# Patient Record
Sex: FEMALE | Race: WHITE | NOT HISPANIC OR LATINO | Employment: OTHER | URBAN - METROPOLITAN AREA
[De-identification: names, ages, dates, MRNs, and addresses within clinical notes are randomized per-mention and may not be internally consistent; named-entity substitution may affect disease eponyms.]

---

## 2019-04-24 NOTE — TELEPHONE ENCOUNTER
RECORDS RECEIVED FROM: Compression fracture T-12 vertebrae, referral Dr. Melissa Leon, records available at [facility] in [facility], pt was seen by Dr. Dangelo- fax number is [phone], appt per pt   DATE RECEIVED: May 7, 2019    NOTES STATUS DETAILS   OFFICE NOTE from referring provider Not found Dr. Melissa Leon   OFFICE NOTE from other specialist Care Everywhere Dr Horn 10/27/16  Dr. Ferreira 9/28/16   DISCHARGE SUMMARY from hospital N/A    DISCHARGE REPORT from the ER N/A    OPERATIVE REPORT N/A    MEDICATION LIST Care Everywhere    IMPLANT RECORD/STICKER N/A    LABS     CBC/DIFF N/A    CULTURES N/A    INJECTIONS DONE IN RADIOLOGY Received 8/29/18   MRI Received 10/7/16   CT SCAN N/A    XRAYS (IMAGES & REPORTS) Received 8/6/18   TUMOR     PATHOLOGY  Slides & report N/A      04/24/19   2:22 PM  Faxed request to above number. Note, this is a colorectal specialist. Likely unrelated.    05/03/19   10:20 AM  Spoke with Jannet at [facility], she is pushing images

## 2019-05-03 DIAGNOSIS — M54.9 BACK PAIN: Primary | ICD-10-CM

## 2019-05-07 ENCOUNTER — ANCILLARY PROCEDURE (OUTPATIENT)
Dept: GENERAL RADIOLOGY | Facility: CLINIC | Age: 84
End: 2019-05-07
Attending: ORTHOPAEDIC SURGERY
Payer: MEDICARE

## 2019-05-07 ENCOUNTER — OFFICE VISIT (OUTPATIENT)
Dept: ORTHOPEDICS | Facility: CLINIC | Age: 84
End: 2019-05-07
Payer: MEDICARE

## 2019-05-07 ENCOUNTER — PRE VISIT (OUTPATIENT)
Dept: ORTHOPEDICS | Facility: CLINIC | Age: 84
End: 2019-05-07

## 2019-05-07 VITALS — BODY MASS INDEX: 21.29 KG/M2 | WEIGHT: 127.8 LBS | HEIGHT: 65 IN

## 2019-05-07 DIAGNOSIS — S22.080A WEDGE COMPRESSION FRACTURE OF T11-T12 VERTEBRA, INITIAL ENCOUNTER FOR CLOSED FRACTURE (H): ICD-10-CM

## 2019-05-07 DIAGNOSIS — S22.080A CLOSED WEDGE COMPRESSION FRACTURE OF ELEVENTH THORACIC VERTEBRA, INITIAL ENCOUNTER: Primary | ICD-10-CM

## 2019-05-07 DIAGNOSIS — M54.9 BACK PAIN: ICD-10-CM

## 2019-05-07 RX ORDER — IBUPROFEN 600 MG/1
600 TABLET, FILM COATED ORAL
Status: ON HOLD | COMMUNITY
Start: 2016-09-28 | End: 2020-09-10

## 2019-05-07 RX ORDER — FLUTICASONE PROPIONATE 50 MCG
2 SPRAY, SUSPENSION (ML) NASAL EVERY MORNING
COMMUNITY
Start: 2014-12-15

## 2019-05-07 RX ORDER — TRIAMTERENE/HYDROCHLOROTHIAZID 37.5-25 MG
37.5 TABLET ORAL DAILY
Status: ON HOLD | COMMUNITY
Start: 2019-02-15 | End: 2020-09-10

## 2019-05-07 RX ORDER — METOPROLOL SUCCINATE 25 MG/1
25 TABLET, EXTENDED RELEASE ORAL EVERY MORNING
COMMUNITY
Start: 2018-08-22 | End: 2022-03-15

## 2019-05-07 ASSESSMENT — ENCOUNTER SYMPTOMS
STIFFNESS: 0
SNORES LOUDLY: 0
NECK PAIN: 0
MUSCLE WEAKNESS: 0
MYALGIAS: 0
BACK PAIN: 1
SHORTNESS OF BREATH: 0
MUSCLE CRAMPS: 1
POSTURAL DYSPNEA: 0
ARTHRALGIAS: 0
JOINT SWELLING: 0
COUGH DISTURBING SLEEP: 0
DYSPNEA ON EXERTION: 0
HEMOPTYSIS: 0
COUGH: 1
WHEEZING: 0

## 2019-05-07 ASSESSMENT — MIFFLIN-ST. JEOR: SCORE: 1045.58

## 2019-05-07 NOTE — PROGRESS NOTES
REASON FOR CONSULTATION: Consult (Low back pain, compression fracture T-12 Vertebra referral by Dr. Melissa Leon )     REFERRING PHYSICIAN: Referred Self   PCP:Cameron Chandler    History of Present Illness:  83/f, for evaluation of a T11 body compression fracture.  Patient does not report any specific event that caused her back pain.  Notes pain in her back since early 2000's.  Has been getting worse for the last several years.  She has tried physical therapy and water aerobics, which have not helped very much.  Ibuprofen helps occasionally.  She has had 3 steroid injections into her back, the most recent one on 8/29/2018, which did not work (appears to be a facet joint injection).   Reports the pain is gone to limit her quality of life and ability to daily activities.  She has pain when sitting or walking for long durations.  Pain is better when lying down on a wedge.  She would like testing done at this point as it significantly bothering her.      Back 100%, Leg 0%,  Left > Right  Worse: Sitting, standing or walking  Better: Lying down    Previous treatment:   Epidural steroid injection, 8/29/2018 most recent one, did not work  PT and water aerobics, which has not worked.      Oswestry (DACIA) Questionnaire    OSWESTRY DISABILITY INDEX 5/7/2019   Count 8   Sum 14   Oswestry Score (%) 35   Some recent data might be hidden          ROS:  A 12-point review of systems was completed and is negative except for otherwise noted above in the history of present illness.    Med Hx:  No past medical history on file.    Surg Hx:  No past surgical history on file.    Allergies:  Allergies   Allergen Reactions     Rosuvastatin Muscle Pain (Myalgia)     Seasonal Allergies      Atorvastatin      PN: Reaction: BLURRED VISION     Fenofibrate Muscle Pain (Myalgia)     Fluconazole Nausea     Losartan      PN: Reaction: Back pain and cramping     Pravastatin      PN:  Reaction: JOINT AND MUSCLE PAIN     Simvastatin Nausea     Niacin  "Rash       Meds:  Current Outpatient Medications   Medication     aspirin (ASA) 81 MG EC tablet     cholecalciferol (VITAMIN D-1000 MAX ST) 1000 units TABS     fluticasone (FLONASE) 50 MCG/ACT nasal spray     ibuprofen (ADVIL/MOTRIN) 600 MG tablet     metoprolol succinate ER (TOPROL-XL) 25 MG 24 hr tablet     triamterene-HCTZ (MAXZIDE-25) 37.5-25 MG tablet     No current facility-administered medications for this visit.        Fam Hx:  No family history on file.    P/S Hx:  Social History     Tobacco Use     Smoking status: Not on file   Substance Use Topics     Alcohol use: Not on file         Physical Exam:  Very pleasant, healthy appearing, alert, oriented x 3, cooperative.  Normal mood and affect.  Not in cardiorespiratory distress.  Ht 1.651 m (5' 5\")   Wt 58 kg (127 lb 12.8 oz)   BMI 21.27 kg/m    Normal upright posture.    Normal gait without assistive device.  No antalgia / imbalance.  Able to walk on toes and on heels with ease.  Back: no deformity, no skin lesions or surgical scars.  Localizes pain at low back and PSIS  Tenderness: (+) lumbar midline, (+) L paraspinal, (-) R and (+) L PSIS.  ROM:   Pain with extension.   Mild pain with flexion    Neuro Exam:  Motor: 5/5 strength for all muscle groups in both LE's.  Sensory:  Intact to light touch in both LE's.   Reflexes:  Knee 2+ bilat.  Ankle 2+ bilat.  (-) Babinski, (-) clonus.    Lower Extremity:  Equal leg lengths, full pulses, (-) atrophy / asymmetry.  Full painless passive knee and ankle motion.  Straight leg raise: (-) right, (-) left.  Hip impingement: (-) right, (-) left.  THERESA/Chinmay's: (-) right, (+) left.      Sacroiliac Joint Tests:      TEST RIGHT LEFT   PSIS tenderness - +   Dioni finger sign - -   THERESA/Chinmay - +   Thigh thrust - -           Gapping -   Compression -   Sacral thrust -   Gaenslen's -           Imaging:  Lumbar AP lateral x-ray from 8/6/2018 reviewed.  This shows chronic appearing T11 compression deformity.  " Multilevel lumbar spondylosis.  Thoracic hyperkyphosis; thoracolumbar junction kyphosis, with compensatory lumbar hyperlordosis and pelvic retroversion.  Sagittal measurements:  LL 43  L4-S1 30, ideal 28  PI 42  PI-LL mismatch -1  PT 26  SVA +4.0cm  TPA 22  GT 30  T10-L2 kyph 32  TK T2-L1 87    Impression:   - 83/f with a T11 compression fracture of unknown chronicity and etiology, kyphosis with T-spine round back, and lumbar paraspinal pain and weakness    Plan:   Discussed physical therapy with the patient to strengthen her paraspinal muscles.  Patient states that she is tried physical therapy in the past and did not work very well.  Discussed a interlaminar epidural steroid injection for L4-5.  Patient is in agreement of this plan and will undergo the injection.  Discussed possible surgery in the future for posterior spinal fusion with osteotomies.  We discussed this to be a large procedure and will undergo for someone of her age.  Patient agrees and like to hold off on surgery as long as possible.  Patient will call the office with results of the injection.  Follow-up PRN    All questions and concerns were answered to the patient's apparent satisfaction before leaving the clinic.     Total visit time > 30 mins, > 50% counseling and coordination of care.    Luis E Otoole MD    Attestation:  I (Dr. Acosta Whipple - Spine Surgeon) have personally evaluated patient with PGY-4 Xiang, and agree with findings and plan outlined in the note, which I also edited.  I discussed at length with the patient/family, explained the nature of spinal condition, and formulated workup and/or treatment plan together.  All questions were answered to the best of my ability and to patient's apparent satisfaction.    A>  - Old burst fracture L3; old compression fracture T9; unknown date/mechanism of fractures.  - Probable osteoporosis.  - Thoracic hyperkyphosis (TK T2-L1 = 87 degrees) and thoracolumbar junction kyphosis (T10-L2  = 32 degrees kyphosis), with compensatory lumbar hyperlordosis and pelvic retroversion (PT = 26).    P> as above.      Acosta Whipple MD    Orthopaedic Spine Surgery  Dept Orthopaedic Surgery, [facility]  [phone] office, [phone] pager  [email or web address]    Answers for HPI/ROS submitted by the patient on 5/7/2019   General Symptoms: No  Skin Symptoms: No  HENT Symptoms: No  EYE SYMPTOMS: No  HEART SYMPTOMS: No  LUNG SYMPTOMS: Yes  INTESTINAL SYMPTOMS: No  URINARY SYMPTOMS: No  GYNECOLOGIC SYMPTOMS: No  BREAST SYMPTOMS: No  SKELETAL SYMPTOMS: Yes  BLOOD SYMPTOMS: No  NERVOUS SYSTEM SYMPTOMS: No  MENTAL HEALTH SYMPTOMS: No  Cough: Yes  Coughing up blood: No  Difficulty breating or shortness of breath: No  Snoring: No  Wheezing: No  Difficulty breathing on exertion: No  Nighttime Cough: No  Difficulty breathing when lying flat: No  Back pain: Yes  Muscle aches: No  Neck pain: No  Swollen joints: No  Joint pain: No  Bone pain: Yes  Muscle cramps: Yes  Muscle weakness: No  Joint stiffness: No

## 2019-05-07 NOTE — LETTER
5/7/2019       RE: Lydia Dietz  [address] [facility] [address]     Dear Colleague,    Thank you for referring your patient, Lydia Dietz, to the [facility]. Please see a copy of my visit note below.    REASON FOR CONSULTATION: Consult (Low back pain, compression fracture T-12 Vertebra referral by Dr. Melissa Leon )     REFERRING PHYSICIAN: Referred Self   PCP:Cameron Chandler    History of Present Illness:  83/f, for evaluation of a T11 body compression fracture.  Patient does not report any specific event that caused her back pain.  Notes pain in her back since early 2000's.  Has been getting worse for the last several years.  She has tried physical therapy and water aerobics, which have not helped very much.  Ibuprofen helps occasionally.  She has had 3 steroid injections into her back, the most recent one on 8/29/2018, which did not work (appears to be a facet joint injection).   Reports the pain is gone to limit her quality of life and ability to daily activities.  She has pain when sitting or walking for long durations.  Pain is better when lying down on a wedge.  She would like testing done at this point as it significantly bothering her.      Back 100%, Leg 0%,  Left > Right  Worse: Sitting, standing or walking  Better: Lying down    Previous treatment:   Epidural steroid injection, 8/29/2018 most recent one, did not work  PT and water aerobics, which has not worked.      Oswestry (DACIA) Questionnaire    OSWESTRY DISABILITY INDEX 5/7/2019   Count 8   Sum 14   Oswestry Score (%) 35   Some recent data might be hidden          ROS:  A 12-point review of systems was completed and is negative except for otherwise noted above in the history of present illness.    Med Hx:  No past medical history on file.    Surg Hx:  No past surgical history on file.    Allergies:  Allergies   Allergen Reactions     Rosuvastatin Muscle Pain (Myalgia)  "    Seasonal Allergies      Atorvastatin      PN: Reaction: BLURRED VISION     Fenofibrate Muscle Pain (Myalgia)     Fluconazole Nausea     Losartan      PN: Reaction: Back pain and cramping     Pravastatin      PN:  Reaction: JOINT AND MUSCLE PAIN     Simvastatin Nausea     Niacin Rash       Meds:  Current Outpatient Medications   Medication     aspirin (ASA) 81 MG EC tablet     cholecalciferol (VITAMIN D-1000 MAX ST) 1000 units TABS     fluticasone (FLONASE) 50 MCG/ACT nasal spray     ibuprofen (ADVIL/MOTRIN) 600 MG tablet     metoprolol succinate ER (TOPROL-XL) 25 MG 24 hr tablet     triamterene-HCTZ (MAXZIDE-25) 37.5-25 MG tablet     No current facility-administered medications for this visit.        Fam Hx:  No family history on file.    P/S Hx:  Social History     Tobacco Use     Smoking status: Not on file   Substance Use Topics     Alcohol use: Not on file         Physical Exam:  Very pleasant, healthy appearing, alert, oriented x 3, cooperative.  Normal mood and affect.  Not in cardiorespiratory distress.  Ht 1.651 m (5' 5\")   Wt 58 kg (127 lb 12.8 oz)   BMI 21.27 kg/m     Normal upright posture.    Normal gait without assistive device.  No antalgia / imbalance.  Able to walk on toes and on heels with ease.  Back: no deformity, no skin lesions or surgical scars.  Localizes pain at low back and PSIS  Tenderness: (+) lumbar midline, (+) L paraspinal, (-) R and (+) L PSIS.  ROM:   Pain with extension.   Mild pain with flexion    Neuro Exam:  Motor: 5/5 strength for all muscle groups in both LE's.  Sensory:  Intact to light touch in both LE's.   Reflexes:  Knee 2+ bilat.  Ankle 2+ bilat.  (-) Babinski, (-) clonus.    Lower Extremity:  Equal leg lengths, full pulses, (-) atrophy / asymmetry.  Full painless passive knee and ankle motion.  Straight leg raise: (-) right, (-) left.  Hip impingement: (-) right, (-) left.  THERESA/Chinmay's: (-) right, (+) left.      Sacroiliac Joint Tests:      TEST RIGHT LEFT   PSIS " tenderness - +   Dioni finger sign - -   THERESA/Chinmay - +   Thigh thrust - -           Gapping -   Compression -   Sacral thrust -   Gaenslen's -           Imaging:  Lumbar AP lateral x-ray from 8/6/2018 reviewed.  This shows chronic appearing T11 compression deformity.  Multilevel lumbar spondylosis.  Thoracic hyperkyphosis; thoracolumbar junction kyphosis, with compensatory lumbar hyperlordosis and pelvic retroversion.  Sagittal measurements:  LL 43  L4-S1 30, ideal 28  PI 42  PI-LL mismatch -1  PT 26  SVA +4.0cm  TPA 22  GT 30  T10-L2 kyph 32  TK T2-L1 87    Impression:   - 83/f with a T11 compression fracture of unknown chronicity and etiology, kyphosis with T-spine round back, and lumbar paraspinal pain and weakness    Plan:   Discussed physical therapy with the patient to strengthen her paraspinal muscles.  Patient states that she is tried physical therapy in the past and did not work very well.  Discussed a interlaminar epidural steroid injection for L4-5.  Patient is in agreement of this plan and will undergo the injection.  Discussed possible surgery in the future for posterior spinal fusion with osteotomies.  We discussed this to be a large procedure and will undergo for someone of her age.  Patient agrees and like to hold off on surgery as long as possible.  Patient will call the office with results of the injection.  Follow-up PRN    All questions and concerns were answered to the patient's apparent satisfaction before leaving the clinic.     Total visit time > 30 mins, > 50% counseling and coordination of care.    Luis E Otoole MD    Attestation:  I (Dr. Acosta Whipple - Spine Surgeon) have personally evaluated patient with PGY-4 Xiang, and agree with findings and plan outlined in the note, which I also edited.  I discussed at length with the patient/family, explained the nature of spinal condition, and formulated workup and/or treatment plan together.  All questions were answered to the  best of my ability and to patient's apparent satisfaction.    A>  - Old burst fracture L3; old compression fracture T9; unknown date/mechanism of fractures.  - Probable osteoporosis.  - Thoracic hyperkyphosis (TK T2-L1 = 87 degrees) and thoracolumbar junction kyphosis (T10-L2 = 32 degrees kyphosis), with compensatory lumbar hyperlordosis and pelvic retroversion (PT = 26).    P> as above.      Acosta Whipple MD    Orthopaedic Spine Surgery  Dept Orthopaedic Surgery, [facility]  [phone] office, [phone] pager  [email or web address]    Answers for HPI/ROS submitted by the patient on 5/7/2019   General Symptoms: No  Skin Symptoms: No  HENT Symptoms: No  EYE SYMPTOMS: No  HEART SYMPTOMS: No  LUNG SYMPTOMS: Yes  INTESTINAL SYMPTOMS: No  URINARY SYMPTOMS: No  GYNECOLOGIC SYMPTOMS: No  BREAST SYMPTOMS: No  SKELETAL SYMPTOMS: Yes  BLOOD SYMPTOMS: No  NERVOUS SYSTEM SYMPTOMS: No  MENTAL HEALTH SYMPTOMS: No  Cough: Yes  Coughing up blood: No  Difficulty breating or shortness of breath: No  Snoring: No  Wheezing: No  Difficulty breathing on exertion: No  Nighttime Cough: No  Difficulty breathing when lying flat: No  Back pain: Yes  Muscle aches: No  Neck pain: No  Swollen joints: No  Joint pain: No  Bone pain: Yes  Muscle cramps: Yes  Muscle weakness: No  Joint stiffness: No      Again, thank you for allowing me to participate in the care of your patient.      Sincerely,    Acosta Whipple MD

## 2019-05-07 NOTE — NURSING NOTE
"Reason For Visit:   Chief Complaint   Patient presents with     Consult     Low back pain, compression fracture T-12 Vertebra referral by Dr. Melissa Leon        Primary MD: Cameron Chandler  Ref. MD: Argelia Leon     ?  No  Currently working? No.  Work status?  Retired.  Date of surgery: N/A  Type of surgery: n/a.  Smoker: No  Request smoking cessation information: No    Ht 1.651 m (5' 5\")   Wt 58 kg (127 lb 12.8 oz)   BMI 21.27 kg/m      Pain Assessment  Patient Currently in Pain: Yes  0-10 Pain Scale: 9  Primary Pain Location: Back  Pain Descriptors: Constant, Sore, Aching    Oswestry (DACIA) Questionnaire    OSWESTRY DISABILITY INDEX 5/7/2019   Count 8   Sum 14   Oswestry Score (%) 35   Some recent data might be hidden            Neck Disability Index (NDI) Questionnaire    No flowsheet data found.           Visual Analog Pain Scale  Back Pain Scale 0-10: 10  Right leg pain: 0  Left leg pain: 0    Promis 10 Assessment    PROMIS 10 5/7/2019   In general, would you say your health is: Very good   In general, would you say your quality of life is: Excellent   In general, how would you rate your physical health? Fair   In general, how would you rate your mental health, including your mood and your ability to think? Very good   In general, how would you rate your satisfaction with your social activities and relationships? Very good   In general, please rate how well you carry out your usual social activities and roles Very good   To what extent are you able to carry out your everyday physical activities such as walking, climbing stairs, carrying groceries, or moving a chair? Completely   How often have you been bothered by emotional problems such as feeling anxious, depressed or irritable? Sometimes   How would you rate your fatigue on average? Moderate   How would you rate your pain on average?   0 = No Pain  to  10 = Worst Imaginable Pain 10   In general, would you say your health is: 4   In general, " would you say your quality of life is: 5   In general, how would you rate your physical health? 2   In general, how would you rate your mental health, including your mood and your ability to think? 4   In general, how would you rate your satisfaction with your social activities and relationships? 4   In general, please rate how well you carry out your usual social activities and roles. (This includes activities at home, at work and in your community, and responsibilities as a parent, child, spouse, employee, friend, etc.) 4   To what extent are you able to carry out your everyday physical activities such as walking, climbing stairs, carrying groceries, or moving a chair? 5   In the past 7 days, how often have you been bothered by emotional problems such as feeling anxious, depressed, or irritable? 3   In the past 7 days, how would you rate your fatigue on average? 3   In the past 7 days, how would you rate your pain on average, where 0 means no pain, and 10 means worst imaginable pain? 10   Global Mental Health Score 16   Global Physical Health Score 11   PROMIS TOTAL - SUBSCORES 27   Some recent data might be hidden                Marleni De Luna CMA

## 2019-06-12 ENCOUNTER — ANCILLARY PROCEDURE (OUTPATIENT)
Dept: GENERAL RADIOLOGY | Facility: CLINIC | Age: 84
End: 2019-06-12
Payer: MEDICARE

## 2019-06-12 VITALS
HEART RATE: 72 BPM | TEMPERATURE: 97.8 F | SYSTOLIC BLOOD PRESSURE: 142 MMHG | RESPIRATION RATE: 16 BRPM | DIASTOLIC BLOOD PRESSURE: 88 MMHG | OXYGEN SATURATION: 96 %

## 2019-06-12 DIAGNOSIS — S22.080A CLOSED WEDGE COMPRESSION FRACTURE OF ELEVENTH THORACIC VERTEBRA, INITIAL ENCOUNTER: ICD-10-CM

## 2019-06-12 RX ORDER — LIDOCAINE HYDROCHLORIDE 10 MG/ML
30 INJECTION, SOLUTION EPIDURAL; INFILTRATION; INTRACAUDAL; PERINEURAL ONCE
Status: COMPLETED | OUTPATIENT
Start: 2019-06-12 | End: 2019-06-12

## 2019-06-12 RX ORDER — METHYLPREDNISOLONE ACETATE 80 MG/ML
80 INJECTION, SUSPENSION INTRA-ARTICULAR; INTRALESIONAL; INTRAMUSCULAR; SOFT TISSUE ONCE
Status: COMPLETED | OUTPATIENT
Start: 2019-06-12 | End: 2019-06-12

## 2019-06-12 RX ORDER — BUPIVACAINE HYDROCHLORIDE 5 MG/ML
10 INJECTION, SOLUTION EPIDURAL; INTRACAUDAL ONCE
Status: COMPLETED | OUTPATIENT
Start: 2019-06-12 | End: 2019-06-12

## 2019-06-12 RX ORDER — IOPAMIDOL 408 MG/ML
20 INJECTION, SOLUTION INTRATHECAL ONCE
Status: COMPLETED | OUTPATIENT
Start: 2019-06-12 | End: 2019-06-12

## 2019-06-12 RX ADMIN — IOPAMIDOL 2 ML: 408 INJECTION, SOLUTION INTRATHECAL at 09:52

## 2019-06-12 RX ADMIN — LIDOCAINE HYDROCHLORIDE 5 ML: 10 INJECTION, SOLUTION EPIDURAL; INFILTRATION; INTRACAUDAL; PERINEURAL at 09:52

## 2019-06-12 RX ADMIN — BUPIVACAINE HYDROCHLORIDE 2 ML: 5 INJECTION, SOLUTION EPIDURAL; INTRACAUDAL at 09:52

## 2019-06-12 RX ADMIN — METHYLPREDNISOLONE ACETATE 80 MG: 80 INJECTION, SUSPENSION INTRA-ARTICULAR; INTRALESIONAL; INTRAMUSCULAR; SOFT TISSUE at 09:52

## 2019-06-12 NOTE — PROGRESS NOTES
Pt back from injection and complains of no back pain at this time. VSS and no complications noted. Pt given oral and written instructions. Pt escorted to the lobby. Darcy HORNER.

## 2020-08-20 ENCOUNTER — TELEPHONE (OUTPATIENT)
Dept: CARDIOLOGY | Facility: CLINIC | Age: 85
End: 2020-08-20

## 2020-08-20 NOTE — TELEPHONE ENCOUNTER
Called to make appointment for a second opinion with cardiovascular surgeon, Dr. De Los Santos. Unfortunately she was transferred to Dr. Gamble's nurse team.     Was seen and worked up at [facility] in [place] and was told she needed a valve and open heart surgery done. She wishes to have a second opinion.    Will have scheduling call and set up an appointment. Verbalized understanding.    Message sent to .

## 2020-08-27 ENCOUNTER — OFFICE VISIT (OUTPATIENT)
Dept: CARDIOLOGY | Facility: CLINIC | Age: 85
End: 2020-08-27
Payer: MEDICARE

## 2020-08-27 VITALS
WEIGHT: 126 LBS | HEIGHT: 65 IN | HEART RATE: 73 BPM | BODY MASS INDEX: 20.99 KG/M2 | DIASTOLIC BLOOD PRESSURE: 54 MMHG | SYSTOLIC BLOOD PRESSURE: 144 MMHG

## 2020-08-27 DIAGNOSIS — I35.0 NONRHEUMATIC AORTIC VALVE STENOSIS: Primary | ICD-10-CM

## 2020-08-27 ASSESSMENT — MIFFLIN-ST. JEOR: SCORE: 1027.41

## 2020-08-27 NOTE — LETTER
8/27/2020      RE: Lydia Dietz  [address] [facility] [address]       Dear Colleague,    Thank you for the opportunity to participate in the care of your patient, Lydia Dietz, at the [facility] CARDIOTHORACIC at [facility]. Please see a copy of my visit note below.     HISTORY:         Patient is a pleasant 85 year old female with fatigue and shortness of breath.  Her severe aortic stenosis is characterized by echocardiography on 6/22/20 at [facility] with an area of 0.86 cm2, mean gradient 42 mmHg and peak velocity of 4.3 m/sec with LVEF 60%.  She underwent coronary angiography as part of her preoperative evaluation, which demonstrated moderate proximal LAD stenosis, severe distal LAD stenosis, and severe proximal RCA stenosis. The lesions appear calcified. Patient's systemic pressure reportedly dropped with engagement of the RCA with a diagnostic catheter at the time. Hypotension was transient, and she recovered quickly with the administration of vasoactive agents.      Patient says that she has been feeling progressively fatigued over the last two years, and finds that she needs to rest for up to a half-hour at a time while tending garden or doing other work around the house. Her  Geovany feels that she has slowed down a bit when they go out for walks. She has no angina, syncope, orthopnea, lower extremity edema, or PND.      PAST MEDICAL HISTORY:   Coronary artery disease  Aortic stenosis  Hypertension  Osteoarthritis  Mild renal insuffiency  Renal cysts      PAST SURGICAL HISTORY:   None      CURRENT MEDICATIONS:   Aspirin 81mg  Metoprolol succinate 25mg   Triamterene-hydrochlorothiazide 37.5-25mg      ALLERGIES:            Allergies   Allergen Reactions     Rosuvastatin Muscle Pain (Myalgia)     Seasonal Allergies       Amlodipine         Other reaction(s): Edema,generalized     Atorvastatin         PN: Reaction: BLURRED VISION     Fenofibrate Muscle  Pain (Myalgia)     Fluconazole Nausea     Losartan         PN: Reaction: Back pain and cramping     Pravastatin         PN:  Reaction: JOINT AND MUSCLE PAIN     Simvastatin Nausea     Niacin Rash          FAMILY HISTORY:   None relevant      SOCIAL HISTORY:   Denies alcohol drug or tobacco abuse.      REVIEW OF SYSTEMS:      10 point review of systems norrnal other than mentioned in history and physical.       PHYSICAL EXAMINATION:      GENERAL: Well-appearing and comfortable  HEENT: no conjunctival pallor  Neck: no JVD  Heart: Regular rate and rhythm. Harsh 3/6 late-peaking systolic murmur with radiation to carotids. Delayed carotid pulses.   Lungs: Clear to auscultation. No ronchi, wheezes, rales.   Abdomen: Soft, nontender, nondistended. Bowel sounds present.  Extremities: No clubbing, cyanosis, or edema.   Neurologic: Alert and oriented to person/place/time, normal speech and affect. No focal deficits.  Skin: No petechiae, purpura or rash.      PROCEDURES & FURTHER ASSESSMENTS:      ECG: ([employer] report) sinus rhythm, normal tracing     CT TAVR (8/3/2020 - [employer]):              Aortic annulus:                          Area: 377 mm2                          Circumference: 70 mm                          Diameter: 20.1 mm x 22.7 mm mm                Left ventricular Outflow Tract (LVOT)                          Moderate calcifications are noted in the LVOT.                          Area: 338 mm2                          Circumference : 67 mm                          Diameter: 20.2 x 23.1 mm                Sinus of Valsalva                           Diameter: 33.4 x 33.5 mm                          Height: 22.3 mm                Sinotubular Junction                           Area: 613 mm2                          Circumference: 91 mm                          Diameter: 29.3 x 32.7 mm                Coronary Heights                          Annulus to LM Height: 18.2 mm                          Annulus  to RCA Height: 15.6 mm                Max Ascending Aorta Diameter: 34.5 mm                          Max Descending Thoracic Aorta Diameter: 35.2 mm                          Min Abdominal Aorta Diameter: 12.8 mm                CTA Abdomen and Pelvis: Moderate calcifications of the abdominal aorta.  Minimal iliac tortuosity.               Measurements:                          Right Common Iliac Artery: 7.2 x 8.5 mm                          Right External Iliac Artery: 7. 2 x 8.3 mm                          Right Common Femoral Artery: 7.1 x 7.5 mm                            Left Common Iliac Artery: 8.4 x 9.9 mm                          Left External Iliac Artery: 6.8 x 8.2 mm                          Left Common Femoral artery: 6.8 x 8.0 mm        STS RISK SCORE: 3.4%  FRAILTY SCORE: 1/5  NYHA CLASS: II      CLINICAL IMPRESSION:      Patient is a 85-year-old female with symptomatic severe aortic stenosis who is a high risk for adverse outcomes after surgical aortic valve replacement based on age, frailty, co-morbidities and overall surgical mortality risk estimation of 3.4% based on the STS score.  I will request a TAVR evaluation to assess her candidacy.         Please do not hesitate to contact me if you have any questions/concerns.     Sincerely,     Meño De Los Santos MD

## 2020-09-02 ENCOUNTER — OFFICE VISIT (OUTPATIENT)
Dept: CARDIOLOGY | Facility: CLINIC | Age: 85
End: 2020-09-02
Attending: INTERNAL MEDICINE
Payer: MEDICARE

## 2020-09-02 DIAGNOSIS — I35.0 NONRHEUMATIC AORTIC VALVE STENOSIS: Primary | ICD-10-CM

## 2020-09-02 DIAGNOSIS — I25.10 CORONARY ARTERY DISEASE INVOLVING NATIVE CORONARY ARTERY OF NATIVE HEART WITHOUT ANGINA PECTORIS: ICD-10-CM

## 2020-09-02 PROCEDURE — 99205 OFFICE O/P NEW HI 60 MIN: CPT | Mod: GC | Performed by: INTERNAL MEDICINE

## 2020-09-02 NOTE — PROGRESS NOTES
[facility] HEART CARE  CARDIOVASCULAR DIVISION    VALVE CLINIC INITIAL CONSULTATION    PRIMARY CARDIOLOGIST: Dr. Rudolph Giron      PERTINENT CLINICAL HISTORY:     Lydia Dietz is a very pleasant 85-year-old female with severe aortic valvular stenosis referred to our clinic by Dr Meño De Los Santos for evaluation and consideration for potential transcatheter aortic valve replacement (TAVR). She was originally seen by Dr Ras Colbert, who recommended a surgical AVR with concomitant two-vessel bypass for severe coronary artery disease. She sought a second opinion from Dr De Los Santos, who felt that if her CAD could be addressed percutaneously, she would be a good candidate for TAVR. She is here today with her  Geovany, and her daughter Argelia called in to join the conversation     Her severe aortic stenosis is characterized by echocardiography on 6/22/20 at [facility] with an area of 0.86 cm2, mean gradient 42 mmHg and peak velocity of 4.3 m/sec with LVEF 60%.  She underwent coronary angiography as part of her preoperative evaluation, which demonstrated moderate proximal LAD stenosis, severe distal LAD stenosis, and severe proximal RCA stenosis. The lesions appear calcified. Patient's systemic pressure reportedly dropped with engagement of the RCA with a diagnostic catheter at the time. Hypotension was transient, and she recovered quickly with the administration of vasoactive agents.     On my evaluation, Ms Dietz relates that she has been feeling progressively fatigued over the last two years, and finds that she needs to rest for up to a half-hour at a time while tending garden or doing other work around the house. Her  Geovany feels that she has slowed down a bit when they go out for walks. She has no angina, syncope, orthopnea, lower extremity edema, or PND.     PAST MEDICAL HISTORY:   Coronary artery disease  Aortic stenosis  Hypertension  Osteoarthritis  Mild renal insuffiency  Renal cysts     PAST  SURGICAL HISTORY:   None     CURRENT MEDICATIONS:   Aspirin 81mg  Metoprolol succinate 25mg   Triamterene-hydrochlorothiazide 37.5-25mg     ALLERGIES:     Allergies   Allergen Reactions     Rosuvastatin Muscle Pain (Myalgia)     Seasonal Allergies      Amlodipine      Other reaction(s): Edema,generalized     Atorvastatin      PN: Reaction: BLURRED VISION     Fenofibrate Muscle Pain (Myalgia)     Fluconazole Nausea     Losartan      PN: Reaction: Back pain and cramping     Pravastatin      PN:  Reaction: JOINT AND MUSCLE PAIN     Simvastatin Nausea     Niacin Rash        FAMILY HISTORY:   None relevant     SOCIAL HISTORY:     Retired  Former smoker; quit in 1980     REVIEW OF SYSTEMS:     Constitutional: No fevers or chills  Skin: No new rash or itching  Eyes: No acute change in vision  Ears/Nose/Throat: No purulent rhinorrhea, new hearing loss, or new vertigo  Respiratory: No cough or hemoptysis  Cardiovascular: See HPI  Gastrointestinal: No change in appetite, vomiting, hematemesis or diarrhea  Genitourinary: No dysuria or hematuria  Musculoskeletal: No new back pain, neck pain or muscle pain  Neurologic: No new headaches, focal weakness or behavior changes  Psychiatric: No hallucinations, excessive alcohol consumption or illegal drug usage  Hematologic/Lymphatic/Immunologic: No bleeding, chills, fever, night sweats or weight loss  Endocrine: No new cold intolerance, heat intolerance, polyphagia, polydipsia or polyuria      PHYSICAL EXAMINATION:     GENERAL: Well-appearing and comfortable  HEENT: no conjunctival pallor  Neck: no JVD  Heart: Regular rate and rhythm. Harsh 3/6 late-peaking systolic murmur with radiation to carotids. Delayed carotid pulses.   Lungs: Clear to auscultation. No ronchi, wheezes, rales.   Abdomen: Soft, nontender, nondistended. Bowel sounds present.  Extremities: No clubbing, cyanosis, or edema.   Neurologic: Alert and oriented to person/place/time, normal speech and affect. No  focal deficits.  Skin: No petechiae, purpura or rash.     PROCEDURES & FURTHER ASSESSMENTS:     ECG: ([employer] report) sinus rhythm, normal tracing    CT TAVR (8/3/2020 - [employer]):   Aortic annulus:    Area: 377 mm2    Circumference: 70 mm    Diameter: 20.1 mm x 22.7 mm mm     Left ventricular Outflow Tract (LVOT)    Moderate calcifications are noted in the LVOT.    Area: 338 mm2    Circumference : 67 mm    Diameter: 20.2 x 23.1 mm     Sinus of Valsalva     Diameter: 33.4 x 33.5 mm    Height: 22.3 mm     Sinotubular Junction     Area: 613 mm2    Circumference: 91 mm    Diameter: 29.3 x 32.7 mm     Coronary Heights    Annulus to LM Height: 18.2 mm    Annulus to RCA Height: 15.6 mm     Max Ascending Aorta Diameter: 34.5 mm    Max Descending Thoracic Aorta Diameter: 35.2 mm    Min Abdominal Aorta Diameter: 12.8 mm     CTA Abdomen and Pelvis: Moderate calcifications of the abdominal aorta.  Minimal iliac tortuosity.    Measurements:    Right Common Iliac Artery: 7.2 x 8.5 mm    Right External Iliac Artery: 7. 2 x 8.3 mm    Right Common Femoral Artery: 7.1 x 7.5 mm      Left Common Iliac Artery: 8.4 x 9.9 mm    Left External Iliac Artery: 6.8 x 8.2 mm    Left Common Femoral artery: 6.8 x 8.0 mm      STS RISK SCORE: 3.4%  FRAILTY SCORE: 1/5  NYHA CLASS: II     CLINICAL IMPRESSION:     Lydia Dietz is a 85-year-old female with symptomatic severe aortic stenosis who is a good candidate for transcatheter aortic valve replacement based on age, frailty, co-morbidities and overall surgical mortality risk estimation of 3.4% based on the STS score.  She is interested in proceeding with TAVR at this time.     She will need a PCI of her right coronary artery, and perhaps an FFR of her proximal LAD. We will work to scheduled this as soon as is convenient for her and her family. We will then plan for an elective transfemoral TAVR after that.     Plan Summary:  1) Obstructive CAD: PCI to be scheduled this week or  next week.  2) Severe aortic stenosis: TAVR in a few weeks' time.     Patient was evaluated in clinic with Dr. Leonard Pastor of interventional cardiology and Dr. Meño De Los Santos of cardiothoracic surgery.    Cathy Rivera MD  Fellow, Advanced Interventional Cardiology and Structural Heart Disease       Patient seen and examined with Cardiovascular fellow and agree with the assessment and plan described above.     Leonard Pastor M.D.  Interventional Cardiology  [facility]

## 2020-09-02 NOTE — LETTER
9/2/2020      RE: Lydia Dietz  [address] [facility] [address]       Dear Colleague,    Thank you for the opportunity to participate in the care of your patient, Lydia Dietz, at the [facility] at [facility]. Please see a copy of my visit note below.        [facility] HEART CARE  CARDIOVASCULAR DIVISION    VALVE CLINIC INITIAL CONSULTATION    PRIMARY CARDIOLOGIST: Dr. Rudolph Giron      PERTINENT CLINICAL HISTORY:     Lydia Dietz is a very pleasant 85-year-old female with severe aortic valvular stenosis referred to our clinic by Dr Meño De Los Santos for evaluation and consideration for potential transcatheter aortic valve replacement (TAVR). She was originally seen by Dr Ras Colbert, who recommended a surgical AVR with concomitant two-vessel bypass for severe coronary artery disease. She sought a second opinion from Dr De Los Santos, who felt that if her CAD could be addressed percutaneously, she would be a good candidate for TAVR. She is here today with her  Geovany, and her daughter Argelia called in to join the conversation     Her severe aortic stenosis is characterized by echocardiography on 6/22/20 at [facility] with an area of 0.86 cm2, mean gradient 42 mmHg and peak velocity of 4.3 m/sec with LVEF 60%.  She underwent coronary angiography as part of her preoperative evaluation, which demonstrated moderate proximal LAD stenosis, severe distal LAD stenosis, and severe proximal RCA stenosis. The lesions appear calcified. Patient's systemic pressure reportedly dropped with engagement of the RCA with a diagnostic catheter at the time. Hypotension was transient, and she recovered quickly with the administration of vasoactive agents.     On my evaluation, Ms Dietz relates that she has been feeling progressively fatigued over the last two years, and finds that she needs to rest for up to a half-hour at a time while tending garden or doing other work  around the house. Her  Geovany feels that she has slowed down a bit when they go out for walks. She has no angina, syncope, orthopnea, lower extremity edema, or PND.     PAST MEDICAL HISTORY:   Coronary artery disease  Aortic stenosis  Hypertension  Osteoarthritis  Mild renal insuffiency  Renal cysts     PAST SURGICAL HISTORY:   None     CURRENT MEDICATIONS:   Aspirin 81mg  Metoprolol succinate 25mg   Triamterene-hydrochlorothiazide 37.5-25mg     ALLERGIES:     Allergies   Allergen Reactions     Rosuvastatin Muscle Pain (Myalgia)     Seasonal Allergies      Amlodipine      Other reaction(s): Edema,generalized     Atorvastatin      PN: Reaction: BLURRED VISION     Fenofibrate Muscle Pain (Myalgia)     Fluconazole Nausea     Losartan      PN: Reaction: Back pain and cramping     Pravastatin      PN:  Reaction: JOINT AND MUSCLE PAIN     Simvastatin Nausea     Niacin Rash        FAMILY HISTORY:   None relevant     SOCIAL HISTORY:     Retired  Former smoker; quit in 1980     REVIEW OF SYSTEMS:     Constitutional: No fevers or chills  Skin: No new rash or itching  Eyes: No acute change in vision  Ears/Nose/Throat: No purulent rhinorrhea, new hearing loss, or new vertigo  Respiratory: No cough or hemoptysis  Cardiovascular: See HPI  Gastrointestinal: No change in appetite, vomiting, hematemesis or diarrhea  Genitourinary: No dysuria or hematuria  Musculoskeletal: No new back pain, neck pain or muscle pain  Neurologic: No new headaches, focal weakness or behavior changes  Psychiatric: No hallucinations, excessive alcohol consumption or illegal drug usage  Hematologic/Lymphatic/Immunologic: No bleeding, chills, fever, night sweats or weight loss  Endocrine: No new cold intolerance, heat intolerance, polyphagia, polydipsia or polyuria      PHYSICAL EXAMINATION:     GENERAL: Well-appearing and comfortable  HEENT: no conjunctival pallor  Neck: no JVD  Heart: Regular rate and rhythm. Harsh 3/6 late-peaking systolic  murmur with radiation to carotids. Delayed carotid pulses.   Lungs: Clear to auscultation. No ronchi, wheezes, rales.   Abdomen: Soft, nontender, nondistended. Bowel sounds present.  Extremities: No clubbing, cyanosis, or edema.   Neurologic: Alert and oriented to person/place/time, normal speech and affect. No focal deficits.  Skin: No petechiae, purpura or rash.     PROCEDURES & FURTHER ASSESSMENTS:     ECG: ([employer] report) sinus rhythm, normal tracing    [facility]   Aortic annulus:    Area: 377 mm2    Circumference: 70 mm    Diameter: 20.1 mm x 22.7 mm mm     Left ventricular Outflow Tract (LVOT)    Moderate calcifications are noted in the LVOT.    Area: 338 mm2    Circumference : 67 mm    Diameter: 20.2 x 23.1 mm     Sinus of Valsalva     Diameter: 33.4 x 33.5 mm    Height: 22.3 mm     Sinotubular Junction     Area: 613 mm2    Circumference: 91 mm    Diameter: 29.3 x 32.7 mm     Coronary Heights    Annulus to LM Height: 18.2 mm    Annulus to RCA Height: 15.6 mm     Max Ascending Aorta Diameter: 34.5 mm    Max Descending Thoracic Aorta Diameter: 35.2 mm    Min Abdominal Aorta Diameter: 12.8 mm     CTA Abdomen and Pelvis: Moderate calcifications of the abdominal aorta.  Minimal iliac tortuosity.    Measurements:    Right Common Iliac Artery: 7.2 x 8.5 mm    Right External Iliac Artery: 7. 2 x 8.3 mm    Right Common Femoral Artery: 7.1 x 7.5 mm      Left Common Iliac Artery: 8.4 x 9.9 mm    Left External Iliac Artery: 6.8 x 8.2 mm    Left Common Femoral artery: 6.8 x 8.0 mm      STS RISK SCORE: 3.4%  FRAILTY SCORE: 1/5  NYHA CLASS: II     CLINICAL IMPRESSION:     Lydia Dietz is a 85-year-old female with symptomatic severe aortic stenosis who is a good candidate for transcatheter aortic valve replacement based on age, frailty, co-morbidities and overall surgical mortality risk estimation of 3.4% based on the STS score.  She is interested in proceeding with TAVR at this time.      She will need a PCI of her right coronary artery, and perhaps an FFR of her proximal LAD. We will work to scheduled this as soon as is convenient for her and her family. We will then plan for an elective transfemoral TAVR after that.     Plan Summary:  1) Obstructive CAD: PCI to be scheduled this week or next week.  2) Severe aortic stenosis: TAVR in a few weeks' time.     Patient was evaluated in clinic with Dr. Leonard Pastor of interventional cardiology and Dr. Meño De Los Santos of cardiothoracic surgery.    Cathy Rivera MD  Fellow, Advanced Interventional Cardiology and Structural Heart Disease       Patient seen and examined with Cardiovascular fellow and agree with the assessment and plan described above.     Leonard Pastor M.D.  Interventional Cardiology  [facility]

## 2020-09-03 ENCOUNTER — TELEPHONE (OUTPATIENT)
Dept: CARDIOLOGY | Facility: CLINIC | Age: 85
End: 2020-09-03

## 2020-09-03 DIAGNOSIS — I25.10 CORONARY ARTERY DISEASE INVOLVING NATIVE CORONARY ARTERY, ANGINA PRESENCE UNSPECIFIED, UNSPECIFIED WHETHER NATIVE OR TRANSPLANTED HEART: Primary | ICD-10-CM

## 2020-09-03 DIAGNOSIS — Z11.59 ENCOUNTER FOR SCREENING FOR OTHER VIRAL DISEASES: Primary | ICD-10-CM

## 2020-09-03 RX ORDER — ASPIRIN 81 MG/1
81 TABLET ORAL DAILY
Status: CANCELLED | OUTPATIENT
Start: 2020-09-03 | End: 2020-09-05

## 2020-09-03 RX ORDER — SODIUM CHLORIDE 9 MG/ML
INJECTION, SOLUTION INTRAVENOUS CONTINUOUS
Status: CANCELLED | OUTPATIENT
Start: 2020-09-03

## 2020-09-03 RX ORDER — POTASSIUM CHLORIDE 1500 MG/1
40 TABLET, EXTENDED RELEASE ORAL
Status: CANCELLED | OUTPATIENT
Start: 2020-09-03

## 2020-09-03 RX ORDER — POTASSIUM CHLORIDE 1500 MG/1
20 TABLET, EXTENDED RELEASE ORAL
Status: CANCELLED | OUTPATIENT
Start: 2020-09-03

## 2020-09-03 RX ORDER — LIDOCAINE 40 MG/G
CREAM TOPICAL
Status: CANCELLED | OUTPATIENT
Start: 2020-09-03

## 2020-09-03 NOTE — PROGRESS NOTES
Date: 9/3/2020    Time of Call: 1:36 PM     Diagnosis:  Coronary artery disease     [ TORB ] Ordering provider: Leonard Pastor MD  Order:   CASE REQUEST:  PCI, angiogram   CBC, CMP, INR     Order received by: Elizabeth Mata RN     Follow-up/additional notes:   PCI prior to TAVR procedure.

## 2020-09-03 NOTE — TELEPHONE ENCOUNTER
Called and left detailed VM that she has been rescheduled to 9/9/2020 for CORS/PCI with a checkin time of 1 pm, also that we will need a Covid Testing done either by Saturday or Sunday. Left my contact to call me back.

## 2020-09-05 DIAGNOSIS — I25.10 CORONARY ARTERY DISEASE INVOLVING NATIVE CORONARY ARTERY, ANGINA PRESENCE UNSPECIFIED, UNSPECIFIED WHETHER NATIVE OR TRANSPLANTED HEART: ICD-10-CM

## 2020-09-05 DIAGNOSIS — Z11.59 ENCOUNTER FOR SCREENING FOR OTHER VIRAL DISEASES: ICD-10-CM

## 2020-09-05 PROCEDURE — U0003 INFECTIOUS AGENT DETECTION BY NUCLEIC ACID (DNA OR RNA); SEVERE ACUTE RESPIRATORY SYNDROME CORONAVIRUS 2 (SARS-COV-2) (CORONAVIRUS DISEASE [COVID-19]), AMPLIFIED PROBE TECHNIQUE, MAKING USE OF HIGH THROUGHPUT TECHNOLOGIES AS DESCRIBED BY CMS-2020-01-R: HCPCS | Performed by: INTERNAL MEDICINE

## 2020-09-06 LAB
SARS-COV-2 RNA SPEC QL NAA+PROBE: NOT DETECTED
SPECIMEN SOURCE: NORMAL

## 2020-09-08 ENCOUNTER — HOSPITAL ENCOUNTER (INPATIENT)
Dept: GENERAL RADIOLOGY | Facility: CLINIC | Age: 85
End: 2020-09-08
Attending: INTERNAL MEDICINE
Payer: MEDICARE

## 2020-09-08 ENCOUNTER — TELEPHONE (OUTPATIENT)
Dept: CARDIOLOGY | Facility: CLINIC | Age: 85
End: 2020-09-08

## 2020-09-08 DIAGNOSIS — I35.0 SEVERE AORTIC STENOSIS: Primary | ICD-10-CM

## 2020-09-08 DIAGNOSIS — I35.0 SEVERE AORTIC STENOSIS: ICD-10-CM

## 2020-09-09 ENCOUNTER — APPOINTMENT (OUTPATIENT)
Dept: LAB | Facility: CLINIC | Age: 85
End: 2020-09-09
Attending: INTERNAL MEDICINE
Payer: MEDICARE

## 2020-09-09 ENCOUNTER — APPOINTMENT (OUTPATIENT)
Dept: MEDSURG UNIT | Facility: CLINIC | Age: 85
End: 2020-09-09
Attending: INTERNAL MEDICINE
Payer: MEDICARE

## 2020-09-09 ENCOUNTER — HOSPITAL ENCOUNTER (OUTPATIENT)
Facility: CLINIC | Age: 85
Setting detail: OBSERVATION
Discharge: HOME | End: 2020-09-10
Attending: INTERNAL MEDICINE | Admitting: INTERNAL MEDICINE
Payer: MEDICARE

## 2020-09-09 DIAGNOSIS — I25.10 CORONARY ARTERY DISEASE INVOLVING NATIVE CORONARY ARTERY, ANGINA PRESENCE UNSPECIFIED, UNSPECIFIED WHETHER NATIVE OR TRANSPLANTED HEART: ICD-10-CM

## 2020-09-09 PROBLEM — Z98.890 STATUS POST CORONARY ANGIOGRAM: Status: ACTIVE | Noted: 2020-09-09

## 2020-09-09 LAB
ALBUMIN SERPL-MCNC: 3.6 G/DL (ref 3.4–5)
ALP SERPL-CCNC: 76 U/L (ref 40–150)
ALT SERPL W P-5'-P-CCNC: 21 U/L (ref 0–50)
ANION GAP SERPL CALCULATED.3IONS-SCNC: 3 MMOL/L (ref 3–14)
ANION GAP SERPL CALCULATED.3IONS-SCNC: 5 MMOL/L (ref 3–14)
AST SERPL W P-5'-P-CCNC: 19 U/L (ref 0–45)
BILIRUB SERPL-MCNC: 0.6 MG/DL (ref 0.2–1.3)
BUN SERPL-MCNC: 24 MG/DL (ref 7–30)
BUN SERPL-MCNC: 30 MG/DL (ref 7–30)
CALCIUM SERPL-MCNC: 8.4 MG/DL (ref 8.5–10.1)
CALCIUM SERPL-MCNC: 9.6 MG/DL (ref 8.5–10.1)
CHLORIDE SERPL-SCNC: 104 MMOL/L (ref 94–109)
CHLORIDE SERPL-SCNC: 107 MMOL/L (ref 94–109)
CHOLEST SERPL-MCNC: 242 MG/DL
CO2 SERPL-SCNC: 26 MMOL/L (ref 20–32)
CO2 SERPL-SCNC: 31 MMOL/L (ref 20–32)
CREAT SERPL-MCNC: 0.84 MG/DL (ref 0.52–1.04)
CREAT SERPL-MCNC: 0.91 MG/DL (ref 0.52–1.04)
ERYTHROCYTE [DISTWIDTH] IN BLOOD BY AUTOMATED COUNT: 13.1 % (ref 10–15)
ERYTHROCYTE [DISTWIDTH] IN BLOOD BY AUTOMATED COUNT: 13.2 % (ref 10–15)
GFR SERPL CREATININE-BSD FRML MDRD: 58 ML/MIN/{1.73_M2}
GFR SERPL CREATININE-BSD FRML MDRD: 64 ML/MIN/{1.73_M2}
GLUCOSE SERPL-MCNC: 178 MG/DL (ref 70–99)
GLUCOSE SERPL-MCNC: 99 MG/DL (ref 70–99)
HCT VFR BLD AUTO: 34 % (ref 35–47)
HCT VFR BLD AUTO: 40.7 % (ref 35–47)
HDLC SERPL-MCNC: 62 MG/DL
HGB BLD-MCNC: 10.5 G/DL (ref 11.7–15.7)
HGB BLD-MCNC: 12.6 G/DL (ref 11.7–15.7)
INR PPP: 0.98 (ref 0.86–1.14)
KCT BLD-ACNC: 277 SEC (ref 75–150)
KCT BLD-ACNC: 293 SEC (ref 75–150)
KCT BLD-ACNC: 348 SEC (ref 75–150)
LDLC SERPL CALC-MCNC: 167 MG/DL
MAGNESIUM SERPL-MCNC: 2 MG/DL (ref 1.6–2.3)
MCH RBC QN AUTO: 26.5 PG (ref 26.5–33)
MCH RBC QN AUTO: 26.9 PG (ref 26.5–33)
MCHC RBC AUTO-ENTMCNC: 30.9 G/DL (ref 31.5–36.5)
MCHC RBC AUTO-ENTMCNC: 31 G/DL (ref 31.5–36.5)
MCV RBC AUTO: 86 FL (ref 78–100)
MCV RBC AUTO: 87 FL (ref 78–100)
NONHDLC SERPL-MCNC: 180 MG/DL
PLATELET # BLD AUTO: 160 10E9/L (ref 150–450)
PLATELET # BLD AUTO: 195 10E9/L (ref 150–450)
POTASSIUM SERPL-SCNC: 3.8 MMOL/L (ref 3.4–5.3)
POTASSIUM SERPL-SCNC: 3.9 MMOL/L (ref 3.4–5.3)
PROT SERPL-MCNC: 7.3 G/DL (ref 6.8–8.8)
RBC # BLD AUTO: 3.9 10E12/L (ref 3.8–5.2)
RBC # BLD AUTO: 4.75 10E12/L (ref 3.8–5.2)
SODIUM SERPL-SCNC: 138 MMOL/L (ref 133–144)
SODIUM SERPL-SCNC: 138 MMOL/L (ref 133–144)
TRIGL SERPL-MCNC: 66 MG/DL
WBC # BLD AUTO: 6.4 10E9/L (ref 4–11)
WBC # BLD AUTO: 8 10E9/L (ref 4–11)

## 2020-09-09 PROCEDURE — 99152 MOD SED SAME PHYS/QHP 5/>YRS: CPT | Performed by: INTERNAL MEDICINE

## 2020-09-09 PROCEDURE — C9600 PERC DRUG-EL COR STENT SING: HCPCS | Mod: LD | Performed by: INTERNAL MEDICINE

## 2020-09-09 PROCEDURE — C1725 CATH, TRANSLUMIN NON-LASER: HCPCS | Performed by: INTERNAL MEDICINE

## 2020-09-09 PROCEDURE — C1769 GUIDE WIRE: HCPCS | Performed by: INTERNAL MEDICINE

## 2020-09-09 PROCEDURE — 83735 ASSAY OF MAGNESIUM: CPT | Performed by: INTERNAL MEDICINE

## 2020-09-09 PROCEDURE — 25000128 H RX IP 250 OP 636: Performed by: INTERNAL MEDICINE

## 2020-09-09 PROCEDURE — 25000132 ZZH RX MED GY IP 250 OP 250 PS 637: Mod: GY | Performed by: NURSE PRACTITIONER

## 2020-09-09 PROCEDURE — 80053 COMPREHEN METABOLIC PANEL: CPT | Performed by: INTERNAL MEDICINE

## 2020-09-09 PROCEDURE — 25000132 ZZH RX MED GY IP 250 OP 250 PS 637: Mod: GY | Performed by: INTERNAL MEDICINE

## 2020-09-09 PROCEDURE — 80048 BASIC METABOLIC PNL TOTAL CA: CPT | Performed by: INTERNAL MEDICINE

## 2020-09-09 PROCEDURE — 85347 COAGULATION TIME ACTIVATED: CPT | Mod: 91

## 2020-09-09 PROCEDURE — 40000172 ZZH STATISTIC PROCEDURE PREP ONLY

## 2020-09-09 PROCEDURE — 27210794 ZZH OR GENERAL SUPPLY STERILE: Performed by: INTERNAL MEDICINE

## 2020-09-09 PROCEDURE — C1874 STENT, COATED/COV W/DEL SYS: HCPCS | Performed by: INTERNAL MEDICINE

## 2020-09-09 PROCEDURE — C1760 CLOSURE DEV, VASC: HCPCS | Performed by: INTERNAL MEDICINE

## 2020-09-09 PROCEDURE — 85610 PROTHROMBIN TIME: CPT | Performed by: INTERNAL MEDICINE

## 2020-09-09 PROCEDURE — 85027 COMPLETE CBC AUTOMATED: CPT | Performed by: INTERNAL MEDICINE

## 2020-09-09 PROCEDURE — C1894 INTRO/SHEATH, NON-LASER: HCPCS | Performed by: INTERNAL MEDICINE

## 2020-09-09 PROCEDURE — 99153 MOD SED SAME PHYS/QHP EA: CPT | Performed by: INTERNAL MEDICINE

## 2020-09-09 PROCEDURE — 93005 ELECTROCARDIOGRAM TRACING: CPT | Mod: 59

## 2020-09-09 PROCEDURE — 25000125 ZZHC RX 250: Performed by: INTERNAL MEDICINE

## 2020-09-09 PROCEDURE — C1887 CATHETER, GUIDING: HCPCS | Performed by: INTERNAL MEDICINE

## 2020-09-09 PROCEDURE — 36415 COLL VENOUS BLD VENIPUNCTURE: CPT | Performed by: INTERNAL MEDICINE

## 2020-09-09 PROCEDURE — 36415 COLL VENOUS BLD VENIPUNCTURE: CPT | Performed by: NURSE PRACTITIONER

## 2020-09-09 PROCEDURE — 25800030 ZZH RX IP 258 OP 636: Performed by: INTERNAL MEDICINE

## 2020-09-09 PROCEDURE — G0378 HOSPITAL OBSERVATION PER HR: HCPCS

## 2020-09-09 PROCEDURE — G0379 DIRECT REFER HOSPITAL OBSERV: HCPCS

## 2020-09-09 PROCEDURE — 92928 PRQ TCAT PLMT NTRAC ST 1 LES: CPT | Mod: 76 | Performed by: INTERNAL MEDICINE

## 2020-09-09 PROCEDURE — 93010 ELECTROCARDIOGRAM REPORT: CPT | Performed by: INTERNAL MEDICINE

## 2020-09-09 PROCEDURE — 80061 LIPID PANEL: CPT | Performed by: NURSE PRACTITIONER

## 2020-09-09 PROCEDURE — 99219 ZZC INITIAL OBSERVATION CARE,LEVL II: CPT | Mod: 25 | Performed by: INTERNAL MEDICINE

## 2020-09-09 DEVICE — STENT SYNERGY DRUG ELUTING 3.00X12MM  H7493926012300: Type: IMPLANTABLE DEVICE | Status: FUNCTIONAL

## 2020-09-09 DEVICE — STENT SYNERGY DRUG ELUTING 2.50X20MM  H7493926020250: Type: IMPLANTABLE DEVICE | Status: FUNCTIONAL

## 2020-09-09 DEVICE — STENT SYNERGY DRUG ELUTING 3.50X20MM  H7493926020350: Type: IMPLANTABLE DEVICE | Status: FUNCTIONAL

## 2020-09-09 RX ORDER — OXYCODONE HYDROCHLORIDE 5 MG/1
5 TABLET ORAL EVERY 4 HOURS PRN
Status: DISCONTINUED | OUTPATIENT
Start: 2020-09-09 | End: 2020-09-10 | Stop reason: HOSPADM

## 2020-09-09 RX ORDER — DOBUTAMINE HYDROCHLORIDE 200 MG/100ML
2-20 INJECTION INTRAVENOUS CONTINUOUS PRN
Status: DISCONTINUED | OUTPATIENT
Start: 2020-09-09 | End: 2020-09-09 | Stop reason: HOSPADM

## 2020-09-09 RX ORDER — SODIUM CHLORIDE 9 MG/ML
INJECTION, SOLUTION INTRAVENOUS CONTINUOUS
Status: DISCONTINUED | OUTPATIENT
Start: 2020-09-09 | End: 2020-09-10 | Stop reason: HOSPADM

## 2020-09-09 RX ORDER — LIDOCAINE 40 MG/G
CREAM TOPICAL
Status: DISCONTINUED | OUTPATIENT
Start: 2020-09-09 | End: 2020-09-10 | Stop reason: HOSPADM

## 2020-09-09 RX ORDER — ONDANSETRON 4 MG/1
4 TABLET, ORALLY DISINTEGRATING ORAL EVERY 6 HOURS PRN
Status: DISCONTINUED | OUTPATIENT
Start: 2020-09-09 | End: 2020-09-10 | Stop reason: HOSPADM

## 2020-09-09 RX ORDER — SODIUM CHLORIDE 9 MG/ML
INJECTION, SOLUTION INTRAVENOUS CONTINUOUS
Status: ACTIVE | OUTPATIENT
Start: 2020-09-09 | End: 2020-09-09

## 2020-09-09 RX ORDER — POTASSIUM CHLORIDE 750 MG/1
40 TABLET, EXTENDED RELEASE ORAL
Status: DISCONTINUED | OUTPATIENT
Start: 2020-09-09 | End: 2020-09-09 | Stop reason: HOSPADM

## 2020-09-09 RX ORDER — EPTIFIBATIDE 2 MG/ML
1 INJECTION, SOLUTION INTRAVENOUS CONTINUOUS PRN
Status: DISCONTINUED | OUTPATIENT
Start: 2020-09-09 | End: 2020-09-09 | Stop reason: HOSPADM

## 2020-09-09 RX ORDER — LIDOCAINE 40 MG/G
CREAM TOPICAL
Status: DISCONTINUED | OUTPATIENT
Start: 2020-09-09 | End: 2020-09-09 | Stop reason: HOSPADM

## 2020-09-09 RX ORDER — FLUMAZENIL 0.1 MG/ML
0.2 INJECTION, SOLUTION INTRAVENOUS
Status: ACTIVE | OUTPATIENT
Start: 2020-09-09 | End: 2020-09-10

## 2020-09-09 RX ORDER — ONDANSETRON 2 MG/ML
4 INJECTION INTRAMUSCULAR; INTRAVENOUS EVERY 6 HOURS PRN
Status: DISCONTINUED | OUTPATIENT
Start: 2020-09-09 | End: 2020-09-10 | Stop reason: HOSPADM

## 2020-09-09 RX ORDER — ATROPINE SULFATE 0.1 MG/ML
0.5 INJECTION INTRAVENOUS
Status: ACTIVE | OUTPATIENT
Start: 2020-09-09 | End: 2020-09-10

## 2020-09-09 RX ORDER — FENTANYL CITRATE 50 UG/ML
25-50 INJECTION, SOLUTION INTRAMUSCULAR; INTRAVENOUS
Status: ACTIVE | OUTPATIENT
Start: 2020-09-09 | End: 2020-09-09

## 2020-09-09 RX ORDER — ARGATROBAN 1 MG/ML
150 INJECTION, SOLUTION INTRAVENOUS
Status: DISCONTINUED | OUTPATIENT
Start: 2020-09-09 | End: 2020-09-09 | Stop reason: HOSPADM

## 2020-09-09 RX ORDER — DOPAMINE HYDROCHLORIDE 160 MG/100ML
2-20 INJECTION, SOLUTION INTRAVENOUS CONTINUOUS PRN
Status: DISCONTINUED | OUTPATIENT
Start: 2020-09-09 | End: 2020-09-09 | Stop reason: HOSPADM

## 2020-09-09 RX ORDER — HEPARIN SODIUM 10000 [USP'U]/100ML
100-1000 INJECTION, SOLUTION INTRAVENOUS CONTINUOUS PRN
Status: DISCONTINUED | OUTPATIENT
Start: 2020-09-09 | End: 2020-09-09 | Stop reason: HOSPADM

## 2020-09-09 RX ORDER — EPTIFIBATIDE 2 MG/ML
180 INJECTION, SOLUTION INTRAVENOUS EVERY 10 MIN PRN
Status: DISCONTINUED | OUTPATIENT
Start: 2020-09-09 | End: 2020-09-09 | Stop reason: HOSPADM

## 2020-09-09 RX ORDER — FENTANYL CITRATE 50 UG/ML
INJECTION, SOLUTION INTRAMUSCULAR; INTRAVENOUS
Status: DISCONTINUED | OUTPATIENT
Start: 2020-09-09 | End: 2020-09-09 | Stop reason: HOSPADM

## 2020-09-09 RX ORDER — NALOXONE HYDROCHLORIDE 0.4 MG/ML
.1-.4 INJECTION, SOLUTION INTRAMUSCULAR; INTRAVENOUS; SUBCUTANEOUS
Status: DISCONTINUED | OUTPATIENT
Start: 2020-09-09 | End: 2020-09-10 | Stop reason: HOSPADM

## 2020-09-09 RX ORDER — ARGATROBAN 1 MG/ML
350 INJECTION, SOLUTION INTRAVENOUS
Status: DISCONTINUED | OUTPATIENT
Start: 2020-09-09 | End: 2020-09-09 | Stop reason: HOSPADM

## 2020-09-09 RX ORDER — ALUMINA, MAGNESIA, AND SIMETHICONE 2400; 2400; 240 MG/30ML; MG/30ML; MG/30ML
30 SUSPENSION ORAL EVERY 4 HOURS PRN
Status: DISCONTINUED | OUTPATIENT
Start: 2020-09-09 | End: 2020-09-10 | Stop reason: HOSPADM

## 2020-09-09 RX ORDER — METOPROLOL TARTRATE 1 MG/ML
5-10 INJECTION, SOLUTION INTRAVENOUS
Status: DISCONTINUED | OUTPATIENT
Start: 2020-09-09 | End: 2020-09-10 | Stop reason: HOSPADM

## 2020-09-09 RX ORDER — NITROGLYCERIN 0.4 MG/1
0.4 TABLET SUBLINGUAL EVERY 5 MIN PRN
Status: DISCONTINUED | OUTPATIENT
Start: 2020-09-09 | End: 2020-09-10 | Stop reason: HOSPADM

## 2020-09-09 RX ORDER — ACETAMINOPHEN 650 MG/1
650 SUPPOSITORY RECTAL EVERY 4 HOURS PRN
Status: DISCONTINUED | OUTPATIENT
Start: 2020-09-09 | End: 2020-09-10 | Stop reason: HOSPADM

## 2020-09-09 RX ORDER — ASPIRIN 81 MG/1
81 TABLET ORAL DAILY
Status: DISCONTINUED | OUTPATIENT
Start: 2020-09-09 | End: 2020-09-09 | Stop reason: HOSPADM

## 2020-09-09 RX ORDER — ACETAMINOPHEN 325 MG/1
650 TABLET ORAL EVERY 4 HOURS PRN
Status: DISCONTINUED | OUTPATIENT
Start: 2020-09-09 | End: 2020-09-10 | Stop reason: HOSPADM

## 2020-09-09 RX ORDER — ASPIRIN 81 MG/1
81 TABLET ORAL DAILY
Status: DISCONTINUED | OUTPATIENT
Start: 2020-09-10 | End: 2020-09-10 | Stop reason: HOSPADM

## 2020-09-09 RX ORDER — ASPIRIN 81 MG/1
81 TABLET, CHEWABLE ORAL ONCE
Status: DISCONTINUED | OUTPATIENT
Start: 2020-09-09 | End: 2020-09-10

## 2020-09-09 RX ORDER — NALOXONE HYDROCHLORIDE 0.4 MG/ML
.1-.4 INJECTION, SOLUTION INTRAMUSCULAR; INTRAVENOUS; SUBCUTANEOUS
Status: DISCONTINUED | OUTPATIENT
Start: 2020-09-09 | End: 2020-09-09

## 2020-09-09 RX ORDER — POTASSIUM CHLORIDE 750 MG/1
20 TABLET, EXTENDED RELEASE ORAL
Status: COMPLETED | OUTPATIENT
Start: 2020-09-09 | End: 2020-09-09

## 2020-09-09 RX ORDER — NITROGLYCERIN 0.4 MG/1
0.4 TABLET SUBLINGUAL EVERY 5 MIN PRN
Status: DISCONTINUED | OUTPATIENT
Start: 2020-09-09 | End: 2020-09-09

## 2020-09-09 RX ORDER — EPTIFIBATIDE 2 MG/ML
2 INJECTION, SOLUTION INTRAVENOUS CONTINUOUS PRN
Status: DISCONTINUED | OUTPATIENT
Start: 2020-09-09 | End: 2020-09-09 | Stop reason: HOSPADM

## 2020-09-09 RX ORDER — OXYCODONE HYDROCHLORIDE 10 MG/1
10 TABLET ORAL EVERY 4 HOURS PRN
Status: DISCONTINUED | OUTPATIENT
Start: 2020-09-09 | End: 2020-09-10 | Stop reason: HOSPADM

## 2020-09-09 RX ORDER — NALOXONE HYDROCHLORIDE 0.4 MG/ML
.2-.4 INJECTION, SOLUTION INTRAMUSCULAR; INTRAVENOUS; SUBCUTANEOUS
Status: DISCONTINUED | OUTPATIENT
Start: 2020-09-09 | End: 2020-09-09

## 2020-09-09 RX ORDER — HYDRALAZINE HYDROCHLORIDE 20 MG/ML
10 INJECTION INTRAMUSCULAR; INTRAVENOUS EVERY 4 HOURS PRN
Status: DISCONTINUED | OUTPATIENT
Start: 2020-09-09 | End: 2020-09-10 | Stop reason: HOSPADM

## 2020-09-09 RX ORDER — SODIUM CHLORIDE 9 MG/ML
INJECTION, SOLUTION INTRAVENOUS CONTINUOUS
Status: DISCONTINUED | OUTPATIENT
Start: 2020-09-09 | End: 2020-09-09 | Stop reason: HOSPADM

## 2020-09-09 RX ORDER — HEPARIN SODIUM 1000 [USP'U]/ML
INJECTION, SOLUTION INTRAVENOUS; SUBCUTANEOUS
Status: DISCONTINUED | OUTPATIENT
Start: 2020-09-09 | End: 2020-09-09 | Stop reason: HOSPADM

## 2020-09-09 RX ORDER — NITROGLYCERIN 20 MG/100ML
10-200 INJECTION INTRAVENOUS CONTINUOUS PRN
Status: DISCONTINUED | OUTPATIENT
Start: 2020-09-09 | End: 2020-09-09 | Stop reason: HOSPADM

## 2020-09-09 RX ADMIN — TICAGRELOR 90 MG: 90 TABLET ORAL at 22:09

## 2020-09-09 RX ADMIN — SODIUM CHLORIDE: 9 INJECTION, SOLUTION INTRAVENOUS at 13:49

## 2020-09-09 RX ADMIN — POTASSIUM CHLORIDE 20 MEQ: 750 TABLET, EXTENDED RELEASE ORAL at 14:13

## 2020-09-09 RX ADMIN — SODIUM CHLORIDE, POTASSIUM CHLORIDE, SODIUM LACTATE AND CALCIUM CHLORIDE 500 ML: 600; 310; 30; 20 INJECTION, SOLUTION INTRAVENOUS at 21:37

## 2020-09-09 ASSESSMENT — MIFFLIN-ST. JEOR: SCORE: 1022.88

## 2020-09-09 NOTE — PRE-PROCEDURE
GENERAL PRE-PROCEDURE:   Procedure:  Coronary angiogram with percutaneous coronary intervention  Date/Time:  9/9/2020 1:46 PM    Written consent obtained?: Yes    Risks and benefits: Risks, benefits and alternatives were discussed    Consent given by:  Patient  Patient states understanding of procedure being performed: Yes    Patient's understanding of procedure matches consent: Yes    Procedure consent matches procedure scheduled: Yes    Expected level of sedation:  Moderate  Appropriately NPO:  Yes  ASA Class:  Class 2- mild systemic disease, no acute problems, no functional limitations  Mallampati  :  Grade 2- soft palate, base of uvula, tonsillar pillars, and portion of posterior pharyngeal wall visible  Lungs:  Lungs clear with good breath sounds bilaterally  Heart:  Normal heart sounds and rate  History & Physical reviewed:  History and physical reviewed and no updates needed  Statement of review:  I have reviewed the lab findings, diagnostic data, medications, and the plan for sedation

## 2020-09-09 NOTE — PLAN OF CARE
Pt arrived to [facility]  Via litter. VSS. Double checked groin site with CL RN. No c/o pain, SOB, n/v/d, or chest pain.  Will continue to monitor and notify team of changes or concerns.    Bedrest confirmed for 3 hours    Tamika Scales RN on 9/9/2020 at 4:30 PM

## 2020-09-09 NOTE — Clinical Note
The first balloon was inserted into the left anterior descending.Max pressure = 14 bharath. Total duration = 8 seconds.

## 2020-09-09 NOTE — Clinical Note
Stent deployed in the right coronary artery. Max pressure = 18 bharath. Total duration = 10 seconds.

## 2020-09-09 NOTE — Clinical Note
Stent deployed in the left anterior descending. Max pressure = 14 bharath. Total duration = 8 seconds.

## 2020-09-09 NOTE — Clinical Note
The first balloon was inserted into the left anterior descending and proximal left anterior descending.Max pressure = 10 bharath. Total duration = 10 seconds.

## 2020-09-09 NOTE — PROGRESS NOTES
Pt arrives to 2a, with spouse, for CORS/planned PCI. H&P is up to date. Consent is signed. Pt prepped and ready.

## 2020-09-09 NOTE — Clinical Note
6 Fr ProGlide utilized for closure of sight.  Manual pressure applied by scrub person; hemostasis achieved.

## 2020-09-09 NOTE — Clinical Note
dry, intact, no bleeding and no hematoma. 6 Fr sheath removed from the RFA. 6 Fr perclose used to close insertion site. Tegaderm.

## 2020-09-10 ENCOUNTER — PATIENT OUTREACH (OUTPATIENT)
Dept: CARE COORDINATION | Facility: CLINIC | Age: 85
End: 2020-09-10

## 2020-09-10 VITALS
DIASTOLIC BLOOD PRESSURE: 60 MMHG | HEIGHT: 65 IN | SYSTOLIC BLOOD PRESSURE: 111 MMHG | BODY MASS INDEX: 20.94 KG/M2 | TEMPERATURE: 98.1 F | HEART RATE: 82 BPM | WEIGHT: 125.7 LBS | OXYGEN SATURATION: 96 % | RESPIRATION RATE: 16 BRPM

## 2020-09-10 LAB
ANION GAP SERPL CALCULATED.3IONS-SCNC: 6 MMOL/L (ref 3–14)
BUN SERPL-MCNC: 24 MG/DL (ref 7–30)
CALCIUM SERPL-MCNC: 8.5 MG/DL (ref 8.5–10.1)
CHLORIDE SERPL-SCNC: 108 MMOL/L (ref 94–109)
CO2 SERPL-SCNC: 25 MMOL/L (ref 20–32)
CREAT SERPL-MCNC: 0.8 MG/DL (ref 0.52–1.04)
ERYTHROCYTE [DISTWIDTH] IN BLOOD BY AUTOMATED COUNT: 13.1 % (ref 10–15)
GFR SERPL CREATININE-BSD FRML MDRD: 68 ML/MIN/{1.73_M2}
GLUCOSE SERPL-MCNC: 116 MG/DL (ref 70–99)
HCT VFR BLD AUTO: 31.5 % (ref 35–47)
HGB BLD-MCNC: 9.5 G/DL (ref 11.7–15.7)
MCH RBC QN AUTO: 26.3 PG (ref 26.5–33)
MCHC RBC AUTO-ENTMCNC: 30.2 G/DL (ref 31.5–36.5)
MCV RBC AUTO: 87 FL (ref 78–100)
PLATELET # BLD AUTO: 156 10E9/L (ref 150–450)
POTASSIUM SERPL-SCNC: 3.8 MMOL/L (ref 3.4–5.3)
RBC # BLD AUTO: 3.61 10E12/L (ref 3.8–5.2)
SODIUM SERPL-SCNC: 139 MMOL/L (ref 133–144)
WBC # BLD AUTO: 7.6 10E9/L (ref 4–11)

## 2020-09-10 PROCEDURE — 25000132 ZZH RX MED GY IP 250 OP 250 PS 637: Mod: GY | Performed by: NURSE PRACTITIONER

## 2020-09-10 PROCEDURE — 36415 COLL VENOUS BLD VENIPUNCTURE: CPT | Performed by: NURSE PRACTITIONER

## 2020-09-10 PROCEDURE — 93010 ELECTROCARDIOGRAM REPORT: CPT | Performed by: INTERNAL MEDICINE

## 2020-09-10 PROCEDURE — 85027 COMPLETE CBC AUTOMATED: CPT | Performed by: NURSE PRACTITIONER

## 2020-09-10 PROCEDURE — 80048 BASIC METABOLIC PNL TOTAL CA: CPT | Performed by: NURSE PRACTITIONER

## 2020-09-10 PROCEDURE — G0378 HOSPITAL OBSERVATION PER HR: HCPCS

## 2020-09-10 PROCEDURE — 99217 ZZC OBSERVATION CARE DISCHARGE: CPT | Performed by: NURSE PRACTITIONER

## 2020-09-10 PROCEDURE — 93005 ELECTROCARDIOGRAM TRACING: CPT

## 2020-09-10 RX ORDER — METOPROLOL SUCCINATE 25 MG/1
25 TABLET, EXTENDED RELEASE ORAL DAILY
Status: DISCONTINUED | OUTPATIENT
Start: 2020-09-10 | End: 2020-09-10 | Stop reason: HOSPADM

## 2020-09-10 RX ADMIN — ASPIRIN 81 MG: 81 TABLET, COATED ORAL at 07:57

## 2020-09-10 RX ADMIN — TICAGRELOR 90 MG: 90 TABLET ORAL at 07:57

## 2020-09-10 RX ADMIN — METOPROLOL SUCCINATE 25 MG: 25 TABLET, EXTENDED RELEASE ORAL at 10:39

## 2020-09-10 ASSESSMENT — MIFFLIN-ST. JEOR: SCORE: 1026.05

## 2020-09-10 NOTE — PLAN OF CARE
Transferred to  from  via litter accompanied by RN.See flow sheets for vs and assessments. R groin hematoma. Denies back pain and abdominal pain. Groin site tender to touch.  I-Instructed to call for assistance to get OOB. Dr. Flanagan here to assess pt. Notified Dr. Flanagan of decrease in hgb.  A-Telemetry shows SR.  P-Continue with current poc. Anticipate discharge to home with spouse tomorrow. Goals not met at this time. Stress test not complete.

## 2020-09-10 NOTE — PLAN OF CARE
Monitored pt status overnight for observation goals. A&Ox4. RA. SR. Denies pain. Hematoma unchanged. CMS intact. Pt reports light headedness resolved. Hypotensive at times. Voiding adequately. Up with assist of 1. L PIV SL flushing well.     - Serial troponins and stress test complete: Not met   - Seen and cleared by consultant if applicable: Not met  - Adequate pain control on oral analgesia: Goal met  - Vital signs normal or at patient baseline: Goal met  - Safe disposition plan has been identified: Goal met

## 2020-09-10 NOTE — PROGRESS NOTES
- Serial troponins and stress test complete: Not met   - Seen and cleared by consultant if applicable: Not met  - Adequate pain control on oral analgesia: Goal met  - Vital signs normal or at patient baseline: Goal met  - Safe disposition plan has been identified: Goal met

## 2020-09-10 NOTE — DISCHARGE INSTRUCTIONS
Going Home after an Angioplasty or Stent Placement (Cardiac)  ______________________________________________      After you go home:    Have an adult stay with you for 24 hours.    Drink plenty of fluids.    You may eat your normal diet, unless your doctor tells you otherwise.    For 24 hours:    Relax and take it easy.    Do NOT smoke.    Do NOT make any important or legal decisions.    Do NOT drive or operate machines at home or at work.    Do NOT drink alcohol.    Remove the Band-Aid after 24 hours. If there is minor oozing, apply another Band-aid and remove it after 12 hours.    For 2 days, do NOT have sex or do any heavy exercise.    Do NOT take a bath, or use a hot tub or pool for at least 3 days. You may shower.    Care of groin site  It is normal to have a small bruise or lump at the site.    Do not scrub the site.    For the first 2 days: Do not stoop or squat. When you cough, sneeze or move your bowels, hold your hand over the puncture site and press gently.    Do not lift more than 10 pounds for at least 3 to 5 days.    Do not use lotion or powder near the puncture site for 3 days.    If you start bleeding from the site in your groin, lie down flat and press firmly  on the site. Call your doctor as soon as you can.    Medicines    If you have started taking Plavix or Effient, do not stop taking it until you talk to your heart doctor (cardiologist).    If you are on metformin (Glucophage), do not restart it until you have blood tests (within 2 to 3 days after discharge). When your doctor tells you it is safe, you may restart the metformin.    If you have stopped any other medicines, check with your nurse or provider about when to restart them.    Call 911 right away if you have bleeding that is heavy or does not stop.    Call your doctor if:    You have a large or growing hard lump around the site.    The site is red, swollen, hot or tender.    Blood or fluid is draining from the site.    You have chills  or a fever greater than 101 F (38 C).    Your leg or arm feels numb or cool.    You have hives, a rash or unusual itching.      [facility]:  [phone] (7 days a week)

## 2020-09-10 NOTE — H&P
CSI - History and Physical       Date of Admission:  9/9/2020  Date of Service (when I saw the patient): 09/09/20    Assessment & Plan   Lydia Dietz is a 85 year old with PMHx of severe aortic stenosis and 2-vessel CAD who presented to cath lab 9/9/2020 for elective PCI s/p REY x2 to mid-distal LAD and DESx1 to prox RCA.  Postprocedural course complicated by orthostatic hypotension in setting of dehydration requiring observation admission.     # 2V CAD s/p LAD and RCA PCI  # Orthostatic hypotension 2/2 dehydration/low PO intake   While symptoms are most likely related to dehydration, will monitor hemodynamics and possible blood loss although no significant evidence of hematoma at groin site, no abdominal/back pain to suggest RP bleed and suspect drop in hemoglobin from 12-10 is related to IVF/dillution.   -s/p 500 ml LR   -orthostatic vitals and ambulate with assistance once off bed rest   -hold anti-HTN meds as below   -monitor on telemetry overnight   -trend CBC   -Aspirin 81 mg qday   -Ticagrelor 90 mg BID  -Consider alternate-day or less frequent statin dosing given significant documented allergy to multiple statins    # Severe aortic stenosis   -planning TAVR in near future     # HTN  -hold PTA triamterene-hydrochlorothiazide and toprol-XL     To be formally staffed in AM.     Kevin Flanagan MD  Cardiology Fellow, PGY-5  [phone]     Chief Complaint   Lightheadedness    History is obtained from the patient    History of Present Illness   Lydia Dietz is a 85 year old with PMHx of severe aortic stenosis and 2-vessel CAD who presented to cath lab 9/9/2020 for elective PCI s/p REY x2 to mid-distal LAD and DESx1 to prox RCA. Found to have positional lightheadedness after procedure.      3 hours after completion of angiogram and at the completion of bedrest, patient was significantly lightheaded and shaky with going from sitting to standing position and unable to stand/walk independently.  She was allowed  to eat with mild improvement of symptoms.  She denies any chest pain or shortness of breath.  She notes her last p.o. intake was 6 PM yesterday and angiogram today was not until approximately 3 PM.  She is asymptomatic at present while lying flat.  She further denies any abdominal pain or back pain.  No groin or leg pain.    Review of angiogram from today shows successful PCI of LAD and RCA with LVEDP of 10.  Peak aortic valve gradient 41 mmHg, consistent with severe AS. Hgb decreased from 12.6 (baseline in care everywhere is ~12) -> 10.5. HD stable. No orthostatics done in PACU.     Past Medical History    I have reviewed this patient's medical history and updated it with pertinent information if needed.   CAD  Severe AORTIC STENOSIS  HTN  OA     Past Surgical History   I have reviewed this patient's surgical history and updated it with pertinent information if needed.  LAD and RCA PCI 9/9/2020     Prior to Admission Medications   Prior to Admission Medications   Prescriptions Last Dose Informant Patient Reported? Taking?   aspirin (ASA) 81 MG EC tablet 9/9/2020 at Unknown time Self Yes Yes   Sig: Take 81 mg by mouth every 24 hours   cholecalciferol (VITAMIN D-1000 MAX ST) 1000 units TABS Unknown at Unknown time Self Yes No   Sig: Take 1,000 Units by mouth   fluticasone (FLONASE) 50 MCG/ACT nasal spray 9/8/2020 at Unknown time Self Yes Yes   Sig: Spray 2 sprays in nostril every 24 hours   ibuprofen (ADVIL/MOTRIN) 600 MG tablet More than a month at Unknown time Self Yes No   Sig: Take 600 mg by mouth   metoprolol succinate ER (TOPROL-XL) 25 MG 24 hr tablet 9/9/2020 at Unknown time Self Yes Yes   Sig: Take 25 mg by mouth daily   triamterene-HCTZ (MAXZIDE-25) 37.5-25 MG tablet 9/8/2020 at Unknown time Self Yes Yes   Sig: Take 37.5 tablets by mouth daily      Facility-Administered Medications: None     Allergies   Allergies   Allergen Reactions     Rosuvastatin Muscle Pain (Myalgia)     Seasonal Allergies       Amlodipine      Other reaction(s): Edema,generalized     Atorvastatin      PN: Reaction: BLURRED VISION     Fenofibrate Muscle Pain (Myalgia)     Fluconazole Nausea     Losartan      PN: Reaction: Back pain and cramping     Pravastatin      PN:  Reaction: JOINT AND MUSCLE PAIN     Simvastatin Nausea     Niacin Rash       Social History   I have reviewed this patient's social history and updated it with pertinent information if needed. Lydia Dietz  reports that she has quit smoking. She has never used smokeless tobacco. She reports current alcohol use. She reports that she does not use drugs.    Family History   Family history reviewed with patient and is noncontributory.    Review of Systems   The 10 point Review of Systems is negative other than noted in the HPI or here.     Physical Exam   Temp: 97.5  F (36.4  C) Temp src: Oral BP: 137/53 Pulse: 64   Resp: 18 SpO2: 100 % O2 Device: None (Room air)    Vital Signs with Ranges  Temp:  [97.5  F (36.4  C)-98.3  F (36.8  C)] 97.5  F (36.4  C)  Pulse:  [64-70] 64  Resp:  [16-20] 18  BP: (122-173)/(42-81) 137/53  SpO2:  [91 %-100 %] 100 %  125 lbs 0 oz    GEN:  Alert, oriented x 3, appears comfortable, NAD.  HEENT:  Normocephalic/atraumatic, no scleral icterus, no nasal discharge, mouth moist.  CV:  Regular rate and rhythm, IV/VI QUOC at RUSB c/w severe AORTIC STENOSIS. no JVD.  S1 + S2 noted, no S3 or S4.  LUNGS:  Clear to auscultation bilaterally   ABD:  Active bowel sounds, soft, non-tender/non-distended.  No rebound/guarding/rigidity.  EXT:  No edema or cyanosis.  Hands/feet warm to touch with good signs of peripheral perfusion. Right groin site is c/d/i, soft, with no significant ecchymosis.   SKIN:  Dry to touch, no exanthems noted in the visualized areas.  NEURO:  No new focal deficits appreciated.    Data   Data reviewed today:  I personally reviewed labs and angiogram.   Recent Labs   Lab 09/09/20  1230   WBC 6.4   HGB 12.6   MCV 86      INR 0.98   NA  138   POTASSIUM 3.8   CHLORIDE 104   CO2 31   BUN 30   CR 0.91   ANIONGAP 3   DREW 9.6   GLC 99   ALBUMIN 3.6   PROTTOTAL 7.3   BILITOTAL 0.6   ALKPHOS 76   ALT 21   AST 19       Recent Results (from the past 24 hour(s))   Cardiac Catheterization    Narrative    1. Severe 2-vessel CAD of the mid and distal LAD and proximal RCA s/p   successful intervention  - 1 drug eluting stent (Synergy 3.5x20mm) to mid LAD  - 1 drug eluting stent (Synergy 2.5x20mm) to distal LAD  - 1 drug eluting stent (Synergy 3.0x12mm) to proximal RCA  2. Severe aortic stenosis  3. Normal LV filling pressure (LVEDP 10)  4. Perclose to RFA

## 2020-09-10 NOTE — PROGRESS NOTES
Patient arrived on unit 6C on 9/9/20 at 20:15 with the following belongings.  - cell phone  - beige fleece/sweater  - shoes  - other clothes  - floral mask   - purse  - wallet  - medication from pharmacy

## 2020-09-10 NOTE — PROGRESS NOTES
DISCHARGE   Discharged to: Home  Via: Automobile  Accompanied by: -Geovany  Discharge Instructions: principal diagnosis, major findings/procedures, diet, activity, medications, follow up appointments, when to call the MD, and what to watchout for  s/s of infection or concern at groin site, increasing SOB, palpitations, Angina. Pt states understanding of all discharge instructions.   Prescriptions: To be filled by home pharmacy per pt's request; scripts sent with pt.  Follow Up Appointments: discussed with patient  Belongings: All sent with pt. Pt verifies that they are taking all belongings with them.   IV: discontinued.   Telemetry: discontinued.   Pt exhibits understanding of above discharge instructions; all questions answered.  Discharge Paperwork: faxed to discharge planner.

## 2020-09-10 NOTE — DISCHARGE SUMMARY
[address] [address]  p: [phone]  Discharge Summary: Cardiology Service    Lydia Dietz MRN# [id number]   YOB: 1935 Age: 85 year old       Admission Date: 9/9/2020  Discharge Date: 09/10/20    Discharge Diagnoses:  1. CAD s/p LAD and RCA PCI  2. Orthostatic hypotension  3. Severe aortic stenosis   4. HTN    Brief HPI:  Lydia Dietz is a 85 year old with PMHx of severe aortic stenosis and 2-vessel CAD who presented to cath lab 9/9/2020 for elective PCI s/p REY x2 to mid-distal LAD and DESx1 to prox RCA. Found to have positional lightheadedness after procedure.       3 hours after completion of angiogram and at the completion of bedrest, patient was significantly lightheaded and shaky with going from sitting to standing position and unable to stand/walk independently.  She was allowed to eat with mild improvement of symptoms.  She denies any chest pain or shortness of breath.  She notes her last p.o. intake was 6 PM yesterday and angiogram today was not until approximately 3 PM.  She is asymptomatic at present while lying flat.  She further denies any abdominal pain or back pain.  No groin or leg pain.     Review of angiogram from today shows successful PCI of LAD and RCA with LVEDP of 10.  Peak aortic valve gradient 41 mmHg, consistent with severe AS. Hgb decreased from 12.6 (baseline in care everywhere is ~12) -> 10.5. HD stable. No orthostatics done in PACU.     Hospital Course by Diagnosis:  # 2V CAD s/p LAD and RCA PCI  # Orthostatic hypotension 2/2 dehydration/low PO intake   Patient presented to cath lab on 9/9/20 for elective PCI s/p REY x 2 to mid-distal LAD and REY x 1 to pRCA. Unfortunately, her post-procedural course was complicated by symptomatic orthostatic hypotension. No acute EKG changes. While symptoms were most likely related to dehydration and low oral intake, she was kept overnight for closer  monitoring of of hemodynamics and possible blood loss although no significant evidence of hematoma at groin site, no abdominal/back pain to suggest RP bleed and suspect drop in hemoglobin from 12-9 is related to IVF/dillution. Patient was able to eat post-procedure and received 500 ml bolus of LR with slight resolution in symptoms. PTA triamterene-hydrochlorothiazide and Toprol XL were held and orthostatic vitals were closely monitored. No arrythmias post-angiogram. BP stabilized overnight with -130's. Patient denies chest pain, shortness of breath, dizziness, lightheadedness today. No symptoms while up and walking around unit. Patient was initiated on DAPT with aspirin and brilinta this admission. No statin given multiple allergies.      - Continue DAPT with aspirin 81 mg and brilinta 90 mg BID for one year  - Will re-start PTA Toprol XL 25 mg daily given improved BP  - Will stop PTA triamterene-hydrochlorothiazide, can re-evaluate as outpatient   - Consider alternate-day or less frequent statin dosing given significant documented allergy to multiple statins, can re-evaluate as outpatient      # Severe aortic stenosis   Patient has seen Dr. Pastor in structural clinic for TAVR evaluation. Was here for planned PCI prior to TAVR.   - Planned TAVR in 2-3 weeks       # HTN  Patient was hypertensive prior to outpatient angiogram, 9/9, although experienced orthostatic hypotension post-procedure. BP now stabilized and patient remains asymptomatic at rest and with activity. -130's today.     - Resume PTA Toprol XL 25 mg daily.   - Stop PTA triamterene-hydrochlorothiazide, can re-evaluate as outpatient        Pertinent Procedures:  1. Coronary angiogram with PCI    Consults:  None     Medication Changes:  See below     Discharge medications:   Current Discharge Medication List      START taking these medications    Details   ticagrelor (BRILINTA) 90 MG tablet Take 1 tablet (90 mg) by mouth 2 times daily  Start this evening.  Qty: 180 tablet, Refills: 3    Associated Diagnoses: Coronary artery disease involving native coronary artery, angina presence unspecified, unspecified whether native or transplanted heart         CONTINUE these medications which have NOT CHANGED    Details   aspirin (ASA) 81 MG EC tablet Take 81 mg by mouth every 24 hours      fluticasone (FLONASE) 50 MCG/ACT nasal spray Spray 2 sprays in nostril every 24 hours      metoprolol succinate ER (TOPROL-XL) 25 MG 24 hr tablet Take 25 mg by mouth daily      cholecalciferol (VITAMIN D-1000 MAX ST) 1000 units TABS Take 1,000 Units by mouth         STOP taking these medications       ibuprofen (ADVIL/MOTRIN) 600 MG tablet Comments:   Reason for Stopping:         triamterene-HCTZ (MAXZIDE-25) 37.5-25 MG tablet Comments:   Reason for Stopping:             Follow-up:  Structural clinic in 1-2 weeks for upcoming TAVR  Cardiology [facility] in 1-2 weeks   PCP in 1 week     Labs or imaging requiring follow-up after discharge:  CBC, BMP     Code status:  Full     Condition on discharge  Temp:  [97.5  F (36.4  C)-98.3  F (36.8  C)] 98.1  F (36.7  C)  Pulse:  [60-96] 82  Resp:  [14-20] 16  BP: ()/(35-81) 111/60  SpO2:  [91 %-100 %] 96 %  General: Alert, interactive, NAD  Eyes: sclera anicteric, EOMI  Neck: JVP 0, carotid 2+ bilaterally  Cardiovascular: regular rate and rhythm, normal S1 and S2, harsh systolic murmur, no gallops or rubs  Resp: clear to auscultation bilaterally, no rales, wheezes, or rhonchi  GI: Soft, nontender, nondistended. +BS.  No HSM or masses, no rebound or guarding.  Extremities: No edema, no cyanosis or clubbing, dorsalis pedis and posterior tibialis pulses 2+ bilaterally  Skin: Warm and dry, no jaundice or rash  Neuro: CN 2-12 intact, moves all extremities equally  Psych: Alert & oriented x 3    Imaging with results:  Echocardiogram : 6/2020 ([facility])  CONCLUSIONS  1. The aortic valve is tricuspid. Severe aortic stenosis. The  peak  velocity across the aortic valve is 4.3 m/sec and the mean gradient is  42 mmHg. The aortic valve area is 0.86 cm^2 by the continuity  equation. Mild to moderate aortic regurgitation.  2. Left ventricular chamber size is normal. Normal left ventricular  wall thickness. Global and regional left ventricular function is  normal. Left ventricular ejection fraction is visually estimated at  65%.  3. Normal right ventricle size and normal global function.  4. Mild mitral stenosis. Mitral valve mean gradient: 5 mmHg at 89  beats per minute. Mitral valve area by pressure half-time : 1.69 cm^2.  Mild mitral regurgitation.  5. The visualized segments of the thoracic aorta are normal in  diameter.  6. No pulmonary hypertension.  7. The inferior vena cava is normal suggesting normal RA pressure.  8. There is no pericardial effusion.  9. Compared to the previous study done on 5/28/19, the aortic stenosis  is slightly worse.      Coronary Angiogram 9/9/20:  Conclusion   1. Severe 2-vessel CAD of the mid and distal LAD and proximal RCA s/p successful intervention  - 1 drug eluting stent (Synergy 3.5x20mm) to mid LAD  - 1 drug eluting stent (Synergy 2.5x20mm) to distal LAD  - 1 drug eluting stent (Synergy 3.0x12mm) to proximal RCA  2. Severe aortic stenosis  3. Normal LV filling pressure (LVEDP 10)  4. Perclose to RFA       Plan       Follow bedrest per protocol    Continued medical management and lifestyle modifications for cardiovascular risk factor optimizations.  Post antiplatelet therapy of    Aspirin; give 81 mg qd .     Ticagrelor; and 90 mg BID.      Continue high dose statin therapy  Outpatient TAVR to be scheduled in 2-3 weeks        Patient Care Team:  Tabitha [facility] as PCP - General  Praveen Costello MD as MD (Orthopedics)    Cassidy Santana DNP, APRN, CNP  [facility]  [phone]

## 2020-09-10 NOTE — PLAN OF CARE
Pt off bedrest. No hematoma. Minimal surface bleeding at site. Redressed with quick clot and tegaderm.     Upon standing patient very shaky, and unable to hold weight. Mild syncopal episode. Bedside RN assisted back to supine position. VSS stable. Neuro WNL. No pain or numbness in extremities or around torso. Courtesy meal brought to patient as she stated it has been over 24 hours since she has eaten. Will attempt to stand again after food consumption.     Tamika Scales RN on 9/9/2020 at 7:23 PM

## 2020-09-10 NOTE — PLAN OF CARE
Pt transferred to 6C for overnight observation after near syncopal episode X2 on 3C recovery. Report given at bedside to EVENS Brown and EVENS Handley on 6C. Pt alert and oriented, VSS while supine. Unable to check orthostatics d/t hematoma developing at right groin site and syncope happening within 45 seconds of standing. NS to gravity into L PIV.    Geovany notified of admission by Cardiology Fellow.     Tamika Scales RN on 9/9/2020 at 8:41 PM

## 2020-09-11 LAB
INTERPRETATION ECG - MUSE: NORMAL

## 2020-09-11 NOTE — PROGRESS NOTES
[facility]: Post-Disc harge Note  SITUATION                                                      Admission:    Admission Date: 09/09/20   Reason for Admission: CAD s/p LAD and RCA PCI  Discharge:   Discharge Date: 09/10/20  Discharge Diagnosis: CAD s/p LAD and RCA PCI    BACKGROUND                                                      Lydia Dietz is a 85 year old with PMHx of severe aortic stenosis and 2-vessel CAD who presented to cath lab 9/9/2020 for elective PCI s/p REY x2 to mid-distal LAD and DESx1 to prox RCA. Found to have positional lightheadedness after procedure.    ASSESSMENT      Discharge Assessment  Patient reports symptoms are: Improved  Does the patient have all of their medications?: Yes  Does patient know what their new medications are for?: Yes  Does patient have a follow-up appointment scheduled?: Yes  Does patient have any other questions or concerns?: No    Post-op  Did the patient have surgery or a procedure: No  Fever: No  Chills: No  Eating & Drinking: eating and drinking without complaints/concerns  PO Intake: regular diet  Bowel Function: normal  Urinary Status: voiding without complaint/concerns        PLAN                                                      Outpatient Plan:  Follow-up:  Structural clinic in 1-2 weeks for upcoming TAVR  Cardiology [facility] in 1-2 weeks   PCP in 1 week      Labs or imaging requiring follow-up after discharge:  CBC, BMP     No future appointments.        Bianca Lopez, CMA

## 2020-09-16 DIAGNOSIS — I25.10 CORONARY ARTERY DISEASE INVOLVING NATIVE CORONARY ARTERY, ANGINA PRESENCE UNSPECIFIED, UNSPECIFIED WHETHER NATIVE OR TRANSPLANTED HEART: ICD-10-CM

## 2020-09-16 NOTE — TELEPHONE ENCOUNTER
Spoke with Lydia, and she requested that her brilinta prescription be sent to [facility] pharmacy.  This was done and completed.

## 2020-09-21 ENCOUNTER — TELEPHONE (OUTPATIENT)
Dept: CARDIOLOGY | Facility: CLINIC | Age: 85
End: 2020-09-21

## 2020-09-21 NOTE — TELEPHONE ENCOUNTER
[facility] Call Center    Phone Message    May a detailed message be left on voicemail: yes     Reason for Call: Other: Pt missed a call from Viki and she did not understand her message. She thinks she was calling her to schedule an angiogram but did not know for sure. Please call back pt to clarify what she is needing. Thank you     Action Taken: Message routed to:  [facility]): Clinic coordinators-cardio    Travel Screening: Not Applicable

## 2020-09-21 NOTE — TELEPHONE ENCOUNTER
Left message for patient to call [phone] for any questions concerning follow up PCI. She is a TAVR workup

## 2020-09-22 NOTE — TELEPHONE ENCOUNTER
[facility] Call Center    Phone Message    May a detailed message be left on voicemail: yes     Reason for Call: Other: Lydia calling to let Viki know that she is available for a call back after 1:30pm today.       Action Taken: Message routed to:  [facility]):  Cardiology    Travel Screening: Not Applicable

## 2020-09-23 DIAGNOSIS — Z11.59 ENCOUNTER FOR SCREENING FOR OTHER VIRAL DISEASES: Primary | ICD-10-CM

## 2020-09-23 DIAGNOSIS — I35.0 SEVERE AORTIC STENOSIS: Primary | ICD-10-CM

## 2020-09-25 NOTE — TELEPHONE ENCOUNTER
FUTURE VISIT INFORMATION      SURGERY INFORMATION:    Date: 10/7/20    Location: uu or    Surgeon:  Leonard Pastor MD Knoper, Ryan C, MD     Anesthesia Type:  MAC    Procedure: Transfemoral, subclavian SIFUENTES or transapical transcatheter aortic valve replacementPossible emergency open heart bypass and or balloon pump placement, and any indicated procedure.      RECORDS REQUESTED FROM:       Primary Care Provider: [facility]    Pertinent Medical History: cad, severe aortic stenosis    Most recent EKG+ Tracin20    Most recent ECHO: [facility]    Most recent Cardiac Stress Test: 5/15/15- [facility]

## 2020-09-26 NOTE — PROGRESS NOTES
HISTORY:         Patient is a pleasant 85 year old female with fatigue and shortness of breath.  Her severe aortic stenosis is characterized by echocardiography on 6/22/20 at [facility] with an area of 0.86 cm2, mean gradient 42 mmHg and peak velocity of 4.3 m/sec with LVEF 60%.  She underwent coronary angiography as part of her preoperative evaluation, which demonstrated moderate proximal LAD stenosis, severe distal LAD stenosis, and severe proximal RCA stenosis. The lesions appear calcified. Patient's systemic pressure reportedly dropped with engagement of the RCA with a diagnostic catheter at the time. Hypotension was transient, and she recovered quickly with the administration of vasoactive agents.      Patient says that she has been feeling progressively fatigued over the last two years, and finds that she needs to rest for up to a half-hour at a time while tending garden or doing other work around the house. Her  Geovany feels that she has slowed down a bit when they go out for walks. She has no angina, syncope, orthopnea, lower extremity edema, or PND.      PAST MEDICAL HISTORY:   Coronary artery disease  Aortic stenosis  Hypertension  Osteoarthritis  Mild renal insuffiency  Renal cysts      PAST SURGICAL HISTORY:   None      CURRENT MEDICATIONS:   Aspirin 81mg  Metoprolol succinate 25mg   Triamterene-hydrochlorothiazide 37.5-25mg      ALLERGIES:            Allergies   Allergen Reactions     Rosuvastatin Muscle Pain (Myalgia)     Seasonal Allergies       Amlodipine         Other reaction(s): Edema,generalized     Atorvastatin         PN: Reaction: BLURRED VISION     Fenofibrate Muscle Pain (Myalgia)     Fluconazole Nausea     Losartan         PN: Reaction: Back pain and cramping     Pravastatin         PN:  Reaction: JOINT AND MUSCLE PAIN     Simvastatin Nausea     Niacin Rash          FAMILY HISTORY:   None relevant      SOCIAL HISTORY:   Denies alcohol drug or tobacco abuse.      REVIEW OF SYSTEMS:       10 point review of systems norrnal other than mentioned in history and physical.       PHYSICAL EXAMINATION:      GENERAL: Well-appearing and comfortable  HEENT: no conjunctival pallor  Neck: no JVD  Heart: Regular rate and rhythm. Harsh 3/6 late-peaking systolic murmur with radiation to carotids. Delayed carotid pulses.   Lungs: Clear to auscultation. No ronchi, wheezes, rales.   Abdomen: Soft, nontender, nondistended. Bowel sounds present.  Extremities: No clubbing, cyanosis, or edema.   Neurologic: Alert and oriented to person/place/time, normal speech and affect. No focal deficits.  Skin: No petechiae, purpura or rash.      PROCEDURES & FURTHER ASSESSMENTS:      ECG: ([facility] report) sinus rhythm, normal tracing     CT TAVR (8/3/2020 - [facility]):              Aortic annulus:                          Area: 377 mm2                          Circumference: 70 mm                          Diameter: 20.1 mm x 22.7 mm mm                Left ventricular Outflow Tract (LVOT)                          Moderate calcifications are noted in the LVOT.                          Area: 338 mm2                          Circumference : 67 mm                          Diameter: 20.2 x 23.1 mm                Sinus of Valsalva                           Diameter: 33.4 x 33.5 mm                          Height: 22.3 mm                Sinotubular Junction                           Area: 613 mm2                          Circumference: 91 mm                          Diameter: 29.3 x 32.7 mm                Coronary Heights                          Annulus to LM Height: 18.2 mm                          Annulus to RCA Height: 15.6 mm                Max Ascending Aorta Diameter: 34.5 mm                          Max Descending Thoracic Aorta Diameter: 35.2 mm                          Min Abdominal Aorta Diameter: 12.8 mm                CTA Abdomen and Pelvis: Moderate calcifications of the abdominal aorta.  Minimal iliac  tortuosity.               Measurements:                          Right Common Iliac Artery: 7.2 x 8.5 mm                          Right External Iliac Artery: 7. 2 x 8.3 mm                          Right Common Femoral Artery: 7.1 x 7.5 mm                            Left Common Iliac Artery: 8.4 x 9.9 mm                          Left External Iliac Artery: 6.8 x 8.2 mm                          Left Common Femoral artery: 6.8 x 8.0 mm        STS RISK SCORE: 3.4%  FRAILTY SCORE: 1/5  NYHA CLASS: II      CLINICAL IMPRESSION:      Patient is a 85-year-old female with symptomatic severe aortic stenosis who is a high risk for adverse outcomes after surgical aortic valve replacement based on age, frailty, co-morbidities and overall surgical mortality risk estimation of 3.4% based on the STS score.  I will request a TAVR evaluation to assess her candidacy.

## 2020-09-29 ENCOUNTER — CARE COORDINATION (OUTPATIENT)
Dept: CARDIOLOGY | Facility: CLINIC | Age: 85
End: 2020-09-29

## 2020-09-29 PROBLEM — R73.01 IFG (IMPAIRED FASTING GLUCOSE): Status: ACTIVE | Noted: 2019-11-06

## 2020-09-29 PROBLEM — Z71.89 COUNSELING REGARDING ADVANCED DIRECTIVES AND GOALS OF CARE: Status: ACTIVE | Noted: 2020-06-23

## 2020-09-29 PROBLEM — N28.1 RENAL CYST, LEFT: Status: ACTIVE | Noted: 2019-11-06

## 2020-09-29 PROBLEM — G89.29 CHRONIC LEFT-SIDED LOW BACK PAIN WITHOUT SCIATICA: Status: ACTIVE | Noted: 2020-06-23

## 2020-09-29 PROBLEM — N18.30 CKD (CHRONIC KIDNEY DISEASE) STAGE 3, GFR 30-59 ML/MIN (H): Status: ACTIVE | Noted: 2019-11-06

## 2020-09-29 PROBLEM — M54.50 CHRONIC LEFT-SIDED LOW BACK PAIN WITHOUT SCIATICA: Status: ACTIVE | Noted: 2020-06-23

## 2020-09-29 PROBLEM — D12.6 ADENOMATOUS POLYP OF COLON: Status: ACTIVE | Noted: 2019-11-06

## 2020-09-29 NOTE — PROGRESS NOTES
TAVR Procedure:  October 7, 2020, 2nd case        Check in to the hospital front entrance, at the registration area by 8:30 a.m. They will direct you to the 3rd floor, pre-op area after checking in.  After leaving the registration check-in area, there will be ONE visitor allowed until the time of discharge.  When patients leave the pre-op area for their procedure, the visitor will leave the hospital.  After the procedure is finished, the MD will call the visitor and update them on the procedure.   When the patient arrives to the unit where they will stay until discharge, the visitor is allowed to come visit.      Medications Instructions:  --Continue taking your brilinta 90 mg, by mouth, two times daily, including the morning of the procedure.  --Continue taking your Aspirin 81 mg, by mouth, daily, including the morning of the procedure.  --All other medication instructions are at the discretion of the PAC.       If any questions please contact:  Savita Mata RN  Structural Heart Care Coordinator  [facility]  Office: [phone]  Pager: [phone]

## 2020-09-30 ENCOUNTER — OFFICE VISIT (OUTPATIENT)
Dept: SURGERY | Facility: CLINIC | Age: 85
End: 2020-09-30
Payer: MEDICARE

## 2020-09-30 ENCOUNTER — ANESTHESIA EVENT (OUTPATIENT)
Dept: SURGERY | Facility: CLINIC | Age: 85
DRG: 267 | End: 2020-09-30
Payer: MEDICARE

## 2020-09-30 ENCOUNTER — PRE VISIT (OUTPATIENT)
Dept: SURGERY | Facility: CLINIC | Age: 85
End: 2020-09-30

## 2020-09-30 VITALS
OXYGEN SATURATION: 100 % | TEMPERATURE: 97.6 F | RESPIRATION RATE: 20 BRPM | HEIGHT: 65 IN | WEIGHT: 124 LBS | SYSTOLIC BLOOD PRESSURE: 118 MMHG | BODY MASS INDEX: 20.66 KG/M2 | HEART RATE: 67 BPM | DIASTOLIC BLOOD PRESSURE: 69 MMHG

## 2020-09-30 DIAGNOSIS — I35.0 SEVERE AORTIC STENOSIS: ICD-10-CM

## 2020-09-30 DIAGNOSIS — Z01.818 PRE-OP EXAMINATION: Primary | ICD-10-CM

## 2020-09-30 PROCEDURE — 86923 COMPATIBILITY TEST ELECTRIC: CPT | Performed by: PHYSICIAN ASSISTANT

## 2020-09-30 ASSESSMENT — MIFFLIN-ST. JEOR: SCORE: 1018.34

## 2020-09-30 ASSESSMENT — PAIN SCALES - GENERAL: PAINLEVEL: NO PAIN (0)

## 2020-09-30 ASSESSMENT — ENCOUNTER SYMPTOMS: SEIZURES: 0

## 2020-09-30 ASSESSMENT — LIFESTYLE VARIABLES: TOBACCO_USE: 1

## 2020-09-30 NOTE — ANESTHESIA PREPROCEDURE EVALUATION
"Anesthesia Pre-Procedure Evaluation    Patient: Lydia Dietz   MRN:     [id number] Gender:   female   Age:    85 year old :      1935        Preoperative Diagnosis: Severe aortic stenosis [I35.0]   Procedure(s):  Transfemoral, subclavian (SIFUENTES) or transapical transcatheter aortic valve replacement  Possible emergency open heart bypass and or balloon pump placement, and any indicated procedure.     LABS:  CBC:   Lab Results   Component Value Date    WBC 7.6 09/10/2020    WBC 8.0 2020    HGB 9.5 (L) 09/10/2020    HGB 10.5 (L) 2020    HCT 31.5 (L) 09/10/2020    HCT 34.0 (L) 2020     09/10/2020     2020     BMP:   Lab Results   Component Value Date     09/10/2020     2020    POTASSIUM 3.8 09/10/2020    POTASSIUM 3.9 2020    CHLORIDE 108 09/10/2020    CHLORIDE 107 2020    CO2 25 09/10/2020    CO2 26 2020    BUN 24 09/10/2020    BUN 24 2020    CR 0.80 09/10/2020    CR 0.84 2020     (H) 09/10/2020     (H) 2020     COAGS:   Lab Results   Component Value Date    INR 0.98 2020     POC: No results found for: BGM, HCG, HCGS  OTHER:   Lab Results   Component Value Date    DREW 8.5 09/10/2020    MAG 2.0 2020    ALBUMIN 3.6 2020    PROTTOTAL 7.3 2020    ALT 21 2020    AST 19 2020    ALKPHOS 76 2020    BILITOTAL 0.6 2020        Preop Vitals    BP Readings from Last 3 Encounters:   09/10/20 111/60   20 (!) 144/54   19 142/88    Pulse Readings from Last 3 Encounters:   09/10/20 82   20 73   19 72      Resp Readings from Last 3 Encounters:   09/10/20 16   19 16    SpO2 Readings from Last 3 Encounters:   09/10/20 96%   19 96%      Temp Readings from Last 1 Encounters:   09/10/20 98.1  F (36.7  C) (Oral)    Ht Readings from Last 1 Encounters:   20 1.651 m (5' 5\")      Wt Readings from Last 1 Encounters:   09/10/20 57 kg (125 lb " "11.2 oz)    Estimated body mass index is 20.92 kg/m  as calculated from the following:    Height as of 9/9/20: 1.651 m (5' 5\").    Weight as of 9/10/20: 57 kg (125 lb 11.2 oz).     LDA:  Right Groin Interventional Procedure Access (Active)   Site Assessment Unchanged 09/10/20 0300   Hemostasis management Unchanged 09/10/20 0300   Femoral Bruit not present 09/10/20 0300   CMS Right Extremity WDL Except 09/10/20 0300   Dorsalis Pulse - Right Leg Weak 09/10/20 0300   Posterior Tibial Pulse - Right Leg Weak 09/10/20 0300   Number of days: 21        Past Medical History:   Diagnosis Date     Allergies      Anemia      Coronary artery disease      HLD (hyperlipidemia)      HTN (hypertension)      Impaired fasting glucose      Osteoarthritis      Severe aortic stenosis       Past Surgical History:   Procedure Laterality Date     AS LAP INCISIONAL HERNIA REPAIR       BREAST BIOPSY, CORE RT/LT      X3     CHOLECYSTECTOMY       COLECTOMY PARTIAL  2019     CV LEFT HEART CATH N/A 9/9/2020    Procedure: Left Heart Cath;  Surgeon: Leonard Pastor MD;  Location:  HEART CARDIAC CATH LAB     CV PCI ANGIOPLASTY N/A 9/9/2020    Procedure: CV PCI ANGIOPLASTY;  Surgeon: Leonard Pastor MD;  Location:  HEART CARDIAC CATH LAB     HC LAPAROSCOPY, OVIDUCT/OVARY; REMOVAL       HYSTERECTOMY TOTAL ABDOMINAL        Allergies   Allergen Reactions     Rosuvastatin Muscle Pain (Myalgia)     Seasonal Allergies      Amlodipine      Other reaction(s): Edema,generalized     Atorvastatin      PN: Reaction: BLURRED VISION     Fenofibrate Muscle Pain (Myalgia)     Fluconazole Nausea     Losartan      PN: Reaction: Back pain and cramping     Pravastatin      PN:  Reaction: JOINT AND MUSCLE PAIN     Simvastatin Nausea     Niacin Rash        Anesthesia Evaluation     . Pt has had prior anesthetic. Type: General    No history of anesthetic complications          ROS/MED HX    ENT/Pulmonary:     (+)allergic rhinitis, tobacco use (patient smoked " socially for 5 years with 1 pack per week if that. Quit in 1980), Past use , . .    Neurologic:  - neg neurologic ROS    (-) seizures, CVA and TIA   Cardiovascular:     (+) Dyslipidemia, hypertension--CAD (s/p PCI with DEX x2 on 9/9/20), --. Taking blood thinners Pt has received instructions: Instructions Given to patient: continue brilinta and ASA 81mg through surgery. . . :. valvular problems/murmurs type: AS severe:. Previous cardiac testing Echodate:6/22/20results:CONCLUSIONS  1. The aortic valve is tricuspid. Severe aortic stenosis. The peak  velocity across the aortic valve is 4.3 m/sec and the mean gradient is  42 mmHg. The aortic valve area is 0.86 cm^2 by the continuity  equation. Mild to moderate aortic regurgitation.  2. Left ventricular chamber size is normal. Normal left ventricular  wall thickness. Global and regional left ventricular function is  normal. Left ventricular ejection fraction is visually estimated at  65%.  3. Normal right ventricle size and normal global function.  4. Mild mitral stenosis. Mitral valve mean gradient: 5 mmHg at 89  beats per minute. Mitral valve area by pressure half-time : 1.69 cm^2.  Mild mitral regurgitation.  5. The visualized segments of the thoracic aorta are normal in  diameter.  6. No pulmonary hypertension.  7. The inferior vena cava is normal suggesting normal RA pressure.  8. There is no pericardial effusion.  9. Compared to the previous study done on 5/28/19, the aortic stenosis  is slightly worse.  date: results:ECG reviewed date:9/9/20 results:Sinus rhythmCath date: 9/9/20 results:1. Severe 2-vessel CAD of the mid and distal LAD and proximal RCA s/p successful intervention  - 1 drug eluting stent (Synergy 3.5x20mm) to mid LAD  - 1 drug eluting stent (Synergy 2.5x20mm) to distal LAD  - 1 drug eluting stent (Synergy 3.0x12mm) to proximal RCA  2. Severe aortic stenosis  3. Normal LV filling pressure (LVEDP 10)  4. Perclose to RFA            METS/Exercise  Tolerance:  4 - Raking leaves, gardening   Hematologic:     (+) Anemia, -     (-) history of blood clots and History of Transfusion   Musculoskeletal:   (+) arthritis,  other musculoskeletal- chronic back pain       GI/Hepatic:  - neg GI/hepatic ROS      (-) GERD   Renal/Genitourinary: Comment: History of renal cyst    (+) chronic renal disease, type: CRI, Pt does not require dialysis, Pt has no history of transplant,       Endo: Comment: Impaired fasting glucose        Psychiatric:  - neg psychiatric ROS       Infectious Disease:  - neg infectious disease ROS       Malignancy:      - no malignancy   Other:    (+) No chance of pregnancy no H/O Chronic Pain,no other significant disability                        PHYSICAL EXAM:   Mental Status/Neuro: A/A/O; Age Appropriate   Airway: Facies: Feasible  Mallampati: I  Mouth/Opening: Full  TM distance: > 6 cm  Neck ROM: Limited   Respiratory: Auscultation: CTAB     Resp. Rate: Normal     Resp. Effort: Normal      CV: Rhythm: Regular  Heart: Murmur  Edema: None  Pulses: Normal  Neck: Normal   Comments: Referred aortic stenosis murmur to carotid arteries      Dental: Normal Dentition                Assessment:   ASA SCORE: 3    H&P: History and physical reviewed and following examination; no interval change.    NPO Status: NPO Appropriate     Plan:   Anes. Type:  MAC   Pre-Medication: None   Induction:  N/a   Airway: Native Airway   Access/Monitoring: PIV; A-Line; Central Access/Port present   Maintenance: N/a     Postop Plan:   Postop Pain: None  Postop Sedation/Airway: Not planned  Disposition: Inpatient/Admit     PONV Management: Adult Risk Factors: Female   Prevention: Ondansetron     CONSENT: Direct conversation   Plan and risks discussed with: Patient   Blood Products: Consented (ALL Blood Products)                PAC Discussion and Assessment    ASA Classification: 3  Case is suitable for: [place]  Anesthetic techniques and relevant risks discussed: MAC with GA as  backup  Invasive monitoring and risk discussed:   Types:   Possibility and Risk of blood transfusion discussed:   NPO instructions given:   Additional anesthetic preparation and risks discussed:   Needs early admission to pre-op area:   Other:     PAC Resident/NP Anesthesia Assessment:  Lydia is an 85 year old woman who is scheduled for Transfemoral, subclavian (SIFUENTES) or transapical transcatheter aortic valve replacement, Possible emergency open heart bypass and or balloon pump placement, and any indicated procedure on 10/7/20 by Dr. Pastor in treatment of severe aortic stenosis.  PAC referral for risk assessment and optimization for anesthesia with comorbid conditions of CAD, HTN, HLD, former smoker, anemia, IFG, mild renal insufficiency, history of renal cyst, back pain:    Pre-operative considerations:  1.  Cardiac:  Functional status- METS 4, the patient and her  walk 6-8 blocks a day. 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     ~ CAD - the patient had coronary angiograon on 9/9/20 and had PCI with REY x2. She was started on Brilinta and continue with ASA 81 mg and per cardiology team will continue both through surgery.   ~ Severe aortic stenosis - procedure as above.   ~ HTN/ HLD - continue metoprolol    2.  Pulm:  Airway feasible.  LORRIE risk: Low (age, HTN)  ~ Former smoker - quit in 1980  ~ Allergies - continue Flonase.     3. Heme:  Anemia - hgb was 9.5 on 9/10/20. Will get type and screen done today.     4. Endo: IFG - had an A1c of 5.7 in 2014. She's not on medications.     5. GI:  Risk of PONV score = 3.  If > 2, anti-emetic intervention recommended.    6. : History of renal cyst - has decided to pursue this after surgery  ~ mild renal insufficiency - last creatinine was 0.80 on 9/10/20.      7. Musculoskeletal: Osteoarthritis/ chronic back pain and history of T11 wedge compression fracture - consideration for careful positioning to minimize discomfort.     VTE  risk: 0.5%    Patient is optimized and is acceptable candidate for the proposed procedure.  No further diagnostic evaluation is needed.     For further details of assessment, testing, and physical exam please see H and P completed on same date.    Sophia Boudreaux PA-C        Mid-Level Provider/Resident: Sophia Boudreaux PA-C  Date: 9/30/20  Time:     Attending Anesthesiologist Anesthesia Assessment:  Reviewed.      Anesthesiologist: Felicity  Date: Sept 30, 2020  Time: 0730  Pass/Fail: Pass  Disposition:     PAC Pharmacist Assessment:        Pharmacist:   Date:   Time:    Sophia Boudreaux PA-C

## 2020-09-30 NOTE — PATIENT INSTRUCTIONS
Preparing for Your Surgery      Name:  Lydia Dietz   MRN:  [id number]   :  1935   Today's Date:  2020       Arriving for surgery:  Surgery date:  10/07/2020  Arrival time:  9:00 am    Restrictions due to COVID 19:  Patients are allowed one visitor in the pre-op period  All visitors must wear a mask  No visitors under 18  No ill visitors   parking is not available     Please come to:       [facility] [address] [place] [address]  [id number]       -    Please proceed to the Surgery Lounge on the 3rd floor. [phone]?     - ?If you are in need of directions, wheelchair or escort please stop at the Information Desk in the lobby. ?     What can I eat or drink?  -  You may eat and drink normally for up to 8 hours before your surgery. (Until 3 am)  -  You may have clear liquids until 2 hours before surgery. (Until 9 am arrival time)  Examples of clear liquids:  Water  Clear broth  Juices (apple, white grape, white cranberry  and cider) without pulp  Noncarbonated, powder based beverages  (lemonade and Олег-Aid)  Sodas (Sprite, 7-Up, ginger ale and seltzer)  Coffee or tea (without milk or cream)  Gatorade    -  No Alcohol for at least 24 hours before surgery     Which medicines can I take?    --Continue taking your brilinta 90 mg, by mouth, two times daily, including the morning of the procedure.  --Continue taking your Aspirin 81 mg, by mouth, daily, including the morning of the procedure.    Hold Multivitamins for 7 days before surgery.  Hold Supplements for 7 days before surgery.  Hold Ibuprofen (Advil, Motrin) for 1 day before surgery--unless otherwise directed by surgeon.  Hold Naproxen (Aleve) for 4 days before surgery.      -  PLEASE TAKE these medications the day of surgery:  Metoprolol  Aspirin  Brilinta  Flonase         How do I prepare myself?  - Please take 2 showers before surgery using Scrubcare or Hibiclens soap.    Use this soap only  from the neck to your toes.     Leave the soap on your skin for one minute--then rinse thoroughly.      You may use your own shampoo and conditioner; no other hair products.   - Please remove all jewelry and body piercings.  - No lotions, deodorants or fragrance.  - No makeup or fingernail polish.   - Bring your ID and insurance card.    - All patients are required to have a Covid-19 test within 4 days of surgery/procedure.      -Patients will be contacted by the [facility] scheduling team within 1 week of surgery to make an appointment.      - Patients may call the Scheduling team at [phone] if they have not been scheduled within 4 days of  surgery.      Questions or Concerns:    - For any questions regarding the day of surgery or your hospital stay, please contact the Pre Admission Nursing Office at [phone].       - If you have health changes between today and your surgery please call your surgeon.     If any questions please contact:  Savita Mata RN  Structural Heart Care Coordinator  [facility]  Office: [phone]  Pager: [phone]

## 2020-09-30 NOTE — H&P
Pre-Operative H & P     CC:  Preoperative exam to assess for increased cardiopulmonary risk while undergoing surgery and anesthesia.    Date of Encounter: 9/30/2020  Primary Care Physician:  Clinic, [facility] [place] [facility] [place]     Reason for visit: pre operative examination, Severe aortic stenosis    HPI  Lydia Dietz is a 85 year old female who presents for pre-operative H & P in preparation for Transfemoral, subclavian (SIFUENTES) or transapical transcatheter aortic valve replacement, Possible emergency open heart bypass and or balloon pump placement, and any indicated procedure. with Dr. Pastor on 10/7/20 at [facility].     The patient is an 85-year-old woman who has a past medical history significant for coronary artery disease, hyperlipidemia, hypertension, former smoker, anemia, impaired fasting glucose, chronic kidney disease stage III, osteoarthritis and back pain.  The patient has been followed by cardiology for her known aortic stenosis but recently on follow-up echo this has progressed to severe aortic stenosis.  To her outside cardiologist she underwent a cardiac angiogram and this showed occluded coronary artery disease LAD and RCA.  She met with his cardiologist who discussed combined procedure of coronary artery bypass and aortic valve replacement.  She came to the [facility] for a second opinion.  She was seen by Dr. De Los Santos on 8/27/2020.  Given her diagnosis and other comorbidities she was referred to Dr. Pastor for consideration for TAVR.  Patient met with Dr. Shyam High on 9/2/2020 and underwent coronary angiogram with PCI and 2 drug-eluting stents to the LAD and the RCA on 9/9/2020.  She is now scheduled for the procedure as above on 10/7/2020.    History is obtained from the patient, her  and chart review    Past Medical History  Past Medical History:   Diagnosis Date     Allergies      Anemia      Coronary artery disease       HLD (hyperlipidemia)      HTN (hypertension)      Impaired fasting glucose      Osteoarthritis      Severe aortic stenosis        Past Surgical History  Past Surgical History:   Procedure Laterality Date     AS LAP INCISIONAL HERNIA REPAIR       BREAST BIOPSY, CORE RT/LT      X3     CHOLECYSTECTOMY       COLECTOMY PARTIAL  2019     CV LEFT HEART CATH N/A 9/9/2020    Procedure: Left Heart Cath;  Surgeon: Leonard Pastor MD;  Location:  HEART CARDIAC CATH LAB     CV PCI ANGIOPLASTY N/A 9/9/2020    Procedure: CV PCI ANGIOPLASTY;  Surgeon: Leonard Pastor MD;  Location:  HEART CARDIAC CATH LAB     HC LAPAROSCOPY, OVIDUCT/OVARY; REMOVAL       HYSTERECTOMY TOTAL ABDOMINAL         Hx of Blood transfusions/reactions: denies     Hx of abnormal bleeding or anti-platelet use: Brilinta and ASA 81 mg - continue both through surgery per cardiology team    Menstrual history: No LMP recorded. Patient has had a hysterectomy.:     Steroid use in the last year: none    Personal or FH with difficulty with Anesthesia:  denies    Prior to Admission Medications  Current Outpatient Medications   Medication Sig Dispense Refill     aspirin (ASA) 81 MG EC tablet Take 81 mg by mouth every morning        fluticasone (FLONASE) 50 MCG/ACT nasal spray Spray 2 sprays in nostril every morning        metoprolol succinate ER (TOPROL-XL) 25 MG 24 hr tablet Take 25 mg by mouth every morning        ticagrelor (BRILINTA) 90 MG tablet Take 1 tablet (90 mg) by mouth 2 times daily Start this evening. 180 tablet 3       Allergies  Allergies   Allergen Reactions     Rosuvastatin Muscle Pain (Myalgia)     Seasonal Allergies      Amlodipine      Other reaction(s): Edema,generalized     Atorvastatin      PN: Reaction: BLURRED VISION     Fenofibrate Muscle Pain (Myalgia)     Fluconazole Nausea     Losartan      PN: Reaction: Back pain and cramping     Pravastatin      PN:  Reaction: JOINT AND MUSCLE PAIN     Simvastatin Nausea     Niacin Rash        Social History  Social History     Socioeconomic History     Marital status:      Spouse name: Not on file     Number of children: Not on file     Years of education: Not on file     Highest education level: Not on file   Occupational History     Not on file   Social Needs     Financial resource strain: Not on file     Food insecurity     Worry: Not on file     Inability: Not on file     Transportation needs     Medical: Not on file     Non-medical: Not on file   Tobacco Use     Smoking status: Former Smoker     Last attempt to quit:      Years since quittin.7     Smokeless tobacco: Never Used     Tobacco comment: 1 pack per week if that maybe for 5 years    Substance and Sexual Activity     Alcohol use: Yes     Comment: occ      Drug use: Never     Sexual activity: Not on file   Lifestyle     Physical activity     Days per week: Not on file     Minutes per session: Not on file     Stress: Not on file   Relationships     Social connections     Talks on phone: Not on file     Gets together: Not on file     Attends Sikhism service: Not on file     Active member of club or organization: Not on file     Attends meetings of clubs or organizations: Not on file     Relationship status: Not on file     Intimate partner violence     Fear of current or ex partner: Not on file     Emotionally abused: Not on file     Physically abused: Not on file     Forced sexual activity: Not on file   Other Topics Concern     Not on file   Social History Narrative     Not on file       Family History  No family history on file.    Review of Systems    ROS/MED HX    ENT/Pulmonary:     (+)allergic rhinitis, tobacco use (patient smoked socially for 5 years with 1 pack per week if that. Quit in ), Past use , . .    Neurologic:  - neg neurologic ROS    (-) seizures, CVA and TIA   Cardiovascular:     (+) Dyslipidemia, hypertension--CAD (s/p PCI with DEX x2 on 20), --. : . . . :. valvular problems/murmurs type: AS  severe:. Previous cardiac testing Echodate:6/22/20results:CONCLUSIONS  1. The aortic valve is tricuspid. Severe aortic stenosis. The peak  velocity across the aortic valve is 4.3 m/sec and the mean gradient is  42 mmHg. The aortic valve area is 0.86 cm^2 by the continuity  equation. Mild to moderate aortic regurgitation.  2. Left ventricular chamber size is normal. Normal left ventricular  wall thickness. Global and regional left ventricular function is  normal. Left ventricular ejection fraction is visually estimated at  65%.  3. Normal right ventricle size and normal global function.  4. Mild mitral stenosis. Mitral valve mean gradient: 5 mmHg at 89  beats per minute. Mitral valve area by pressure half-time : 1.69 cm^2.  Mild mitral regurgitation.  5. The visualized segments of the thoracic aorta are normal in  diameter.  6. No pulmonary hypertension.  7. The inferior vena cava is normal suggesting normal RA pressure.  8. There is no pericardial effusion.  9. Compared to the previous study done on 5/28/19, the aortic stenosis  is slightly worse.  date: results:ECG reviewed date:9/9/20 results:Sinus rhythmCath date: 9/9/20 results:1. Severe 2-vessel CAD of the mid and distal LAD and proximal RCA s/p successful intervention  - 1 drug eluting stent (Synergy 3.5x20mm) to mid LAD  - 1 drug eluting stent (Synergy 2.5x20mm) to distal LAD  - 1 drug eluting stent (Synergy 3.0x12mm) to proximal RCA  2. Severe aortic stenosis  3. Normal LV filling pressure (LVEDP 10)  4. Perclose to RFA            METS/Exercise Tolerance:  4 - Raking leaves, gardening   Hematologic:     (+) Anemia, -     (-) history of blood clots and History of Transfusion   Musculoskeletal:   (+) arthritis,  other musculoskeletal- chronic back pain       GI/Hepatic:  - neg GI/hepatic ROS      (-) GERD   Renal/Genitourinary: Comment: History of renal cyst    (+) chronic renal disease, type: CRI, Pt does not require dialysis, Pt has no history of transplant,  "      Endo: Comment: Impaired fasting glucose        Psychiatric:  - neg psychiatric ROS       Infectious Disease:  - neg infectious disease ROS       Malignancy:      - no malignancy   Other:    (+) No chance of pregnancy no H/O Chronic Pain,no other significant disability        The complete review of systems is negative other than noted in the HPI or here.   Temp: 97.6  F (36.4  C) Temp src: Oral BP: 118/69 Pulse: 67   Resp: 20 SpO2: 100 %         124 lbs 0 oz  5' 5\"[pt reported[   Body mass index is 20.63 kg/m .       Physical Exam  Constitutional: Awake, alert, cooperative, no apparent distress, and appears stated age.  Eyes: Pupils equal, round and reactive to light, extra ocular muscles intact, sclera clear, conjunctiva normal.  HENT: Normocephalic, oral pharynx with moist mucus membranes, good dentition. No goiter appreciated.   Respiratory: Clear to auscultation bilaterally, no crackles or wheezing.  Cardiovascular: Regular rate and rhythm, normal S1 and S2, and loud systolic murmur noted.  Carotids +2, no bruits. No edema. Palpable pulses to radial  DP and PT arteries.   GI: Normal bowel sounds, soft, non-distended, non-tender, no masses palpated, no hepatosplenomegaly.    Lymph/Hematologic: No cervical lymphadenopathy and no supraclavicular lymphadenopathy.  Genitourinary:  defer  Skin: Warm and dry.  No rashes at anticipated surgical site.   Musculoskeletal: Limited ROM of neck. There is no redness, warmth, or swelling of the joints. Gross motor strength is normal.    Neurologic: Awake, alert, oriented to name, place and time. Cranial nerves II-XII are grossly intact. Gait is normal.   Neuropsychiatric: Calm, cooperative. Normal affect.     Labs: (personally reviewed)      LABS:  CBC:   Lab Results   Component Value Date    WBC 7.6 09/10/2020    WBC 8.0 09/09/2020    HGB 9.5 (L) 09/10/2020    HGB 10.5 (L) 09/09/2020    HCT 31.5 (L) 09/10/2020    HCT 34.0 (L) 09/09/2020     09/10/2020     " 2020     BMP:   Lab Results   Component Value Date     09/10/2020     2020    POTASSIUM 3.8 09/10/2020    POTASSIUM 3.9 2020    CHLORIDE 108 09/10/2020    CHLORIDE 107 2020    CO2 25 09/10/2020    CO2 26 2020    BUN 24 09/10/2020    BUN 24 2020    CR 0.80 09/10/2020    CR 0.84 2020     (H) 09/10/2020     (H) 2020     COAGS:   Lab Results   Component Value Date    INR 0.98 2020     POC: No results found for: BGM, HCG, HCGS  OTHER:   Lab Results   Component Value Date    DREW 8.5 09/10/2020    MAG 2.0 2020    ALBUMIN 3.6 2020    PROTTOTAL 7.3 2020    ALT 21 2020    AST 19 2020    ALKPHOS 76 2020    BILITOTAL 0.6 2020          EK20  Sinus rhythm    Echo 20    CONCLUSIONS  1. The aortic valve is tricuspid. Severe aortic stenosis. The peak  velocity across the aortic valve is 4.3 m/sec and the mean gradient is  42 mmHg. The aortic valve area is 0.86 cm^2 by the continuity  equation. Mild to moderate aortic regurgitation.  2. Left ventricular chamber size is normal. Normal left ventricular  wall thickness. Global and regional left ventricular function is  normal. Left ventricular ejection fraction is visually estimated at  65%.  3. Normal right ventricle size and normal global function.  4. Mild mitral stenosis. Mitral valve mean gradient: 5 mmHg at 89  beats per minute. Mitral valve area by pressure half-time : 1.69 cm^2.  Mild mitral regurgitation.  5. The visualized segments of the thoracic aorta are normal in  diameter.  6. No pulmonary hypertension.  7. The inferior vena cava is normal suggesting normal RA pressure.  8. There is no pericardial effusion.  9. Compared to the previous study done on 19, the aortic stenosis  is slightly worse.    Cath 20  Conclusion     1. Severe 2-vessel CAD of the mid and distal LAD and proximal RCA s/p successful intervention  - 1 drug eluting  stent (Synergy 3.5x20mm) to mid LAD  - 1 drug eluting stent (Synergy 2.5x20mm) to distal LAD  - 1 drug eluting stent (Synergy 3.0x12mm) to proximal RCA  2. Severe aortic stenosis  3. Normal LV filling pressure (LVEDP 10)  4. Perclose to RFA     Duke Activity Scale Index: 42.70 (Least activity: 0 - Max activity:58)     [place] Frailty Scale: 2 (0-18, Increased risk if >8)    Mini- Cog Score: 5 (0-5, If < 3, needs referral)     Malnutrition Screen: negative    Fall Risk Screen: positive for fall        The patient's records and results personally reviewed by this provider.     Outside records reviewed from: care everywhere     ASSESSMENT and PLAN  Lydia is an 85 year old woman who is scheduled for Transfemoral, subclavian (SIFUENTES) or transapical transcatheter aortic valve replacement, Possible emergency open heart bypass and or balloon pump placement, and any indicated procedure on 10/7/20 by Dr. Pastor in treatment of severe aortic stenosis.  PAC referral for risk assessment and optimization for anesthesia with comorbid conditions of CAD, HTN, HLD, former smoker, anemia, IFG, mild renal insufficiency, history of renal cyst, back pain:    Pre-operative considerations:  1.  Cardiac:  Functional status- METS 4, the patient and her  walk 6-8 blocks a day. 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     ~ CAD - the patient had coronary angiograon on 9/9/20 and had PCI with REY x2. She was started on Brilinta and continue with ASA 81 mg and per cardiology team will continue both through surgery.   ~ Severe aortic stenosis - procedure as above.   ~ HTN/ HLD - continue metoprolol    2.  Pulm:  Airway feasible.  LORRIE risk: Low (age, HTN)  ~ Former smoker - quit in 1980  ~ Allergies - continue Flonase.     3. Heme:  Anemia - hgb was 9.5 on 9/10/20. Will get type and screen done today.     4. Endo: IFG - had an A1c of 5.7 in 2014. She's not on medications.     5. GI:  Risk of PONV score = 3.   If > 2, anti-emetic intervention recommended.    6. : History of renal cyst - has decided to pursue this after surgery  ~ mild renal insufficiency - last creatinine was 0.80 on 9/10/20.      7. Musculoskeletal: Osteoarthritis/ chronic back pain and history of T11 wedge compression fracture - consideration for careful positioning to minimize discomfort. Patient tested positive for fall risk. Fall precautions as indicated.     VTE risk: 0.5%    The patient is optimized for their procedure. AVS with information on surgery time/arrival time, meds and NPO status given by nursing staff.        Sophia Boudreaux PA-C  Preoperative Assessment Center  [facility]  Clinic and Surgery Center  Phone: [phone]  Fax: [phone]

## 2020-10-02 DIAGNOSIS — I35.0 SEVERE AORTIC STENOSIS: Primary | ICD-10-CM

## 2020-10-02 RX ORDER — LIDOCAINE 40 MG/G
CREAM TOPICAL
Status: CANCELLED | OUTPATIENT
Start: 2020-10-02

## 2020-10-02 RX ORDER — CEFAZOLIN SODIUM 2 G/50ML
2 SOLUTION INTRAVENOUS
Status: CANCELLED | OUTPATIENT
Start: 2020-10-02

## 2020-10-02 RX ORDER — SODIUM CHLORIDE 9 MG/ML
INJECTION, SOLUTION INTRAVENOUS CONTINUOUS
Status: CANCELLED | OUTPATIENT
Start: 2020-10-02

## 2020-10-02 RX ORDER — ASPIRIN 81 MG/1
81 TABLET ORAL DAILY
Status: CANCELLED | OUTPATIENT
Start: 2020-10-02

## 2020-10-02 NOTE — PROGRESS NOTES
Date: 10/2/2020    Time of Call: 11:23 AM     Diagnosis:  Severe aortic stenosis     [ TORB ] Ordering provider: Leonard Pastor MD  Order:   Echo  Blood components  Pre-TAVR procedure orders       Order received by: Elizabeth Mata RN     Follow-up/additional notes:   Orders entered for upcoming TAVR on 10/07/20.

## 2020-10-03 DIAGNOSIS — Z11.59 ENCOUNTER FOR SCREENING FOR OTHER VIRAL DISEASES: ICD-10-CM

## 2020-10-03 PROCEDURE — U0003 INFECTIOUS AGENT DETECTION BY NUCLEIC ACID (DNA OR RNA); SEVERE ACUTE RESPIRATORY SYNDROME CORONAVIRUS 2 (SARS-COV-2) (CORONAVIRUS DISEASE [COVID-19]), AMPLIFIED PROBE TECHNIQUE, MAKING USE OF HIGH THROUGHPUT TECHNOLOGIES AS DESCRIBED BY CMS-2020-01-R: HCPCS | Performed by: INTERNAL MEDICINE

## 2020-10-04 LAB
SARS-COV-2 RNA SPEC QL NAA+PROBE: NOT DETECTED
SPECIMEN SOURCE: NORMAL

## 2020-10-07 ENCOUNTER — HOSPITAL ENCOUNTER (INPATIENT)
Facility: CLINIC | Age: 85
Setting detail: SURGERY ADMIT
LOS: 1 days | Discharge: HOME | DRG: 267 | End: 2020-10-08
Attending: INTERNAL MEDICINE | Admitting: INTERNAL MEDICINE
Payer: MEDICARE

## 2020-10-07 ENCOUNTER — HOSPITAL ENCOUNTER (OUTPATIENT)
Dept: CARDIOLOGY | Facility: CLINIC | Age: 85
DRG: 267 | End: 2020-10-07
Attending: INTERNAL MEDICINE | Admitting: INTERNAL MEDICINE
Payer: MEDICARE

## 2020-10-07 ENCOUNTER — ANESTHESIA (OUTPATIENT)
Dept: SURGERY | Facility: CLINIC | Age: 85
DRG: 267 | End: 2020-10-07
Payer: MEDICARE

## 2020-10-07 DIAGNOSIS — I71.9 AORTIC ANEURYSM (H): ICD-10-CM

## 2020-10-07 DIAGNOSIS — I35.0 SEVERE AORTIC STENOSIS: ICD-10-CM

## 2020-10-07 LAB
ABO + RH BLD: NORMAL
ALBUMIN SERPL-MCNC: 2.6 G/DL (ref 3.4–5)
ALP SERPL-CCNC: 59 U/L (ref 40–150)
ALT SERPL W P-5'-P-CCNC: 15 U/L (ref 0–50)
ANION GAP SERPL CALCULATED.3IONS-SCNC: 3 MMOL/L (ref 3–14)
ANION GAP SERPL CALCULATED.3IONS-SCNC: 5 MMOL/L (ref 3–14)
AST SERPL W P-5'-P-CCNC: 17 U/L (ref 0–45)
BASE DEFICIT BLDA-SCNC: 0.2 MMOL/L
BILIRUB SERPL-MCNC: 0.4 MG/DL (ref 0.2–1.3)
BLD GP AB SCN SERPL QL: NORMAL
BLD GP AB SCN SERPL QL: NORMAL
BLD PROD TYP BPU: NORMAL
BLD UNIT ID BPU: 0
BLD UNIT ID BPU: 0
BLOOD BANK CMNT PATIENT-IMP: NORMAL
BLOOD PRODUCT CODE: NORMAL
BLOOD PRODUCT CODE: NORMAL
BPU ID: NORMAL
BPU ID: NORMAL
BUN SERPL-MCNC: 24 MG/DL (ref 7–30)
BUN SERPL-MCNC: 25 MG/DL (ref 7–30)
CA-I BLD-MCNC: 4.8 MG/DL (ref 4.4–5.2)
CALCIUM SERPL-MCNC: 8.1 MG/DL (ref 8.5–10.1)
CALCIUM SERPL-MCNC: 9.4 MG/DL (ref 8.5–10.1)
CHLORIDE SERPL-SCNC: 106 MMOL/L (ref 94–109)
CHLORIDE SERPL-SCNC: 107 MMOL/L (ref 94–109)
CO2 SERPL-SCNC: 25 MMOL/L (ref 20–32)
CO2 SERPL-SCNC: 29 MMOL/L (ref 20–32)
CREAT SERPL-MCNC: 0.88 MG/DL (ref 0.52–1.04)
CREAT SERPL-MCNC: 1.04 MG/DL (ref 0.52–1.04)
ERYTHROCYTE [DISTWIDTH] IN BLOOD BY AUTOMATED COUNT: 13.8 % (ref 10–15)
ERYTHROCYTE [DISTWIDTH] IN BLOOD BY AUTOMATED COUNT: 13.8 % (ref 10–15)
GFR SERPL CREATININE-BSD FRML MDRD: 50 ML/MIN/{1.73_M2}
GFR SERPL CREATININE-BSD FRML MDRD: 60 ML/MIN/{1.73_M2}
GLUCOSE BLD-MCNC: 123 MG/DL (ref 70–99)
GLUCOSE BLDC GLUCOMTR-MCNC: 103 MG/DL (ref 70–99)
GLUCOSE SERPL-MCNC: 108 MG/DL (ref 70–99)
GLUCOSE SERPL-MCNC: 109 MG/DL (ref 70–99)
HCO3 BLD-SCNC: 25 MMOL/L (ref 21–28)
HCT VFR BLD AUTO: 26.4 % (ref 35–47)
HCT VFR BLD AUTO: 36.1 % (ref 35–47)
HGB BLD-MCNC: 11.1 G/DL (ref 11.7–15.7)
HGB BLD-MCNC: 8.3 G/DL (ref 11.7–15.7)
HGB BLD-MCNC: 8.3 G/DL (ref 11.7–15.7)
INR PPP: 0.97 (ref 0.86–1.14)
KCT BLD-ACNC: 103 SEC (ref 75–150)
KCT BLD-ACNC: 107 SEC (ref 75–150)
KCT BLD-ACNC: 255 SEC (ref 75–150)
KCT BLD-ACNC: 319 SEC (ref 75–150)
LACTATE BLD-SCNC: 0.4 MMOL/L (ref 0.7–2)
MAGNESIUM SERPL-MCNC: 2 MG/DL (ref 1.6–2.3)
MCH RBC QN AUTO: 27.1 PG (ref 26.5–33)
MCH RBC QN AUTO: 27.5 PG (ref 26.5–33)
MCHC RBC AUTO-ENTMCNC: 30.7 G/DL (ref 31.5–36.5)
MCHC RBC AUTO-ENTMCNC: 31.4 G/DL (ref 31.5–36.5)
MCV RBC AUTO: 87 FL (ref 78–100)
MCV RBC AUTO: 88 FL (ref 78–100)
NUM BPU REQUESTED: 2
NUM BPU REQUESTED: 2
O2/TOTAL GAS SETTING VFR VENT: 40 %
PCO2 BLD: 45 MM HG (ref 35–45)
PH BLD: 7.36 PH (ref 7.35–7.45)
PHOSPHATE SERPL-MCNC: 3.2 MG/DL (ref 2.5–4.5)
PLATELET # BLD AUTO: 137 10E9/L (ref 150–450)
PLATELET # BLD AUTO: 200 10E9/L (ref 150–450)
PO2 BLD: 347 MM HG (ref 80–105)
POTASSIUM BLD-SCNC: 3.5 MMOL/L (ref 3.4–5.3)
POTASSIUM SERPL-SCNC: 3.5 MMOL/L (ref 3.4–5.3)
POTASSIUM SERPL-SCNC: 4.2 MMOL/L (ref 3.4–5.3)
PROT SERPL-MCNC: 5.3 G/DL (ref 6.8–8.8)
RBC # BLD AUTO: 3.02 10E12/L (ref 3.8–5.2)
RBC # BLD AUTO: 4.09 10E12/L (ref 3.8–5.2)
SODIUM BLD-SCNC: 139 MMOL/L (ref 133–144)
SODIUM SERPL-SCNC: 137 MMOL/L (ref 133–144)
SODIUM SERPL-SCNC: 139 MMOL/L (ref 133–144)
SPECIMEN EXP DATE BLD: NORMAL
SPECIMEN EXP DATE BLD: NORMAL
TRANSFUSION STATUS PATIENT QL: NORMAL
WBC # BLD AUTO: 7 10E9/L (ref 4–11)
WBC # BLD AUTO: 7.9 10E9/L (ref 4–11)

## 2020-10-07 PROCEDURE — 370N000001 HC ANESTHESIA TECHNICAL FEE, 1ST 30 MIN: Performed by: INTERNAL MEDICINE

## 2020-10-07 PROCEDURE — 02RF3JZ REPLACEMENT OF AORTIC VALVE WITH SYNTHETIC SUBSTITUTE, PERCUTANEOUS APPROACH: ICD-10-PCS | Performed by: INTERNAL MEDICINE

## 2020-10-07 PROCEDURE — 250N000011 HC RX IP 250 OP 636: Performed by: NURSE ANESTHETIST, CERTIFIED REGISTERED

## 2020-10-07 PROCEDURE — 250N000009 HC RX 250: Performed by: NURSE ANESTHETIST, CERTIFIED REGISTERED

## 2020-10-07 PROCEDURE — 214N000001 HC R&B CCU UMMC

## 2020-10-07 PROCEDURE — 93010 ELECTROCARDIOGRAM REPORT: CPT | Mod: 76 | Performed by: INTERNAL MEDICINE

## 2020-10-07 PROCEDURE — 410N000003 HC PER-PERFUSION 1ST 30 MIN: Performed by: INTERNAL MEDICINE

## 2020-10-07 PROCEDURE — 258N000003 HC RX IP 258 OP 636: Performed by: INTERNAL MEDICINE

## 2020-10-07 PROCEDURE — 86901 BLOOD TYPING SEROLOGIC RH(D): CPT | Performed by: INTERNAL MEDICINE

## 2020-10-07 PROCEDURE — 93005 ELECTROCARDIOGRAM TRACING: CPT

## 2020-10-07 PROCEDURE — 761N000003 HC RECOVERY PHASE 1 LEVEL 2 FIRST HR: Performed by: INTERNAL MEDICINE

## 2020-10-07 PROCEDURE — 360N000040 HC SURGERY LEVEL 6 1ST 30 MIN - UMMC: Performed by: INTERNAL MEDICINE

## 2020-10-07 PROCEDURE — C1730 CATH, EP, 19 OR FEW ELECT: HCPCS | Performed by: INTERNAL MEDICINE

## 2020-10-07 PROCEDURE — 85027 COMPLETE CBC AUTOMATED: CPT | Performed by: STUDENT IN AN ORGANIZED HEALTH CARE EDUCATION/TRAINING PROGRAM

## 2020-10-07 PROCEDURE — 258N000003 HC RX IP 258 OP 636: Performed by: STUDENT IN AN ORGANIZED HEALTH CARE EDUCATION/TRAINING PROGRAM

## 2020-10-07 PROCEDURE — 86923 COMPATIBILITY TEST ELECTRIC: CPT | Performed by: INTERNAL MEDICINE

## 2020-10-07 PROCEDURE — 99222 1ST HOSP IP/OBS MODERATE 55: CPT | Mod: 25 | Performed by: INTERNAL MEDICINE

## 2020-10-07 PROCEDURE — 272N000001 HC OR GENERAL SUPPLY STERILE: Performed by: INTERNAL MEDICINE

## 2020-10-07 PROCEDURE — 80053 COMPREHEN METABOLIC PANEL: CPT | Performed by: STUDENT IN AN ORGANIZED HEALTH CARE EDUCATION/TRAINING PROGRAM

## 2020-10-07 PROCEDURE — 93306 TTE W/DOPPLER COMPLETE: CPT | Mod: 26 | Performed by: INTERNAL MEDICINE

## 2020-10-07 PROCEDURE — 83605 ASSAY OF LACTIC ACID: CPT

## 2020-10-07 PROCEDURE — 999N000139 HC STATISTIC PRE-PROCEDURE ASSESSMENT II: Performed by: INTERNAL MEDICINE

## 2020-10-07 PROCEDURE — 250N000009 HC RX 250: Performed by: INTERNAL MEDICINE

## 2020-10-07 PROCEDURE — 999N000015 HC STATISTIC ARTERIAL MONITORING DAILY

## 2020-10-07 PROCEDURE — 360N000041 HC SURGERY LEVEL 6 EA 15 ADDTL MIN - UMMC: Performed by: INTERNAL MEDICINE

## 2020-10-07 PROCEDURE — 80048 BASIC METABOLIC PNL TOTAL CA: CPT | Performed by: INTERNAL MEDICINE

## 2020-10-07 PROCEDURE — 83735 ASSAY OF MAGNESIUM: CPT | Performed by: STUDENT IN AN ORGANIZED HEALTH CARE EDUCATION/TRAINING PROGRAM

## 2020-10-07 PROCEDURE — 86900 BLOOD TYPING SEROLOGIC ABO: CPT | Performed by: INTERNAL MEDICINE

## 2020-10-07 PROCEDURE — 250N000011 HC RX IP 250 OP 636: Performed by: INTERNAL MEDICINE

## 2020-10-07 PROCEDURE — 82947 ASSAY GLUCOSE BLOOD QUANT: CPT

## 2020-10-07 PROCEDURE — 250N000013 HC RX MED GY IP 250 OP 250 PS 637: Performed by: STUDENT IN AN ORGANIZED HEALTH CARE EDUCATION/TRAINING PROGRAM

## 2020-10-07 PROCEDURE — C1760 CLOSURE DEV, VASC: HCPCS | Performed by: INTERNAL MEDICINE

## 2020-10-07 PROCEDURE — 82330 ASSAY OF CALCIUM: CPT

## 2020-10-07 PROCEDURE — 33361 REPLACE AORTIC VALVE PERQ: CPT | Mod: 62 | Performed by: INTERNAL MEDICINE

## 2020-10-07 PROCEDURE — 93306 TTE W/DOPPLER COMPLETE: CPT

## 2020-10-07 PROCEDURE — 84295 ASSAY OF SERUM SODIUM: CPT

## 2020-10-07 PROCEDURE — 370N000002 HC ANESTHESIA TECHNICAL FEE, EACH ADDTL 15 MIN: Performed by: INTERNAL MEDICINE

## 2020-10-07 PROCEDURE — 84100 ASSAY OF PHOSPHORUS: CPT | Performed by: STUDENT IN AN ORGANIZED HEALTH CARE EDUCATION/TRAINING PROGRAM

## 2020-10-07 PROCEDURE — P9016 RBC LEUKOCYTES REDUCED: HCPCS | Performed by: INTERNAL MEDICINE

## 2020-10-07 PROCEDURE — C1894 INTRO/SHEATH, NON-LASER: HCPCS | Performed by: INTERNAL MEDICINE

## 2020-10-07 PROCEDURE — 86850 RBC ANTIBODY SCREEN: CPT | Performed by: INTERNAL MEDICINE

## 2020-10-07 PROCEDURE — 82803 BLOOD GASES ANY COMBINATION: CPT

## 2020-10-07 PROCEDURE — 999N001017 HC STATISTIC GLUCOSE BY METER IP

## 2020-10-07 PROCEDURE — 85027 COMPLETE CBC AUTOMATED: CPT | Performed by: INTERNAL MEDICINE

## 2020-10-07 PROCEDURE — C1769 GUIDE WIRE: HCPCS | Performed by: INTERNAL MEDICINE

## 2020-10-07 PROCEDURE — 999N000157 HC STATISTIC RCP TIME EA 10 MIN

## 2020-10-07 PROCEDURE — 85347 COAGULATION TIME ACTIVATED: CPT

## 2020-10-07 PROCEDURE — 36415 COLL VENOUS BLD VENIPUNCTURE: CPT | Performed by: INTERNAL MEDICINE

## 2020-10-07 PROCEDURE — 84132 ASSAY OF SERUM POTASSIUM: CPT

## 2020-10-07 PROCEDURE — 85610 PROTHROMBIN TIME: CPT | Performed by: INTERNAL MEDICINE

## 2020-10-07 DEVICE — VALVE HEART SAPIEN 3 23MM 9600TFX23A: Type: IMPLANTABLE DEVICE | Site: CHEST | Status: FUNCTIONAL

## 2020-10-07 RX ORDER — LIDOCAINE 40 MG/G
CREAM TOPICAL
Status: DISCONTINUED | OUTPATIENT
Start: 2020-10-07 | End: 2020-10-07 | Stop reason: HOSPADM

## 2020-10-07 RX ORDER — SODIUM CHLORIDE, SODIUM LACTATE, POTASSIUM CHLORIDE, CALCIUM CHLORIDE 600; 310; 30; 20 MG/100ML; MG/100ML; MG/100ML; MG/100ML
INJECTION, SOLUTION INTRAVENOUS CONTINUOUS
Status: DISCONTINUED | OUTPATIENT
Start: 2020-10-07 | End: 2020-10-07 | Stop reason: HOSPADM

## 2020-10-07 RX ORDER — NITROGLYCERIN 20 MG/100ML
INJECTION INTRAVENOUS PRN
Status: DISCONTINUED | OUTPATIENT
Start: 2020-10-07 | End: 2020-10-07

## 2020-10-07 RX ORDER — ASPIRIN 81 MG/1
81 TABLET ORAL DAILY
Status: DISCONTINUED | OUTPATIENT
Start: 2020-10-07 | End: 2020-10-07 | Stop reason: HOSPADM

## 2020-10-07 RX ORDER — CEFAZOLIN SODIUM 2 G/100ML
2 INJECTION, SOLUTION INTRAVENOUS
Status: COMPLETED | OUTPATIENT
Start: 2020-10-07 | End: 2020-10-07

## 2020-10-07 RX ORDER — DEXMEDETOMIDINE HYDROCHLORIDE 4 UG/ML
0.2-1.2 INJECTION, SOLUTION INTRAVENOUS CONTINUOUS
Status: DISCONTINUED | OUTPATIENT
Start: 2020-10-07 | End: 2020-10-07 | Stop reason: HOSPADM

## 2020-10-07 RX ORDER — SODIUM CHLORIDE 9 MG/ML
INJECTION, SOLUTION INTRAVENOUS CONTINUOUS
Status: DISCONTINUED | OUTPATIENT
Start: 2020-10-07 | End: 2020-10-07

## 2020-10-07 RX ORDER — HYDRALAZINE HYDROCHLORIDE 20 MG/ML
10 INJECTION INTRAMUSCULAR; INTRAVENOUS
Status: DISCONTINUED | OUTPATIENT
Start: 2020-10-07 | End: 2020-10-08 | Stop reason: HOSPADM

## 2020-10-07 RX ORDER — ACETAMINOPHEN 325 MG/1
325-650 TABLET ORAL EVERY 4 HOURS PRN
Status: DISCONTINUED | OUTPATIENT
Start: 2020-10-07 | End: 2020-10-08 | Stop reason: HOSPADM

## 2020-10-07 RX ORDER — ONDANSETRON 2 MG/ML
4 INJECTION INTRAMUSCULAR; INTRAVENOUS EVERY 6 HOURS PRN
Status: DISCONTINUED | OUTPATIENT
Start: 2020-10-07 | End: 2020-10-08 | Stop reason: HOSPADM

## 2020-10-07 RX ORDER — ONDANSETRON 2 MG/ML
4 INJECTION INTRAMUSCULAR; INTRAVENOUS EVERY 30 MIN PRN
Status: DISCONTINUED | OUTPATIENT
Start: 2020-10-07 | End: 2020-10-07 | Stop reason: HOSPADM

## 2020-10-07 RX ORDER — SODIUM CHLORIDE, SODIUM GLUCONATE, SODIUM ACETATE, POTASSIUM CHLORIDE AND MAGNESIUM CHLORIDE 526; 502; 368; 37; 30 MG/100ML; MG/100ML; MG/100ML; MG/100ML; MG/100ML
INJECTION, SOLUTION INTRAVENOUS CONTINUOUS PRN
Status: DISCONTINUED | OUTPATIENT
Start: 2020-10-07 | End: 2020-10-07

## 2020-10-07 RX ORDER — PROTAMINE SULFATE 10 MG/ML
INJECTION, SOLUTION INTRAVENOUS PRN
Status: DISCONTINUED | OUTPATIENT
Start: 2020-10-07 | End: 2020-10-07

## 2020-10-07 RX ORDER — ASPIRIN 81 MG/1
81 TABLET, CHEWABLE ORAL DAILY
Status: DISCONTINUED | OUTPATIENT
Start: 2020-10-08 | End: 2020-10-08 | Stop reason: HOSPADM

## 2020-10-07 RX ORDER — HEPARIN SODIUM 1000 [USP'U]/ML
INJECTION, SOLUTION INTRAVENOUS; SUBCUTANEOUS PRN
Status: DISCONTINUED | OUTPATIENT
Start: 2020-10-07 | End: 2020-10-07

## 2020-10-07 RX ORDER — SODIUM CHLORIDE 9 MG/ML
INJECTION, SOLUTION INTRAVENOUS CONTINUOUS
Status: DISCONTINUED | OUTPATIENT
Start: 2020-10-07 | End: 2020-10-07 | Stop reason: HOSPADM

## 2020-10-07 RX ORDER — ONDANSETRON 4 MG/1
4 TABLET, ORALLY DISINTEGRATING ORAL EVERY 30 MIN PRN
Status: DISCONTINUED | OUTPATIENT
Start: 2020-10-07 | End: 2020-10-07 | Stop reason: HOSPADM

## 2020-10-07 RX ORDER — HYDROMORPHONE HYDROCHLORIDE 1 MG/ML
.3-.5 INJECTION, SOLUTION INTRAMUSCULAR; INTRAVENOUS; SUBCUTANEOUS EVERY 5 MIN PRN
Status: DISCONTINUED | OUTPATIENT
Start: 2020-10-07 | End: 2020-10-07 | Stop reason: HOSPADM

## 2020-10-07 RX ORDER — IOPAMIDOL 755 MG/ML
INJECTION, SOLUTION INTRAVASCULAR
Status: DISCONTINUED | OUTPATIENT
Start: 2020-10-07 | End: 2020-10-07 | Stop reason: HOSPADM

## 2020-10-07 RX ORDER — FENTANYL CITRATE 50 UG/ML
25-50 INJECTION, SOLUTION INTRAMUSCULAR; INTRAVENOUS
Status: DISCONTINUED | OUTPATIENT
Start: 2020-10-07 | End: 2020-10-07 | Stop reason: HOSPADM

## 2020-10-07 RX ORDER — FENTANYL CITRATE 50 UG/ML
INJECTION, SOLUTION INTRAMUSCULAR; INTRAVENOUS PRN
Status: DISCONTINUED | OUTPATIENT
Start: 2020-10-07 | End: 2020-10-07

## 2020-10-07 RX ORDER — NALOXONE HYDROCHLORIDE 0.4 MG/ML
.1-.4 INJECTION, SOLUTION INTRAMUSCULAR; INTRAVENOUS; SUBCUTANEOUS
Status: ACTIVE | OUTPATIENT
Start: 2020-10-07 | End: 2020-10-08

## 2020-10-07 RX ORDER — METOPROLOL SUCCINATE 25 MG/1
25 TABLET, EXTENDED RELEASE ORAL EVERY MORNING
Status: DISCONTINUED | OUTPATIENT
Start: 2020-10-08 | End: 2020-10-08 | Stop reason: HOSPADM

## 2020-10-07 RX ORDER — NITROGLYCERIN 0.4 MG/1
0.4 TABLET SUBLINGUAL EVERY 5 MIN PRN
Status: DISCONTINUED | OUTPATIENT
Start: 2020-10-07 | End: 2020-10-08 | Stop reason: HOSPADM

## 2020-10-07 RX ADMIN — Medication 4 MCG: at 17:21

## 2020-10-07 RX ADMIN — Medication 4 MCG: at 16:11

## 2020-10-07 RX ADMIN — Medication 4 MCG: at 17:15

## 2020-10-07 RX ADMIN — NITROGLYCERIN 100 MCG: 20 INJECTION INTRAVENOUS at 17:18

## 2020-10-07 RX ADMIN — FENTANYL CITRATE 25 MCG: 50 INJECTION, SOLUTION INTRAMUSCULAR; INTRAVENOUS at 17:21

## 2020-10-07 RX ADMIN — HEPARIN SODIUM 8400 UNITS: 1000 INJECTION INTRAVENOUS; SUBCUTANEOUS at 16:57

## 2020-10-07 RX ADMIN — Medication 2 G: at 16:19

## 2020-10-07 RX ADMIN — NITROGLYCERIN 50 MCG: 20 INJECTION INTRAVENOUS at 17:17

## 2020-10-07 RX ADMIN — PROTAMINE SULFATE 100 MG: 10 INJECTION, SOLUTION INTRAVENOUS at 17:40

## 2020-10-07 RX ADMIN — FENTANYL CITRATE 25 MCG: 50 INJECTION, SOLUTION INTRAMUSCULAR; INTRAVENOUS at 17:17

## 2020-10-07 RX ADMIN — FENTANYL CITRATE 50 MCG: 50 INJECTION, SOLUTION INTRAMUSCULAR; INTRAVENOUS at 16:13

## 2020-10-07 RX ADMIN — Medication 4 MCG: at 16:48

## 2020-10-07 RX ADMIN — SODIUM CHLORIDE, SODIUM GLUCONATE, SODIUM ACETATE, POTASSIUM CHLORIDE AND MAGNESIUM CHLORIDE: 526; 502; 368; 37; 30 INJECTION, SOLUTION INTRAVENOUS at 17:01

## 2020-10-07 RX ADMIN — SODIUM CHLORIDE: 9 INJECTION, SOLUTION INTRAVENOUS at 18:49

## 2020-10-07 RX ADMIN — FENTANYL CITRATE 25 MCG: 50 INJECTION, SOLUTION INTRAMUSCULAR; INTRAVENOUS at 17:30

## 2020-10-07 RX ADMIN — HEPARIN SODIUM 4000 UNITS: 1000 INJECTION INTRAVENOUS; SUBCUTANEOUS at 17:10

## 2020-10-07 RX ADMIN — FENTANYL CITRATE 50 MCG: 50 INJECTION, SOLUTION INTRAMUSCULAR; INTRAVENOUS at 16:33

## 2020-10-07 RX ADMIN — Medication 4 MCG: at 16:17

## 2020-10-07 RX ADMIN — TICAGRELOR 90 MG: 90 TABLET ORAL at 20:03

## 2020-10-07 RX ADMIN — SODIUM CHLORIDE: 9 INJECTION, SOLUTION INTRAVENOUS at 09:52

## 2020-10-07 RX ADMIN — SODIUM CHLORIDE, SODIUM GLUCONATE, SODIUM ACETATE, POTASSIUM CHLORIDE AND MAGNESIUM CHLORIDE: 526; 502; 368; 37; 30 INJECTION, SOLUTION INTRAVENOUS at 16:05

## 2020-10-07 ASSESSMENT — ACTIVITIES OF DAILY LIVING (ADL): ADLS_ACUITY_SCORE: 12

## 2020-10-07 ASSESSMENT — MIFFLIN-ST. JEOR
SCORE: 1015.88
SCORE: 1015.88

## 2020-10-07 NOTE — OR NURSING
Duplicate EKG ordered by Dr. Cota.  An EKG was done this morning at 1000 and was sinus rhythm. Dr. Cota stated that we do not need to complete an additional EKG at this time.

## 2020-10-07 NOTE — ANESTHESIA CARE TRANSFER NOTE
Patient: Lydia Dietz    Procedure(s):  Transfemoral, subclavian (DINH) or transapical transcatheter aortic valve replacement 23mm dinh valve placed. cardiovascular standby.  Possible emergency open heart bypass and or balloon pump placement, and any indicated procedure.    Diagnosis: Severe aortic stenosis [I35.0]  Diagnosis Additional Information: No value filed.    Anesthesia Type:   MAC     Note:  Airway :Nasal Cannula  Patient transferred to:PACU  Comments: VSS. Report to RN. Patient awake. Denies pain.  112/48. 99%. HR 68.Handoff Report: Identifed the Patient, Identified the Reponsible Provider, Reviewed the pertinent medical history, Discussed the surgical course, Reviewed Intra-OP anesthesia mangement and issues during anesthesia, Set expectations for post-procedure period and Allowed opportunity for questions and acknowledgement of understanding      Vitals: (Last set prior to Anesthesia Care Transfer)    CRNA VITALS  10/7/2020 1739 - 10/7/2020 1817      10/7/2020             NIBP:  112/48    Pulse:  69    SpO2:  99 %    EKG:  Sinus rhythm                Electronically Signed By: YUE Hou CRNA  October 7, 2020  6:17 PM

## 2020-10-07 NOTE — PROGRESS NOTES
SPIRITUAL HEALTH SERVICES  [facility] ([place]) 3C   PRE-SURGERY VISIT    Had pre-surgery visit with pt.  Provided spiritual support, prayer.     Caitlyn Quezada  Chaplain Resident  Pager: [phone]

## 2020-10-08 ENCOUNTER — APPOINTMENT (OUTPATIENT)
Dept: CARDIOLOGY | Facility: CLINIC | Age: 85
DRG: 267 | End: 2020-10-08
Attending: STUDENT IN AN ORGANIZED HEALTH CARE EDUCATION/TRAINING PROGRAM
Payer: MEDICARE

## 2020-10-08 ENCOUNTER — APPOINTMENT (OUTPATIENT)
Dept: OCCUPATIONAL THERAPY | Facility: CLINIC | Age: 85
DRG: 267 | End: 2020-10-08
Attending: STUDENT IN AN ORGANIZED HEALTH CARE EDUCATION/TRAINING PROGRAM
Payer: MEDICARE

## 2020-10-08 ENCOUNTER — PATIENT OUTREACH (OUTPATIENT)
Dept: CARE COORDINATION | Facility: CLINIC | Age: 85
End: 2020-10-08

## 2020-10-08 VITALS
WEIGHT: 122.8 LBS | BODY MASS INDEX: 20.46 KG/M2 | HEART RATE: 83 BPM | OXYGEN SATURATION: 100 % | SYSTOLIC BLOOD PRESSURE: 124 MMHG | DIASTOLIC BLOOD PRESSURE: 50 MMHG | TEMPERATURE: 98.2 F | HEIGHT: 65 IN | RESPIRATION RATE: 16 BRPM

## 2020-10-08 DIAGNOSIS — Z95.2 S/P TAVR (TRANSCATHETER AORTIC VALVE REPLACEMENT): Primary | ICD-10-CM

## 2020-10-08 LAB
ANION GAP SERPL CALCULATED.3IONS-SCNC: 6 MMOL/L (ref 3–14)
ANION GAP SERPL CALCULATED.3IONS-SCNC: 6 MMOL/L (ref 3–14)
BUN SERPL-MCNC: 22 MG/DL (ref 7–30)
BUN SERPL-MCNC: 26 MG/DL (ref 7–30)
CALCIUM SERPL-MCNC: 8.4 MG/DL (ref 8.5–10.1)
CALCIUM SERPL-MCNC: 8.6 MG/DL (ref 8.5–10.1)
CHLORIDE SERPL-SCNC: 105 MMOL/L (ref 94–109)
CHLORIDE SERPL-SCNC: 105 MMOL/L (ref 94–109)
CO2 SERPL-SCNC: 27 MMOL/L (ref 20–32)
CO2 SERPL-SCNC: 28 MMOL/L (ref 20–32)
CREAT SERPL-MCNC: 0.86 MG/DL (ref 0.52–1.04)
CREAT SERPL-MCNC: 0.91 MG/DL (ref 0.52–1.04)
ERYTHROCYTE [DISTWIDTH] IN BLOOD BY AUTOMATED COUNT: 13.7 % (ref 10–15)
ERYTHROCYTE [DISTWIDTH] IN BLOOD BY AUTOMATED COUNT: 13.7 % (ref 10–15)
GFR SERPL CREATININE-BSD FRML MDRD: 58 ML/MIN/{1.73_M2}
GFR SERPL CREATININE-BSD FRML MDRD: 63 ML/MIN/{1.73_M2}
GLUCOSE BLDC GLUCOMTR-MCNC: 129 MG/DL (ref 70–99)
GLUCOSE BLDC GLUCOMTR-MCNC: 159 MG/DL (ref 70–99)
GLUCOSE SERPL-MCNC: 115 MG/DL (ref 70–99)
GLUCOSE SERPL-MCNC: 151 MG/DL (ref 70–99)
HCT VFR BLD AUTO: 28.1 % (ref 35–47)
HCT VFR BLD AUTO: 28.4 % (ref 35–47)
HGB BLD-MCNC: 8.7 G/DL (ref 11.7–15.7)
HGB BLD-MCNC: 8.7 G/DL (ref 11.7–15.7)
INTERPRETATION ECG - MUSE: NORMAL
IRON SATN MFR SERPL: 20 % (ref 15–46)
IRON SERPL-MCNC: 65 UG/DL (ref 35–180)
MAGNESIUM SERPL-MCNC: 2.1 MG/DL (ref 1.6–2.3)
MCH RBC QN AUTO: 27.2 PG (ref 26.5–33)
MCH RBC QN AUTO: 27.4 PG (ref 26.5–33)
MCHC RBC AUTO-ENTMCNC: 30.6 G/DL (ref 31.5–36.5)
MCHC RBC AUTO-ENTMCNC: 31 G/DL (ref 31.5–36.5)
MCV RBC AUTO: 89 FL (ref 78–100)
MCV RBC AUTO: 89 FL (ref 78–100)
PHOSPHATE SERPL-MCNC: 3.1 MG/DL (ref 2.5–4.5)
PLATELET # BLD AUTO: 143 10E9/L (ref 150–450)
PLATELET # BLD AUTO: 143 10E9/L (ref 150–450)
POTASSIUM SERPL-SCNC: 3.3 MMOL/L (ref 3.4–5.3)
POTASSIUM SERPL-SCNC: 3.5 MMOL/L (ref 3.4–5.3)
RBC # BLD AUTO: 3.17 10E12/L (ref 3.8–5.2)
RBC # BLD AUTO: 3.2 10E12/L (ref 3.8–5.2)
SODIUM SERPL-SCNC: 138 MMOL/L (ref 133–144)
SODIUM SERPL-SCNC: 138 MMOL/L (ref 133–144)
TIBC SERPL-MCNC: 320 UG/DL (ref 240–430)
WBC # BLD AUTO: 7.2 10E9/L (ref 4–11)
WBC # BLD AUTO: 9 10E9/L (ref 4–11)

## 2020-10-08 PROCEDURE — 97165 OT EVAL LOW COMPLEX 30 MIN: CPT | Mod: GO

## 2020-10-08 PROCEDURE — 250N000013 HC RX MED GY IP 250 OP 250 PS 637: Performed by: STUDENT IN AN ORGANIZED HEALTH CARE EDUCATION/TRAINING PROGRAM

## 2020-10-08 PROCEDURE — 258N000003 HC RX IP 258 OP 636: Performed by: PHYSICIAN ASSISTANT

## 2020-10-08 PROCEDURE — 97535 SELF CARE MNGMENT TRAINING: CPT | Mod: GO

## 2020-10-08 PROCEDURE — 80048 BASIC METABOLIC PNL TOTAL CA: CPT | Performed by: STUDENT IN AN ORGANIZED HEALTH CARE EDUCATION/TRAINING PROGRAM

## 2020-10-08 PROCEDURE — 999N001017 HC STATISTIC GLUCOSE BY METER IP

## 2020-10-08 PROCEDURE — 85027 COMPLETE CBC AUTOMATED: CPT | Performed by: STUDENT IN AN ORGANIZED HEALTH CARE EDUCATION/TRAINING PROGRAM

## 2020-10-08 PROCEDURE — 36415 COLL VENOUS BLD VENIPUNCTURE: CPT | Performed by: PHYSICIAN ASSISTANT

## 2020-10-08 PROCEDURE — 83550 IRON BINDING TEST: CPT | Performed by: STUDENT IN AN ORGANIZED HEALTH CARE EDUCATION/TRAINING PROGRAM

## 2020-10-08 PROCEDURE — 99238 HOSP IP/OBS DSCHRG MGMT 30/<: CPT | Mod: 25 | Performed by: PHYSICIAN ASSISTANT

## 2020-10-08 PROCEDURE — 84100 ASSAY OF PHOSPHORUS: CPT | Performed by: STUDENT IN AN ORGANIZED HEALTH CARE EDUCATION/TRAINING PROGRAM

## 2020-10-08 PROCEDURE — 80048 BASIC METABOLIC PNL TOTAL CA: CPT | Performed by: PHYSICIAN ASSISTANT

## 2020-10-08 PROCEDURE — 97110 THERAPEUTIC EXERCISES: CPT | Mod: GO

## 2020-10-08 PROCEDURE — 36415 COLL VENOUS BLD VENIPUNCTURE: CPT | Performed by: STUDENT IN AN ORGANIZED HEALTH CARE EDUCATION/TRAINING PROGRAM

## 2020-10-08 PROCEDURE — 93005 ELECTROCARDIOGRAM TRACING: CPT

## 2020-10-08 PROCEDURE — 93308 TTE F-UP OR LMTD: CPT

## 2020-10-08 PROCEDURE — 85027 COMPLETE CBC AUTOMATED: CPT | Performed by: PHYSICIAN ASSISTANT

## 2020-10-08 PROCEDURE — 93321 DOPPLER ECHO F-UP/LMTD STD: CPT | Mod: 26 | Performed by: INTERNAL MEDICINE

## 2020-10-08 PROCEDURE — 83540 ASSAY OF IRON: CPT | Performed by: PHYSICIAN ASSISTANT

## 2020-10-08 PROCEDURE — 93308 TTE F-UP OR LMTD: CPT | Mod: 26 | Performed by: INTERNAL MEDICINE

## 2020-10-08 PROCEDURE — 93010 ELECTROCARDIOGRAM REPORT: CPT | Performed by: INTERNAL MEDICINE

## 2020-10-08 PROCEDURE — 83735 ASSAY OF MAGNESIUM: CPT | Performed by: STUDENT IN AN ORGANIZED HEALTH CARE EDUCATION/TRAINING PROGRAM

## 2020-10-08 PROCEDURE — 999N000127 HC STATISTIC PERIPHERAL IV START W US GUIDANCE

## 2020-10-08 PROCEDURE — 83540 ASSAY OF IRON: CPT | Performed by: STUDENT IN AN ORGANIZED HEALTH CARE EDUCATION/TRAINING PROGRAM

## 2020-10-08 PROCEDURE — 93325 DOPPLER ECHO COLOR FLOW MAPG: CPT | Mod: 26 | Performed by: INTERNAL MEDICINE

## 2020-10-08 RX ORDER — POTASSIUM CHLORIDE 1.5 G/1.58G
40 POWDER, FOR SOLUTION ORAL ONCE
Status: COMPLETED | OUTPATIENT
Start: 2020-10-08 | End: 2020-10-08

## 2020-10-08 RX ADMIN — POTASSIUM CHLORIDE 40 MEQ: 1.5 POWDER, FOR SOLUTION ORAL at 18:03

## 2020-10-08 RX ADMIN — ASPIRIN 81 MG: 81 TABLET, DELAYED RELEASE ORAL at 09:01

## 2020-10-08 RX ADMIN — TICAGRELOR 90 MG: 90 TABLET ORAL at 19:38

## 2020-10-08 RX ADMIN — METOPROLOL SUCCINATE 25 MG: 25 TABLET, EXTENDED RELEASE ORAL at 09:01

## 2020-10-08 RX ADMIN — SODIUM CHLORIDE, POTASSIUM CHLORIDE, SODIUM LACTATE AND CALCIUM CHLORIDE 500 ML: 600; 310; 30; 20 INJECTION, SOLUTION INTRAVENOUS at 16:11

## 2020-10-08 RX ADMIN — TICAGRELOR 90 MG: 90 TABLET ORAL at 09:01

## 2020-10-08 ASSESSMENT — MIFFLIN-ST. JEOR: SCORE: 1012.9

## 2020-10-08 ASSESSMENT — ACTIVITIES OF DAILY LIVING (ADL)
ADLS_ACUITY_SCORE: 11
ADLS_ACUITY_SCORE: 11
IADL_COMMENTS: SPOUSE CAN A AT D/C
ADLS_ACUITY_SCORE: 12
ADLS_ACUITY_SCORE: 11
ADLS_ACUITY_SCORE: 12
PREVIOUS_RESPONSIBILITIES: HOUSEKEEPING;MEAL PREP;LAUNDRY;SHOPPING;MEDICATION MANAGEMENT;FINANCES;DRIVING
ADLS_ACUITY_SCORE: 11

## 2020-10-08 NOTE — DISCHARGE SUMMARY
[address] [address]  p: [phone]    Discharge Summary: Cardiology Service    Lydia Dietz MRN# [id number]   YOB: 1935 Age: 85 year old       Admission Date: 10/7/2020  Discharge Date: 10/08/20      Discharge Diagnoses:  1. Severe Aortic stenosis s/p TAVR 10/7/2020  2. Coronary artery disease s/p PCI to LAD, RCA 9/2020  3. CKD III  4. Normocytic anemia    Pertinent Procedures:  1. TAVR    Consults:  NA    Imaging with results:  Echocardiogram 10/8/2020:     Interpretation Summary  s/p TAVR 23 mm Paredes Arik 3 valve. MG is normal 10 mmHg. Valve well  seated. Trivial paravalvular AI.  Global and regional left ventricular function is normal with an EF of 60-65%.  Right ventricular function is normal.     This study was compared with the study from 10/7/2020 .No change compared to  post-procedure.  ____________________________________________________________________  Left Ventricle  Global and regional left ventricular function is normal with an EF of 60-65%.  Left ventricular size is normal. Mild concentric wall thickening consistent  with left ventricular hypertrophy is present. Left ventricular diastolic  function is indeterminate.     Right Ventricle  Right ventricular function, chamber size, wall motion, and thickness are  normal.     Atria  The atria cannot be assessed.     Mitral Valve  Mild mitral annular calcification is present. Trace mitral insufficiency is  present.        Aortic Valve  Trace aortic insufficiency is present. The mean AoV pressure gradient is 10.0  mmHg. 23 mm Paredes Arik 3 valve.     Tricuspid Valve  The tricuspid valve is normal.     Pulmonic Valve  The pulmonic valve cannot be assessed.     Vessels  The inferior vena cava was normal in size with preserved respiratory  variability. IVC diameter <2.1 cm collapsing >50% with sniff suggests a normal  RA pressure of 3 mmHg.     Pericardium  No  pericardial effusion is present.    Brief HPI:  Lydia Dietz is an 85 year old woman with a past medical history significant for severe symptomatic aortic stenosis, 2V CAD s/p LAD and RCA PCI, and CKD III who is admitted for observation following TAVR.    Hospital Course by Diagnosis:  #Severe symptomatic aortic stenosis s/p 23mm Paredes Arik 3 TAVR 10/7/20  Prior to procedure, pt noted to have a mean gradient 42 mmHG, MARLENY 0.86 cm^2, and a PV of 4.3m/s. Post-placement aortogram showed trace/mild paravalvular insufficiency, with trace PVL on echo. Sheath sites soft without hematoma. No arrhythmias. TTE on POD 1 with well seated valve, trace AI, trivial PVL.   - continue Brilinta and ASA  - cardiac rehab      2V CAD s/p PCI to LAD and RCA  Statin intolerant.  - continue ASA, Brilinta, and metorprolol succinate 25mg     Subacute normocytic anemia  Hb 8.3 following TAVR from baseline 10-12. Noted to be ~8 after angiogram as well. Iron studies WNL. Recheck AM after procedure with hgb of 8.7. No signs/sx of bleeding.     Hypokalemia  K 3.3 on PM BMP on day of discharge (from 3.5 on previous check). Suspect 2/2 dilution (received fluids for HOTN), and poor PO intake (NPO day before, minimal PO on day of discharge). Repleted per protocol.    Condition on discharge  Temp:  [97.2  F (36.2  C)-98.8  F (37.1  C)] 98.8  F (37.1  C)  Pulse:  [67-81] 78  Resp:  [12-18] 16  BP: ()/(38-59) 122/50  SpO2:  [93 %-100 %] 96 %  General: Alert, interactive, NAD  Eyes: sclera anicteric, EOMI  Neck: JVP not appreciably elevated, carotid 2+ bilaterally  Cardiovascular: regular rate and rhythm, normal S1 and S2, no murmurs, gallops, or rubs  Resp: clear to auscultation bilaterally, no rales, wheezes, or rhonchi  GI: Soft, nontender, nondistended. +BS.  No HSM or masses, no rebound or guarding.  Extremities: no edema, no cyanosis or clubbing, dorsalis pedis and posterior tibialis pulses 2+ bilaterally. Groin sites c/d/i bilaterally.  No palpable hematoma. No bruit on exam.  Skin: Warm and dry, no jaundice or rash  Neuro: CN 2-12 intact, moves all extremities equally  Psych: Alert & oriented x 3      Medication Changes:    Discharge medications:   Current Discharge Medication List      CONTINUE these medications which have NOT CHANGED    Details   aspirin (ASA) 81 MG EC tablet Take 81 mg by mouth every morning       fluticasone (FLONASE) 50 MCG/ACT nasal spray Spray 2 sprays in nostril every morning       metoprolol succinate ER (TOPROL-XL) 25 MG 24 hr tablet Take 25 mg by mouth every morning       ticagrelor (BRILINTA) 90 MG tablet Take 1 tablet (90 mg) by mouth 2 times daily Start this evening.  Qty: 180 tablet, Refills: 3    Associated Diagnoses: Coronary artery disease involving native coronary artery, angina presence unspecified, unspecified whether native or transplanted heart             Labs or imaging requiring follow-up after discharge:  CBC with PCP visit      Follow-up:  PCP within 5-7 days of discharge  Valve clinic per protocol    Code status:  FULL    Patient discussed with Dr. Munroe, who agrees with the above plan.    Chase Hartman PA-C  [facility] Cardiology Team

## 2020-10-08 NOTE — PROGRESS NOTES
10/08/20 0732   Quick Adds   Type of Visit Initial Occupational Therapy Evaluation       Present no   Living Environment   People in home spouse   Current Living Arrangements house   Home Accessibility stairs to enter home;stairs within home   Number of Stairs, Main Entrance 2   Number of Stairs, Within Home, Primary 10   Transportation Anticipated family or friend will provide   Living Environment Comments IND prior. spouse can A as neded at d/c    Self-Care   Usual Activity Tolerance good   Current Activity Tolerance moderate   Regular Exercise Yes   Activity/Exercise Type walking   Exercise Amount/Frequency 3-5 times/wk   Equipment Currently Used at Home none   Activity/Exercise/Self-Care Comment walks or does stairs a few times/wk    Disability/Function   Hearing Difficulty or Deaf no   Wear Glasses or Blind yes   Vision Management glasses   Concentrating, Remembering or Making Decisions Difficulty no   Difficulty Communicating no   Difficulty Eating/Swallowing no   Walking or Climbing Stairs Difficulty no   Dressing/Bathing Difficulty no   Toileting no   Doing Errands Independently Difficulty (such as shopping) no   Fall history within last six months no   Change in Functional Status Since Onset of Current Illness/Injury no   General Information   Onset of Illness/Injury or Date of Surgery 10/07/20   Referring Physician Dawit   Patient/Family Therapy Goal Statement (OT) return home   Additional Occupational Profile Info/Pertinent History of Current Problem TAVR   Existing Precautions/Restrictions other (see comments)  (no heavy pushing, pulling, lifting)   Cognitive Status Examination   Orientation Status orientation to person, place and time   Affect/Mental Status (Cognitive) WNL   Follows Commands WNL   Visual Perception   Visual Impairment/Limitations WNL   Sensory   Sensory Quick Adds No deficits were identified   Pain Assessment   Patient Currently in Pain No  "  Integumentary/Edema   Integumentary/Edema no deficits were identifed   Posture   Posture not impaired   Range of Motion Comprehensive   General Range of Motion no range of motion deficits identified   Strength Comprehensive (MMT)   General Manual Muscle Testing (MMT) Assessment no strength deficits identified   Muscle Tone Assessment   Muscle Tone Quick Adds No deficits were identified   Coordination   Upper Extremity Coordination No deficits were identified   Instrumental Activities of Daily Living (IADL)   Previous Responsibilities housekeeping;meal prep;laundry;shopping;medication management;finances;driving   IADL Comments spouse can A at d/c    Clinical Impression   Criteria for Skilled Therapeutic Interventions Met (OT) yes   OT Diagnosis need for monitored exercise   OT Problem List-Impairments impacting ADL strength;balance   Assessment of Occupational Performance 1-3 Performance Deficits   Identified Performance Deficits exercise   Planned Therapy Interventions (OT) home program guidelines;progressive activity/exercise;risk factor education;ADL retraining   Clinical Decision Making Complexity (OT) low complexity   Therapy Frequency (OT) Daily   Predicted Duration of Therapy 2 days   Risks and Benefits of Treatment have been explained. Yes   Patient, Family & other staff in agreement with plan of care Yes   OT Discharge Planning    OT Discharge Recommendation (DC Rec) Home with assist;home with outpatient occupational therapy   OT Rationale for DC Rec pt will benefit from OP CR to progress ax tolerance and monitor CV response. Supportive spouse can A with ADL/IADL's at home as needed.    [facility] [employer] \"6 Clicks\"   2016, Trustees of [facility], under license to [employer].  All rights reserved.   6 Clicks Short Forms Daily Activity Inpatient Short Form   [facility] AM-PAC  \"6 Clicks\" Daily Activity Inpatient Short Form   1. Putting on and taking off regular lower body clothing? " 4 - None   2. Bathing (including washing, rinsing, drying)? 4 - None   3. Toileting, which includes using toilet, bedpan or urinal? 4 - None   4. Putting on and taking off regular upper body clothing? 4 - None   5. Taking care of personal grooming such as brushing teeth? 4 - None   6. Eating meals? 4 - None   Daily Activity Raw Score (Score out of 24.Lower scores equate to lower levels of function) 24   Total Evaluation Time (Minutes)   Total Evaluation Time (Minutes) 8

## 2020-10-08 NOTE — PLAN OF CARE
D: S/p TAVR 10/7. Hx of aortic stenosis, 2V CAD s/p LAD & RCA PCI, HTN, HLD, CKD III, OA, & back pain.     I/A: A/Ox4. VSS on 1L NC. Capno in place. Neuros Q2H. Pt woke up disoriented to time & situation at 0330, but was redirectable. Denies pain. Bilateral groin sites WDL, CMS intact. Doppler pedal pulses. Off bedrest at midnight, pt voiding adequately. Regular diet. Emesis x1 while on bedrest, nausea resolved. Ax1.     P: Continue monitoring & notify [facility] of changes/concerns.

## 2020-10-08 NOTE — PROGRESS NOTES
"Brief cardiology progress note    Just prior to patient leaving, she stood from a chair and suddenly felt dizzy, \"shakey\", and weak. BP low 90s/70s. Had her lay supine on bed, with improvement in her symptoms. Patient states she has had little to drink today. Sx c/w presyncope.   - CBC, BMP now  - give 500mL LR bolus over 2 hours  - encourage PO fluids  - continue to monitor. Will reassess pressures after bolus infused. If asymptomatic, and not having orthostatic HOTN, can still discharge this evening.       Chase Hartman PA-C  [facility] Cardiology Team      "

## 2020-10-08 NOTE — PROGRESS NOTES
HX:85 year old woman with a past medical history significant for severe symptomatic aortic stenosis, 2V CAD s/p LAD and RCA PCI, HTN, HLD, CKD III, OA, and back pain who is admitted for monitoring following TAVR.    Cardiac:SR/ST   VS:VSS  IV:PIV-SL  Tubes:NA  Neuro:A/Ox4, neuro checks intact  Resp:denies shortness of breath  GI/:voiding, +BM this shift  Endo:NA  Skin:intact  Pain:denies  Social: present  Plan:discharge today      After review of discharge paperwork with patient and her , returned to the room and found  helping patient back to bed. Patient stated that she felt shaky. Helped her to lay back and she closed her eyes and mumbled unintelligibly. SBP 90 (has been running in the low 100s). Able to answer questions after several minutes. See subsequent notes by RN and PA.

## 2020-10-08 NOTE — PLAN OF CARE
Pt s/p TAVR 10/7. Pt A/Ox4. Neuros intact. VSS. Pt given 500ml LR as ordered, also given PO fluids. Orthostatic BPs done after IVF completed - see flowstabatha, Will from [facility] notified about orthostatic BPs/drop in BPs with orthostatics. Pt asymptomatic. Pt also walked with no symptoms. NSR. RA. Denies pain. K+ (3.3) - given 40meq K+ as ordered.  supportive at bedside. Continue with plan of care and report changes to treatment team. Anticipate possible d/ c later night - waiting for MD to see pt.     Writer hours of care: [phone]

## 2020-10-08 NOTE — DISCHARGE INSTRUCTIONS
Please hold your triamterene until you follow up with your primary care provider.      Going home after Transcatheter Aortic Valve Replacement (TAVR)  Femoral Access     You may have small amounts of bruising or discomfort, this is expected. If you develop worse swelling, redness and warmth that does not go away, fever and chills, Increasing numbness in your legs, worsening pain at the site then you need to contact your nurse coordinator.    You may shower, but do not soak in a bath tub or pool or apply lotions, ointments, powder, etc for 3-5 days or until sites look healed.     Activity: No lifting, pushing, pulling more than 10 pounds (examples to avoid: groceries, vacuuming, gardening, golfing): For one week with a procedure through the groin.    After the initial healing process of the access site, we recommend cardiac rehabilitation for all TAVR patients. Cardiac rehabilitation will help you:  - Rebuild stamina, strength and balance.  - Learn how to participate in activities safely, as well as help you regain confidence to do so.  - Return to activities of daily living and leisure.  Please contact their central scheduling to arrange at [phone]    We prefer you to follow up with your primary care provider within 2 weeks. You will be arranged to see the TAVR clinic in month. At that time you will have a repeat ultrasound of the heart to look at the valve.     Should you have any questions or concerns please contact your assigned TAVR nurse coordinator:  Savita [phone] (at the first prompt enter #1, second prompt enter #3, then ask the triage nurse if you can speak with Savita).

## 2020-10-08 NOTE — OP NOTE
OPERATIVE DATE: 10/7/2020    PRE-OPERATIVE DIAGNOSIS:  1) Severe, symptomatic aortic stenosis  2) Coronary artery disease  3) Hypertension  4) Anemia  5) Osteoarthritis  6) Hyperlipidemia    POST-OPERATIVE DIAGNOSIS:  1) Severe, symptomatic aortic stenosis  2) Coronary artery disease  3) Hypertension  4) Anemia  5) Osteoarthritis  6) Hyperlipidemia    PROCEDURE:  1) Temporary pacemaker insertion  2) Iliofemoral artery angiography  3) Ascending aorta angiography  4) Left heart catheterization  5) Successful right transfemoral transcatheter aortic valve replacement with 23 mm Paredes S3 valve  6) Arteriotomy closure    SURGEON: Richi Olmos MD    CARDIOLOGIST: Leonard Pastor MD and Maxx Wilson MD    STRUCTURAL FELLOW: Cathy Rivera MD    ANESTHESIA: Monitored anesthesia care with local analgesia    ESTIMATED BLOOD LOSS: 50 mL    INDICATIONS:  Ms. Lydia Dietz is a 85 year old year old female with severe, symptomatic aortic stenosis.  We were asked to evaluate for transcatheter aortic stenosis after percutaneous treatment of multi-vessel coronary artery disease. Risks and benefits of the operation were explained to the patient and their family including, but not limited to, bleeding, infection, stroke and even death. They understood these risks and agreed to proceed electively.    OPERATIVE REPORT:  The patient was transferred to the operating room and positioned supine on the OR table.  Monitored anesthesia care was begun.  The patients chest, abdomen and bilateral lower extremities were clipped, prepped and draped in sterile fashion.  A pre-procedure time-out was performed confirming the correct patient, correct site and correct procedure.    Intravenous heparin was administered. Arterial access of the right and left femoral arteries and left common femoral vein was performed by interventional cardiology.  Two Perclose Proglide devices were deployed on the right side for future arteriotomy closure.    A  temporary transvenous pacemaker was placed by the cardiology team.    A Lunderquist wire was advanced to support the Paredes sheath insertion.  A pigtail catheter was advanced to the aortic root and angiogram performed to confirm optimal implant angle.  The pigtail was placed in the non-coronary cusp.    The LV was accessed using an AL-1 catheter over a straight wire.  The pigtail catheter was advanced into the LV.  The pigtail catheter was used to advance a Lunderquist Safari wire into the LV and the pigtail catheter was removed.    The 23 mm S3 bioprosthetic valve was prepared in a retrograde position on the back table, inserted and positioned across the native aortic valve and deployed using rapid pacing.    Transthoracic echocardiography showed trace paravalvular insufficiency.    The deployment system and wires were removed.  The femoral access was made hemostatic.    A final iliofemoral angiogram showed no extravasation or and mild stenosis without flow limitation.    The patient was then transferred to the recovery area in stable condition.    All needle, sponge and instrument counts were correct at the end of the case.    Richi Olmos MD  Cardiothoracic Surgery  [phone]

## 2020-10-08 NOTE — ANESTHESIA POSTPROCEDURE EVALUATION
Anesthesia POST Procedure Evaluation    Patient: Lydia Dietz   MRN:     [id number] Gender:   female   Age:    85 year old :      1935        Preoperative Diagnosis: Severe aortic stenosis   Procedure(s):  Transfemoral, subclavian (DINH) or transapical transcatheter aortic valve replacement 23mm dinh valve placed. cardiovascular standby.  Cardiac standby. Perfusion standby.   Postop Comments: No value filed.     Anesthesia Type: MAC       Disposition: Admission   Postop Pain Control: Uneventful            Sign Out: Well controlled pain   PONV: No   Neuro/Psych: Uneventful            Sign Out: Acceptable/Baseline neuro status   Airway/Respiratory: Uneventful            Sign Out: Acceptable/Baseline resp. status   CV/Hemodynamics: Uneventful            Sign Out: Acceptable CV status   Other NRE: NONE   DID A NON-ROUTINE EVENT OCCUR? No         Last Anesthesia Record Vitals:  CRNA VITALS  10/7/2020 1739 - 10/7/2020 1839      10/7/2020             NIBP:  112/48    Pulse:  69    SpO2:  99 %    EKG:  Sinus rhythm          Last PACU Vitals:  Vitals Value Taken Time   /55 10/07/20 1900   Temp 36.5  C (97.7  F) 10/07/20 1815   Pulse 71 10/07/20 1909   Resp 15 10/07/20 1900   SpO2 99 % 10/07/20 1909   Temp src     NIBP 112/48 10/07/20 1815   Pulse 69 10/07/20 1815   SpO2 99 % 10/07/20 1815   Resp     Temp     Ht Rate     Temp 2     Vitals shown include unvalidated device data.      Electronically Signed By: Cheyenne Chung MD, 2020, 7:10 PM

## 2020-10-08 NOTE — CONSULTS
Brief Social Work Note    SW consult seen and acknowledged for discharge planning needs. Per OT recommendations, pt is able to safely return home with OP CR. No further SW needs identified. Please re-consult if further needs arise.    SUSI Alexander, [facility]  Adult Acute Care SW  Ph:[phone]  Pager [phone]

## 2020-10-09 NOTE — PLAN OF CARE
Occupational Therapy Discharge Summary    Reason for therapy discharge:    Discharged to home with outpatient therapy.    Progress towards therapy goal(s). See goals on Care Plan in [facility] electronic health record for goal details.  Goals partially met.  Barriers to achieving goals:   discharge from facility.    Therapy recommendation(s):    Continued therapy is recommended.  Rationale/Recommendations:  to increase exercise endurance and activity tolerance.

## 2020-10-09 NOTE — PLAN OF CARE
Patient discharged to hotel w/ . Patient belongings and paperwork sent w/ patient. MD at bedside to assess before discharge. PIV and tele removed. Questions and concerns answered.

## 2020-10-11 LAB
BLD PROD TYP BPU: NORMAL
BLD PROD TYP BPU: NORMAL
BLD UNIT ID BPU: 0
BLD UNIT ID BPU: 0
BLOOD PRODUCT CODE: NORMAL
BLOOD PRODUCT CODE: NORMAL
BPU ID: NORMAL
BPU ID: NORMAL
TRANSFUSION STATUS PATIENT QL: NORMAL

## 2020-10-19 ENCOUNTER — VIRTUAL VISIT (OUTPATIENT)
Dept: CARDIOLOGY | Facility: CLINIC | Age: 85
End: 2020-10-19
Attending: NURSE PRACTITIONER
Payer: MEDICARE

## 2020-10-19 DIAGNOSIS — Z95.2 S/P TAVR (TRANSCATHETER AORTIC VALVE REPLACEMENT): Primary | ICD-10-CM

## 2020-10-19 PROCEDURE — 99442 PR PHYSICIAN TELEPHONE EVALUATION 11-20 MIN: CPT | Mod: 95 | Performed by: NURSE PRACTITIONER

## 2020-10-19 RX ORDER — TRIAMTERENE/HYDROCHLOROTHIAZID 37.5-25 MG
TABLET ORAL
COMMUNITY
Start: 2020-08-08 | End: 2022-01-17

## 2020-10-19 NOTE — PATIENT INSTRUCTIONS
You were seen today in the Structural Heart Clinic at the [facility].    Cardiology provider you saw during your visit: Daphne Murphy NP    Instructions:   1. Continue aspirin 81 mg daily lifelong.  2. Continue Brilinta x 12 months (through 2021)  3. Delay any nonurgent dental procedures for the first 3 month post TAVR.  4. For all future dental cleanings and procedures you will need to take antibiotics prior - see instructions below.   5. We will fax orders for cardiac rehab to [facility]   6. Follow-up in Valve Clinic in 1 month with echo, labs and ECG prior     Prevention of Infective (Bacterial) Endocarditis:  You are at increased risk for developing adverse outcomes from infective endocarditis (IE), also known as bacterial endocarditis (BE) because of the new device in your heart. The guidelines for prevention of IE are to give patients antibiotics prior to any dental procedures that involve manipulation of gingival tissue or the periapical region of teeth, or perforation of the oral mucosa:      It is recommended to take Amoxicillin 2 gm by mouth as a single dose 30 to 60 minutes before procedure.     OR if allergic to Penicillin or Ampicillin:     Cephalexin 2 gm by mouth, or  Clindamycin 600 mg by mouth, or  Azithromycin or Clarithromycin 500 mg PO       Questions and [phone].   First press #1 for the [employer] and then press #3 for Medical Questions to reach the Cardiology triage nurse.     On Call Cardiologist for after hours or on weekends: [phone], press option #4 and ask to speak to the on-call Cardiologist.

## 2020-10-19 NOTE — PROGRESS NOTES
"  Lydia Dietz 85 year old who is being evaluated via a billable telephone visit.      The patient has been notified of following:     \"This telephone visit will be conducted via a call between you and your physician/provider. We have found that certain health care needs can be provided without the need for a physical exam.  This service lets us provide the care you need with a short phone conversation.  If a prescription is necessary we can send it directly to your pharmacy.  If lab work is needed we can place an order for that and you can then stop by our lab to have the test done at a later time.    If during the course of the call the physician/provider feels a telephone visit is not appropriate, you will not be charged for this service.\"     Patient has given verbal consent for Telephone visit?  Yes    How would you like to obtain your AVS? Mail a copy  [phone]        [facility]  CARDIOVASCULAR DIVISION    VALVE CLINIC RETURN VISIT      Lydia Dietz is being evaluated for 1 week TAVR follow-up. She has a history of coronary artery disease status-post PCI of the LAD and RCA 9/9/2020 and severe aortic valvular stenosis transfemoral transcatheter aortic valve replacement (TAVR) with a 23 mm Paredes Arik 3 on 10/7/20 by Aleisha Pastor, Steve, Nickolas and Miguel. The TAVR and post-procedural course were notable for no complications or development of conduction abnormalities. Her post TAVR echo showed mean gradient of 10 mmHg with trivial paravalvular AI. She was discharged on ASA and Brilinta.     Lydia is accompanied to the visit by her  Geovany. She tells me she is feeling much better. She had some soreness and bruising at her groin sites which now seems to be improving. She was also experiencing brief \"twinges\" of left sided chest pain; however, the episodes are becoming less frequent and she hasn't noticed it in a few days. She started walking in her house and is now up to 18 " minutes a day. She denies any exertional chest pain. She tells me her breathing has improved since the procedure and she has more energy. She has also noticed that her dizziness has resolved. She otherwise denies orthopnea, PND, leg swelling, weight gain, palpitations or syncope. She is tolerating ASA and Brilinta without any major bleeding.      PAST MEDICAL HISTORY:     Past Medical History:   Diagnosis Date     Allergies      Anemia      Coronary artery disease      HLD (hyperlipidemia)      HTN (hypertension)      Impaired fasting glucose      Osteoarthritis      Severe aortic stenosis       PAST SURGICAL HISTORY:     Past Surgical History:   Procedure Laterality Date     AS LAP INCISIONAL HERNIA REPAIR       BREAST BIOPSY, CORE RT/LT      X3     CHOLECYSTECTOMY       COLECTOMY PARTIAL  2019     CV LEFT HEART CATH N/A 9/9/2020    Procedure: Left Heart Cath;  Surgeon: Leoanrd Pastor MD;  Location:  HEART CARDIAC CATH LAB     CV PCI ANGIOPLASTY N/A 9/9/2020    Procedure: CV PCI ANGIOPLASTY;  Surgeon: Leonard Pastor MD;  Location:  HEART CARDIAC CATH LAB     CV TRANSCATHETER AORTIC VALVE REPLACEMENT N/A 10/7/2020    Procedure: Transfemoral, subclavian (DINH) or transapical transcatheter aortic valve replacement 23mm dinh valve placed. cardiovascular standby.;  Surgeon: Leonard Pastor MD;  Location: [facility] OR      LAPAROSCOPY, OVIDUCT/OVARY; REMOVAL       HEART CATH FEMORAL CANNULIZATION WITH OPEN STANDBY REPAIR AORTIC VALVE N/A 10/7/2020    Procedure: Cardiac standby. Perfusion standby.;  Surgeon: Richi Olmos MD;  Location: [facility] OR     HYSTERECTOMY TOTAL ABDOMINAL            CURRENT MEDICATIONS:     Current Outpatient Medications   Medication     aspirin (ASA) 81 MG EC tablet     fluticasone (FLONASE) 50 MCG/ACT nasal spray     metoprolol succinate ER (TOPROL-XL) 25 MG 24 hr tablet     ticagrelor (BRILINTA) 90 MG tablet     No current facility-administered medications for this visit.          ALLERGIES:      Allergies   Allergen Reactions     Rosuvastatin Muscle Pain (Myalgia)     Seasonal Allergies      Amlodipine      Other reaction(s): Edema,generalized     Atorvastatin      PN: Reaction: BLURRED VISION     Fenofibrate Muscle Pain (Myalgia)     Fluconazole Nausea     Losartan      PN: Reaction: Back pain and cramping     Pravastatin      PN:  Reaction: JOINT AND MUSCLE PAIN     Simvastatin Nausea     Niacin Rash        FAMILY HISTORY:       No family history on file.       SOCIAL HISTORY:     Social History     Socioeconomic History     Marital status:      Spouse name: Not on file     Number of children: Not on file     Years of education: Not on file     Highest education level: Not on file   Occupational History     Not on file   Social Needs     Financial resource strain: Not on file     Food insecurity     Worry: Not on file     Inability: Not on file     Transportation needs     Medical: Not on file     Non-medical: Not on file   Tobacco Use     Smoking status: Former Smoker     Quit date:      Years since quittin.8     Smokeless tobacco: Never Used     Tobacco comment: 1 pack per week if that maybe for 5 years    Substance and Sexual Activity     Alcohol use: Yes     Comment: occ      Drug use: Never     Sexual activity: Not on file   Lifestyle     Physical activity     Days per week: Not on file     Minutes per session: Not on file     Stress: Not on file   Relationships     Social connections     Talks on phone: Not on file     Gets together: Not on file     Attends [employer] service: Not on file     Active member of club or organization: Not on file     Attends meetings of clubs or organizations: Not on file     Relationship status: Not on file     Intimate partner violence     Fear of current or ex partner: Not on file     Emotionally abused: Not on file     Physically abused: Not on file     Forced sexual activity: Not on file   Other Topics Concern     Not on file    Social History Narrative     Not on file          REVIEW OF SYSTEMS:     Constitutional: No fevers or chills  Skin: No new rash or itching  Eyes: No acute change in vision  Ears/Nose/Throat: No purulent rhinorrhea, new hearing loss, or new vertigo  Respiratory: No cough or hemoptysis  Cardiovascular: See HPI  Gastrointestinal: No change in appetite, vomiting, hematemesis or diarrhea  Genitourinary: No dysuria or hematuria  Musculoskeletal: No new back pain, neck pain or muscle pain  Neurologic: No new headaches, focal weakness or behavior changes  Psychiatric: No hallucinations, excessive alcohol consumption or illegal drug usage  Hematologic/Lymphatic/Immunologic: No bleeding, chills, fever, night sweats or weight loss  Endocrine: No new cold intolerance, heat intolerance, polyphagia, polydipsia or polyuria      PHYSICAL EXAMINATION:     No vitals signs or physical exam due to telephone visit.        LABORATORY DATA:     Last Comprehensive Metabolic Panel:  Sodium   Date Value Ref Range Status   10/08/2020 138 133 - 144 mmol/L Final     Potassium   Date Value Ref Range Status   10/08/2020 3.3 (L) 3.4 - 5.3 mmol/L Final     Chloride   Date Value Ref Range Status   10/08/2020 105 94 - 109 mmol/L Final     Carbon Dioxide   Date Value Ref Range Status   10/08/2020 28 20 - 32 mmol/L Final     Anion Gap   Date Value Ref Range Status   10/08/2020 6 3 - 14 mmol/L Final     Glucose   Date Value Ref Range Status   10/08/2020 151 (H) 70 - 99 mg/dL Final     Urea Nitrogen   Date Value Ref Range Status   10/08/2020 26 7 - 30 mg/dL Final     Creatinine   Date Value Ref Range Status   10/08/2020 0.91 0.52 - 1.04 mg/dL Final     GFR Estimate   Date Value Ref Range Status   10/08/2020 58 (L) >60 mL/min/[1.73_m2] Final     Comment:     Non  GFR Calc  Starting 12/18/2018, serum creatinine based estimated GFR (eGFR) will be   calculated using the Chronic Kidney Disease Epidemiology Collaboration   (CKD-EPI)  equation.       Calcium   Date Value Ref Range Status   10/08/2020 8.4 (L) 8.5 - 10.1 mg/dL Final       CBC RESULTS:   Recent Labs   Lab Test 10/08/20  1604   WBC 9.0   RBC 3.20*   HGB 8.7*   HCT 28.4*   MCV 89   MCH 27.2   MCHC 30.6*   RDW 13.7   *          PROCEDURES & FURTHER ASSESSMENTS:     ECG dated 10/8/20: NSR HR 70, , QRS 84, QTc 473    Echocardiogram dated 10/8/20:    Interpretation Summary  s/p TAVR 23 mm Paredes Arik 3 valve. MG is normal 10 mmHg. Valve well  seated. Trivial paravalvular AI.  Global and regional left ventricular function is normal with an EF of 60-65%.  Right ventricular function is normal.     This study was compared with the study from 10/7/2020 .No change compared to  post-procedure.    Left Ventricle  Global and regional left ventricular function is normal with an EF of 60-65%.  Left ventricular size is normal. Mild concentric wall thickening consistent  with left ventricular hypertrophy is present. Left ventricular diastolic  function is indeterminate.     Right Ventricle  Right ventricular function, chamber size, wall motion, and thickness are  normal.     Atria  The atria cannot be assessed.     Mitral Valve  Mild mitral annular calcification is present. Trace mitral insufficiency is  present.        Aortic Valve  Trace aortic insufficiency is present. The mean AoV pressure gradient is 10.0  mmHg. 23 mm Paredes Arik 3 valve.     Tricuspid Valve  The tricuspid valve is normal.     Pulmonic Valve  The pulmonic valve cannot be assessed.     Vessels  The inferior vena cava was normal in size with preserved respiratory  variability. IVC diameter <2.1 cm collapsing >50% with sniff suggests a normal  RA pressure of 3 mmHg.     Pericardium  No pericardial effusion is present.     NYHA Class: II     CLINICAL IMPRESSION:     Lydia Dietz is being evaluated for 1 week TAVR follow-up. She has a history of coronary artery disease status-post PCI of the LAD and RCA  9/9/2020 and severe aortic valvular stenosis transfemoral transcatheter aortic valve replacement (TAVR) with a 23 mm Paredes Arik 3 on 10/7/20 by Aleisha Pastor, Nickolas Wilson and Miguel. The TAVR and post-procedural course were notable for no complications or development of conduction abnormalities. His post TAVR echo showed mean gradient of 10 mmHg with trivial paravalvular AI. She was discharged on ASA and Brilinta.     Currently doing well with improvement in dyspnea and energy level since the procedure. She reports brief episodes of atypical chest pain that seem to be improving. Symptoms are not consistent with angina. She is tolerating ASA and Brilinta without bleeding concerns. Plan is to continue current medicine regimen w/ plans to follow-up with valve clinic in 1 month with imaging and labs prior to visit.     Plan Summary:  1) Aspirin 81 mg daily lifelong  2) Continue Brilinta for 12 months post PCI (9/9/2021)  3) Lifelong antibiotic prophylaxis prior to all dental procedures.  4) We will fax order for cardiac rehab to [facility] in [place] [phone]  5) Follow-up in 1 month with echo, labs, cardiac CT and ecg prior to visit     Phone call contact time  Call Started at 11:21  Call Ended at 11:35    Daphne Murphy NP    CC  Patient Care Team:  Christal Ferreira MD as PCP - General (Family Practice)  Praveen Costello MD as MD (Orthopedics)  Tayla Hartman (Cardiology)

## 2020-10-19 NOTE — LETTER
"10/19/2020      RE: Lydia Dietz  [id number] [address] [facility] [address]       Dear Colleague,    Thank you for the opportunity to participate in the care of your patient, Lydia Dietz, at the [facility] HEART [facility]. Please see a copy of my visit note below.      Lydia Dietz 85 year old who is being evaluated via a billable telephone visit.      The patient has been notified of following:     \"This telephone visit will be conducted via a call between you and your physician/provider. We have found that certain health care needs can be provided without the need for a physical exam.  This service lets us provide the care you need with a short phone conversation.  If a prescription is necessary we can send it directly to your pharmacy.  If lab work is needed we can place an order for that and you can then stop by our lab to have the test done at a later time.    If during the course of the call the physician/provider feels a telephone visit is not appropriate, you will not be charged for this service.\"     Patient has given verbal consent for Telephone visit?  Yes    How would you like to obtain your AVS? Mail a copy  [phone]      [facility]  CARDIOVASCULAR DIVISION    VALVE CLINIC RETURN VISIT      Lydia Dietz is being evaluated for 1 week TAVR follow-up. She has a history of coronary artery disease status-post PCI of the LAD and RCA 9/9/2020 and severe aortic valvular stenosis transfemoral transcatheter aortic valve replacement (TAVR) with a 23 mm Paredes Arik 3 on 10/7/20 by Aleisha Pastor, Nickolas Wilson and Miguel. The TAVR and post-procedural course were notable for no complications or development of conduction abnormalities. Her post TAVR echo showed mean gradient of 10 mmHg with trivial paravalvular AI. She was discharged on ASA and Brilinta.     Lydia is accompanied to the visit by her  Geovany. She " "tells me she is feeling much better. She had some soreness and bruising at her groin sites which now seems to be improving. She was also experiencing brief \"twinges\" of left sided chest pain; however, the episodes are becoming less frequent and she hasn't noticed it in a few days. She started walking in her house and is now up to 18 minutes a day. She denies any exertional chest pain. She tells me her breathing has improved since the procedure and she has more energy. She has also noticed that her dizziness has resolved. She otherwise denies orthopnea, PND, leg swelling, weight gain, palpitations or syncope. She is tolerating ASA and Brilinta without any major bleeding.      PAST MEDICAL HISTORY:     Past Medical History:   Diagnosis Date     Allergies      Anemia      Coronary artery disease      HLD (hyperlipidemia)      HTN (hypertension)      Impaired fasting glucose      Osteoarthritis      Severe aortic stenosis       PAST SURGICAL HISTORY:     Past Surgical History:   Procedure Laterality Date     AS LAP INCISIONAL HERNIA REPAIR       BREAST BIOPSY, CORE RT/LT      X3     CHOLECYSTECTOMY       COLECTOMY PARTIAL  2019     CV LEFT HEART CATH N/A 9/9/2020    Procedure: Left Heart Cath;  Surgeon: Leonard Pastor MD;  Location:  HEART CARDIAC CATH LAB     CV PCI ANGIOPLASTY N/A 9/9/2020    Procedure: CV PCI ANGIOPLASTY;  Surgeon: Leonard Pastor MD;  Location: [facility] CARDIAC CATH LAB     CV TRANSCATHETER AORTIC VALVE REPLACEMENT N/A 10/7/2020    Procedure: Transfemoral, subclavian (DINH) or transapical transcatheter aortic valve replacement 23mm dinh valve placed. cardiovascular standby.;  Surgeon: Leonard Pastor MD;  Location:  OR      LAPAROSCOPY, OVIDUCT/OVARY; REMOVAL       HEART CATH FEMORAL CANNULIZATION WITH OPEN STANDBY REPAIR AORTIC VALVE N/A 10/7/2020    Procedure: Cardiac standby. Perfusion standby.;  Surgeon: Richi Olmos MD;  Location: [facility] OR     HYSTERECTOMY TOTAL " ABDOMINAL            CURRENT MEDICATIONS:     Current Outpatient Medications   Medication     aspirin (ASA) 81 MG EC tablet     fluticasone (FLONASE) 50 MCG/ACT nasal spray     metoprolol succinate ER (TOPROL-XL) 25 MG 24 hr tablet     ticagrelor (BRILINTA) 90 MG tablet     No current facility-administered medications for this visit.         ALLERGIES:      Allergies   Allergen Reactions     Rosuvastatin Muscle Pain (Myalgia)     Seasonal Allergies      Amlodipine      Other reaction(s): Edema,generalized     Atorvastatin      PN: Reaction: BLURRED VISION     Fenofibrate Muscle Pain (Myalgia)     Fluconazole Nausea     Losartan      PN: Reaction: Back pain and cramping     Pravastatin      PN:  Reaction: JOINT AND MUSCLE PAIN     Simvastatin Nausea     Niacin Rash        FAMILY HISTORY:       No family history on file.       SOCIAL HISTORY:     Social History     Socioeconomic History     Marital status:      Spouse name: Not on file     Number of children: Not on file     Years of education: Not on file     Highest education level: Not on file   Occupational History     Not on file   Social Needs     Financial resource strain: Not on file     Food insecurity     Worry: Not on file     Inability: Not on file     Transportation needs     Medical: Not on file     Non-medical: Not on file   Tobacco Use     Smoking status: Former Smoker     Quit date:      Years since quittin.8     Smokeless tobacco: Never Used     Tobacco comment: 1 pack per week if that maybe for 5 years    Substance and Sexual Activity     Alcohol use: Yes     Comment: occ      Drug use: Never     Sexual activity: Not on file   Lifestyle     Physical activity     Days per week: Not on file     Minutes per session: Not on file     Stress: Not on file   Relationships     Social connections     Talks on phone: Not on file     Gets together: Not on file     Attends [employer] service: Not on file     Active member of club or organization:  Not on file     Attends meetings of clubs or organizations: Not on file     Relationship status: Not on file     Intimate partner violence     Fear of current or ex partner: Not on file     Emotionally abused: Not on file     Physically abused: Not on file     Forced sexual activity: Not on file   Other Topics Concern     Not on file   Social History Narrative     Not on file          REVIEW OF SYSTEMS:     Constitutional: No fevers or chills  Skin: No new rash or itching  Eyes: No acute change in vision  Ears/Nose/Throat: No purulent rhinorrhea, new hearing loss, or new vertigo  Respiratory: No cough or hemoptysis  Cardiovascular: See HPI  Gastrointestinal: No change in appetite, vomiting, hematemesis or diarrhea  Genitourinary: No dysuria or hematuria  Musculoskeletal: No new back pain, neck pain or muscle pain  Neurologic: No new headaches, focal weakness or behavior changes  Psychiatric: No hallucinations, excessive alcohol consumption or illegal drug usage  Hematologic/Lymphatic/Immunologic: No bleeding, chills, fever, night sweats or weight loss  Endocrine: No new cold intolerance, heat intolerance, polyphagia, polydipsia or polyuria      PHYSICAL EXAMINATION:     No vitals signs or physical exam due to telephone visit.        LABORATORY DATA:     Last Comprehensive Metabolic Panel:  Sodium   Date Value Ref Range Status   10/08/2020 138 133 - 144 mmol/L Final     Potassium   Date Value Ref Range Status   10/08/2020 3.3 (L) 3.4 - 5.3 mmol/L Final     Chloride   Date Value Ref Range Status   10/08/2020 105 94 - 109 mmol/L Final     Carbon Dioxide   Date Value Ref Range Status   10/08/2020 28 20 - 32 mmol/L Final     Anion Gap   Date Value Ref Range Status   10/08/2020 6 3 - 14 mmol/L Final     Glucose   Date Value Ref Range Status   10/08/2020 151 (H) 70 - 99 mg/dL Final     Urea Nitrogen   Date Value Ref Range Status   10/08/2020 26 7 - 30 mg/dL Final     Creatinine   Date Value Ref Range Status   10/08/2020  0.91 0.52 - 1.04 mg/dL Final     GFR Estimate   Date Value Ref Range Status   10/08/2020 58 (L) >60 mL/min/[1.73_m2] Final     Comment:     Non  GFR Calc  Starting 12/18/2018, serum creatinine based estimated GFR (eGFR) will be   calculated using the Chronic Kidney Disease Epidemiology Collaboration   (CKD-EPI) equation.       Calcium   Date Value Ref Range Status   10/08/2020 8.4 (L) 8.5 - 10.1 mg/dL Final       CBC RESULTS:   Recent Labs   Lab Test 10/08/20  1604   WBC 9.0   RBC 3.20*   HGB 8.7*   HCT 28.4*   MCV 89   MCH 27.2   MCHC 30.6*   RDW 13.7   *          PROCEDURES & FURTHER ASSESSMENTS:     ECG dated 10/8/20: NSR HR 70, , QRS 84, QTc 473    Echocardiogram dated 10/8/20:    Interpretation Summary  s/p TAVR 23 mm Paredes Arik 3 valve. MG is normal 10 mmHg. Valve well  seated. Trivial paravalvular AI.  Global and regional left ventricular function is normal with an EF of 60-65%.  Right ventricular function is normal.     This study was compared with the study from 10/7/2020 .No change compared to  post-procedure.    Left Ventricle  Global and regional left ventricular function is normal with an EF of 60-65%.  Left ventricular size is normal. Mild concentric wall thickening consistent  with left ventricular hypertrophy is present. Left ventricular diastolic  function is indeterminate.     Right Ventricle  Right ventricular function, chamber size, wall motion, and thickness are  normal.     Atria  The atria cannot be assessed.     Mitral Valve  Mild mitral annular calcification is present. Trace mitral insufficiency is  present.        Aortic Valve  Trace aortic insufficiency is present. The mean AoV pressure gradient is 10.0  mmHg. 23 mm Paredes Arik 3 valve.     Tricuspid Valve  The tricuspid valve is normal.     Pulmonic Valve  The pulmonic valve cannot be assessed.     Vessels  The inferior vena cava was normal in size with preserved respiratory  variability. IVC diameter  <2.1 cm collapsing >50% with sniff suggests a normal  RA pressure of 3 mmHg.     Pericardium  No pericardial effusion is present.     NYHA Class: II     CLINICAL IMPRESSION:     Lydia Dietz is being evaluated for 1 week TAVR follow-up. She has a history of coronary artery disease status-post PCI of the LAD and RCA 9/9/2020 and severe aortic valvular stenosis transfemoral transcatheter aortic valve replacement (TAVR) with a 23 mm Paredes Arik 3 on 10/7/20 by Aleisha Pastor, Nickolas Wilson and Miguel. The TAVR and post-procedural course were notable for no complications or development of conduction abnormalities. His post TAVR echo showed mean gradient of 10 mmHg with trivial paravalvular AI. She was discharged on ASA and Brilinta.     Currently doing well with improvement in dyspnea and energy level since the procedure. She reports brief episodes of atypical chest pain that seem to be improving. Symptoms are not consistent with angina. She is tolerating ASA and Brilinta without bleeding concerns. Plan is to continue current medicine regimen w/ plans to follow-up with valve clinic in 1 month with imaging and labs prior to visit.     Plan Summary:  1) Aspirin 81 mg daily lifelong  2) Continue Brilinta for 12 months post PCI (9/9/2021)  3) Lifelong antibiotic prophylaxis prior to all dental procedures.  4) We will fax order for cardiac rehab to [facility] [phone]  5) Follow-up in 1 month with echo, labs, cardiac CT and ecg prior to visit     Phone call contact time  Call Started at 11:21  Call Ended at 11:35    Daphne Murphy NP    CC  Patient Care Team:  Christal Ferreira MD as PCP - General (Family Practice)  Praveen Costello MD as MD (Orthopedics)  Tayla Hartman (Cardiology)         Please do not hesitate to contact me if you have any questions/concerns.     Sincerely,     Daphne Murphy NP

## 2020-11-15 NOTE — PROGRESS NOTES
[facility] HEART [facility]  CARDIOVASCULAR DIVISION    VALVE CLINIC RETURN VISIT      Lydia Dietz is being evaluated for 1 month TAVR follow-up. She has a history of coronary artery disease status-post PCI of the LAD and RCA 9/9/2020, HLD w/statin intolerance, HTN, CKD stage III and severe aortic valvular stenosis status-post transfemoral transcatheter aortic valve replacement (TAVR) with a 23 mm Paredes Arik 3 on 10/7/20 by Aleisha Pastor, Steve, Nickolas and Miguel. The TAVR and post-procedural course were notable for no complications or development of conduction abnormalities. Her post TAVR echo showed mean gradient of 10 mmHg with trivial paravalvular AI. She was discharged on ASA and Brilinta.     Lydia is accompanied to the visit by her  Geovany. She tells me she is feeling pretty good and has noticed improvement in fatigue and exertional dyspnea since the procedure. She has been participating in cardiac rehab twice a week. She has been walking (280 feet x 8 times), using the Nustep for 10 minutes and the exercise bike for 10 minutes. She denies any exertional chest pain or dyspnea. She has not experienced any orthopnea, PND, leg swelling, weight gain, palpitations, lightheadedness or syncope. She is compliant with ASA and Brilinta. She occasionally notices some blood on her tissue after blowing her nose but denies any major bleeding. She does not check home blood pressures. She tells me she is seeing her primary care provider today for follow-up of CKD.      PAST MEDICAL HISTORY:     Past Medical History:   Diagnosis Date     Allergies      Anemia      Coronary artery disease      HLD (hyperlipidemia)      HTN (hypertension)      Impaired fasting glucose      Osteoarthritis      Severe aortic stenosis       PAST SURGICAL HISTORY:     Past Surgical History:   Procedure Laterality Date     AS LAP INCISIONAL HERNIA REPAIR       BREAST BIOPSY, CORE RT/LT      X3     CHOLECYSTECTOMY       COLECTOMY  PARTIAL  2019     CV LEFT HEART CATH N/A 9/9/2020    Procedure: Left Heart Cath;  Surgeon: Leonard Pastor MD;  Location:  HEART CARDIAC CATH LAB     CV PCI ANGIOPLASTY N/A 9/9/2020    Procedure: CV PCI ANGIOPLASTY;  Surgeon: Leonard Pastor MD;  Location:  HEART CARDIAC CATH LAB     CV TRANSCATHETER AORTIC VALVE REPLACEMENT N/A 10/7/2020    Procedure: Transfemoral, subclavian (DINH) or transapical transcatheter aortic valve replacement 23mm dinh valve placed. cardiovascular standby.;  Surgeon: Leonard Pastor MD;  Location: [facility] OR      LAPAROSCOPY, OVIDUCT/OVARY; REMOVAL       HEART CATH FEMORAL CANNULIZATION WITH OPEN STANDBY REPAIR AORTIC VALVE N/A 10/7/2020    Procedure: Cardiac standby. Perfusion standby.;  Surgeon: Richi Olmos MD;  Location: [facility] OR     HYSTERECTOMY TOTAL ABDOMINAL        CURRENT MEDICATIONS:     Current Outpatient Medications   Medication     aspirin (ASA) 81 MG EC tablet     fluticasone (FLONASE) 50 MCG/ACT nasal spray     metoprolol succinate ER (TOPROL-XL) 25 MG 24 hr tablet     ticagrelor (BRILINTA) 90 MG tablet     triamterene-HCTZ (MAXZIDE-25) 37.5-25 MG tablet     No current facility-administered medications for this visit.       ALLERGIES:      Allergies   Allergen Reactions     Rosuvastatin Muscle Pain (Myalgia)     Seasonal Allergies      Amlodipine      Other reaction(s): Edema,generalized     Atorvastatin      PN: Reaction: BLURRED VISION     Fenofibrate Muscle Pain (Myalgia)     Fluconazole Nausea     Losartan      PN: Reaction: Back pain and cramping     Pravastatin      PN:  Reaction: JOINT AND MUSCLE PAIN     Simvastatin Nausea     Niacin Rash        FAMILY HISTORY:       No family history on file.       SOCIAL HISTORY:     Social History     Socioeconomic History     Marital status:      Spouse name: Not on file     Number of children: Not on file     Years of education: Not on file     Highest education level: Not on file   Occupational  History     Not on file   Social Needs     Financial resource strain: Not on file     Food insecurity     Worry: Not on file     Inability: Not on file     Transportation needs     Medical: Not on file     Non-medical: Not on file   Tobacco Use     Smoking status: Former Smoker     Quit date: 1980     Years since quittin.9     Smokeless tobacco: Never Used     Tobacco comment: 1 pack per week if that maybe for 5 years    Substance and Sexual Activity     Alcohol use: Yes     Comment: occ      Drug use: Never     Sexual activity: Not on file   Lifestyle     Physical activity     Days per week: Not on file     Minutes per session: Not on file     Stress: Not on file   Relationships     Social connections     Talks on phone: Not on file     Gets together: Not on file     Attends [employer] service: Not on file     Active member of club or organization: Not on file     Attends meetings of clubs or organizations: Not on file     Relationship status: Not on file     Intimate partner violence     Fear of current or ex partner: Not on file     Emotionally abused: Not on file     Physically abused: Not on file     Forced sexual activity: Not on file   Other Topics Concern     Not on file   Social History Narrative     Not on file      REVIEW OF SYSTEMS:     Constitutional: No fevers or chills  Skin: No new rash or itching  Eyes: No acute change in vision  Ears/Nose/Throat: No purulent rhinorrhea, new hearing loss, or new vertigo  Respiratory: No cough or hemoptysis  Cardiovascular: See HPI  Gastrointestinal: No change in appetite, vomiting, hematemesis or diarrhea  Genitourinary: No dysuria or hematuria  Musculoskeletal: No new back pain, neck pain or muscle pain  Neurologic: No new headaches, focal weakness or behavior changes  Psychiatric: No hallucinations, excessive alcohol consumption or illegal drug usage  Hematologic/Lymphatic/Immunologic: No bleeding, chills, fever, night sweats or weight loss  Endocrine: No new  "cold intolerance, heat intolerance, polyphagia, polydipsia or polyuria      PHYSICAL EXAMINATION:     /74 (BP Location: Right arm, Patient Position: Chair, Cuff Size: Adult Regular)   Pulse 89   Ht 1.651 m (5' 5\")   Wt 54.9 kg (121 lb)   SpO2 96%   BMI 20.14 kg/m       Physical Exam   Constitutional: She is oriented to person, place, and time. She appears well-developed and well-nourished.   HENT:   Head: Normocephalic and atraumatic.   Neck: Normal range of motion. No JVD present.   Cardiovascular: Normal rate and regular rhythm.   Murmur heard.  2/6 systolic murmur RUSB      Pulmonary/Chest: Effort normal and breath sounds normal.   Abdominal: Soft.   Musculoskeletal: Normal range of motion.   Neurological: She is alert and oriented to person, place, and time.   Skin: Skin is warm and dry.   Psychiatric: Her behavior is normal.      LABORATORY DATA:     Last Comprehensive Metabolic Panel:  Sodium   Date Value Ref Range Status   11/16/2020 140 133 - 144 mmol/L Final     Potassium   Date Value Ref Range Status   11/16/2020 4.1 3.4 - 5.3 mmol/L Final     Chloride   Date Value Ref Range Status   11/16/2020 109 94 - 109 mmol/L Final     Carbon Dioxide   Date Value Ref Range Status   11/16/2020 26 20 - 32 mmol/L Final     Anion Gap   Date Value Ref Range Status   11/16/2020 5 3 - 14 mmol/L Final     Glucose   Date Value Ref Range Status   11/16/2020 98 70 - 99 mg/dL Final     Urea Nitrogen   Date Value Ref Range Status   11/16/2020 27 7 - 30 mg/dL Final     Creatinine   Date Value Ref Range Status   11/16/2020 1.26 (H) 0.52 - 1.04 mg/dL Final     GFR Estimate   Date Value Ref Range Status   11/16/2020 39 (L) >60 mL/min/[1.73_m2] Final     Comment:     Non  GFR Calc  Starting 12/18/2018, serum creatinine based estimated GFR (eGFR) will be   calculated using the Chronic Kidney Disease Epidemiology Collaboration   (CKD-EPI) equation.       Calcium   Date Value Ref Range Status   11/16/2020 9.4 " 8.5 - 10.1 mg/dL Final     CBC RESULTS:   Recent Labs   Lab Test 11/16/20  0947   WBC 5.7   RBC 4.08   HGB 10.6*   HCT 35.9   MCV 88   MCH 26.0*   MCHC 29.5*   RDW 13.2           PROCEDURES & FURTHER ASSESSMENTS:     ECG dated today: NSR HR 83, , QRS 88, QTc 474    Echocardiogram dated today:  Interpretation Summary  s/p 23 mm Paredes Arik 3 valve TAVR on 10/07/2020. The valve is well-seated  there is mild-moderate paravalvular regurgitation and the jet is quite  eccentric. The jet appears to be most prominent in the 4-6 o'clock position.  While the patient's BP is higher during this examination, the Vmax is 2.1 m/s  and the mean gradient is 11 mmHg (similar to the last study).     Global and regional left ventricular function is normal with an EF of 55-60%.  Grade II or moderate diastolic dysfunction.  Global right ventricular function is normal. The right ventricle is normal  size.  This study was compared with the study from 10/08/2020. The paravalvular  aortic regurgitation appears to be more prominent on today's study on direct  visual comparison. The Vmax and mean gradient are not significantly different.    CT heart today:       IMPRESSIONS:     1.  There is a 23 mm Paredes Arik transcatheter valve in the aortic  position.   2.  There is no evidence of hypoattenuating leaflet thrombosis. Small  paravalvular leak between the left and right coronary cusps (2-3  o'clock position), with associated large protruding annular  calcification.  3.  Please review Radiology report for incidental noncardiac findings  that will follow separately.     FINDINGS:      1.  There is a 23 mm Paredes Arik  transcatheter valve in the aortic  position. It is well-seated.  2.  There is no evidence of hypoattenuating leaflet thrombosis.  There  is normal leaflet opening.   3.  The visualized portions of the aortic root and ascending aorta are  normal in size  4.  The systemic venous connections are normal.   5.   The cardiac chambers demonstrate normal atrioventricular and  ventriculoarterial concordance.  6.  Coronary arteries originate from their respective cusps.   7.  There are severe coronary artery calcifications.  8.  The pericardium is unremarkable.  There is no pericardial  effusion.   9.  There is no intracardiac thrombus.    NYHA Class: II     CLINICAL IMPRESSION:     Lydia Dietz is a 85 year old female with a past medical history of HTN, HLD w/statin intolerance, CKD stage III, coronary artery disease status-post PCI of the LAD and RCA 9/9/2020 and severe aortic valvular stenosis status-post transfemoral transcatheter aortic valve replacement (TAVR) with a 23 mm Paredes Arik 3 on 10/7/20 by Aleisha Pastor, Steve, Nickolas and Miguel who presents for 1 month follow-up.    Lydia is doing well with improvement in dyspnea and fatigue since the procedure. She is active participating in cardiac rehab twice a week without cardiac symptoms. Her ECG today shows normal sinus rhythm without conduction disease. Her ECHO shows well seated valve with mean PG of 11 mmHg. There is mild-moderate PVL noted on today's study which appears more prominent when compared to prior echo. Her cardiac CT confirms a small paravalvular leak between the left and right coronary cusps, with associated large protruding annular calcification. Discussed the case with Dr. Clark and Dr. Pastor. Given that PVL is mild and with large protruding calcification near the leak, would favor medical management at this time. We will continue to monitor PVL with routine echos. Should her PVL worsen, or if patient develops symptoms of heart failure or hemolysis, we could consider percutaneous paravalvular leak closure.      Plan Summary:  1) Aspirin 81 mg daily lifelong  2) Continue Brilinta for 12 months post PCI (9/9/2021)  3) Lifelong antibiotic prophylaxis prior to all dental procedures  4) Monitor home blood pressure and call if consistently >  130/80 at which time I would institute afterload reduction for PVL  5) Follow-up with PCP regarding worsening CKD  6) Follow-up in valve clinic in 4 months with repeat echo and labs prior to visit     Daphne Murphy NP    A total of 30 minutes spent face to face with patient and > 50% of the time spent counseling and coordinating care.       CC  Patient Care Team:  Christal Ferreira MD as PCP - General (Family Practice)  Praveen Costello MD as MD (Orthopedics)  Tayla Hartman (Cardiology)  Leonard Pastor MD as Assigned Heart and Vascular Provider  Acosta Whipple MD as Assigned Musculoskeletal Provider

## 2020-11-16 ENCOUNTER — HOSPITAL ENCOUNTER (OUTPATIENT)
Dept: CT IMAGING | Facility: CLINIC | Age: 85
End: 2020-11-16
Attending: NURSE PRACTITIONER
Payer: MEDICARE

## 2020-11-16 ENCOUNTER — HOSPITAL ENCOUNTER (OUTPATIENT)
Dept: CARDIOLOGY | Facility: CLINIC | Age: 85
End: 2020-11-16
Attending: NURSE PRACTITIONER
Payer: MEDICARE

## 2020-11-16 VITALS
HEART RATE: 89 BPM | WEIGHT: 121 LBS | HEIGHT: 65 IN | SYSTOLIC BLOOD PRESSURE: 122 MMHG | OXYGEN SATURATION: 96 % | DIASTOLIC BLOOD PRESSURE: 74 MMHG | BODY MASS INDEX: 20.16 KG/M2

## 2020-11-16 DIAGNOSIS — I35.0 NONRHEUMATIC AORTIC (VALVE) STENOSIS: ICD-10-CM

## 2020-11-16 DIAGNOSIS — Z95.2 S/P TAVR (TRANSCATHETER AORTIC VALVE REPLACEMENT): ICD-10-CM

## 2020-11-16 DIAGNOSIS — T82.03XA PARAVALVULAR LEAK (PROSTHETIC VALVE), INITIAL ENCOUNTER: ICD-10-CM

## 2020-11-16 DIAGNOSIS — Z95.2 S/P TAVR (TRANSCATHETER AORTIC VALVE REPLACEMENT): Primary | ICD-10-CM

## 2020-11-16 LAB
ANION GAP SERPL CALCULATED.3IONS-SCNC: 5 MMOL/L (ref 3–14)
BUN SERPL-MCNC: 27 MG/DL (ref 7–30)
CALCIUM SERPL-MCNC: 9.4 MG/DL (ref 8.5–10.1)
CHLORIDE SERPL-SCNC: 109 MMOL/L (ref 94–109)
CO2 SERPL-SCNC: 26 MMOL/L (ref 20–32)
CREAT SERPL-MCNC: 1.26 MG/DL (ref 0.52–1.04)
ERYTHROCYTE [DISTWIDTH] IN BLOOD BY AUTOMATED COUNT: 13.2 % (ref 10–15)
GFR SERPL CREATININE-BSD FRML MDRD: 39 ML/MIN/{1.73_M2}
GLUCOSE SERPL-MCNC: 98 MG/DL (ref 70–99)
HCT VFR BLD AUTO: 35.9 % (ref 35–47)
HGB BLD-MCNC: 10.6 G/DL (ref 11.7–15.7)
MCH RBC QN AUTO: 26 PG (ref 26.5–33)
MCHC RBC AUTO-ENTMCNC: 29.5 G/DL (ref 31.5–36.5)
MCV RBC AUTO: 88 FL (ref 78–100)
PLATELET # BLD AUTO: 159 10E9/L (ref 150–450)
POTASSIUM SERPL-SCNC: 4.1 MMOL/L (ref 3.4–5.3)
RBC # BLD AUTO: 4.08 10E12/L (ref 3.8–5.2)
SODIUM SERPL-SCNC: 140 MMOL/L (ref 133–144)
WBC # BLD AUTO: 5.7 10E9/L (ref 4–11)

## 2020-11-16 PROCEDURE — 999N000054 HC STATISTIC EKG NON-CHARGEABLE

## 2020-11-16 PROCEDURE — 93306 TTE W/DOPPLER COMPLETE: CPT | Mod: 26 | Performed by: STUDENT IN AN ORGANIZED HEALTH CARE EDUCATION/TRAINING PROGRAM

## 2020-11-16 PROCEDURE — 93010 ELECTROCARDIOGRAM REPORT: CPT | Performed by: INTERNAL MEDICINE

## 2020-11-16 PROCEDURE — 75572 CT HRT W/3D IMAGE: CPT | Mod: 26 | Performed by: INTERNAL MEDICINE

## 2020-11-16 PROCEDURE — 75572 CT HRT W/3D IMAGE: CPT

## 2020-11-16 PROCEDURE — 93306 TTE W/DOPPLER COMPLETE: CPT

## 2020-11-16 PROCEDURE — 250N000011 HC RX IP 250 OP 636: Performed by: NURSE PRACTITIONER

## 2020-11-16 PROCEDURE — 85027 COMPLETE CBC AUTOMATED: CPT | Performed by: NURSE PRACTITIONER

## 2020-11-16 PROCEDURE — 93005 ELECTROCARDIOGRAM TRACING: CPT

## 2020-11-16 PROCEDURE — 36415 COLL VENOUS BLD VENIPUNCTURE: CPT | Performed by: NURSE PRACTITIONER

## 2020-11-16 PROCEDURE — G0463 HOSPITAL OUTPT CLINIC VISIT: HCPCS | Mod: 25

## 2020-11-16 PROCEDURE — 80048 BASIC METABOLIC PNL TOTAL CA: CPT | Performed by: NURSE PRACTITIONER

## 2020-11-16 PROCEDURE — 99214 OFFICE O/P EST MOD 30 MIN: CPT | Mod: 25 | Performed by: NURSE PRACTITIONER

## 2020-11-16 RX ORDER — CHLORAL HYDRATE 500 MG
2 CAPSULE ORAL DAILY
COMMUNITY
End: 2022-08-19

## 2020-11-16 RX ORDER — IOPAMIDOL 755 MG/ML
120 INJECTION, SOLUTION INTRAVASCULAR ONCE
Status: COMPLETED | OUTPATIENT
Start: 2020-11-16 | End: 2020-11-16

## 2020-11-16 RX ADMIN — IOPAMIDOL 120 ML: 755 INJECTION, SOLUTION INTRAVENOUS at 12:13

## 2020-11-16 ASSESSMENT — PAIN SCALES - GENERAL: PAINLEVEL: NO PAIN (0)

## 2020-11-16 ASSESSMENT — MIFFLIN-ST. JEOR: SCORE: 1004.73

## 2020-11-16 NOTE — LETTER
11/16/2020      RE: Lydia Dietz  [address] [facility] [address]       Dear Colleague,    Thank you for the opportunity to participate in the care of your patient, Lydia Dietz, at the [facility] HEART [facility]. Please see a copy of my visit note below.      [facility] HEART [facility]  CARDIOVASCULAR DIVISION    VALVE CLINIC RETURN VISIT      Lydia Dietz is being evaluated for 1 month TAVR follow-up. She has a history of coronary artery disease status-post PCI of the LAD and RCA 9/9/2020, HLD w/statin intolerance, HTN, CKD stage III and severe aortic valvular stenosis status-post transfemoral transcatheter aortic valve replacement (TAVR) with a 23 mm Paredes Arik 3 on 10/7/20 by Aleisha Pastor, Steve, Nickolas and Miguel. The TAVR and post-procedural course were notable for no complications or development of conduction abnormalities. Her post TAVR echo showed mean gradient of 10 mmHg with trivial paravalvular AI. She was discharged on ASA and Brilinta.     Lydia is accompanied to the visit by her  Geovany. She tells me she is feeling pretty good and has noticed improvement in fatigue and exertional dyspnea since the procedure. She has been participating in cardiac rehab twice a week. She has been walking (280 feet x 8 times), using the Nustep for 10 minutes and the exercise bike for 10 minutes. She denies any exertional chest pain or dyspnea. She has not experienced any orthopnea, PND, leg swelling, weight gain, palpitations, lightheadedness or syncope. She is compliant with ASA and Brilinta. She occasionally notices some blood on her tissue after blowing her nose but denies any major bleeding. She does not check home blood pressures. She tells me she is seeing her primary care provider today for follow-up of CKD.      PAST MEDICAL HISTORY:     Past Medical History:   Diagnosis Date     Allergies      Anemia      Coronary artery  disease      HLD (hyperlipidemia)      HTN (hypertension)      Impaired fasting glucose      Osteoarthritis      Severe aortic stenosis       PAST SURGICAL HISTORY:     Past Surgical History:   Procedure Laterality Date     AS LAP INCISIONAL HERNIA REPAIR       BREAST BIOPSY, CORE RT/LT      X3     CHOLECYSTECTOMY       COLECTOMY PARTIAL  2019     CV LEFT HEART CATH N/A 9/9/2020    Procedure: Left Heart Cath;  Surgeon: Leonard Pastor MD;  Location:  HEART CARDIAC CATH LAB     CV PCI ANGIOPLASTY N/A 9/9/2020    Procedure: CV PCI ANGIOPLASTY;  Surgeon: Leonard Pastor MD;  Location:  HEART CARDIAC CATH LAB     CV TRANSCATHETER AORTIC VALVE REPLACEMENT N/A 10/7/2020    Procedure: Transfemoral, subclavian (DINH) or transapical transcatheter aortic valve replacement 23mm dinh valve placed. cardiovascular standby.;  Surgeon: Leonard Pastor MD;  Location: [facility] OR      LAPAROSCOPY, OVIDUCT/OVARY; REMOVAL       HEART CATH FEMORAL CANNULIZATION WITH OPEN STANDBY REPAIR AORTIC VALVE N/A 10/7/2020    Procedure: Cardiac standby. Perfusion standby.;  Surgeon: Richi Olmos MD;  Location: [facility] OR     HYSTERECTOMY TOTAL ABDOMINAL        CURRENT MEDICATIONS:     Current Outpatient Medications   Medication     aspirin (ASA) 81 MG EC tablet     fluticasone (FLONASE) 50 MCG/ACT nasal spray     metoprolol succinate ER (TOPROL-XL) 25 MG 24 hr tablet     ticagrelor (BRILINTA) 90 MG tablet     triamterene-HCTZ (MAXZIDE-25) 37.5-25 MG tablet     No current facility-administered medications for this visit.       ALLERGIES:      Allergies   Allergen Reactions     Rosuvastatin Muscle Pain (Myalgia)     Seasonal Allergies      Amlodipine      Other reaction(s): Edema,generalized     Atorvastatin      PN: Reaction: BLURRED VISION     Fenofibrate Muscle Pain (Myalgia)     Fluconazole Nausea     Losartan      PN: Reaction: Back pain and cramping     Pravastatin      PN:  Reaction: JOINT AND MUSCLE PAIN     Simvastatin  Nausea     Niacin Rash        FAMILY HISTORY:       No family history on file.       SOCIAL HISTORY:     Social History     Socioeconomic History     Marital status:      Spouse name: Not on file     Number of children: Not on file     Years of education: Not on file     Highest education level: Not on file   Occupational History     Not on file   Social Needs     Financial resource strain: Not on file     Food insecurity     Worry: Not on file     Inability: Not on file     Transportation needs     Medical: Not on file     Non-medical: Not on file   Tobacco Use     Smoking status: Former Smoker     Quit date:      Years since quittin.9     Smokeless tobacco: Never Used     Tobacco comment: 1 pack per week if that maybe for 5 years    Substance and Sexual Activity     Alcohol use: Yes     Comment: occ      Drug use: Never     Sexual activity: Not on file   Lifestyle     Physical activity     Days per week: Not on file     Minutes per session: Not on file     Stress: Not on file   Relationships     Social connections     Talks on phone: Not on file     Gets together: Not on file     Attends Evangelical service: Not on file     Active member of club or organization: Not on file     Attends meetings of clubs or organizations: Not on file     Relationship status: Not on file     Intimate partner violence     Fear of current or ex partner: Not on file     Emotionally abused: Not on file     Physically abused: Not on file     Forced sexual activity: Not on file   Other Topics Concern     Not on file   Social History Narrative     Not on file      REVIEW OF SYSTEMS:     Constitutional: No fevers or chills  Skin: No new rash or itching  Eyes: No acute change in vision  Ears/Nose/Throat: No purulent rhinorrhea, new hearing loss, or new vertigo  Respiratory: No cough or hemoptysis  Cardiovascular: See HPI  Gastrointestinal: No change in appetite, vomiting, hematemesis or diarrhea  Genitourinary: No dysuria or  "hematuria  Musculoskeletal: No new back pain, neck pain or muscle pain  Neurologic: No new headaches, focal weakness or behavior changes  Psychiatric: No hallucinations, excessive alcohol consumption or illegal drug usage  Hematologic/Lymphatic/Immunologic: No bleeding, chills, fever, night sweats or weight loss  Endocrine: No new cold intolerance, heat intolerance, polyphagia, polydipsia or polyuria      PHYSICAL EXAMINATION:     /74 (BP Location: Right arm, Patient Position: Chair, Cuff Size: Adult Regular)   Pulse 89   Ht 1.651 m (5' 5\")   Wt 54.9 kg (121 lb)   SpO2 96%   BMI 20.14 kg/m       Physical Exam   Constitutional: She is oriented to person, place, and time. She appears well-developed and well-nourished.   HENT:   Head: Normocephalic and atraumatic.   Neck: Normal range of motion. No JVD present.   Cardiovascular: Normal rate and regular rhythm.   Murmur heard.  2/6 systolic murmur RUSB      Pulmonary/Chest: Effort normal and breath sounds normal.   Abdominal: Soft.   Musculoskeletal: Normal range of motion.   Neurological: She is alert and oriented to person, place, and time.   Skin: Skin is warm and dry.   Psychiatric: Her behavior is normal.      LABORATORY DATA:     Last Comprehensive Metabolic Panel:  Sodium   Date Value Ref Range Status   11/16/2020 140 133 - 144 mmol/L Final     Potassium   Date Value Ref Range Status   11/16/2020 4.1 3.4 - 5.3 mmol/L Final     Chloride   Date Value Ref Range Status   11/16/2020 109 94 - 109 mmol/L Final     Carbon Dioxide   Date Value Ref Range Status   11/16/2020 26 20 - 32 mmol/L Final     Anion Gap   Date Value Ref Range Status   11/16/2020 5 3 - 14 mmol/L Final     Glucose   Date Value Ref Range Status   11/16/2020 98 70 - 99 mg/dL Final     Urea Nitrogen   Date Value Ref Range Status   11/16/2020 27 7 - 30 mg/dL Final     Creatinine   Date Value Ref Range Status   11/16/2020 1.26 (H) 0.52 - 1.04 mg/dL Final     GFR Estimate   Date Value Ref Range " Status   11/16/2020 39 (L) >60 mL/min/[1.73_m2] Final     Comment:     Non  GFR Calc  Starting 12/18/2018, serum creatinine based estimated GFR (eGFR) will be   calculated using the Chronic Kidney Disease Epidemiology Collaboration   (CKD-EPI) equation.       Calcium   Date Value Ref Range Status   11/16/2020 9.4 8.5 - 10.1 mg/dL Final     CBC RESULTS:   Recent Labs   Lab Test 11/16/20  0947   WBC 5.7   RBC 4.08   HGB 10.6*   HCT 35.9   MCV 88   MCH 26.0*   MCHC 29.5*   RDW 13.2           PROCEDURES & FURTHER ASSESSMENTS:     ECG dated today: NSR HR 83, , QRS 88, QTc 474    Echocardiogram dated today:  Interpretation Summary  s/p 23 mm Paredes Arik 3 valve TAVR on 10/07/2020. The valve is well-seated  there is mild-moderate paravalvular regurgitation and the jet is quite  eccentric. The jet appears to be most prominent in the 4-6 o'clock position.  While the patient's BP is higher during this examination, the Vmax is 2.1 m/s  and the mean gradient is 11 mmHg (similar to the last study).     Global and regional left ventricular function is normal with an EF of 55-60%.  Grade II or moderate diastolic dysfunction.  Global right ventricular function is normal. The right ventricle is normal  size.  This study was compared with the study from 10/08/2020. The paravalvular  aortic regurgitation appears to be more prominent on today's study on direct  visual comparison. The Vmax and mean gradient are not significantly different.    CT heart today:       IMPRESSIONS:     1.  There is a 23 mm Paredes Arik transcatheter valve in the aortic  position.   2.  There is no evidence of hypoattenuating leaflet thrombosis. Small  paravalvular leak between the left and right coronary cusps (2-3  o'clock position), with associated large protruding annular  calcification.  3.  Please review Radiology report for incidental noncardiac findings  that will follow separately.     FINDINGS:      1.  There is a  23 mm Paredes Arik  transcatheter valve in the aortic  position. It is well-seated.  2.  There is no evidence of hypoattenuating leaflet thrombosis.  There  is normal leaflet opening.   3.  The visualized portions of the aortic root and ascending aorta are  normal in size  4.  The systemic venous connections are normal.   5.  The cardiac chambers demonstrate normal atrioventricular and  ventriculoarterial concordance.  6.  Coronary arteries originate from their respective cusps.   7.  There are severe coronary artery calcifications.  8.  The pericardium is unremarkable.  There is no pericardial  effusion.   9.  There is no intracardiac thrombus.    NYHA Class: II     CLINICAL IMPRESSION:     Lydia Dietz is a 85 year old female with a past medical history of HTN, HLD w/statin intolerance, CKD stage III, coronary artery disease status-post PCI of the LAD and RCA 9/9/2020 and severe aortic valvular stenosis status-post transfemoral transcatheter aortic valve replacement (TAVR) with a 23 mm Paredes Arik 3 on 10/7/20 by Aleisha Pastor, Steve, Nickolas and Miguel who presents for 1 month follow-up.    Lydia is doing well with improvement in dyspnea and fatigue since the procedure. She is active participating in cardiac rehab twice a week without cardiac symptoms. Her ECG today shows normal sinus rhythm without conduction disease. Her ECHO shows well seated valve with mean PG of 11 mmHg. There is mild-moderate PVL noted on today's study which appears more prominent when compared to prior echo. Her cardiac CT confirms a small paravalvular leak between the left and right coronary cusps, with associated large protruding annular calcification. Discussed the case with Dr. Clark and Dr. Pastor. Given that PVL is mild and with large protruding calcification near the leak, would favor medical management at this time. We will continue to monitor PVL with routine echos. Should her PVL worsen, or if patient develops  symptoms of heart failure or hemolysis, we could consider percutaneous paravalvular leak closure.      Plan Summary:  1) Aspirin 81 mg daily lifelong  2) Continue Brilinta for 12 months post PCI (9/9/2021)  3) Lifelong antibiotic prophylaxis prior to all dental procedures  4) Monitor home blood pressure and call if consistently > 130/80 at which time I would institute afterload reduction for PVL  5) Follow-up with PCP regarding worsening CKD  6) Follow-up in valve clinic in 4 months with repeat echo and labs prior to visit     Daphne Murphy NP    A total of 30 minutes spent face to face with patient and > 50% of the time spent counseling and coordinating care.       CC  Patient Care Team:  Christal Ferreira MD as PCP - General (Family Practice)  Praveen Costello MD as MD (Orthopedics)  Tayla Hartman (Cardiology)  Leonard Pastor MD as Assigned Heart and Vascular Provider  Acosta Whipple MD as Assigned Musculoskeletal Provider         Please do not hesitate to contact me if you have any questions/concerns.     Sincerely,     Daphne Murphy NP

## 2020-11-16 NOTE — PATIENT INSTRUCTIONS
You were seen today in the Structural Heart Clinic at the [facility].    Cardiology provider you saw during your visit: Daphne Murphy NP    Instructions:   1. Your echo shows normal aortic valve function; however, there is mild leaking around the valve. I would like to talk to Dr. Pastor regarding the plan moving forward and will get back to you.   2. Continue Aspirin 81 mg daily lifelong  3. Continue Brilinta 90 mg twice daily x 12 months   4. Delay any nonurgent dental procedures for the first 6 month post TAVR.  5. For all future dental cleanings and procedures you will need to take antibiotics prior - see instructions below.   6. Follow-up with PCP regarding worsening renal function  7. Follow-up with your Primary Cardiologist in 3 months   8. Follow-up in Valve Clinic in 6 months echo, labs and ECG prior     Prevention of Infective (Bacterial) Endocarditis:  You are at increased risk for developing adverse outcomes from infective endocarditis (IE), also known as bacterial endocarditis (BE) because of the new device in your heart. The guidelines for prevention of IE are to give patients antibiotics prior to any dental procedures that involve manipulation of gingival tissue or the periapical region of teeth, or perforation of the oral mucosa:      It is recommended to take Amoxicillin 2 gm by mouth as a single dose 30 to 60 minutes before procedure.     OR if allergic to Penicillin or Ampicillin:     Cephalexin 2 gm by mouth, or  Clindamycin 600 mg by mouth, or  Azithromycin or Clarithromycin 500 mg PO       Questions and [phone].   First press #1 for the University and then press #3 for Medical Questions to reach the Cardiology triage nurse.     On Call Cardiologist for after hours or on weekends: [phone], press option #4 and ask to speak to the on-call Cardiologist.

## 2020-11-17 LAB — INTERPRETATION ECG - MUSE: NORMAL

## 2020-11-20 ENCOUNTER — CARE COORDINATION (OUTPATIENT)
Dept: CARDIOLOGY | Facility: CLINIC | Age: 85
End: 2020-11-20

## 2020-11-20 NOTE — PROGRESS NOTES
Addendum:  I spoke with Lydia about the incidental finding.  She states she will follow up with her PCP regarding the nodule.  A letter was mailed to Lydia with the report.

## 2020-11-20 NOTE — LETTER
November 20, 2020      TO: Lydia Dietz  [address] [address] [address]         Dear Lydia,    It was nice to speak with you.    You will find the information in the report below.    Please do not hesitate to call if you have any questions.    Best regards,  Savita Mata, RN  Lead Structural Heart Care Coordinator    TAVR, MitraClip and Watchman Programs  [facility]  Office: [phone]                            Exam Information    Exam Date Exam Time Accession # Performing Department Results    11/16/20 12:39 PM [id number] [facility] Imaging    PACS Images     Show images for Radiologist Consult For Cardiology   Study Result    Exam: Radiology consult for cardiology 11/16/2020 12:39 PM      History: Evaluating prosthetic valve function; S/P TAVR (transcatheter  aortic valve replacement)     Comparison: CT 8/3/2020.     TECHNIQUE: Cardiac CT angiography images from the ascending aorta  through the upper abdomen were reviewed with intravenous contrast.  Contrast: iopamidol (ISOVUE-370) solution 120 mL     FINDINGS: The visualized central airways are clear. Endobronchial  mucous plug in the lingula (series 9 image 7). No focal consolidation,  pleural effusion or pneumothorax in the visualized lungs. Minimal  dependent atelectasis the lung bases. Small pulmonary nodules are  stable from 8/3/2020 including 4 x 3 mm nodule and adjacent 2 x 2 mm  solid nodule the right lower lobe (series 9 image 22). Other smaller  nodules are stable.     No pericardial effusion. Prosthetic aortic valve. Ascending aorta and  main pulmonary trunk are normal caliber. No central PE. Moderate to  severe calcified and noncalcified atheromatous plaque in the  descending thoracic aorta. Right coronary dominance. Severe  calcifications of the coronary arteries. Coronary artery stents. No  enlarged thoracic lymph nodes.     Tiny sliding-type hiatal hernia. Remainder the  visualized upper  abdomen is limited on this early arterial phase study. No acute  fracture. Partially visualized compression deformity of the superior  endplate of T11, better characterized on CT 8/3/2020.                                                                            IMPRESSION:   1. Stable small pulmonary nodules including 4 mm nodule in the right  lower lobe. If patient is high risk for lung cancer, could obtain  follow-up chest CT in 12 months from the original CT (8/3/2020) to  document stability per Fleischner Society criteria. If patient is low  risk for lung cancer, no routine follow-up is warranted.  2. Postsurgical changes of aortic valve prosthesis. Please see  separate cardiology report for further characterization of the cardiac  findings.     CARLA MAYO, DO

## 2020-11-20 NOTE — PROGRESS NOTES
Lydia was called and a VM was left for her to return my telephone call.    I wanted to confirm with her that she will follow up with her PCP regarding the findings of the lung nodule.

## 2020-12-01 ENCOUNTER — CARE COORDINATION (OUTPATIENT)
Dept: CARDIOLOGY | Facility: CLINIC | Age: 85
End: 2020-12-01

## 2020-12-01 NOTE — PROGRESS NOTES
Spoke with Lydia and her  at length in regards to the CT results from 11/16/20.    I explained that she should follow up with her PCP about the nodules that are reported on in her lungs.     We talked about the small leak around her valve and that is if she not symptomatic she does not need to worry her valve, that she needs to follow up with the recommended ECHO. Lydia stated that she was very worried that she would get sick and die because of the leak.     All question about the radiologists report and CT answered both stated understanding of the conversation

## 2021-03-03 ENCOUNTER — TRANSFERRED RECORDS (OUTPATIENT)
Dept: HEALTH INFORMATION MANAGEMENT | Facility: CLINIC | Age: 86
End: 2021-03-03

## 2021-03-03 ENCOUNTER — HOSPITAL ENCOUNTER (INPATIENT)
Facility: CLINIC | Age: 86
LOS: 2 days | Discharge: HOME | DRG: 871 | End: 2021-03-05
Attending: INTERNAL MEDICINE | Admitting: INTERNAL MEDICINE
Payer: MEDICARE

## 2021-03-03 ENCOUNTER — APPOINTMENT (OUTPATIENT)
Dept: CT IMAGING | Facility: CLINIC | Age: 86
DRG: 871 | End: 2021-03-03
Attending: INTERNAL MEDICINE
Payer: MEDICARE

## 2021-03-03 DIAGNOSIS — N12 PYELONEPHRITIS: Primary | ICD-10-CM

## 2021-03-03 PROBLEM — R07.9 CHEST PAIN: Status: ACTIVE | Noted: 2021-03-03

## 2021-03-03 LAB
ALT SERPL-CCNC: 35 U/L (ref 8–45)
AST SERPL-CCNC: 29 U/L (ref 5–41)
CREAT SERPL-MCNC: 1.17 MG/DL (ref 0.57–1.11)
GFR SERPL CREATININE-BSD FRML MDRD: 43 ML/MIN/1.73M2
GLUCOSE SERPL-MCNC: 155 MG/DL (ref 70–100)
POTASSIUM SERPL-SCNC: 3.8 MMOL/L (ref 3.5–5.1)
TROPONIN I SERPL-MCNC: <0.015 UG/L (ref 0–0.04)

## 2021-03-03 PROCEDURE — 250N000011 HC RX IP 250 OP 636: Performed by: INTERNAL MEDICINE

## 2021-03-03 PROCEDURE — 71275 CT ANGIOGRAPHY CHEST: CPT | Mod: ME

## 2021-03-03 PROCEDURE — 93010 ELECTROCARDIOGRAM REPORT: CPT | Performed by: INTERNAL MEDICINE

## 2021-03-03 PROCEDURE — 99207 PR CDG-HISTORY COMP: MEETS EXP. PROBLEM FOCUSED-DOWN CODED LACK OF ROS: CPT | Performed by: INTERNAL MEDICINE

## 2021-03-03 PROCEDURE — 250N000013 HC RX MED GY IP 250 OP 250 PS 637: Performed by: STUDENT IN AN ORGANIZED HEALTH CARE EDUCATION/TRAINING PROGRAM

## 2021-03-03 PROCEDURE — 999N000128 HC STATISTIC PERIPHERAL IV START W/O US GUIDANCE

## 2021-03-03 PROCEDURE — 120N000002 HC R&B MED SURG/OB UMMC

## 2021-03-03 PROCEDURE — 99221 1ST HOSP IP/OBS SF/LOW 40: CPT | Mod: AI | Performed by: INTERNAL MEDICINE

## 2021-03-03 PROCEDURE — 71275 CT ANGIOGRAPHY CHEST: CPT | Mod: 26 | Performed by: RADIOLOGY

## 2021-03-03 PROCEDURE — 36415 COLL VENOUS BLD VENIPUNCTURE: CPT | Performed by: STUDENT IN AN ORGANIZED HEALTH CARE EDUCATION/TRAINING PROGRAM

## 2021-03-03 PROCEDURE — 87040 BLOOD CULTURE FOR BACTERIA: CPT | Performed by: STUDENT IN AN ORGANIZED HEALTH CARE EDUCATION/TRAINING PROGRAM

## 2021-03-03 PROCEDURE — 84484 ASSAY OF TROPONIN QUANT: CPT | Performed by: STUDENT IN AN ORGANIZED HEALTH CARE EDUCATION/TRAINING PROGRAM

## 2021-03-03 PROCEDURE — 93005 ELECTROCARDIOGRAM TRACING: CPT

## 2021-03-03 PROCEDURE — G1004 CDSM NDSC: HCPCS

## 2021-03-03 PROCEDURE — G1004 CDSM NDSC: HCPCS | Mod: GC | Performed by: RADIOLOGY

## 2021-03-03 PROCEDURE — 250N000009 HC RX 250: Performed by: INTERNAL MEDICINE

## 2021-03-03 PROCEDURE — 74177 CT ABD & PELVIS W/CONTRAST: CPT | Mod: 26 | Performed by: RADIOLOGY

## 2021-03-03 PROCEDURE — 250N000011 HC RX IP 250 OP 636: Performed by: STUDENT IN AN ORGANIZED HEALTH CARE EDUCATION/TRAINING PROGRAM

## 2021-03-03 RX ORDER — TRIAMTERENE/HYDROCHLOROTHIAZID 37.5-25 MG
1 TABLET ORAL DAILY
Status: DISCONTINUED | OUTPATIENT
Start: 2021-03-04 | End: 2021-03-05 | Stop reason: HOSPADM

## 2021-03-03 RX ORDER — ASPIRIN 81 MG/1
81 TABLET ORAL EVERY MORNING
Status: DISCONTINUED | OUTPATIENT
Start: 2021-03-04 | End: 2021-03-05 | Stop reason: HOSPADM

## 2021-03-03 RX ORDER — LIDOCAINE 40 MG/G
CREAM TOPICAL
Status: DISCONTINUED | OUTPATIENT
Start: 2021-03-03 | End: 2021-03-05 | Stop reason: HOSPADM

## 2021-03-03 RX ORDER — IOPAMIDOL 755 MG/ML
74 INJECTION, SOLUTION INTRAVASCULAR ONCE
Status: COMPLETED | OUTPATIENT
Start: 2021-03-03 | End: 2021-03-03

## 2021-03-03 RX ORDER — ACETAMINOPHEN 325 MG/1
650 TABLET ORAL EVERY 4 HOURS PRN
Status: DISCONTINUED | OUTPATIENT
Start: 2021-03-03 | End: 2021-03-05 | Stop reason: HOSPADM

## 2021-03-03 RX ORDER — VANCOMYCIN HYDROCHLORIDE 1 G/200ML
1000 INJECTION, SOLUTION INTRAVENOUS EVERY 24 HOURS
Status: DISCONTINUED | OUTPATIENT
Start: 2021-03-04 | End: 2021-03-04

## 2021-03-03 RX ORDER — METOPROLOL SUCCINATE 25 MG/1
25 TABLET, EXTENDED RELEASE ORAL EVERY MORNING
Status: DISCONTINUED | OUTPATIENT
Start: 2021-03-04 | End: 2021-03-05 | Stop reason: HOSPADM

## 2021-03-03 RX ADMIN — CEFEPIME HYDROCHLORIDE 2 G: 2 INJECTION, POWDER, FOR SOLUTION INTRAVENOUS at 20:33

## 2021-03-03 RX ADMIN — TICAGRELOR 90 MG: 90 TABLET ORAL at 20:33

## 2021-03-03 RX ADMIN — IOPAMIDOL 74 ML: 755 INJECTION, SOLUTION INTRAVENOUS at 22:07

## 2021-03-03 RX ADMIN — SODIUM CHLORIDE, PRESERVATIVE FREE 90 ML: 5 INJECTION INTRAVENOUS at 22:07

## 2021-03-03 ASSESSMENT — ACTIVITIES OF DAILY LIVING (ADL): ADLS_ACUITY_SCORE: 11

## 2021-03-03 NOTE — PROGRESS NOTES
[facility]  Transfer Triage Note    Date of call: 03/03/21  Time of call: 1:58 PM    Is pandemic COVID-19 a concern? PUI     Reason for transfer: Further diagnostic work up, management, and consultation for specialized care   Diagnosis: rigors, chest pain    Outside Records: Available  Additional records requested to be faxed to [phone].    Stability of Patient: Patient is at risk for instability, however patient requires urgent transfer and does not meet ICU criteria   ICU: No    Expected Time of Arrival for Transfer: 0-8 hours    Arrival Location:  [address] [address] Phone: [phone]    Recommendations for Management and Stabilization: Given    Additional Comments   Received call for transfer from [facility]    85F CAD s/p PCI 9/2020, TAVR on 10/2020  P/w rigors to [facility] ED  Dyspnea, chest pain, intermittent  98.6  75  128/70  16  99%  Alert and comfortable   Procal 40  WBC 20  UA+  Rocephin and vancomycin  Chest pain better after nitroglycerin in ED  EKG NSR without ischemic changes  COVID pending    Accepted for transfer to [facility]. Medicine primary as per likelihood of sepsis, though cardiology being contacted now as consult to be informed of this 85F with CAD s/p PCI at [facility] with chest pain better with nitroglycerin.       Samy Lofton MD

## 2021-03-04 ENCOUNTER — APPOINTMENT (OUTPATIENT)
Dept: CARDIOLOGY | Facility: CLINIC | Age: 86
DRG: 871 | End: 2021-03-04
Attending: INTERNAL MEDICINE
Payer: MEDICARE

## 2021-03-04 LAB
ANION GAP SERPL CALCULATED.3IONS-SCNC: 6 MMOL/L (ref 3–14)
BUN SERPL-MCNC: 25 MG/DL (ref 7–30)
CALCIUM SERPL-MCNC: 8.5 MG/DL (ref 8.5–10.1)
CHLORIDE SERPL-SCNC: 109 MMOL/L (ref 94–109)
CO2 SERPL-SCNC: 25 MMOL/L (ref 20–32)
CREAT SERPL-MCNC: 0.94 MG/DL (ref 0.52–1.04)
ERYTHROCYTE [DISTWIDTH] IN BLOOD BY AUTOMATED COUNT: 16 % (ref 10–15)
GFR SERPL CREATININE-BSD FRML MDRD: 56 ML/MIN/{1.73_M2}
GLUCOSE SERPL-MCNC: 106 MG/DL (ref 70–99)
HCT VFR BLD AUTO: 26.7 % (ref 35–47)
HGB BLD-MCNC: 8.2 G/DL (ref 11.7–15.7)
INTERPRETATION ECG - MUSE: NORMAL
MAGNESIUM SERPL-MCNC: 2.1 MG/DL (ref 1.6–2.3)
MCH RBC QN AUTO: 25.1 PG (ref 26.5–33)
MCHC RBC AUTO-ENTMCNC: 30.7 G/DL (ref 31.5–36.5)
MCV RBC AUTO: 82 FL (ref 78–100)
PHOSPHATE SERPL-MCNC: 3.3 MG/DL (ref 2.5–4.5)
PLATELET # BLD AUTO: 99 10E9/L (ref 150–450)
POTASSIUM SERPL-SCNC: 3.9 MMOL/L (ref 3.4–5.3)
RADIOLOGIST FLAGS: NORMAL
RBC # BLD AUTO: 3.27 10E12/L (ref 3.8–5.2)
SODIUM SERPL-SCNC: 140 MMOL/L (ref 133–144)
WBC # BLD AUTO: 16.3 10E9/L (ref 4–11)

## 2021-03-04 PROCEDURE — 250N000011 HC RX IP 250 OP 636: Performed by: STUDENT IN AN ORGANIZED HEALTH CARE EDUCATION/TRAINING PROGRAM

## 2021-03-04 PROCEDURE — 93306 TTE W/DOPPLER COMPLETE: CPT

## 2021-03-04 PROCEDURE — 93306 TTE W/DOPPLER COMPLETE: CPT | Mod: 26 | Performed by: INTERNAL MEDICINE

## 2021-03-04 PROCEDURE — 80048 BASIC METABOLIC PNL TOTAL CA: CPT | Performed by: STUDENT IN AN ORGANIZED HEALTH CARE EDUCATION/TRAINING PROGRAM

## 2021-03-04 PROCEDURE — 93010 ELECTROCARDIOGRAM REPORT: CPT | Performed by: INTERNAL MEDICINE

## 2021-03-04 PROCEDURE — 83735 ASSAY OF MAGNESIUM: CPT | Performed by: STUDENT IN AN ORGANIZED HEALTH CARE EDUCATION/TRAINING PROGRAM

## 2021-03-04 PROCEDURE — 85027 COMPLETE CBC AUTOMATED: CPT | Performed by: STUDENT IN AN ORGANIZED HEALTH CARE EDUCATION/TRAINING PROGRAM

## 2021-03-04 PROCEDURE — 99232 SBSQ HOSP IP/OBS MODERATE 35: CPT | Mod: GC | Performed by: STUDENT IN AN ORGANIZED HEALTH CARE EDUCATION/TRAINING PROGRAM

## 2021-03-04 PROCEDURE — 36415 COLL VENOUS BLD VENIPUNCTURE: CPT | Performed by: STUDENT IN AN ORGANIZED HEALTH CARE EDUCATION/TRAINING PROGRAM

## 2021-03-04 PROCEDURE — 250N000013 HC RX MED GY IP 250 OP 250 PS 637: Performed by: STUDENT IN AN ORGANIZED HEALTH CARE EDUCATION/TRAINING PROGRAM

## 2021-03-04 PROCEDURE — 120N000002 HC R&B MED SURG/OB UMMC

## 2021-03-04 PROCEDURE — 93005 ELECTROCARDIOGRAM TRACING: CPT

## 2021-03-04 PROCEDURE — 84100 ASSAY OF PHOSPHORUS: CPT | Performed by: STUDENT IN AN ORGANIZED HEALTH CARE EDUCATION/TRAINING PROGRAM

## 2021-03-04 PROCEDURE — 99207 PR CDG-MDM COMPONENT: MEETS MODERATE - DOWN CODED: CPT | Performed by: STUDENT IN AN ORGANIZED HEALTH CARE EDUCATION/TRAINING PROGRAM

## 2021-03-04 RX ORDER — CEFTRIAXONE 1 G/1
1 INJECTION, POWDER, FOR SOLUTION INTRAMUSCULAR; INTRAVENOUS EVERY 24 HOURS
Status: DISCONTINUED | OUTPATIENT
Start: 2021-03-04 | End: 2021-03-04

## 2021-03-04 RX ORDER — CEFTRIAXONE 2 G/1
2 INJECTION, POWDER, FOR SOLUTION INTRAMUSCULAR; INTRAVENOUS EVERY 24 HOURS
Status: DISCONTINUED | OUTPATIENT
Start: 2021-03-05 | End: 2021-03-05 | Stop reason: HOSPADM

## 2021-03-04 RX ADMIN — CEFTRIAXONE 1 G: 1 INJECTION, POWDER, FOR SOLUTION INTRAMUSCULAR; INTRAVENOUS at 12:28

## 2021-03-04 RX ADMIN — ASPIRIN 81 MG: 81 TABLET, COATED ORAL at 09:22

## 2021-03-04 RX ADMIN — TICAGRELOR 90 MG: 90 TABLET ORAL at 19:41

## 2021-03-04 RX ADMIN — TICAGRELOR 90 MG: 90 TABLET ORAL at 09:22

## 2021-03-04 ASSESSMENT — ACTIVITIES OF DAILY LIVING (ADL)
ADLS_ACUITY_SCORE: 12
ADLS_ACUITY_SCORE: 13
ADLS_ACUITY_SCORE: 13
ADLS_ACUITY_SCORE: 12
ADLS_ACUITY_SCORE: 12
ADLS_ACUITY_SCORE: 13

## 2021-03-04 NOTE — PROGRESS NOTES
Antimicrobial Stewardship Team Note    Antimicrobial Stewardship Program - A joint venture between [facility] and  Physicians to optimize antibiotic management.  NOT a formal consult - Restricted Antimicrobial Review     Patient: Lydia Dietz  MRN: [id number]  Allergies: Rosuvastatin, Seasonal allergies, Amlodipine, Atorvastatin, Fenofibrate, Fluconazole, Losartan, Pravastatin, Simvastatin, and Niacin    Brief Summary: Lydia Dietz is an 85 year old female with PMH of aortic stenosis s/p TAVR (10/2020), CAD s/p PCI (9/2020), CKD3, and chronic normocytic anemia. She was sent to the ED in [facility] on 3/3 at the recommendation of her doctor during an office visit for further workup after 2 days of intermittent chest pain with associated dyspnea and rigors. She was then transferred to [facility] for ongoing management.    On transfer, the patient is hemodynamically stable without hypoxia with a slight temperature of 100.2F. Labs obtained prior to transfer revealed leukocytosis with WBC at 20.8, procalcitonin 41.30, D-dimer 2.01, CRP 9.44, troponin 0.05, and . A urinalysis revealed positive nitrites with 1+ LE and moderate bacteria but the patient is asymptomatic. A call received from [facility] ED was documented in a note on 3/4 stating blood cultures taken on 3/3 are growing E. coli. Per a phone call this writer placed to [facility] microbiology lab today, E. coli sensitivities are pending but Verigene testing was negative for ESBL- or KPC-producing E. coli.      A chest x-ray revealed mild left basilar linear atelectasis/scarring but negative for definite active process. An abdomen/pelvis CT revealed in evidence of intra-abdominal abscess, a patchy right renal cortical enhancement with increased perinephric fat stranding, concerning for acute pyelonephritis, bladder wall thickening and fat stranding at the anterior aspect of the bladder with may represent acute cystitis, a 10 mm left  adrenal nodule, and multiple bilateral simple renal cysts.     As of this morning, the patient is afebrile and hemodynamically stable on room air, with WBCs down from 20.8->16.3, no complaints of pain, and no shortness of breath overnight.         Active Anti-infective Medications   (From admission, onward)                 Start     Stop    03/04/21 1200  cefTRIAXone  1 g,   Intravenous,   EVERY 24 HOURS     Pyelonephritis        --                  Assessment: E.coli bacteremia likely 2/2 urinary source  With a positive blood culture growing E.coli and with evidence including leukocytosis and an elevated procalcitonin, antibiotic therapy is warranted. Considering the patient had a remarkable urinalysis with positive nitrite, leukocyte esterase, and moderate bacteria, despite being asymptomatic, it's a likely source for her bacteremia, especially with the abdomen/pelvis CT imaging remarkable for a possible acute pyelonephritis or cystitis. A good antibiotic to treat a probable non-ESBL-producing E. coli bacteremia of suspected urinary source is ceftriaxone because it has once daily dosing and provides adequate activity against E. coli. A duration of therapy of 10 days for an E. coli bacteremia in this patient is reasonable. Since the patient is clinically stable, consider transition to an appropriate oral antibiotic once susceptibilities become available.     Recommendations:  Agree with stopping cefepime  Agree with ceftriaxone. Recommend increasing dose to 2 gm every 24 hours for bacteremia  Continue antibiotics until 3/13 for a total of 10 days of treatment    Discussed with ID Staff  MD Bandar Contreras, PharmD    Rene Sepulveda, KonradD IV student  [phone]      Vital Signs/Clinical Features:  Vitals         03/02 0700  -  03/03 0659 03/03 0700  -  03/04 0659 03/04 0700  -  03/04 1559   Most Recent    Temp ( F)   97.4 -  100.2      97.3     97.3 (36.3)    Pulse   66 -  87      74     74    Resp    16 -  18      16     16    BP   91/33 -  131/47      122/44     122/44    SpO2 (%)   93 -  100      92     92            Labs  CrCl cannot be calculated (Unknown ideal weight.).  Recent Labs   Lab Test 10/07/20  0916 10/07/20  1834 10/08/20  0522 10/08/20  1604 20  0947 21  0629   CR 1.04 0.88 0.86 0.91 1.26* 0.94       Recent Labs   Lab Test 10/07/20  0916 10/07/20  1707 10/07/20  1834 10/08/20  0522 10/08/20  1604 20  0947 21  0629   WBC 7.0  --  7.9 7.2 9.0 5.7 16.3*   HGB 11.1* 8.3* 8.3* 8.7* 8.7* 10.6* 8.2*   HCT 36.1  --  26.4* 28.1* 28.4* 35.9 26.7*   MCV 88  --  87 89 89 88 82     --  137* 143* 143* 159 99*       Recent Labs   Lab Test 20  1230 10/07/20  1834   BILITOTAL 0.6 0.4   ALKPHOS 76 59   ALBUMIN 3.6 2.6*   AST 19 17   ALT 21 15       Recent Labs   Lab Test 10/07/20  1707   LACT 0.4*             Culture Results:  7-Day Micro Results       Procedure Component Value Units Date/Time    Blood culture [[id number]] Collected: 21    Order Status: Completed Lab Status: Preliminary result Updated: 21    Specimen: Blood      Specimen Description Blood Right Hand     Culture Micro No growth after 9 hours    Blood culture [[id number]] Collected: 21    Order Status: Completed Lab Status: Preliminary result Updated: 21    Specimen: Blood      Specimen Description Blood Left Arm     Culture Micro No growth after 9 hours                                    Imaging: Echo Complete    Result Date: 3/4/2021  [id number]^[email or web address]^[facility] [address] [place] [id number]  Name: ZAYNAB RIVAS MRN: [id number] : 1935 Study Date: 2021 10:40 AM Age: 85 yrs Gender: Female Patient Location: [facility] Reason For Study: Chest Pain Ordering Physician: MARY BANGURA Referring Physician: STEVEN ANTONIO Performed By: Shelly Hartman RDCS   BSA: 1.6 m2 Height: 65 in Weight: 121 lb HR: 72 BP: 131/47 mmHg _____________________________________________________________________________ __   Procedure Complete Portable Echo Adult. Technically difficult study. Poor acoustic windows. _____________________________________________________________________________ __   Interpretation Summary s/p 23 mm Paredes Arik 3 valve TAVR on 10/07/2020. Global and regional left ventricular function is normal with an EF of 60-65%. Grade II or moderate diastolic dysfunction. No regional wall motion abnormalities are seen. Global right ventricular function is normal. Aortic valve leaflets are not well visualized. Normal doppler assessment (MG 11mmHg, PV 2.1m/s); mild AI, likely paravalvular based on past studies. IVC diameter >2.1 cm collapsing <50% with sniff suggests a high RA pressure estimated at 15 mmHg or greater.  Compared with the study from 11/16/20, the estimated RA pressure has increased. _____________________________________________________________________________ __   Left Ventricle Global and regional left ventricular function is normal with an EF of 60-65%. Left ventricular size is normal. Grade II or moderate diastolic dysfunction. No regional wall motion abnormalities are seen.  Right Ventricle The right ventricle is normal size. Global right ventricular function is normal.  Atria Severe biatrial enlargement is present.  Mitral Valve Moderate mitral annular calcification is present. Trace mitral insufficiency is present. The mean gradient across the mitral valve is 5 mmHg.   Aortic Valve The mean gradient across the aortic valve is11 mmHg. The peak aortic velocity is 2.1 m/sec. Aortic valve leaflets are not well visualized. Normal doppler assessment (MG 11mmHg, PV 2.1m/s); mild AI, likely paravalvular based on past studies.  Tricuspid Valve The tricuspid valve is normal. The peak velocity of the tricuspid regurgitant jet is not obtainable. Pulmonary artery  systolic pressure cannot be assessed.  Pulmonic Valve The valve leaflets are not well visualized. On Doppler interrogation, there is no significant stenosis or regurgitation.  Vessels Normal diameter aortic root and proximal ascending aorta. IVC diameter >2.1 cm collapsing <50% with sniff suggests a high RA pressure estimated at 15 mmHg or greater.  Pericardium No pericardial effusion is present.  _____________________________________________________________________________ __ MMode/2D Measurements & Calculations asc Aorta Diam: 2.1 cm LVOT diam: 1.9 cm LVOT area: 2.8 cm2    Doppler Measurements & Calculations MV E max karson: 138.0 cm/sec MV A max karson: 148.0 cm/sec MV E/A: 0.93 MV max P.5 mmHg MV mean P.0 mmHg MV V2 VTI: 52.8 cm MVA(VTI): 2.2 cm2 MV dec slope: 400.0 cm/sec2 MV dec time: 0.34 sec Ao V2 max: 208.0 cm/sec Ao max P.0 mmHg Ao V2 mean: 156.0 cm/sec Ao mean P.0 mmHg Ao V2 VTI: 51.9 cm MARLENY(I,D): 2.3 cm2 MARLENY(V,D): 2.4 cm2 LV V1 max P.5 mmHg LV V1 max: 177.0 cm/sec LV V1 VTI: 41.5 cm SV(LVOT): 117.7 ml SI(LVOT): 73.6 ml/m2 PA acc time: 0.11 sec AV Karson Ratio (DI): 0.85 MARLENY Index (cm2/m2): 1.4 E/E' av.4 Lateral E/e': 31.7 Medial E/e': 27.0   _____________________________________________________________________________ __   Report approved by: Angelique Cardozo 2021 12:39 PM      Ct Abdomen Pelvis W Contrast    Result Date: 3/4/2021  EXAMINATION: CT ABDOMEN PELVIS W CONTRAST  3/3/2021 10:26 PM  CLINICAL HISTORY: Abdominal abscess/infection suspected COMPARISON: None PROCEDURE COMMENTS: CT of the abdomen and pelvis was performed with iopamidol (ISOVUE-370) solution 74 mL intravenous contrast. Coronal and sagittal reformatted images were obtained. FINDINGS: Lower thorax: Please refer to same day CT chest for findings within the lower thorax. Abdomen and pelvis: 10 mm cystic hypodensity near the stef hepatis, likely a simple hepatic cyst. Scattered subcentimeter cystic  hypodensities, too small to characterize. The liver is normal in appearance. Focal fatty sparing along the falciform ligament. Cholecystectomy. Mild fatty atrophy of the pancreas. 10 mm left adrenal nodule, 2 Hounsfield units on CT chest portion (consistent with an adenoma). The right adrenal gland is normal in appearance. Multiple bilateral simple renal cysts, largest measuring 3.4 cm in the superior pole the left kidney. Right peripelvic cyst. Patchy areas of hypoenhancing right renal cortex with associated perinephric fat stranding. No hydronephrosis or nephrolithiasis. Mild bilateral thickening with inflammatory fat stranding around the anterior aspect of the bladder. No pelvic masses. Hysterectomy. Multiple calcified pelvic phleboliths. Trace free fluid within the pelvis. No free intraperitoneal air. Sliding hiatal hernia. No abnormally dilated loops of large or small bowel. Large colonic stool burden. Extensive colonic diverticulosis without evidence of acute diverticulitis. Postsurgical changes of partial right colectomy with re-anastomosis. Aortoiliac atherosclerotic calcifications of the major intra-abdominal vasculature is widely patent. No abdominal or pelvic lymphadenopathy by size criteria. Mild anasarca. Bones: Multilevel degenerative changes of the spine, hips, and sacroiliac joints. Chronic compression deformity of T11. No suspicious or aggressive appearing bone lesions.     IMPRESSION: In summary, concern for right pyelonephritis and cystitis. 1. No evidence of intra-abdominal abscess. 2. Patchy right renal cortical enhancement with increased perinephric fat stranding, concerning for acute pyelonephritis. Additionally, there is bladder wall thickening and fat stranding at the anterior aspect of the bladder which may represent acute cystitis. Recommend correlation with urinalysis. 3. Extensive colonic diverticulosis without evidence of acute diverticulitis. 4. 10 mm left adrenal nodule. Consistent  with an adrenal adenoma based on CT chest from the same day. 5. Multiple bilateral simple renal cysts, largest measuring up to 3.4 cm in the left kidney. I have personally reviewed the examination and initial interpretation and I agree with the findings. CHICA NAIK MD    Cta Chest With Contrast    Result Date: 3/4/2021  Examination:  CTA CHEST WITH CONTRAST 3/3/2021 10:25 PM Comparison: CT abdomen and pelvis 3/3/2021 and CT chest 11/16/2020 History: Chest pain, nonspecific; PE suspected, low/intermediate prob, positive D-dimer; Pt with chest pain and dyspnea with elevated d-dimer TECHNIQUE: Volumetric helical acquisition of CT images of the chest from the lung apices to the kidneys were acquired in arterial phase after the administration of IV contrast. Three-dimensional (3D) post-processed angiographic images were reconstructed, archived to PACS and used in interpretation of this study. Contrast dose: iopamidol (ISOVUE-370) solution 74 mL Total DLP: 731 mGy*cm FINDINGS: Chest: There is adequate opacification of the main and lobar pulmonary arteries. No filling defect in the lobar and main segmental pulmonary arteries to suggest pulmonary embolism.  There is no right-sided heart strain. The heart size is normal. Trace pericardial effusion. Prosthetic aortic valve. Coronary stents. Severe coronary artery calcifications. Normal caliber main pulmonary artery and thoracic aorta. Conventional 3 vessel branching aortic arch with moderate atherosclerotic calcifications involving the great vessels and descending thoracic aorta. No axillary or mediastinal lymphadenopathy. Sliding hiatal hernia. Thickening of the esophagus as well as possible diverticular outpouchings of the level of the eve. Scattered bilateral breast calcifications. The central tracheobronchial tree is patent. No pneumothorax. No pleural effusion. Bibasilar dependent atelectasis. Slight increase in 5 mm solid pulmonary nodule in the right lower  lobe, previously measured 4 mm (series 6 image 161). Additional scattered sub-6 mm solid pulmonary nodules are unchanged. Scarring along the lingula. No focal airspace consolidation. Upper abdomen: Please see same day CT abdomen and pelvis for findings in the upper abdomen. Bones: The bones are diffusely osteopenic. Chronic appearing compression deformity of T11. Multilevel degenerative changes of the spine. No suspicious or aggressive appearing bone lesions.     IMPRESSION: 1. No evidence of pulmonary embolism. 2. Slight increase in 5 mm solid pulmonary nodule in the right lower lobe with unchanged scattered sub-6 mm solid pulmonary nodules. If patient is high risk for lung cancer, consider follow-up CT in one year to ensure stability. 3. Postsurgical changes of aortic valve replacement with severe coronary artery calcifications. 4. Thickening of the esophageal wall. Differential favors esophagitis although a dysplastic process cannot be ruled out. Consider upper endoscopy. [Recommend Follow Up: Thickening of the distal esophagus, consider upper endoscopy] This report will be copied to the [facility] to ensure a provider acknowledges the finding. I have personally reviewed the examination and initial interpretation and I agree with the findings. CHICA NAIK MD

## 2021-03-04 NOTE — PLAN OF CARE
BP 95/59 (BP Location: Left arm)   Pulse 66   Temp 97.4  F (36.3  C) (Oral)   Resp 16   SpO2 100%     Time:  3159-4665    Reason for admission:  SOB, Chest Pain, Rigors  Neuro:  A&Ox4, forgetful.  Clear and logical speech, no aphasia.  PERRLA.  Behavior:  Calm and cooperative.  Activity:  SBA w/ walker.  Vitals:  VSS on RA, except soft BPs.   Lines:  R PIV w/ IV abx.  R PIV SL.  Cardiac:  Tele.  Elevated D-dimer, concern for PE, EKG unchanged, Trops negative.  TTE in the am.  Respiratory:  Clear LS. Sats in 90s on RA.  No SOB.  GI/:  Voiding w/ BSC.  Clear.  No BM.   Skin:  Pale, tyron LE, bruising to R forearm and R shin, d/t anticoagulants per patient.  Endo:  NA  Pain:  No c/o pain.  Diet:  NPO since midnight.  Labs/Imaging:  Trop 0.015, , D-dimer 2.2, CRP 9.0  Consults:  NA    New changes this shift:  Admitted w/ SOB, chest pain, and rigors.  Chest pain improved, EKG w/ no changes.  Trops negative.  Elevated BNP.  No SOB this shift.  Flu, COVID, RSV negative.  CTA done to r/o PE.  Repeat BCx done.  Patient had + BCx from OSH w/ gram negative rods and E.coli, MD aware.  IV Cefepime.  IV Vanco dc'd this shift.  Voiding.  No BM.  No c/o pain.    Plan:  TTE this am.    Continue to monitor and follow POC.

## 2021-03-04 NOTE — PROGRESS NOTES
Resident/Fellow Attestation   I, Filemon Ortega, was present with the medical/HILARIO student who participated in the service and in the documentation of the note.  I have verified the history and personally performed the physical exam and medical decision making.  I agree with the assessment and plan of care as documented in the note.      Filemon Ortega MD  Internal Medicine-Pediatrics PGY-2   [facility]    Progress Note - [facility] [employer] Service        Date of Admission:  3/3/2021    Assessment & Plan       Lydia Dietz is a 85 year old female admitted on 3/3/2021. She has a history of aortic stenosis s/p TAVR (10/2020), CAD s/p PCI (to LAD and RCA 9/2020), CKD3, and chronic normocytic anemia and is admitted for chest pain and rigors    # E. coli bacteremia due to acute pyelonephritis  Patient presented to OSH with rigors and her labs showed signs of infection. Her labs on 3/3/21 at the OSH showed a procalcitonin of 41.30, lactate dehydrogenase of 274, and her UA was positive for nitrates, and 1+ for blood and leukocyte esterase. On admission, she had a CT abdomen which showed concerns for pyelonephritis. The OSH has now notified us that her blood cultures grew E. Coli. The patient was started on IV cefepime yesterday, but now it is reasonable to narrow coverage to cover E. Coli.   - Switch from IV cefepime to IV ceftriaxone   - Called OSH for E. Coli sensitivities, which are still pending   - Can further narrow antibiotics based on sensitivities      # CKD3  Cr at baseline ~1.1-1.2. Pt with UA at OSH with 20-50 WBC and positive for nitrites but asymptomatic. Now Cr is 0.94 and patient is receiving IV ceftriaxone for E. Coli bacteremia presumed to be due to acute pyelonephritis  - Continue to monitor    # Hx of aortic stenosis s/p TAVR (10/2020)  # Hx of CAD s/p PCI x2 (9/2020)  TAVR in 10/2020 c/b small paravalvular leak.  - Continue PTA  ASA, brilinta  - Holding PTA metoprolol succinate and triamterene-hydrochlorothiazide given c/f sepsis, consider restarting in AM     # Type 2 NSTEMI in the setting of sepsis, resolved    Pt with 2 days of intermittent chest pain with associated dyspnea and rigors. Chest pain pleuritic in nature with reproduction with palpation and deep breaths seems more pleuritic in nature than cardiac. Hemodynamically stable, not hypoxic. Labs showed elevated WBC w/ L shift, procal. Troponin minimally elevated without EKG changes, chest pain resolved briefly with nitroglycerin. Also c/f PE given elevated d-dimer. DDx includes infection (pneumonia vs abdominal vs urine - see below), PE, vs other. Low concern for ACS given repeat troponin negative here despite ongoing chest pain. Chest pain and slightly elevated troponin on admission was likely due to supply/demand mismatch leading to Type 2 NSEMI in the setting of E.coli bacteremia.  - No current chest pain.      # Chronic normocytic anemia  No signs of bleeding. Hgb 8.2 (baseline ~10).  -  Continue to monitor     Diet: Regular Diet Adult    Fluids: None  DVT Prophylaxis: Pneumatic Compression Devices  Barrientos Catheter: not present  Code Status: Full Code         Disposition Plan   Expected discharge: Tomorrow, recommended to prior living arrangement once antibiotic plan established and SIRS/Sepsis treated.  Entered: Pretty Mena 03/04/2021, 11:25 AM     The patient's care was discussed with the Attending Physician, Dr. Prajapati.    Pretty Mena  Medical Student  [address]  Please see sign in/sign out for up to date coverage information  ______________________________________________________________________    Interval History   ANGE. Patient improved greatly overnight on IV cefepime. No chest pain or shortness of breath. Chronic back pain. No dysuria, hematuria noted.     Data reviewed today: I reviewed all  medications, new labs and imaging results over the last 24 hours. I personally reviewed the EKG tracing showing no signs of STEMI and the abdominal CT image(s) showing findings read as concerning for cystitis and pyelonephritis.    Physical Exam   Vital Signs: Temp: 97.4  F (36.3  C) Temp src: Oral BP: 95/59 Pulse: 66   Resp: 16 SpO2: 100 % O2 Device: None (Room air)    Weight: 0 lbs 0 oz  Constitutional: awake, alert, cooperative, no apparent distress, and appears stated age  Eyes: Lids and lashes normal, pupils equal, round and reactive to light, extra ocular muscles intact, sclera clear, conjunctiva normal  ENT: Normocephalic, without obvious abnormality, atraumatic, sinuses nontender on palpation, external ears without lesions  Respiratory: No increased work of breathing, good air exchange, clear to auscultation bilaterally, no crackles or wheezing  Cardiovascular:  Regularly Irregular, and 2/6 systolic murmur noted  GI: No scars, normal bowel sounds, soft, non-distended, non-tender, no masses palpated, no hepatosplenomegally  Skin: ecchymosis on right lower leg  Musculoskeletal: There is no redness, warmth, or swelling of the joints.  Full range of motion noted.  Motor strength is 5 out of 5 all extremities bilaterally.  Tone is normal.  Neurologic: Awake, alert, oriented to name, place and time.  Cranial nerves II-XII are grossly intact.  Motor is 5 out of 5 bilaterally.  Cerebellar finger to nose, heel to shin intact.  Sensory is intact.  Babinski down going, Romberg negative, and gait is normal.  Neuropsychiatric: General: normal, calm and normal eye contact    Data   Recent Labs   Lab 03/04/21  0629 03/03/21  1947   WBC 16.3*  --    HGB 8.2*  --    MCV 82  --    PLT 99*  --      --    POTASSIUM 3.9  --    CHLORIDE 109  --    CO2 25  --    BUN 25  --    CR 0.94  --    ANIONGAP 6  --    DREW 8.5  --    *  --    TROPI  --  <0.015     Recent Results (from the past 24 hour(s))   CTA Chest with  Contrast   Result Value    Radiologist flags Thickening of the distal esophagus, consider    Narrative    Examination:  CTA CHEST WITH CONTRAST 3/3/2021 10:25 PM     Comparison: CT abdomen and pelvis 3/3/2021 and CT chest 11/16/2020    History: Chest pain, nonspecific; PE suspected, low/intermediate prob,  positive D-dimer; Pt with chest pain and dyspnea with elevated d-dimer    TECHNIQUE: Volumetric helical acquisition of CT images of the chest  from the lung apices to the kidneys were acquired in arterial phase  after the administration of IV contrast. Three-dimensional (3D)  post-processed angiographic images were reconstructed, archived to  PACS and used in interpretation of this study.   Contrast dose: iopamidol (ISOVUE-370) solution 74 mL    Total DLP: 731 mGy*cm    FINDINGS:    Chest:  There is adequate opacification of the main and lobar pulmonary  arteries. No filling defect in the lobar and main segmental pulmonary  arteries to suggest pulmonary embolism.  There is no right-sided heart  strain. The heart size is normal. Trace pericardial effusion.  Prosthetic aortic valve. Coronary stents. Severe coronary artery  calcifications. Normal caliber main pulmonary artery and thoracic  aorta. Conventional 3 vessel branching aortic arch with moderate  atherosclerotic calcifications involving the great vessels and  descending thoracic aorta. No axillary or mediastinal lymphadenopathy.  Sliding hiatal hernia. Thickening of the esophagus as well as possible  diverticular outpouchings of the level of the eve. Scattered  bilateral breast calcifications. The central tracheobronchial tree is  patent. No pneumothorax. No pleural effusion. Bibasilar dependent  atelectasis. Slight increase in 5 mm solid pulmonary nodule in the  right lower lobe, previously measured 4 mm (series 6 image 161).  Additional scattered sub-6 mm solid pulmonary nodules are unchanged.  Scarring along the lingula. No focal airspace  consolidation.    Upper abdomen:  Please see same day CT abdomen and pelvis for findings in the upper  abdomen.    Bones:  The bones are diffusely osteopenic. Chronic appearing compression  deformity of T11. Multilevel degenerative changes of the spine. No  suspicious or aggressive appearing bone lesions.      Impression    IMPRESSION:   1. No evidence of pulmonary embolism.  2. Slight increase in 5 mm solid pulmonary nodule in the right lower  lobe with unchanged scattered sub-6 mm solid pulmonary nodules. If  patient is high risk for lung cancer, consider follow-up CT in one  year to ensure stability.  3. Postsurgical changes of aortic valve replacement with severe  coronary artery calcifications.  4. Thickening of the esophageal wall. Differential favors esophagitis  although a dysplastic process cannot be ruled out. Consider upper  endoscopy.      [Recommend Follow Up: Thickening of the distal esophagus, consider  upper endoscopy]    This report will be copied to the [facility] to ensure a  provider acknowledges the finding.       I have personally reviewed the examination and initial interpretation  and I agree with the findings.    CHICA NAIK MD   CT Abdomen Pelvis w Contrast    Narrative    EXAMINATION: CT ABDOMEN PELVIS W CONTRAST  3/3/2021 10:26 PM      CLINICAL HISTORY: Abdominal abscess/infection suspected    COMPARISON: None    PROCEDURE COMMENTS: CT of the abdomen and pelvis was performed with  iopamidol (ISOVUE-370) solution 74 mL intravenous contrast. Coronal  and sagittal reformatted images were obtained.    FINDINGS:  Lower thorax:   Please refer to same day CT chest for findings within the lower  thorax.    Abdomen and pelvis:  10 mm cystic hypodensity near the stef hepatis, likely a simple  hepatic cyst. Scattered subcentimeter cystic hypodensities, too small  to characterize. The liver is normal in appearance. Focal fatty  sparing along the falciform ligament.  Cholecystectomy. Mild fatty  atrophy of the pancreas. 10 mm left adrenal nodule, 2 Hounsfield units  on CT chest portion (consistent with an adenoma). The right adrenal  gland is normal in appearance. Multiple bilateral simple renal cysts,  largest measuring 3.4 cm in the superior pole the left kidney. Right  peripelvic cyst. Patchy areas of hypoenhancing right renal cortex with  associated perinephric fat stranding. No hydronephrosis or  nephrolithiasis. Mild bilateral thickening with inflammatory fat  stranding around the anterior aspect of the bladder. No pelvic masses.  Hysterectomy. Multiple calcified pelvic phleboliths. Trace free fluid  within the pelvis. No free intraperitoneal air. Sliding hiatal hernia.  No abnormally dilated loops of large or small bowel. Large colonic  stool burden. Extensive colonic diverticulosis without evidence of  acute diverticulitis. Postsurgical changes of partial right colectomy  with re-anastomosis. Aortoiliac atherosclerotic calcifications of the  major intra-abdominal vasculature is widely patent. No abdominal or  pelvic lymphadenopathy by size criteria. Mild anasarca.    Bones:  Multilevel degenerative changes of the spine, hips, and sacroiliac  joints. Chronic compression deformity of T11. No suspicious or  aggressive appearing bone lesions.      Impression    IMPRESSION: In summary, concern for right pyelonephritis and cystitis.  1. No evidence of intra-abdominal abscess.  2. Patchy right renal cortical enhancement with increased perinephric  fat stranding, concerning for acute pyelonephritis. Additionally,  there is bladder wall thickening and fat stranding at the anterior  aspect of the bladder which may represent acute cystitis. Recommend  correlation with urinalysis.  3. Extensive colonic diverticulosis without evidence of acute  diverticulitis.  4. 10 mm left adrenal nodule. Consistent with an adrenal adenoma based  on CT chest from the same day.   5. Multiple  bilateral simple renal cysts, largest measuring up to 3.4  cm in the left kidney.    I have personally reviewed the examination and initial interpretation  and I agree with the findings.    CHICA NAIK MD

## 2021-03-04 NOTE — PROVIDER NOTIFICATION
OS @ [facility] ED called with positive blood cultures for patient, growing gram negative rods.  Allyson @ [facility] was unable to tell this writer what site the positive culture was taken from.  Provider notified.  Will continue to monitor and follow POC.    Addendum @ 6026, per Allyson, culture is growing E.Coli.

## 2021-03-04 NOTE — H&P
[facility]    History and Physical - [email or web address] Service        Date of Admission:  3/3/2021    Assessment & Plan   Lydia Dietz is a 85 year old female admitted on 3/3/2021. She has a history of aortic stenosis s/p TAVR (10/2020), CAD s/p PCI (to LAD and RCA 9/2020), CKD3, and chronic normocytic anemia and is admitted for chest pain and rigors    # Chest pain  # Dyspnea  # Elevated troponin  Pt with 2 days of intermittent chest pain with associated dyspnea and rigors. Chest pain pleuritic in nature with reproduction with palpation and deep breaths seems more pleuritic in nature than cardiac. Hemodynamically stable, not hypoxic. Labs show elevated WBC w/ L shift, procal. Troponin minimally elevated without EKG changes, chest pain resolved briefly with nitroglycerin. Also c/f PE given elevated d-dimer. DDx includes infection (pneumonia vs abdominal vs urine - see below), PE, vs other. Low concern for ACS given repeat troponin negative here despite ongoing chest pain.   - Continue vanc, start cefepime  - CTA C/A/P to evaluate for PE and/or source of infection  - Repeat blood cx x2  - F/u OSH blood and urine cx  - Repeat troponin negative    - Repeat EKG - no ST/T changes    # Hx of aortic stenosis s/p TAVR (10/2020)  # Hx of CAD s/p PCI x2 (9/2020)  TAVR in 10/2020 c/b small paravalvular leak.  - Continue PTA ASA, brilinta  - TTE in AM  - Holding PTA metoprolol succinate and triamterene-hydrochlorothiazide given c/f sepsis, consider restarting in AM    # CKD3  # Abnormal UA  Cr at baseline ~1.1-1.2. Pt with UA at OSH with 20-50 WBC and positive for nitrites but asymptomatic.   - Monitor  - F/u urine cx    # Chronic normocytic anemia  No signs of bleeding. Hgb 9.1 (baseline ~10).  - Monitor     Diet:  Cardiac diet, NPO at midnight  Fluids: None  DVT Prophylaxis: Pneumatic Compression Devices  Barrientos Catheter: not present  Code Status:  Full Code       Disposition  Plan   Expected discharge: 2 - 3 days, recommended to prior living arrangement once infection treatment plan established, improvement in symptoms.  Entered: Mahnaz Talley MD 03/03/2021, 7:30 PM     The patient's care was discussed with the Attending Physician, Dr. Gay.    Mahnaz Talley MD  [facility]  Contact information available via [facility] Paging/Directory  Please see sign in/sign out for up to date coverage information  ______________________________________________________________________    Chief Complaint   Chest pain, rigors    History is obtained from the patient    History of Present Illness   Lydia Dietz is a 85 year old female admitted on 3/3/2021. She has a history of aortic stenosis s/p TAVR (10/2020), CAD s/p PCI (to LAD and RCA 9/2020), CKD3, chronic normocytic anemia and is admitted for chest pain and rigors.    Pt was feeling well until 2 nights ago - had sudden onset chest pain while lying in bed at 2AM, very sharp substernal pain with some radiation to R shoulder and associated with SOB. No diaphoresis, weakness, numbness/tingling. Intermittent nausea but no vomiting. Pain continued for 8+ hours prior to improving. Pt thinks drinking room temperature water improved pain (didn't try meds), nothing made pain worse, pain not worse with movement or exertion. Pain worse with deep breaths.     Pain resolved until following night, this time associated with rigors as well. Pain continued for several hours without change prior to presenting to outside ED. No measured fevers and pt hasn't had cough, congestion, rhinorrhea, abdominal pain, diarrhea/constipation, hematochezia/melena, dysuria or other urinary sxs. Has baseline low back pain, no change recently - no headaches or neck pain. Pt had been feeling well prior to 2 nights ago, was active in cardiac rehab (s/p PCI and TAVR in 10/2020) without issues.    At OSH, pt afebrile  and hemodynamically stable and satting well on room air. Labs show elevated WBC with left shift, hgb near baseline ~9, Cr at baseline, procal 41.3, normal LFTs. Troponin minimally elevated at 0.05 (upper limit of normal 0.04) and EKG without changes. CXR without acute process. Pt was given nitroglycerin with resolution of chest pain but pain returned shortly thereafter. D-dimer was elevated but no PE study done. Transferred for further care. On arrival pt having 6/10 chest pain.     Review of Systems    The 10 point Review of Systems is negative other than noted in the HPI or here.     Past Medical History    I have reviewed this patient's medical history and updated it with pertinent information if needed.   Past Medical History:   Diagnosis Date     Allergies      Anemia      Coronary artery disease      HLD (hyperlipidemia)      HTN (hypertension)      Impaired fasting glucose      Osteoarthritis      Severe aortic stenosis         Past Surgical History   I have reviewed this patient's surgical history and updated it with pertinent information if needed.  Past Surgical History:   Procedure Laterality Date     AS LAP INCISIONAL HERNIA REPAIR       BREAST BIOPSY, CORE RT/LT      X3     CHOLECYSTECTOMY       COLECTOMY PARTIAL  2019     CV LEFT HEART CATH N/A 9/9/2020    Procedure: Left Heart Cath;  Surgeon: Leonard Pastor MD;  Location:  HEART CARDIAC CATH LAB     CV PCI ANGIOPLASTY N/A 9/9/2020    Procedure: CV PCI ANGIOPLASTY;  Surgeon: Leonard Pastor MD;  Location: [facility] CARDIAC CATH LAB     CV TRANSCATHETER AORTIC VALVE REPLACEMENT N/A 10/7/2020    Procedure: Transfemoral, subclavian (DINH) or transapical transcatheter aortic valve replacement 23mm dinh valve placed. cardiovascular standby.;  Surgeon: Leonard Pastor MD;  Location:  OR      LAPAROSCOPY, OVIDUCT/OVARY; REMOVAL       HEART CATH FEMORAL CANNULIZATION WITH OPEN STANDBY REPAIR AORTIC VALVE N/A 10/7/2020    Procedure:  Cardiac standby. Perfusion standby.;  Surgeon: Richi Olmos MD;  Location: [facility] OR     HYSTERECTOMY TOTAL ABDOMINAL          Social History   I have reviewed this patient's social history and updated it with pertinent information if needed. Lydia Dietz  reports that she quit smoking about 41 years ago. She has never used smokeless tobacco. She reports current alcohol use. She reports that she does not use drugs.    Family History   No contributory family history    Prior to Admission Medications   Prior to Admission Medications   Prescriptions Last Dose Informant Patient Reported? Taking?   aspirin (ASA) 81 MG EC tablet  Self Yes No   Sig: Take 81 mg by mouth every morning    fish oil-omega-3 fatty acids 1000 MG capsule   Yes No   Sig: Take 2 g by mouth daily   fluticasone (FLONASE) 50 MCG/ACT nasal spray  Self Yes No   Sig: Spray 2 sprays in nostril every morning    metoprolol succinate ER (TOPROL-XL) 25 MG 24 hr tablet  Self Yes No   Sig: Take 25 mg by mouth every morning    ticagrelor (BRILINTA) 90 MG tablet   No No   Sig: Take 1 tablet (90 mg) by mouth 2 times daily Start this evening.   triamterene-HCTZ (MAXZIDE-25) 37.5-25 MG tablet   Yes No      Facility-Administered Medications: None     Allergies   Allergies   Allergen Reactions     Rosuvastatin Muscle Pain (Myalgia)     Seasonal Allergies      Amlodipine      Other reaction(s): Edema,generalized     Atorvastatin      PN: Reaction: BLURRED VISION     Fenofibrate Muscle Pain (Myalgia)     Fluconazole Nausea     Losartan      PN: Reaction: Back pain and cramping     Pravastatin      PN:  Reaction: JOINT AND MUSCLE PAIN     Simvastatin Nausea     Niacin Rash       Physical Exam   Vital Signs: Temp: 100.2  F (37.9  C)   BP: 131/47 Pulse: 87   Resp: 18        Weight: 0 lbs 0 oz    Constitutional: Well appearing, NAD, appears stated age  HEENT: NC/AT, anicteric sclera, pupils round and equal, EOMI, MMM, no cervical lymphadenopathy, no nuchal rigidity  CV:  RRR, normal S1/S2, 3/6 systolic murmur best heard in LLSB, intact distal pulses. Chest pain very reproducible with palpation of chest well, no chest rash  Pulm: non-labored respirations on room air, LCTAB, no wheezes or crackles  Abdomen: normoactive bowel sounds, soft, non-distended, mildly tender in LUQ and RUQ, no rebound or guarding  Extremities: no peripheral edema, cyanosis, or clubbing  Back: No CVA tenderness, no midline spinal tenderness, mild paraspinal tenderness in low back  Skin: no jaundice, no rashes on exposed skin  Neuro: alert and oriented x3, CN II-XII grossly intact, [place], 5/5 strength in bilateral UE and LE, sensation intact  Psych: normal affect    Data   Data reviewed today: I personally reviewed all medications, new labs and imaging results over the last 24 hours.     No lab results found in last 7 days. See Care Everywhere, I personally reviewed    No results found for this or any previous visit (from the past 24 hour(s)).     EKG (3/3/21 @ 1936: per my read: NSR, no ST/T changes, unchanged from previous    EKG (3/3/21) read only from OSH:  Normal sinus rhythm  Normal ECG    CXR (3/3/21) read only from OSH:  IMPRESSION:  Mild left basilar linear atelectasis/scarring.  Negative for definite active  process    TTE (11/16/2020):  Interpretation Summary  s/p 23 mm Paredes Arik 3 valve TAVR on 10/07/2020. The valve is well-seated  there is mild-moderate paravalvular regurgitation and the jet is quite  eccentric. The jet appears to be most prominent in the 4-6 o'clock position.  While the patient's BP is higher during this examination, the Vmax is 2.1 m/s  and the mean gradient is 11 mmHg (similar to the last study).     Global and regional left ventricular function is normal with an EF of 55-60%.  Grade II or moderate diastolic dysfunction.  Global right ventricular function is normal. The right ventricle is normal  size.  This study was compared with the study from 10/08/2020. The  paravalvular  aortic regurgitation appears to be more prominent on today's study on direct  visual comparison. The Vmax and mean gradient are not significantly different.

## 2021-03-04 NOTE — PLAN OF CARE
Pt admitted from [facility] ER with chest pain/ positive UA/ chills and shaking, vitals stable on room air with low grade temp 100.2, up with A 1 to bathroom voided, is continent, admission completed with patient and  and awaiting MD to bedside

## 2021-03-04 NOTE — PHARMACY-VANCOMYCIN DOSING SERVICE
Pharmacy Vancomycin Initial Note  Date of Service March 3, 2021  Patient's  1935  85 year old, female, ABW 57.6 kg (per OSH ED)    Indication: Sepsis    Current estimated CrCl = CrCl cannot be calculated (Patient's most recent lab result is older than the maximum 10 days allowed.).    Creatinine for last 3 days  No results found for requested labs within last 72 hours.    Recent Vancomycin Level(s) for last 3 days  No results found for requested labs within last 72 hours.      Vancomycin IV Administrations (past 72 hours)      No vancomycin orders with administrations in past 72 hours.                Nephrotoxins and other renal medications (From now, onward)    None          Contrast Orders - past 72 hours (72h ago, onward)    None                Plan:  1.  Pt received vancomycin 750 mg IV x1 (13.2 mg/kg) today 3/3 @1500 prior to transfer to [facility]. Start vancomycin 1000 mg IV q24h. Initiate this regimen ~12 hrs after initial dose at OSH ED to account for pt not receiving a true loading dose.  2.  Goal Trough Level: 15-20 mg/L   3.  Pharmacy will check trough levels as appropriate in 1-3 Days.    4.  Serum creatinine levels will be ordered daily for the first week of therapy and at least twice weekly for subsequent weeks.    5.  [place] method utilized to dose vancomycin therapy: Method 2    Bill Taveras, KonradD, BCPS

## 2021-03-05 ENCOUNTER — PATIENT OUTREACH (OUTPATIENT)
Dept: CARE COORDINATION | Facility: CLINIC | Age: 86
End: 2021-03-05

## 2021-03-05 VITALS
HEART RATE: 87 BPM | OXYGEN SATURATION: 94 % | TEMPERATURE: 98.9 F | DIASTOLIC BLOOD PRESSURE: 58 MMHG | RESPIRATION RATE: 16 BRPM | SYSTOLIC BLOOD PRESSURE: 132 MMHG

## 2021-03-05 LAB
ANION GAP SERPL CALCULATED.3IONS-SCNC: 7 MMOL/L (ref 3–14)
BASOPHILS # BLD AUTO: 0 10E9/L (ref 0–0.2)
BASOPHILS NFR BLD AUTO: 0.4 %
BUN SERPL-MCNC: 23 MG/DL (ref 7–30)
CALCIUM SERPL-MCNC: 9 MG/DL (ref 8.5–10.1)
CHLORIDE SERPL-SCNC: 108 MMOL/L (ref 94–109)
CO2 SERPL-SCNC: 25 MMOL/L (ref 20–32)
CREAT SERPL-MCNC: 0.89 MG/DL (ref 0.52–1.04)
DIFFERENTIAL METHOD BLD: ABNORMAL
EOSINOPHIL # BLD AUTO: 0.1 10E9/L (ref 0–0.7)
EOSINOPHIL NFR BLD AUTO: 0.8 %
ERYTHROCYTE [DISTWIDTH] IN BLOOD BY AUTOMATED COUNT: 15.8 % (ref 10–15)
GFR SERPL CREATININE-BSD FRML MDRD: 60 ML/MIN/{1.73_M2}
GLUCOSE SERPL-MCNC: 94 MG/DL (ref 70–99)
HCT VFR BLD AUTO: 32.1 % (ref 35–47)
HGB BLD-MCNC: 9.6 G/DL (ref 11.7–15.7)
IMM GRANULOCYTES # BLD: 0.2 10E9/L (ref 0–0.4)
IMM GRANULOCYTES NFR BLD: 1.6 %
INTERPRETATION ECG - MUSE: NORMAL
INTERPRETATION ECG - MUSE: NORMAL
LACTATE BLD-SCNC: 0.8 MMOL/L (ref 0.7–2)
LYMPHOCYTES # BLD AUTO: 0.6 10E9/L (ref 0.8–5.3)
LYMPHOCYTES NFR BLD AUTO: 6.1 %
MCH RBC QN AUTO: 24.8 PG (ref 26.5–33)
MCHC RBC AUTO-ENTMCNC: 29.9 G/DL (ref 31.5–36.5)
MCV RBC AUTO: 83 FL (ref 78–100)
MONOCYTES # BLD AUTO: 1.2 10E9/L (ref 0–1.3)
MONOCYTES NFR BLD AUTO: 11.4 %
NEUTROPHILS # BLD AUTO: 8 10E9/L (ref 1.6–8.3)
NEUTROPHILS NFR BLD AUTO: 79.7 %
NRBC # BLD AUTO: 0 10*3/UL
NRBC BLD AUTO-RTO: 0 /100
PLATELET # BLD AUTO: 124 10E9/L (ref 150–450)
POTASSIUM SERPL-SCNC: 3.3 MMOL/L (ref 3.4–5.3)
RBC # BLD AUTO: 3.87 10E12/L (ref 3.8–5.2)
SODIUM SERPL-SCNC: 140 MMOL/L (ref 133–144)
WBC # BLD AUTO: 10.1 10E9/L (ref 4–11)

## 2021-03-05 PROCEDURE — 85025 COMPLETE CBC W/AUTO DIFF WBC: CPT | Performed by: STUDENT IN AN ORGANIZED HEALTH CARE EDUCATION/TRAINING PROGRAM

## 2021-03-05 PROCEDURE — 80048 BASIC METABOLIC PNL TOTAL CA: CPT | Performed by: STUDENT IN AN ORGANIZED HEALTH CARE EDUCATION/TRAINING PROGRAM

## 2021-03-05 PROCEDURE — 999N000127 HC STATISTIC PERIPHERAL IV START W US GUIDANCE

## 2021-03-05 PROCEDURE — 93010 ELECTROCARDIOGRAM REPORT: CPT | Performed by: INTERNAL MEDICINE

## 2021-03-05 PROCEDURE — 250N000013 HC RX MED GY IP 250 OP 250 PS 637: Performed by: STUDENT IN AN ORGANIZED HEALTH CARE EDUCATION/TRAINING PROGRAM

## 2021-03-05 PROCEDURE — 93005 ELECTROCARDIOGRAM TRACING: CPT

## 2021-03-05 PROCEDURE — 36415 COLL VENOUS BLD VENIPUNCTURE: CPT | Performed by: STUDENT IN AN ORGANIZED HEALTH CARE EDUCATION/TRAINING PROGRAM

## 2021-03-05 PROCEDURE — 83605 ASSAY OF LACTIC ACID: CPT | Performed by: STUDENT IN AN ORGANIZED HEALTH CARE EDUCATION/TRAINING PROGRAM

## 2021-03-05 PROCEDURE — 99239 HOSP IP/OBS DSCHRG MGMT >30: CPT | Mod: GC | Performed by: STUDENT IN AN ORGANIZED HEALTH CARE EDUCATION/TRAINING PROGRAM

## 2021-03-05 RX ORDER — HYDROXYZINE HYDROCHLORIDE 10 MG/1
5 TABLET, FILM COATED ORAL 3 TIMES DAILY PRN
Status: DISCONTINUED | OUTPATIENT
Start: 2021-03-05 | End: 2021-03-05 | Stop reason: HOSPADM

## 2021-03-05 RX ORDER — POTASSIUM CHLORIDE 1500 MG/1
60 TABLET, EXTENDED RELEASE ORAL ONCE
Status: COMPLETED | OUTPATIENT
Start: 2021-03-05 | End: 2021-03-05

## 2021-03-05 RX ORDER — CEFDINIR 300 MG/1
300 CAPSULE ORAL 2 TIMES DAILY
Qty: 16 CAPSULE | Refills: 0 | Status: SHIPPED | OUTPATIENT
Start: 2021-03-05 | End: 2021-03-13

## 2021-03-05 RX ADMIN — POTASSIUM CHLORIDE 60 MEQ: 1500 TABLET, EXTENDED RELEASE ORAL at 06:35

## 2021-03-05 RX ADMIN — Medication 5 MG: at 10:29

## 2021-03-05 RX ADMIN — ASPIRIN 81 MG: 81 TABLET, COATED ORAL at 10:29

## 2021-03-05 RX ADMIN — TICAGRELOR 90 MG: 90 TABLET ORAL at 10:29

## 2021-03-05 ASSESSMENT — ACTIVITIES OF DAILY LIVING (ADL)
ADLS_ACUITY_SCORE: 12

## 2021-03-05 NOTE — PLAN OF CARE
Time 6387-4021    Reason for admission: SOB, chest pain, Rigors   Vitals: /58 (BP Location: Left arm)   Pulse 87   Temp 98.9  F (37.2  C) (Oral)   Resp 16   SpO2 94%    Activity: SBA  Pain: Denies at this time   Neuro: A&Ox4. Forgetful. Anxious this morning, PRN Atarax x1     Cardiac: EKG overnight showing PACs and PVCs, team aware. Tele d/c'd, pt discharging   Respiratory: LS clear, maintaining sats on RA. Denies SOB or chest pain   GI/: LBM 3/4.  WNL.   Diet: Reg- ate breakfast and lunch before discharge   Lines: R PIV removed.   Skin/Wounds: Intact. Generalized Bruising   Endocrine: NA  Labs/imaging:       EKG showing PVCs and PACs  Potassium 3.3-  Replaced with 60 mEq this AM   New changes this shift:    Pt discharging home. BC neg thus far, pt discharging home with PO Abx. Pt and Pt spouse educated on how to take Abx. Pt and Spouse aware of follow-up visits. R PIV removed. Tele d/c'd. Picking up meds at discharge pharmacy. Pt transferred to car via  by nursing staff.

## 2021-03-05 NOTE — DISCHARGE SUMMARY
[facility]  Discharge Summary - Medicine & Pediatrics       Date of Admission:  3/3/2021  Date of Discharge:  3/5/2021  Discharging Provider: Dr. Prajapati   Discharge Service: Talya 3    Discharge Diagnoses   Ecoli bacteremia  In setting of Acute pyelonephritis  Type 2 NSTEMI in the setting of sepsis  Hx of aortic stenosis s/p TAVR (10/2020)  Hx of CAD s/p PCI x2 (9/2020)  Chronic anemia    Follow-ups Needed After Discharge   Follow-up with PCP within the week in [facility] within the week.      Unresulted Labs Ordered in the Past 30 Days of this Admission       Date and Time Order Name Status Description    3/3/2021 1923 Blood culture Preliminary     3/3/2021 1923 Blood culture Preliminary         These results will be followed up by PCP.     Discharge Disposition   Discharged to home  Condition at discharge: Stable    Hospital Course   Lydia Dietz was admitted on 3/3/2021. She has a history of aortic stenosis s/p TAVR (10/2020), CAD s/p PCI (to LAD and RCA 9/2020), CKD3, and chronic normocytic anemia and is admitted for chest pain and rigors.The following problems were addressed during her hospitalization:    # E. coli bacteremia due to acute pyelonephritis  Patient presented to OSH with rigors and her labs showed signs of infection. Her labs on 3/3/21 at the OSH showed a procalcitonin of 41.30, lactate dehydrogenase of 274, and her UA was positive for nitrates, and 1+ for blood and leukocyte esterase. On admission, she had a CT abdomen which showed concerns for pyelonephritis. The OSH has now notified us that her blood cultures grew E. Coli. The patient was started on IV cefepime yesterday, but now it is reasonable to narrow coverage to cover E. Coli. Repeat blood culture at our institution was NGTD after 36 hours indicating rapid clearance.Switched from IV cefepime to IV ceftriaxone during admission and narrowed to cover Ecoli. OSH showed E. Coli is pan  sensitive. Patient was discharged on a 10 day course of oral cefdinir per discussion with antimicrobial stewardship team. Patient should follow up with her PCP within the week. Patient notified to contact PCP or return to ED with fevers > 100.4, or temp < 95, rigors, worsening flank pain, hypotension or dizziness/lightheadedness with standing, shortness of breath or chest pain, new urinary symptoms.      # CKD3  Cr at baseline ~1.1-1.2. Pt with UA at OSH with 20-50 WBC and positive for nitrites but asymptomatic. Cr dropped to 0.94 after receiving IV ceftriaxone for E. Coli bacteremia presumed to be due to acute pyelonephritis. On discharge, Cr was 0.89, and there was no sign of KIRK.        # Hx of aortic stenosis s/p TAVR (10/2020)  # Hx of CAD s/p PCI x2 (9/2020)  TAVR in 10/2020 c/b small paravalvular leak. Required no treatment during admission.   - Continued PTA ASA, brilinta during admission.  - Holded PTA metoprolol succinate and triamterene-hydrochlorothiazide given c/f sepsis during admission. Restart once patient returns home.     # Type 2 NSTEMI in the setting of sepsis, resolved    Pt with 2 days of intermittent chest pain with associated dyspnea and rigors. Chest pain pleuritic in nature with reproduction with palpation and deep breaths seems more pleuritic in nature than cardiac. Hemodynamically stable, not hypoxic. Labs showed elevated WBC w/ L shift, procal. Troponin minimally elevated without EKG changes, chest pain resolved briefly with nitroglycerin. Also c/f PE given elevated d-dimer. DDx includes infection (pneumonia vs abdominal vs urine - see below), PE, vs other. Low concern for ACS given repeat troponin negative here despite ongoing chest pain. Chest pain and slightly elevated troponin on admission was likely due to supply/demand mismatch leading to Type 2 NSEMI in the setting of E.coli bacteremia. No further chest pain during her admission.  - Follow-up with outpatient cardiology as  previously scheduled in April 2021.     # Chronic normocytic anemia  No signs of bleeding. Hgb 9.6 at discharge  (baseline ~10).      Consultations This Hospital Stay   PHARMACY TO DOSE VANCO  VASCULAR ACCESS CARE ADULT IP CONSULT  VASCULAR ACCESS CARE ADULT IP CONSULT    Code Status   Full Code       The patient was discussed with Dr. Alo Babin 3 Service  [facility] [address] [facility]  [address] [place] [address]  Phone: [phone]    Susanne Negron MD  Internal Medicine Resident, PGY1  Pager: [phone]    ______________________________________________________________________    Physical Exam   Vital Signs: Temp: 98.9  F (37.2  C) Temp src: Oral BP: 132/58 Pulse: 87   Resp: 16 SpO2: 94 % O2 Device: None (Room air)    Weight: 0 lbs 0 oz  Constitutional: awake, alert, visible anxious regarding current illness, no apparent distress, and appears stated age  Eyes: Lids and lashes normal, pupils equal, round and reactive to light, extra ocular muscles intact, sclera clear, conjunctiva normal  ENT: Normocephalic, without obvious abnormality, atraumatic, sinuses nontender on palpation, external ears without lesions  Respiratory: No increased work of breathing, good air exchange, clear to auscultation bilaterally, no crackles or wheezing  Cardiovascular: Normal apical impulse, regular rate and rhythm, normal S1 and S2, no S3 or S4, and no murmur noted   GI: No scars, normal bowel sounds, soft, non-distended, non-tender, no masses palpated, no hepatosplenomegally  Skin: ecchymosis on right lower leg  Musculoskeletal: There is no redness, warmth, or swelling of the joints.  Full range of motion noted.  Motor strength is 5 out of 5 all extremities bilaterally.  Tone is normal.  Neurologic: Awake, alert, oriented to name, place and time.  Cranial nerves II-XII are grossly intact.        Primary Care Physician   Christal Ferreira    Discharge Orders   No discharge procedures on  file.    Significant Results and Procedures   Most Recent 3 CBC's:  Recent Labs   Lab Test 03/05/21  0536 03/04/21  0629 11/16/20  0947   WBC 10.1 16.3* 5.7   HGB 9.6* 8.2* 10.6*   MCV 83 82 88   * 99* 159     Most Recent 3 BMP's:  Recent Labs   Lab Test 03/05/21  0536 03/04/21  0629 11/16/20  0947    140 140   POTASSIUM 3.3* 3.9 4.1   CHLORIDE 108 109 109   CO2 25 25 26   BUN 23 25 27   CR 0.89 0.94 1.26*   ANIONGAP 7 6 5   DREW 9.0 8.5 9.4   GLC 94 106* 98     Most Recent 6 Bacteria Isolates From Any Culture (See EPIC Reports for Culture Details):  Recent Labs   Lab Test 03/03/21  1946   CULT No growth after 2 days  No growth after 2 days     Most Recent Urinalysis:No lab results found.    Discharge Medications   Current Discharge Medication List        CONTINUE these medications which have NOT CHANGED    Details   aspirin (ASA) 81 MG EC tablet Take 81 mg by mouth every morning       fish oil-omega-3 fatty acids 1000 MG capsule Take 2 g by mouth daily      fluticasone (FLONASE) 50 MCG/ACT nasal spray Spray 2 sprays in nostril every morning       metoprolol succinate ER (TOPROL-XL) 25 MG 24 hr tablet Take 25 mg by mouth every morning       ticagrelor (BRILINTA) 90 MG tablet Take 1 tablet (90 mg) by mouth 2 times daily Start this evening.  Qty: 180 tablet, Refills: 3    Associated Diagnoses: Coronary artery disease involving native coronary artery, angina presence unspecified, unspecified whether native or transplanted heart      triamterene-HCTZ (MAXZIDE-25) 37.5-25 MG tablet            Allergies   Allergies   Allergen Reactions    Rosuvastatin Muscle Pain (Myalgia)    Seasonal Allergies     Amlodipine      Other reaction(s): Edema,generalized    Atorvastatin      PN: Reaction: BLURRED VISION    Fenofibrate Muscle Pain (Myalgia)    Fluconazole Nausea    Losartan      PN: Reaction: Back pain and cramping    Pravastatin      PN:  Reaction: JOINT AND MUSCLE PAIN    Simvastatin Nausea    Niacin Rash

## 2021-03-05 NOTE — UTILIZATION REVIEW
Inpatient appropriate    Admission Status; Secondary Review Determination      Under the authority of the Utilization Management Committee, the utilization review process indicated a secondary review on the above patient. The review outcome is based on review of the medical records, discussions with staff, and applying clinical experience noted on the date of the review.    (x) Inpatient Status Appropriate - This patient's medical care is consistent with medical management for inpatient care and reasonable inpatient medical practice.    RATIONALE FOR DETERMINATION  85-year-old female with history of aortic stenosis s/p TAVR, coronary artery disease, CKD stage III was admitted to [facility] on 3/3/2021 after she had initially presented to outside hospital with rigors and lab work showed evidence of infection.  She is now bacteremic with E. coli.  Due to acute pyelonephritis.  She is receiving IV antibiotics with ceftriaxone.  She meets inpatient criteria at this time.    At the time of admission with the information available to the attending physician more than 2 nights Hospital complex care was anticipated, based on patient risk of adverse outcome if treated as outpatient and complex care required. Inpatient admission is appropriate based on the Medicare guidelines.    This document was produced using voice recognition software      The information on this document is developed by the utilization review team in order for the business office to ensure compliance. This only denotes the appropriateness of proper admission status and does not reflect the quality of care rendered.  The definitions of Inpatient Status and Observation Status used in making the determination above are those provided in the CMS Coverage Manual, Chapter 1 and Chapter 6, section 70.4.    Sincerely,    Utilization Review  Physician Advisor  [facility].

## 2021-03-05 NOTE — PROVIDER NOTIFICATION
/61 (BP Location: Left arm)   Pulse 93   Temp 99.1  F (37.3  C) (Oral)   Resp 16   SpO2 97%     Patient HR on Tele was sustaining in 130s to 170s and with PVCs.  Pt VSS w/ no symptoms or complaints.  Provider notified.  STAT EKG ordered.  Pt also triggered sepsis, Lactic pending.  Will continue to monitor and follow POC.

## 2021-03-05 NOTE — PROGRESS NOTES
3-7pm: Patient visiting with . She states she feels better today than yesterday, denies shortness of breath or chest pain. She is alert and oriented but forgetful. Up to bedside commode. Patient denies pain or discomfort.

## 2021-03-05 NOTE — PLAN OF CARE
/61 (BP Location: Left arm)   Pulse 93   Temp 99.1  F (37.3  C) (Oral)   Resp 16   SpO2 97%     Time:  6306-2215     Reason for admission:  SOB, Chest Pain, Rigors  Neuro:  A&Ox4, forgetful.  Clear and logical speech, no aphasia.  PERRLA.  Behavior:  Calm and cooperative.  Activity:  SBA w/ walker.  Vitals:  VSS on RA, except soft BPs. Triggered sepsis this shift, Lactic 0.8.  Lines:  R PIV pulled this shift for infiltration.  No PIV access.   Cardiac:  Tele.  Elevated D-dimer, concern for PE, EKG unchanged, Trops negative.  TTE w/ EF 60-65% and no pericardial effusion. HR elevated into 130s-170s w/ PVCs this shift, no symptoms, and VSS.  Provider notified, EKG and Trops ordered.    Respiratory:  Clear LS. Sats in 90s on RA.  No SOB.  GI/:  Voiding w/ BSC.  Clear.  No BM.   Skin:  Pale, tyron LE, bruising to R forearm and R shin, d/t anticoagulants per patient.  Endo:  NA  Pain:  No c/o pain.  Diet:  Regular, tolerating, good appetite.  Labs/Imaging:  Trop 0.015, , D-dimer 2.2, WBC 10.1, Hgb 9.6, K+ replaced for 3.3, recheck this am.  Consults:  NA     New changes this shift:  Admitted w/ SOB, chest pain, and rigors. Chest pain improved, EKG w/ no changes.  Trops negative.  Elevated BNP.  No SOB this shift.  Flu, COVID, RSV negative.  CTA neg for PE. Repeat BCx done.  Patient had + BCx from OSH w/ gram negative rods and E.coli, MD aware.  IV Cefepime transitioned to Ceftriaxone.  IV Vanco dc'd this shift.  Voiding.  No BM.  No c/o pain.  STAT EKG and Trops done for HR 130s-170s w/ PVCs.  Triggered sepsis, Lactic 0.8.  K+ replaced for 3.3, recheck this am.     Plan:  Discharge tomorrow.     Continue to monitor and follow POC.

## 2021-03-05 NOTE — PROGRESS NOTES
SPIRITUAL HEALTH SERVICES  [facility]A  ON-CALL VISIT     REFERRAL SOURCE: On-call  visit with patient Lydia and  Geovany, per request for hospital  visit as noted in initial nursing assessment.     Pt and  very pleasant, supportive of each other. Pt talked about history of illness and events leading up to this hospitalization, that she and her  have been  65 years, she feels well supported by family and friends. Prayer was welcomed.     PLAN: unit  will be informed of visit.     Augustus Manrique M.Div. (Bill), [facility]  Staff   Pager [phone]     * [facility] remains available 24/7 for emergent requests/referrals, either by having the switchboard page the on-call  or by entering an ASAP/STAT consult in Epic (this will also page the on-call ). Routine Epic consults receive an initial response within 24 hours.*

## 2021-03-09 LAB
BACTERIA SPEC CULT: NO GROWTH
BACTERIA SPEC CULT: NO GROWTH
SPECIMEN SOURCE: NORMAL
SPECIMEN SOURCE: NORMAL

## 2021-05-16 NOTE — PROGRESS NOTES
[facility] HEART [facility]  CARDIOVASCULAR DIVISION    VALVE CLINIC RETURN VISIT      Lydia Dietz is a 86 year old female with a PMH of coronary artery disease status-post PCI of the LAD and RCA 9/9/2020, HLD w/statin intolerance, HTN, CKD stage III and severe aortic valvular stenosis status-post TAVR with a 23 mm Paerdes Arik 3 on 10/7/20 who presents for 6 months follow-up. The TAVR and post-procedural course were notable for no complications or development of conduction abnormalities. Her post TAVR echo showed mean gradient of 10 mmHg with trivial paravalvular AI. She was last evaluated 1 month post TAVR, echo showed stable mean PG of 11 mmHg and mild-moderate PVL w/ associated large protruding annular calcification. Given that she was asymptomatic and potential procedural risk associated with large protruding calcium, decision was made to medically manage PVL.     Lydia is accompanied to the visit by her , Geovany. Since she was last evaluated she was admitted to the hospital in March with a bacteremia secondary to pyelonephritis. She was treated with IV antibiotics and has since recovered. She has since been doing fairly well and completed cardiac rehab. She is active walking 20-30 minutes daily. She walks in the house on cold days and walks outdoors when it is nice out. She denies any chest pain, palpitations, shortness of breath, orthopnea, PND, leg swelling, weight gain.     She had an episode of presyncope last week while walking around at a greenhouse. She says she started shaking and developed abdominal pain. Her  said she looked like she was going to faint and was able to catch her and lower her down to the ground. She did not lose consciousness. She was evaluated by EMS and VS were stable. They thought the episode was related to the heat as it was warm in the greenhouse. She has since been feeling fine without recurrent episodes of presyncope. She has noticed intermittent  shaking which she and her  think is related to anxiety. They notice that she shakes when she has an important event coming up. For example last week she started shaking on her way to an important meeting and today she is shaking and tearful concerned about her echo results.     Home blood pressures have been 125/70, at therapy her blood pressure was consistently 120s systolic. She is surprised that her BP is 150s today.      PAST MEDICAL HISTORY:     Past Medical History:   Diagnosis Date     Allergies      Anemia      Coronary artery disease      HLD (hyperlipidemia)      HTN (hypertension)      Impaired fasting glucose      Osteoarthritis      Severe aortic stenosis       PAST SURGICAL HISTORY:     Past Surgical History:   Procedure Laterality Date     AS LAP INCISIONAL HERNIA REPAIR       BREAST BIOPSY, CORE RT/LT      X3     CHOLECYSTECTOMY       COLECTOMY PARTIAL  2019     CV LEFT HEART CATH N/A 9/9/2020    Procedure: Left Heart Cath;  Surgeon: Leonard Pastor MD;  Location:  HEART CARDIAC CATH LAB     CV PCI ANGIOPLASTY N/A 9/9/2020    Procedure: CV PCI ANGIOPLASTY;  Surgeon: Leonard Pastor MD;  Location:  HEART CARDIAC CATH LAB     CV TRANSCATHETER AORTIC VALVE REPLACEMENT N/A 10/7/2020    Procedure: Transfemoral, subclavian (DINH) or transapical transcatheter aortic valve replacement 23mm dinh valve placed. cardiovascular standby.;  Surgeon: Leonard Pastor MD;  Location: [facility] OR      LAPAROSCOPY, OVIDUCT/OVARY; REMOVAL       HEART CATH FEMORAL CANNULIZATION WITH OPEN STANDBY REPAIR AORTIC VALVE N/A 10/7/2020    Procedure: Cardiac standby. Perfusion standby.;  Surgeon: Richi Olmos MD;  Location: [facility] OR     HYSTERECTOMY TOTAL ABDOMINAL        CURRENT MEDICATIONS:     Current Outpatient Medications   Medication     aspirin (ASA) 81 MG EC tablet     fish oil-omega-3 fatty acids 1000 MG capsule     fluticasone (FLONASE) 50 MCG/ACT nasal spray     metoprolol succinate ER  (TOPROL-XL) 25 MG 24 hr tablet     ticagrelor (BRILINTA) 90 MG tablet     triamterene-HCTZ (MAXZIDE-25) 37.5-25 MG tablet     No current facility-administered medications for this visit.       ALLERGIES:      Allergies   Allergen Reactions     Rosuvastatin Muscle Pain (Myalgia)     Seasonal Allergies      Amlodipine      Other reaction(s): Edema,generalized     Atorvastatin      PN: Reaction: BLURRED VISION     Fenofibrate Muscle Pain (Myalgia)     Fluconazole Nausea     Losartan      PN: Reaction: Back pain and cramping     Pravastatin      PN:  Reaction: JOINT AND MUSCLE PAIN     Simvastatin Nausea     Niacin Rash        FAMILY HISTORY:       No family history on file.       SOCIAL HISTORY:     Social History     Socioeconomic History     Marital status:      Spouse name: Not on file     Number of children: Not on file     Years of education: Not on file     Highest education level: Not on file   Occupational History     Not on file   Social Needs     Financial resource strain: Not on file     Food insecurity     Worry: Not on file     Inability: Not on file     Transportation needs     Medical: Not on file     Non-medical: Not on file   Tobacco Use     Smoking status: Former Smoker     Quit date: 1980     Years since quittin.4     Smokeless tobacco: Never Used     Tobacco comment: 1 pack per week if that maybe for 5 years    Substance and Sexual Activity     Alcohol use: Yes     Comment: occ      Drug use: Never     Sexual activity: Not on file   Lifestyle     Physical activity     Days per week: Not on file     Minutes per session: Not on file     Stress: Not on file   Relationships     Social connections     Talks on phone: Not on file     Gets together: Not on file     Attends Qu[employer] service: Not on file     Active member of club or organization: Not on file     Attends meetings of clubs or organizations: Not on file     Relationship status: Not on file     Intimate partner violence     Fear of  "current or ex partner: Not on file     Emotionally abused: Not on file     Physically abused: Not on file     Forced sexual activity: Not on file   Other Topics Concern     Not on file   Social History Narrative     Not on file      REVIEW OF SYSTEMS:     Constitutional: No fevers or chills  Skin: No new rash or itching  Eyes: No acute change in vision  Ears/Nose/Throat: No purulent rhinorrhea, new hearing loss, or new vertigo  Respiratory: No cough or hemoptysis  Cardiovascular: See HPI  Gastrointestinal: No change in appetite, vomiting, hematemesis or diarrhea  Genitourinary: No dysuria or hematuria  Musculoskeletal: No new back pain, neck pain or muscle pain  Neurologic: No new headaches, focal weakness or behavior changes  Psychiatric: No hallucinations, excessive alcohol consumption or illegal drug usage  Hematologic/Lymphatic/Immunologic: No bleeding, chills, fever, night sweats or weight loss  Endocrine: No new cold intolerance, heat intolerance, polyphagia, polydipsia or polyuria      PHYSICAL EXAMINATION:     BP (!) 154/60 (BP Location: Right arm, Patient Position: Chair, Cuff Size: Adult Regular)   Pulse 72   Ht 1.664 m (5' 5.5\")   Wt 55.8 kg (123 lb)   SpO2 100%   BMI 20.16 kg/m     Exam:  Constitutional: healthy, alert, no distress and tearful and anxious  Head: Normocephalic. No masses, lesions, tenderness or abnormalities  Neck: Neck supple. No adenopathy. Thyroid symmetric, normal size,, Carotids without bruits.  ENT: ENT exam normal, no neck nodes or sinus tenderness  Cardiovascular: No edema or JVD. 2/6 murmur heard best at the apex  Respiratory: Lungs clear  Musculoskeletal: extremities normal- no gross deformities noted, gait normal and normal muscle tone  Skin: ecchymoses - right shin  Neurologic: Gait normal. Reflexes normal and symmetric. Sensation grossly WNL.  Psychiatric: mentation appears normal and affect normal/bright     LABORATORY DATA:     Last Comprehensive Metabolic " Panel:  Sodium   Date Value Ref Range Status   05/17/2021 142 133 - 144 mmol/L Final     Potassium   Date Value Ref Range Status   05/17/2021 3.7 3.4 - 5.3 mmol/L Final     Chloride   Date Value Ref Range Status   05/17/2021 109 94 - 109 mmol/L Final     Carbon Dioxide   Date Value Ref Range Status   05/17/2021 27 20 - 32 mmol/L Final     Anion Gap   Date Value Ref Range Status   05/17/2021 6 3 - 14 mmol/L Final     Glucose   Date Value Ref Range Status   05/17/2021 101 (H) 70 - 99 mg/dL Final     Urea Nitrogen   Date Value Ref Range Status   05/17/2021 26 7 - 30 mg/dL Final     Creatinine   Date Value Ref Range Status   05/17/2021 0.98 0.52 - 1.04 mg/dL Final     GFR Estimate   Date Value Ref Range Status   05/17/2021 53 (L) >60 mL/min/[1.73_m2] Final     Comment:     Non  GFR Calc  Starting 12/18/2018, serum creatinine based estimated GFR (eGFR) will be   calculated using the Chronic Kidney Disease Epidemiology Collaboration   (CKD-EPI) equation.       Calcium   Date Value Ref Range Status   05/17/2021 9.3 8.5 - 10.1 mg/dL Final     CBC RESULTS:   Recent Labs   Lab Test 03/05/21  0536   WBC 10.1   RBC 3.87   HGB 9.6*   HCT 32.1*   MCV 83   MCH 24.8*   MCHC 29.9*   RDW 15.8*   *          PROCEDURES & FURTHER ASSESSMENTS:     ECG 5/17/20:  Personally reviewed and interpreted by me  NSR with LAD, incomplete RBBB, no ischemia    ECHO 5/17/21:  Interpretation Summary  S/P TAVR with 23 mm Paredes Arik 3 valve on 10/07/2020. Mean aortic gradient  12mmHg. Mild paravalvular AI.  No significant changes noted.  ______________________________________________________________________________  Left Ventricle  Global and regional left ventricular function is normal with an EF of 60-65%.  Left ventricular wall thickness is normal. Left ventricular size is normal.  Diastolic function not assessed due to significant mitral annular  calcification. No regional wall motion abnormalities are seen.     Right  Ventricle  Right ventricular function, chamber size, wall motion, and thickness are  normal.     Atria  Both atria appear normal. The atrial septum is intact as assessed by color  Doppler .     Mitral Valve  Moderate mitral annular calcification is present. The mean mitral valve  gradient is 6.7 mmHg. The peak mitral valve gradient is 15.2 mmHg.     Aortic Valve  S/P TAVR with 23 mm Paredes Arik 3 valve on 10/07/2020. Mean aortic gradient  12mmHg. Mild paravalvular AI.     Tricuspid Valve  The tricuspid valve is normal. The right ventricular systolic pressure is  approximated at 20.4 mmHg plus the right atrial pressure. Trace tricuspid  insufficiency is present.     Pulmonic Valve  The pulmonic valve is normal.    ECG dated 11/16/20: NSR HR 83, , QRS 88, QTc 474    Echocardiogram dated 11/16/20:  Interpretation Summary  s/p 23 mm Paredes Arik 3 valve TAVR on 10/07/2020. The valve is well-seated  there is mild-moderate paravalvular regurgitation and the jet is quite  eccentric. The jet appears to be most prominent in the 4-6 o'clock position.  While the patient's BP is higher during this examination, the Vmax is 2.1 m/s  and the mean gradient is 11 mmHg (similar to the last study).     Global and regional left ventricular function is normal with an EF of 55-60%.  Grade II or moderate diastolic dysfunction.  Global right ventricular function is normal. The right ventricle is normal  size.  This study was compared with the study from 10/08/2020. The paravalvular  aortic regurgitation appears to be more prominent on today's study on direct  visual comparison. The Vmax and mean gradient are not significantly different.    CT heart 11/16/20:       IMPRESSIONS:     1.  There is a 23 mm Paredes Arik transcatheter valve in the aortic  position.   2.  There is no evidence of hypoattenuating leaflet thrombosis. Small  paravalvular leak between the left and right coronary cusps (2-3  o'clock position), with  associated large protruding annular  calcification.  3.  Please review Radiology report for incidental noncardiac findings  that will follow separately.     FINDINGS:      1.  There is a 23 mm Paredes Arik  transcatheter valve in the aortic  position. It is well-seated.  2.  There is no evidence of hypoattenuating leaflet thrombosis.  There  is normal leaflet opening.   3.  The visualized portions of the aortic root and ascending aorta are  normal in size  4.  The systemic venous connections are normal.   5.  The cardiac chambers demonstrate normal atrioventricular and  ventriculoarterial concordance.  6.  Coronary arteries originate from their respective cusps.   7.  There are severe coronary artery calcifications.  8.  The pericardium is unremarkable.  There is no pericardial  effusion.   9.  There is no intracardiac thrombus.    NYHA Class: I     CLINICAL IMPRESSION:     Lydia Dietz is a 86 year old female with a past medical history of HTN, HLD w/statin intolerance, CKD stage III, coronary artery disease status-post PCI of the LAD and RCA 9/9/2020 and severe aortic valvular stenosis status-post transfemoral transcatheter aortic valve replacement (TAVR) with a 23 mm Paredes Arik 3 on 10/7/20 w/mild-moderate PVL who presents for 6 month follow-up.    Aortic stenosis s/p TAVR w/mild-moderate PVL: Doing well clinically with improvement in dyspnea and fatigue since the procedure. She is active without cardiac symptoms. Her ECHO today shows normal TAVR function with mean PG of 12 mmHg with mild PVL which is stable when compared to prior. Plan to continue medical management of PVL, as it remains mild and patient is asymptomatic. Continue to monitor with annual echos. Should PVL worsen, or if she develops symptoms of heart failure or hemolysis, could consider percutaneous intervention. Continue ASA 81 mg and SBE prophylaxis lifelong.    Presyncope: Suspect vasovagal event due to heat and prolonged standing.  Discussed placing cardiac monitor to definitively rule-out arrhythmia. Patient and  would like to pursue. Will place 1 week ZioPatch.     CAD: Asymptomatic. Continue ASA 81 mg lifelong and Brilinta 90 mg BID through 9/9/21.     HTN: Well controlled at home. Elevated in clinic today due to anxiety. Continue current regimen.     HLD: LDL poorly controlled, not currently on statin therapy due to intolerance. Will refer to  to discuss PCSK9 inhibitor.     CKD, stage 3: Stable GFR 53 today. Continue to monitor     RTC: Prevention first available, Valve clinic 6 months w/echo, labs and ECG prior      Daphne Jessica Murphy NP    A total of 45 minutes spent face to face with patient and > 50% of the time spent counseling and coordinating care.       CC  Patient Care Team:  Christal Ferreira MD as PCP - General (Family Practice)  Praveen Costello MD as MD (Orthopedics)  Tayla Hartman (Cardiology)  Leonard Pastor MD as Assigned Heart and Vascular Provider  Sophia Boudreaux PA-C as Assigned Surgical Provider

## 2021-05-17 ENCOUNTER — ANCILLARY PROCEDURE (OUTPATIENT)
Dept: CARDIOLOGY | Facility: CLINIC | Age: 86
End: 2021-05-17
Attending: NURSE PRACTITIONER
Payer: MEDICARE

## 2021-05-17 VITALS
OXYGEN SATURATION: 100 % | SYSTOLIC BLOOD PRESSURE: 154 MMHG | WEIGHT: 123 LBS | DIASTOLIC BLOOD PRESSURE: 60 MMHG | HEART RATE: 72 BPM | HEIGHT: 66 IN | BODY MASS INDEX: 19.77 KG/M2

## 2021-05-17 DIAGNOSIS — Z95.2 S/P TAVR (TRANSCATHETER AORTIC VALVE REPLACEMENT): ICD-10-CM

## 2021-05-17 DIAGNOSIS — R55 SYNCOPE, UNSPECIFIED SYNCOPE TYPE: ICD-10-CM

## 2021-05-17 DIAGNOSIS — Z95.2 S/P TAVR (TRANSCATHETER AORTIC VALVE REPLACEMENT): Primary | ICD-10-CM

## 2021-05-17 DIAGNOSIS — R42 DIZZINESS: ICD-10-CM

## 2021-05-17 DIAGNOSIS — I25.10 CORONARY ARTERY DISEASE INVOLVING NATIVE CORONARY ARTERY OF NATIVE HEART WITHOUT ANGINA PECTORIS: ICD-10-CM

## 2021-05-17 DIAGNOSIS — R91.8 PULMONARY NODULES: ICD-10-CM

## 2021-05-17 LAB
ANION GAP SERPL CALCULATED.3IONS-SCNC: 6 MMOL/L (ref 3–14)
BUN SERPL-MCNC: 26 MG/DL (ref 7–30)
CALCIUM SERPL-MCNC: 9.3 MG/DL (ref 8.5–10.1)
CHLORIDE SERPL-SCNC: 109 MMOL/L (ref 94–109)
CO2 SERPL-SCNC: 27 MMOL/L (ref 20–32)
CREAT SERPL-MCNC: 0.98 MG/DL (ref 0.52–1.04)
GFR SERPL CREATININE-BSD FRML MDRD: 53 ML/MIN/{1.73_M2}
GLUCOSE SERPL-MCNC: 101 MG/DL (ref 70–99)
POTASSIUM SERPL-SCNC: 3.7 MMOL/L (ref 3.4–5.3)
SODIUM SERPL-SCNC: 142 MMOL/L (ref 133–144)

## 2021-05-17 PROCEDURE — 99215 OFFICE O/P EST HI 40 MIN: CPT | Mod: 25 | Performed by: NURSE PRACTITIONER

## 2021-05-17 PROCEDURE — 93242 EXT ECG>48HR<7D RECORDING: CPT

## 2021-05-17 PROCEDURE — 36415 COLL VENOUS BLD VENIPUNCTURE: CPT | Performed by: PATHOLOGY

## 2021-05-17 PROCEDURE — 93248 EXT ECG>7D<15D REV&INTERPJ: CPT | Performed by: INTERNAL MEDICINE

## 2021-05-17 PROCEDURE — 93306 TTE W/DOPPLER COMPLETE: CPT | Performed by: INTERNAL MEDICINE

## 2021-05-17 PROCEDURE — G0463 HOSPITAL OUTPT CLINIC VISIT: HCPCS | Mod: 25

## 2021-05-17 PROCEDURE — 93005 ELECTROCARDIOGRAM TRACING: CPT | Mod: XU

## 2021-05-17 PROCEDURE — 80048 BASIC METABOLIC PNL TOTAL CA: CPT | Performed by: PATHOLOGY

## 2021-05-17 ASSESSMENT — MIFFLIN-ST. JEOR: SCORE: 1016.73

## 2021-05-17 ASSESSMENT — PAIN SCALES - GENERAL: PAINLEVEL: NO PAIN (0)

## 2021-05-17 NOTE — LETTER
5/17/2021      RE: Lydia Dietz  [id number] [facility] [id number]       Dear Colleague,    Thank you for the opportunity to participate in the care of your patient, Lydia Dietz, at the [facility]ART [facility] [place] [facility]. Please see a copy of my visit note below.              [facility] HEART CARE  CARDIOVASCULAR DIVISION    VALVE CLINIC RETURN VISIT      Lydia Dietz is a 86 year old female with a PMH of coronary artery disease status-post PCI of the LAD and RCA 9/9/2020, HLD w/statin intolerance, HTN, CKD stage III and severe aortic valvular stenosis status-post TAVR with a 23 mm Paredes Arik 3 on 10/7/20 who presents for 6 months follow-up. The TAVR and post-procedural course were notable for no complications or development of conduction abnormalities. Her post TAVR echo showed mean gradient of 10 mmHg with trivial paravalvular AI. She was last evaluated 1 month post TAVR, echo showed stable mean PG of 11 mmHg and mild-moderate PVL w/ associated large protruding annular calcification. Given that she was asymptomatic and potential procedural risk associated with large protruding calcium, decision was made to medically manage PVL.     Lydia is accompanied to the visit by her , Geovany. Since she was last evaluated she was admitted to the hospital in March with a bacteremia secondary to pyelonephritis. She was treated with IV antibiotics and has since recovered. She has since been doing fairly well and completed cardiac rehab. She is active walking 20-30 minutes daily. She walks in the house on cold days and walks outdoors when it is nice out. She denies any chest pain, palpitations, shortness of breath, orthopnea, PND, leg swelling, weight gain.     She had an episode of presyncope last week while walking around at a greenhouse. She says she started shaking and developed abdominal pain. Her  said she looked like she  was going to faint and was able to catch her and lower her down to the ground. She did not lose consciousness. She was evaluated by EMS and VS were stable. They thought the episode was related to the heat as it was warm in the greenhouse. She has since been feeling fine without recurrent episodes of presyncope. She has noticed intermittent shaking which she and her  think is related to anxiety. They notice that she shakes when she has an important event coming up. For example last week she started shaking on her way to an important meeting and today she is shaking and tearful concerned about her echo results.     Home blood pressures have been 125/70, at therapy her blood pressure was consistently 120s systolic. She is surprised that her BP is 150s today.      PAST MEDICAL HISTORY:     Past Medical History:   Diagnosis Date     Allergies      Anemia      Coronary artery disease      HLD (hyperlipidemia)      HTN (hypertension)      Impaired fasting glucose      Osteoarthritis      Severe aortic stenosis       PAST SURGICAL HISTORY:     Past Surgical History:   Procedure Laterality Date     AS LAP INCISIONAL HERNIA REPAIR       BREAST BIOPSY, CORE RT/LT      X3     CHOLECYSTECTOMY       COLECTOMY PARTIAL  2019     CV LEFT HEART CATH N/A 9/9/2020    Procedure: Left Heart Cath;  Surgeon: Leonard Pastor MD;  Location:  HEART CARDIAC CATH LAB     CV PCI ANGIOPLASTY N/A 9/9/2020    Procedure: CV PCI ANGIOPLASTY;  Surgeon: Leonard Pastor MD;  Location: [facility] CARDIAC CATH LAB     CV TRANSCATHETER AORTIC VALVE REPLACEMENT N/A 10/7/2020    Procedure: Transfemoral, subclavian (DINH) or transapical transcatheter aortic valve replacement 23mm dinh valve placed. cardiovascular standby.;  Surgeon: Leonard Pastor MD;  Location:  OR      LAPAROSCOPY, OVIDUCT/OVARY; REMOVAL       HEART CATH FEMORAL CANNULIZATION WITH OPEN STANDBY REPAIR AORTIC VALVE N/A 10/7/2020    Procedure: Cardiac standby.  Perfusion standby.;  Surgeon: Richi Olmos MD;  Location: [facility] OR     HYSTERECTOMY TOTAL ABDOMINAL        CURRENT MEDICATIONS:     Current Outpatient Medications   Medication     aspirin (ASA) 81 MG EC tablet     fish oil-omega-3 fatty acids 1000 MG capsule     fluticasone (FLONASE) 50 MCG/ACT nasal spray     metoprolol succinate ER (TOPROL-XL) 25 MG 24 hr tablet     ticagrelor (BRILINTA) 90 MG tablet     triamterene-HCTZ (MAXZIDE-25) 37.5-25 MG tablet     No current facility-administered medications for this visit.       ALLERGIES:      Allergies   Allergen Reactions     Rosuvastatin Muscle Pain (Myalgia)     Seasonal Allergies      Amlodipine      Other reaction(s): Edema,generalized     Atorvastatin      PN: Reaction: BLURRED VISION     Fenofibrate Muscle Pain (Myalgia)     Fluconazole Nausea     Losartan      PN: Reaction: Back pain and cramping     Pravastatin      PN:  Reaction: JOINT AND MUSCLE PAIN     Simvastatin Nausea     Niacin Rash        FAMILY HISTORY:       No family history on file.       SOCIAL HISTORY:     Social History     Socioeconomic History     Marital status:      Spouse name: Not on file     Number of children: Not on file     Years of education: Not on file     Highest education level: Not on file   Occupational History     Not on file   Social Needs     Financial resource strain: Not on file     Food insecurity     Worry: Not on file     Inability: Not on file     Transportation needs     Medical: Not on file     Non-medical: Not on file   Tobacco Use     Smoking status: Former Smoker     Quit date: 1980     Years since quittin.4     Smokeless tobacco: Never Used     Tobacco comment: 1 pack per week if that maybe for 5 years    Substance and Sexual Activity     Alcohol use: Yes     Comment: occ      Drug use: Never     Sexual activity: Not on file   Lifestyle     Physical activity     Days per week: Not on file     Minutes per session: Not on file     Stress: Not on file  "  Relationships     Social connections     Talks on phone: Not on file     Gets together: Not on file     Attends [employer] service: Not on file     Active member of club or organization: Not on file     Attends meetings of clubs or organizations: Not on file     Relationship status: Not on file     Intimate partner violence     Fear of current or ex partner: Not on file     Emotionally abused: Not on file     Physically abused: Not on file     Forced sexual activity: Not on file   Other Topics Concern     Not on file   Social History Narrative     Not on file      REVIEW OF SYSTEMS:     Constitutional: No fevers or chills  Skin: No new rash or itching  Eyes: No acute change in vision  Ears/Nose/Throat: No purulent rhinorrhea, new hearing loss, or new vertigo  Respiratory: No cough or hemoptysis  Cardiovascular: See HPI  Gastrointestinal: No change in appetite, vomiting, hematemesis or diarrhea  Genitourinary: No dysuria or hematuria  Musculoskeletal: No new back pain, neck pain or muscle pain  Neurologic: No new headaches, focal weakness or behavior changes  Psychiatric: No hallucinations, excessive alcohol consumption or illegal drug usage  Hematologic/Lymphatic/Immunologic: No bleeding, chills, fever, night sweats or weight loss  Endocrine: No new cold intolerance, heat intolerance, polyphagia, polydipsia or polyuria      PHYSICAL EXAMINATION:     BP (!) 154/60 (BP Location: Right arm, Patient Position: Chair, Cuff Size: Adult Regular)   Pulse 72   Ht 1.664 m (5' 5.5\")   Wt 55.8 kg (123 lb)   SpO2 100%   BMI 20.16 kg/m     Exam:  Constitutional: healthy, alert, no distress and tearful and anxious  Head: Normocephalic. No masses, lesions, tenderness or abnormalities  Neck: Neck supple. No adenopathy. Thyroid symmetric, normal size,, Carotids without bruits.  ENT: ENT exam normal, no neck nodes or sinus tenderness  Cardiovascular: No edema or JVD. 2/6 murmur heard best at the apex  Respiratory: Lungs " clear  Musculoskeletal: extremities normal- no gross deformities noted, gait normal and normal muscle tone  Skin: ecchymoses - right shin  Neurologic: Gait normal. Reflexes normal and symmetric. Sensation grossly WNL.  Psychiatric: mentation appears normal and affect normal/bright     LABORATORY DATA:     Last Comprehensive Metabolic Panel:  Sodium   Date Value Ref Range Status   05/17/2021 142 133 - 144 mmol/L Final     Potassium   Date Value Ref Range Status   05/17/2021 3.7 3.4 - 5.3 mmol/L Final     Chloride   Date Value Ref Range Status   05/17/2021 109 94 - 109 mmol/L Final     Carbon Dioxide   Date Value Ref Range Status   05/17/2021 27 20 - 32 mmol/L Final     Anion Gap   Date Value Ref Range Status   05/17/2021 6 3 - 14 mmol/L Final     Glucose   Date Value Ref Range Status   05/17/2021 101 (H) 70 - 99 mg/dL Final     Urea Nitrogen   Date Value Ref Range Status   05/17/2021 26 7 - 30 mg/dL Final     Creatinine   Date Value Ref Range Status   05/17/2021 0.98 0.52 - 1.04 mg/dL Final     GFR Estimate   Date Value Ref Range Status   05/17/2021 53 (L) >60 mL/min/[1.73_m2] Final     Comment:     Non  GFR Calc  Starting 12/18/2018, serum creatinine based estimated GFR (eGFR) will be   calculated using the Chronic Kidney Disease Epidemiology Collaboration   (CKD-EPI) equation.       Calcium   Date Value Ref Range Status   05/17/2021 9.3 8.5 - 10.1 mg/dL Final     CBC RESULTS:   Recent Labs   Lab Test 03/05/21  0536   WBC 10.1   RBC 3.87   HGB 9.6*   HCT 32.1*   MCV 83   MCH 24.8*   MCHC 29.9*   RDW 15.8*   *          PROCEDURES & FURTHER ASSESSMENTS:     ECG 5/17/20:  Personally reviewed and interpreted by me  NSR with LAD, incomplete RBBB, no ischemia    ECHO 5/17/21:  Interpretation Summary  S/P TAVR with 23 mm Paredes Arik 3 valve on 10/07/2020. Mean aortic gradient  12mmHg. Mild paravalvular AI.  No significant changes  noted.  ______________________________________________________________________________  Left Ventricle  Global and regional left ventricular function is normal with an EF of 60-65%.  Left ventricular wall thickness is normal. Left ventricular size is normal.  Diastolic function not assessed due to significant mitral annular  calcification. No regional wall motion abnormalities are seen.     Right Ventricle  Right ventricular function, chamber size, wall motion, and thickness are  normal.     Atria  Both atria appear normal. The atrial septum is intact as assessed by color  Doppler .     Mitral Valve  Moderate mitral annular calcification is present. The mean mitral valve  gradient is 6.7 mmHg. The peak mitral valve gradient is 15.2 mmHg.     Aortic Valve  S/P TAVR with 23 mm Paredes Arik 3 valve on 10/07/2020. Mean aortic gradient  12mmHg. Mild paravalvular AI.     Tricuspid Valve  The tricuspid valve is normal. The right ventricular systolic pressure is  approximated at 20.4 mmHg plus the right atrial pressure. Trace tricuspid  insufficiency is present.     Pulmonic Valve  The pulmonic valve is normal.    ECG dated 11/16/20: NSR HR 83, , QRS 88, QTc 474    Echocardiogram dated 11/16/20:  Interpretation Summary  s/p 23 mm Paredes Arik 3 valve TAVR on 10/07/2020. The valve is well-seated  there is mild-moderate paravalvular regurgitation and the jet is quite  eccentric. The jet appears to be most prominent in the 4-6 o'clock position.  While the patient's BP is higher during this examination, the Vmax is 2.1 m/s  and the mean gradient is 11 mmHg (similar to the last study).     Global and regional left ventricular function is normal with an EF of 55-60%.  Grade II or moderate diastolic dysfunction.  Global right ventricular function is normal. The right ventricle is normal  size.  This study was compared with the study from 10/08/2020. The paravalvular  aortic regurgitation appears to be more prominent on  today's study on direct  visual comparison. The Vmax and mean gradient are not significantly different.    CT heart 11/16/20:       IMPRESSIONS:     1.  There is a 23 mm Paredes Arik transcatheter valve in the aortic  position.   2.  There is no evidence of hypoattenuating leaflet thrombosis. Small  paravalvular leak between the left and right coronary cusps (2-3  o'clock position), with associated large protruding annular  calcification.  3.  Please review Radiology report for incidental noncardiac findings  that will follow separately.     FINDINGS:      1.  There is a 23 mm Paredes Arik  transcatheter valve in the aortic  position. It is well-seated.  2.  There is no evidence of hypoattenuating leaflet thrombosis.  There  is normal leaflet opening.   3.  The visualized portions of the aortic root and ascending aorta are  normal in size  4.  The systemic venous connections are normal.   5.  The cardiac chambers demonstrate normal atrioventricular and  ventriculoarterial concordance.  6.  Coronary arteries originate from their respective cusps.   7.  There are severe coronary artery calcifications.  8.  The pericardium is unremarkable.  There is no pericardial  effusion.   9.  There is no intracardiac thrombus.    NYHA Class: I     CLINICAL IMPRESSION:     Lydia Dietz is a 86 year old female with a past medical history of HTN, HLD w/statin intolerance, CKD stage III, coronary artery disease status-post PCI of the LAD and RCA 9/9/2020 and severe aortic valvular stenosis status-post transfemoral transcatheter aortic valve replacement (TAVR) with a 23 mm Paredes Arik 3 on 10/7/20 w/mild-moderate PVL who presents for 6 month follow-up.    Aortic stenosis s/p TAVR w/mild-moderate PVL: Doing well clinically with improvement in dyspnea and fatigue since the procedure. She is active without cardiac symptoms. Her ECHO today shows normal TAVR function with mean PG of 12 mmHg with mild PVL which is stable when  compared to prior. Plan to continue medical management of PVL, as it remains mild and patient is asymptomatic. Continue to monitor with annual echos. Should PVL worsen, or if she develops symptoms of heart failure or hemolysis, could consider percutaneous intervention. Continue ASA 81 mg and SBE prophylaxis lifelong.    Presyncope: Suspect vasovagal event due to heat and prolonged standing. Discussed placing cardiac monitor to definitively rule-out arrhythmia. Patient and  would like to pursue. Will place 1 week ZioPatch.     CAD: Asymptomatic. Continue ASA 81 mg lifelong and Brilinta 90 mg BID through 9/9/21.     HTN: Well controlled at home. Elevated in clinic today due to anxiety. Continue current regimen.     HLD: LDL poorly controlled, not currently on statin therapy due to intolerance. Will refer to  to discuss PCSK9 inhibitor.     CKD, stage 3: Stable GFR 53 today. Continue to monitor     RTC: Prevention first available, Valve clinic 6 months w/echo, labs and ECG prior      Daphne Jessica Murphy NP    A total of 45 minutes spent face to face with patient and > 50% of the time spent counseling and coordinating care.       CC  Patient Care Team:  Christal Ferreira MD as PCP - General (Family Practice)  Praveen Costello MD as MD (Orthopedics)  Tayla Hartman (Cardiology)  Leonard Pastor MD as Assigned Heart and Vascular Provider  Sophia Boudreaux PA-C as Assigned Surgical Provider

## 2021-05-17 NOTE — NURSING NOTE
Chief Complaint   Patient presents with     Follow Up     6 month  S/P TAVR      Vitals were taken and medications were reconciled. EKG was performed    Erika WALLACE  12:02 PM

## 2021-05-17 NOTE — PATIENT INSTRUCTIONS
You were seen today in the Structural Heart Clinic at the [facility].    Cardiology provider you saw during your visit: Daphne Murphy NP    Instructions:   1. Your ECHO shows stable TAVR function, EKG shows normal rhythm and labs show stable kidney function  2. Continue Aspirin 81 mg daily and Brilinta 90 mg twice daily   3. For all future dental cleanings and procedures you will need to take antibiotics prior - see instructions below.   4. Wear a 1 week heart monitor to rule-out abnormal heart rhythm  5. Follow-up with PCP regarding shaking  6. Follow-up with pulmonary clinic regarding nodules (they will contact you)  7. Follow-up in Valve Clinic in 6 months echo, labs and ECG prior     Prevention of Infective (Bacterial) Endocarditis:  You are at increased risk for developing adverse outcomes from infective endocarditis (IE), also known as bacterial endocarditis (BE) because of the new device in your heart. The guidelines for prevention of IE are to give patients antibiotics prior to any dental procedures that involve manipulation of gingival tissue or the periapical region of teeth, or perforation of the oral mucosa:      It is recommended to take Amoxicillin 2 gm by mouth as a single dose 30 to 60 minutes before procedure.     OR if allergic to Penicillin or Ampicillin:     Cephalexin 2 gm by mouth, or  Clindamycin 600 mg by mouth, or  Azithromycin or Clarithromycin 500 mg PO       Questions and [phone].   First press #1 for the [employer] and then press #3 for Medical Questions to reach the Cardiology triage nurse.     On Call Cardiologist for after hours or on weekends: [phone], press option #4 and ask to speak to the on-call Cardiologist.

## 2021-05-17 NOTE — PROGRESS NOTES
Per Dr. wilder, patient to have 7 day ziopatch  monitor placed.  Diagnosis: syncope  Monitor placed: Yes  Patient Instructed: Yes  Patient verbalized understanding: Yes  Holter # [id number]    Placed By Erika CHANDLER

## 2021-05-18 DIAGNOSIS — R91.8 PULMONARY NODULES: Primary | ICD-10-CM

## 2021-05-18 LAB — INTERPRETATION ECG - MUSE: NORMAL

## 2021-05-25 DIAGNOSIS — E78.5 HYPERLIPIDEMIA LDL GOAL <70: Primary | ICD-10-CM

## 2021-05-25 DIAGNOSIS — Z78.9 STATIN INTOLERANCE: ICD-10-CM

## 2021-05-25 NOTE — TELEPHONE ENCOUNTER
RECORDS STATUS - ALL OTHER DIAGNOSIS      RECORDS RECEIVED FROM: [facility]   DATE RECEIVED: 5/25   NOTES STATUS DETAILS   OFFICE NOTE from referring provider Daphne Albright NP in  CARDIOVASCULAR CTR   OFFICE NOTE from medical oncologist     DISCHARGE SUMMARY from hospital     DISCHARGE REPORT from the ER     OPERATIVE REPORT     MEDICATION LIST     CLINICAL TRIAL TREATMENTS TO DATE     LABS     PATHOLOGY REPORTS     ANYTHING RELATED TO DIAGNOSIS     GENONOMIC TESTING     TYPE:     IMAGING (NEED IMAGES & REPORT)     CT SCANS PACS [facility]   MRI     MAMMO     ULTRASOUND     PET

## 2021-06-03 ENCOUNTER — TELEPHONE (OUTPATIENT)
Dept: CARDIOLOGY | Facility: CLINIC | Age: 86
End: 2021-06-03

## 2021-06-03 NOTE — TELEPHONE ENCOUNTER
"Pt needs to schedule a NEW PREVENTION 60 min appointment with Dr. Checo Logan per a referral.    appt can be VIRTUAL OR IN PERSON.    CARDIOLOGY EVAL ADULT REFERRAL HAS BEEN CANCELLED; can be found in the \"finalized requests\" tab of the pt's appointment desk. PLEASE REINSTATE (right click on request and select \"reinstate\") AND LINK TO APPOINTMENT WHEN SCHEDULING.     "

## 2021-06-09 ENCOUNTER — HOSPITAL ENCOUNTER (OUTPATIENT)
Dept: CT IMAGING | Facility: CLINIC | Age: 86
Discharge: HOME | End: 2021-06-09
Attending: CLINICAL NURSE SPECIALIST | Admitting: CLINICAL NURSE SPECIALIST
Payer: MEDICARE

## 2021-06-09 ENCOUNTER — PRE VISIT (OUTPATIENT)
Dept: PULMONOLOGY | Facility: CLINIC | Age: 86
End: 2021-06-09

## 2021-06-09 ENCOUNTER — VIRTUAL VISIT (OUTPATIENT)
Dept: PULMONOLOGY | Facility: CLINIC | Age: 86
End: 2021-06-09
Attending: NURSE PRACTITIONER
Payer: MEDICARE

## 2021-06-09 DIAGNOSIS — R91.8 PULMONARY NODULES: ICD-10-CM

## 2021-06-09 PROCEDURE — 99203 OFFICE O/P NEW LOW 30 MIN: CPT | Mod: 95 | Performed by: INTERNAL MEDICINE

## 2021-06-09 PROCEDURE — 71250 CT THORAX DX C-: CPT | Mod: MG

## 2021-06-09 NOTE — PROGRESS NOTES
"Lydia is a 86 year old who is being evaluated via a billable telephone visit.      What phone number would you like to be contacted at? [phone]  How would you like to obtain your AVS? Mail a copy   Vitals - Patient Reported  Weight (Patient Reported): 56.7 kg (125 lb)  Height (Patient Reported): 167.6 cm (5' 6\")  BMI (Based on Pt Reported Ht/Wt): 20.18  Pain Score: No Pain (0)  Phone call duration: 7 minutes    Colette Caba [facility]  Interventional Pulmonary Clinic Virtual Visit Note    June 9, 2021    Chief complaint:  Lydia Dietz is a 86 year old female seen for   Chief Complaint   Patient presents with     Telephone     NEW; PULMONARY NODULES        Reason for clinic visit / Chief complaint:   Pulmonary nodules    Assessment and Plan:  Indeterminate pulmonary nodules, measuring up to 4 mm, unchanged when compared to current CT scan with previous ones from March 2021 in August 2020. Her nodules were found incidentally when she had cardiac CT scan last year. She is not high risk for lung cancer. Based on current recommendations/guidelines no need to do further CT scan follow-up.      Billing: Based on Medical Decision Making (Complexity):  L4    History of Present Illness:  This is an 86 years old woman accompanied with her  during this phone visit and referred to my clinic for further evaluation of pulmonary nodules they were originally found incidentally when she had cardiac CT scan in August 2020. She has no known pulmonary problems she smoked few years when she was a teenager.       Allergies   Allergen Reactions     Rosuvastatin Muscle Pain (Myalgia)     Seasonal Allergies      Amlodipine      Other reaction(s): Edema,generalized     Atorvastatin      PN: Reaction: BLURRED VISION     Fenofibrate Muscle Pain (Myalgia)     Fluconazole Nausea     Losartan      PN: Reaction: Back pain and cramping     Pravastatin      PN:  Reaction: JOINT AND MUSCLE PAIN     Simvastatin Nausea     " Niacin Rash        Past Medical History:   Diagnosis Date     Allergies      Anemia      Coronary artery disease      HLD (hyperlipidemia)      HTN (hypertension)      Impaired fasting glucose      Osteoarthritis      Severe aortic stenosis         Past Surgical History:   Procedure Laterality Date     AS LAP INCISIONAL HERNIA REPAIR       BREAST BIOPSY, CORE RT/LT      X3     CHOLECYSTECTOMY       COLECTOMY PARTIAL  2019     CV LEFT HEART CATH N/A 2020    Procedure: Left Heart Cath;  Surgeon: Leonard Pastor MD;  Location:  HEART CARDIAC CATH LAB     CV PCI ANGIOPLASTY N/A 2020    Procedure: CV PCI ANGIOPLASTY;  Surgeon: Leonard Pastor MD;  Location:  HEART CARDIAC CATH LAB     CV TRANSCATHETER AORTIC VALVE REPLACEMENT N/A 10/7/2020    Procedure: Transfemoral, subclavian (DINH) or transapical transcatheter aortic valve replacement 23mm dinh valve placed. cardiovascular standby.;  Surgeon: Leonard Pastor MD;  Location: [facility] OR      LAPAROSCOPY, OVIDUCT/OVARY; REMOVAL       HEART CATH FEMORAL CANNULIZATION WITH OPEN STANDBY REPAIR AORTIC VALVE N/A 10/7/2020    Procedure: Cardiac standby. Perfusion standby.;  Surgeon: Richi Olmos MD;  Location: [facility] OR     HYSTERECTOMY TOTAL ABDOMINAL          Social History     Socioeconomic History     Marital status:      Spouse name: Not on file     Number of children: Not on file     Years of education: Not on file     Highest education level: Not on file   Occupational History     Not on file   Social Needs     Financial resource strain: Not on file     Food insecurity     Worry: Not on file     Inability: Not on file     Transportation needs     Medical: Not on file     Non-medical: Not on file   Tobacco Use     Smoking status: Former Smoker     Quit date:      Years since quittin.4     Smokeless tobacco: Never Used     Tobacco comment: 1 pack per week if that maybe for 5 years    Substance and Sexual Activity     Alcohol use:  Yes     Comment: occ      Drug use: Never     Sexual activity: Not on file   Lifestyle     Physical activity     Days per week: Not on file     Minutes per session: Not on file     Stress: Not on file   Relationships     Social connections     Talks on phone: Not on file     Gets together: Not on file     Attends Tenriism service: Not on file     Active member of club or organization: Not on file     Attends meetings of clubs or organizations: Not on file     Relationship status: Not on file     Intimate partner violence     Fear of current or ex partner: Not on file     Emotionally abused: Not on file     Physically abused: Not on file     Forced sexual activity: Not on file   Other Topics Concern     Not on file   Social History Narrative     Not on file        No family history on file.     Immunization History   Administered Date(s) Administered     COVID-19,PF,Pfizer 01/22/2021, 02/12/2021       Current Outpatient Medications   Medication Sig     aspirin (ASA) 81 MG EC tablet Take 81 mg by mouth every morning      fish oil-omega-3 fatty acids 1000 MG capsule Take 2 g by mouth daily     fluticasone (FLONASE) 50 MCG/ACT nasal spray Spray 2 sprays in nostril every morning      metoprolol succinate ER (TOPROL-XL) 25 MG 24 hr tablet Take 25 mg by mouth every morning      ticagrelor (BRILINTA) 90 MG tablet Take 1 tablet (90 mg) by mouth 2 times daily Start this evening.     triamterene-HCTZ (MAXZIDE-25) 37.5-25 MG tablet      No current facility-administered medications for this visit.         Review of Systems:  I have done 10 points of review systems and all negative except for those mentioned in HPI    Physical examination  Constitutional: Oriented, not in distress  Respiratory: Normal tidal breathing, no shortness of breath, no audible wheezing or stridor over the phone or video visit  Neurological: Alert, orientedx3  Psychiatric:  Mood and affect are appropriate with insight into his/her medical  condition    Data:  Lab Results   Component Value Date    WBC 10.1 03/05/2021     Lab Results   Component Value Date    RBC 3.87 03/05/2021     Lab Results   Component Value Date    HGB 9.6 03/05/2021     Lab Results   Component Value Date    HCT 32.1 03/05/2021     Lab Results   Component Value Date    MCV 83 03/05/2021     Lab Results   Component Value Date    MCH 24.8 03/05/2021     Lab Results   Component Value Date    MCHC 29.9 03/05/2021     Lab Results   Component Value Date    RDW 15.8 03/05/2021     Lab Results   Component Value Date     03/05/2021       Lab Results   Component Value Date     05/17/2021      Lab Results   Component Value Date    POTASSIUM 3.7 05/17/2021     Lab Results   Component Value Date    CHLORIDE 109 05/17/2021     Lab Results   Component Value Date    DREW 9.3 05/17/2021     Lab Results   Component Value Date    CO2 27 05/17/2021     Lab Results   Component Value Date    BUN 26 05/17/2021     Lab Results   Component Value Date    CR 0.98 05/17/2021     Lab Results   Component Value Date     05/17/2021         SETH Skinner MD

## 2021-06-09 NOTE — LETTER
6/9/2021       RE: Lydia Dietz  [id number] [address] [facility] [address]     Dear Colleague,    Thank you for referring your patient, Lydia Dietz, to the [facility] at [facility]. Please see a copy of my visit note below.      [facility]  Interventional Pulmonary Clinic Virtual Visit Note    June 9, 2021    Chief complaint:  Lydia Dietz is a 86 year old female seen for   Chief Complaint   Patient presents with     Telephone     NEW; PULMONARY NODULES        Reason for clinic visit / Chief complaint:   Pulmonary nodules    Assessment and Plan:  Indeterminate pulmonary nodules, measuring up to 4 mm, unchanged when compared to current CT scan with previous ones from March 2021 in August 2020. Her nodules were found incidentally when she had cardiac CT scan last year. She is not high risk for lung cancer. Based on current recommendations/guidelines no need to do further CT scan follow-up.      Billing: Based on Medical Decision Making (Complexity):  L4    History of Present Illness:  This is an 86 years old woman accompanied with her  during this phone visit and referred to my clinic for further evaluation of pulmonary nodules they were originally found incidentally when she had cardiac CT scan in August 2020. She has no known pulmonary problems she smoked few years when she was a teenager.       Allergies   Allergen Reactions     Rosuvastatin Muscle Pain (Myalgia)     Seasonal Allergies      Amlodipine      Other reaction(s): Edema,generalized     Atorvastatin      PN: Reaction: BLURRED VISION     Fenofibrate Muscle Pain (Myalgia)     Fluconazole Nausea     Losartan      PN: Reaction: Back pain and cramping     Pravastatin      PN:  Reaction: JOINT AND MUSCLE PAIN     Simvastatin Nausea     Niacin Rash        Past Medical History:   Diagnosis Date     Allergies      Anemia      Coronary artery disease      HLD  (hyperlipidemia)      HTN (hypertension)      Impaired fasting glucose      Osteoarthritis      Severe aortic stenosis         Past Surgical History:   Procedure Laterality Date     AS LAP INCISIONAL HERNIA REPAIR       BREAST BIOPSY, CORE RT/LT      X3     CHOLECYSTECTOMY       COLECTOMY PARTIAL  2019     CV LEFT HEART CATH N/A 2020    Procedure: Left Heart Cath;  Surgeon: Leonard Pastor MD;  Location:  HEART CARDIAC CATH LAB     CV PCI ANGIOPLASTY N/A 2020    Procedure: CV PCI ANGIOPLASTY;  Surgeon: Leonard Pastor MD;  Location:  HEART CARDIAC CATH LAB     CV TRANSCATHETER AORTIC VALVE REPLACEMENT N/A 10/7/2020    Procedure: Transfemoral, subclavian (DINH) or transapical transcatheter aortic valve replacement 23mm dinh valve placed. cardiovascular standby.;  Surgeon: Leonard Pastor MD;  Location:  OR      LAPAROSCOPY, OVIDUCT/OVARY; REMOVAL       HEART CATH FEMORAL CANNULIZATION WITH OPEN STANDBY REPAIR AORTIC VALVE N/A 10/7/2020    Procedure: Cardiac standby. Perfusion standby.;  Surgeon: Richi Olmos MD;  Location: [facility] OR     HYSTERECTOMY TOTAL ABDOMINAL          Social History     Socioeconomic History     Marital status:      Spouse name: Not on file     Number of children: Not on file     Years of education: Not on file     Highest education level: Not on file   Occupational History     Not on file   Social Needs     Financial resource strain: Not on file     Food insecurity     Worry: Not on file     Inability: Not on file     Transportation needs     Medical: Not on file     Non-medical: Not on file   Tobacco Use     Smoking status: Former Smoker     Quit date:      Years since quittin.4     Smokeless tobacco: Never Used     Tobacco comment: 1 pack per week if that maybe for 5 years    Substance and Sexual Activity     Alcohol use: Yes     Comment: occ      Drug use: Never     Sexual activity: Not on file   Lifestyle     Physical activity     Days per  week: Not on file     Minutes per session: Not on file     Stress: Not on file   Relationships     Social connections     Talks on phone: Not on file     Gets together: Not on file     Attends Confucianist service: Not on file     Active member of club or organization: Not on file     Attends meetings of clubs or organizations: Not on file     Relationship status: Not on file     Intimate partner violence     Fear of current or ex partner: Not on file     Emotionally abused: Not on file     Physically abused: Not on file     Forced sexual activity: Not on file   Other Topics Concern     Not on file   Social History Narrative     Not on file        No family history on file.     Immunization History   Administered Date(s) Administered     COVID-19,PF,Pfizer 01/22/2021, 02/12/2021       Current Outpatient Medications   Medication Sig     aspirin (ASA) 81 MG EC tablet Take 81 mg by mouth every morning      fish oil-omega-3 fatty acids 1000 MG capsule Take 2 g by mouth daily     fluticasone (FLONASE) 50 MCG/ACT nasal spray Spray 2 sprays in nostril every morning      metoprolol succinate ER (TOPROL-XL) 25 MG 24 hr tablet Take 25 mg by mouth every morning      ticagrelor (BRILINTA) 90 MG tablet Take 1 tablet (90 mg) by mouth 2 times daily Start this evening.     triamterene-HCTZ (MAXZIDE-25) 37.5-25 MG tablet      No current facility-administered medications for this visit.         Review of Systems:  I have done 10 points of review systems and all negative except for those mentioned in HPI    Physical examination  Constitutional: Oriented, not in distress  Respiratory: Normal tidal breathing, no shortness of breath, no audible wheezing or stridor over the phone or video visit  Neurological: Alert, orientedx3  Psychiatric:  Mood and affect are appropriate with insight into his/her medical condition    Data:  Lab Results   Component Value Date    WBC 10.1 03/05/2021     Lab Results   Component Value Date    RBC 3.87  03/05/2021     Lab Results   Component Value Date    HGB 9.6 03/05/2021     Lab Results   Component Value Date    HCT 32.1 03/05/2021     Lab Results   Component Value Date    MCV 83 03/05/2021     Lab Results   Component Value Date    MCH 24.8 03/05/2021     Lab Results   Component Value Date    MCHC 29.9 03/05/2021     Lab Results   Component Value Date    RDW 15.8 03/05/2021     Lab Results   Component Value Date     03/05/2021       Lab Results   Component Value Date     05/17/2021      Lab Results   Component Value Date    POTASSIUM 3.7 05/17/2021     Lab Results   Component Value Date    CHLORIDE 109 05/17/2021     Lab Results   Component Value Date    DREW 9.3 05/17/2021     Lab Results   Component Value Date    CO2 27 05/17/2021     Lab Results   Component Value Date    BUN 26 05/17/2021     Lab Results   Component Value Date    CR 0.98 05/17/2021     Lab Results   Component Value Date     05/17/2021         SETH Skinner MD

## 2021-06-30 DIAGNOSIS — E78.5 HYPERLIPIDEMIA LDL GOAL <70: ICD-10-CM

## 2021-06-30 DIAGNOSIS — Z78.9 STATIN INTOLERANCE: Primary | ICD-10-CM

## 2021-08-20 ENCOUNTER — TELEPHONE (OUTPATIENT)
Dept: CARDIOLOGY | Facility: CLINIC | Age: 86
End: 2021-08-20

## 2021-08-20 ENCOUNTER — OFFICE VISIT (OUTPATIENT)
Dept: CARDIOLOGY | Facility: CLINIC | Age: 86
End: 2021-08-20
Attending: INTERNAL MEDICINE
Payer: MEDICARE

## 2021-08-20 VITALS
OXYGEN SATURATION: 98 % | BODY MASS INDEX: 20.83 KG/M2 | DIASTOLIC BLOOD PRESSURE: 69 MMHG | HEART RATE: 84 BPM | WEIGHT: 125 LBS | SYSTOLIC BLOOD PRESSURE: 156 MMHG | HEIGHT: 65 IN

## 2021-08-20 DIAGNOSIS — I25.10 CORONARY ARTERY DISEASE INVOLVING NATIVE CORONARY ARTERY WITHOUT ANGINA PECTORIS, UNSPECIFIED WHETHER NATIVE OR TRANSPLANTED HEART: Primary | ICD-10-CM

## 2021-08-20 DIAGNOSIS — E78.5 HYPERLIPIDEMIA LDL GOAL <70: ICD-10-CM

## 2021-08-20 DIAGNOSIS — Z78.9 STATIN INTOLERANCE: ICD-10-CM

## 2021-08-20 PROCEDURE — 99214 OFFICE O/P EST MOD 30 MIN: CPT | Performed by: INTERNAL MEDICINE

## 2021-08-20 PROCEDURE — G0463 HOSPITAL OUTPT CLINIC VISIT: HCPCS

## 2021-08-20 RX ORDER — EVOLOCUMAB 140 MG/ML
140 INJECTION, SOLUTION SUBCUTANEOUS
Qty: 2 ML | Refills: 12 | Status: SHIPPED | OUTPATIENT
Start: 2021-08-20 | End: 2021-08-24

## 2021-08-20 ASSESSMENT — MIFFLIN-ST. JEOR: SCORE: 1017.88

## 2021-08-20 ASSESSMENT — PAIN SCALES - GENERAL: PAINLEVEL: NO PAIN (0)

## 2021-08-20 NOTE — PATIENT INSTRUCTIONS
"You were seen today in the Cardiovascular Clinic at the [facility].     Cardiology Providers you saw during your visit: Dr. Palomo oLgan    Diagnosis:   Encounter Diagnoses   Name Primary?     Hyperlipidemia LDL goal <70      Statin intolerance      Coronary artery disease involving native coronary artery without angina pectoris, unspecified whether native or transplanted heart Yes          Orders:   Orders Placed This Encounter   Procedures     Vitamin D Deficiency Screening     TSH with free T4 reflex     Lipid panel reflex to direct LDL Fasting     Lipid panel reflex to direct LDL Fasting     Follow-Up with Cardiologist       Recommendations:   1. Some primary care providers you could consider: Praveen Moses, Saad Caceres, Julian Weller  2. Start evolocumab injections every 2 weeks to lower cholesterol.  3. Repeat cholesterol panel 2 months after starting evolocumab.  4. Check thyroid level and vitamin D level.  5. Follow up with Dr. Checo Logan in 1 year with cholesterol checked before the visit.        Please feel free to call me with any questions or concerns.       Charlene MCCARTHY LPN     Questions: [phone]  First press #1 for [employer] for \"Medical Questions\" to reach us Cardiology Nurses.   [phone]   First press #1 for the [facility] and then press #1     On Call Cardiologist for after hours or on weekends: [phone]   option #4 and ask to speak to the on-call Cardiologist.          If you need a medication refill please contact your pharmacy.  Please allow 3 business days for your refill to be completed.  --------------------------------------------------------------   "

## 2021-08-20 NOTE — PROGRESS NOTES
PREVENTIVE CARDIOLOGY INITIAL CONSULTATION    HPI:  Lydia Dietz is a 86 year old female who receives primary care from Dr. Christal Ferreira. She was referred to Cardiology clinic by THOMAS Murphy who manages Lydia's CAD and TAVR.  She was referred for statin intolerance and plan for lipid-lowering therapy.  She is in clinic today with her .    Lydia Dietz is a pleasant woman with a past medical history of HTN, HLD w/statin intolerance, CKD stage III, coronary artery disease status-post PCI of the LAD and RCA 9/9/2020 and severe aortic valvular stenosis status-post transfemoral transcatheter aortic valve replacement (TAVR) with a 23 mm Paredes Arik 3 on 10/7/20 w/mild-moderate PVL.    Lydia has attempted to take multiple statins over many years including simvastatin, atorvastatin, simvastatin, pravastatin.  All have resulted in intolerable side effects including myalgias which rapidly resolved after stopping medication.  Her LDL cholesterol is 167 mg/dL.    Lydia's main complaint today is left lower back pain.  This is under current evaluation and she recently underwent a steroid injection.  She denies chest pain or dyspnea.  She has mild, chronic pedal edema.    Blood pressure in clinic is mildly elevated.  Home BPs: 120s-130s/60s    ASSESSMENT AND PLAN  Lydia Dietz is a 86 year old female  with a past medical history of HTN, HLD w/statin intolerance, CKD stage III, coronary artery disease status-post PCI of the LAD and RCA 9/9/2020 and severe aortic valvular stenosis status-post transfemoral transcatheter aortic valve replacement (TAVR) with a 23 mm Paredes Arik 3 on 10/7/20 w/mild-moderate PVL.  We will plan to start PCSK9 inhibitor.  Lydia is interested in finding a new primary care provider so I gave her the names of several with our clinic here.    Plan:  -Start evolocumab 140 mg subcutaneous every 2 weeks  -Repeat lipid panel in 2 months  -Check TSH and vitamin D as part of work-up  for statin intolerance  -Follow-up in 1 year with cholesterol panel    Thank you for the opportunity to participate in the care of Ms. Lydia Dietz. Please feel free to contact me with any additional questions or concerns.    Palomo Logan MD    Preventive Cardiology  Pager: [phone]    The total time of this encounter amounted to 38 minutes. This time included time spent with the patient, reviewing records, ordering tests, and performing post visit documentation.   PAST MEDICAL HISTORY:  Patient Active Problem List   Diagnosis     Wedge compression fracture of t11-T12 vertebra, initial encounter for closed fracture (H)     Coronary artery disease involving native coronary artery, angina presence unspecified, unspecified whether native or transplanted heart     Status post coronary angiogram     Severe aortic stenosis     Adenomatous polyp of colon     Allergic rhinitis     Back pain, thoracic     Benign neoplasm of colon     Calculus of gallbladder     Chronic left-sided low back pain without sciatica     CKD (chronic kidney disease) stage 3, GFR 30-59 ml/min     Counseling on health care directive     Counseling regarding advanced directives and goals of care     Degeneration, intervertebral disc, lumbosacral     Abdominal hernia     Diffuse cystic mastopathy     Endometriosis     Essential hypertension     Hyperlipidemia LDL goal <70     IFG (impaired fasting glucose)     Microscopic hematuria     Osteoarthrosis     Renal cyst, left     Symptoms involving cardiovascular system     Varicella     Aortic stenosis, severe     Chest pain     Statin intolerance     Past Medical History:   Diagnosis Date     Allergies      Anemia      Coronary artery disease      HLD (hyperlipidemia)      HTN (hypertension)      Impaired fasting glucose      Osteoarthritis      Severe aortic stenosis        CURRENT MEDICATIONS:  Current Outpatient Medications   Medication Sig Dispense Refill     aspirin (ASA)  81 MG EC tablet Take 81 mg by mouth every morning        evolocumab (REPATHA SURECLICK) 140 MG/ML prefilled autoinjector Inject 1 mL (140 mg) Subcutaneous every 14 days 2 mL 12     fish oil-omega-3 fatty acids 1000 MG capsule Take 2 g by mouth daily       fluticasone (FLONASE) 50 MCG/ACT nasal spray Spray 2 sprays in nostril every morning        metoprolol succinate ER (TOPROL-XL) 25 MG 24 hr tablet Take 25 mg by mouth every morning        ticagrelor (BRILINTA) 90 MG tablet Take 1 tablet (90 mg) by mouth 2 times daily Start this evening. 180 tablet 3     triamterene-HCTZ (MAXZIDE-25) 37.5-25 MG tablet          PAST SURGICAL HISTORY:  Past Surgical History:   Procedure Laterality Date     AS LAP INCISIONAL HERNIA REPAIR       BREAST BIOPSY, CORE RT/LT      X3     CHOLECYSTECTOMY       COLECTOMY PARTIAL  2019     CV LEFT HEART CATH N/A 9/9/2020    Procedure: Left Heart Cath;  Surgeon: Leonard Pastor MD;  Location:  HEART CARDIAC CATH LAB     CV PCI ANGIOPLASTY N/A 9/9/2020    Procedure: CV PCI ANGIOPLASTY;  Surgeon: Leonard Pastor MD;  Location:  HEART CARDIAC CATH LAB     CV TRANSCATHETER AORTIC VALVE REPLACEMENT N/A 10/7/2020    Procedure: Transfemoral, subclavian (DINH) or transapical transcatheter aortic valve replacement 23mm dinh valve placed. cardiovascular standby.;  Surgeon: Leonard Pastor MD;  Location: [facility] OR      LAPAROSCOPY, OVIDUCT/OVARY; REMOVAL       HEART CATH FEMORAL CANNULIZATION WITH OPEN STANDBY REPAIR AORTIC VALVE N/A 10/7/2020    Procedure: Cardiac standby. Perfusion standby.;  Surgeon: Richi Olmos MD;  Location: [facility] OR     HYSTERECTOMY TOTAL ABDOMINAL         ALLERGIES  Rosuvastatin, Seasonal allergies, Amlodipine, Atorvastatin, Fenofibrate, Fluconazole, Losartan, Pravastatin, Simvastatin, and Niacin    FAMILY HX:  No family history on file.    SOCIAL HX:  Social History     Tobacco Use     Smoking status: Former Smoker     Quit date: 1980     Years since quitting:  "41.6     Smokeless tobacco: Never Used     Tobacco comment: 1 pack per week if that maybe for 5 years    Substance Use Topics     Alcohol use: Yes     Comment: occ      Drug use: Never        ROS:  Comprehensive ROS is negative except as noted in HPI.    VITAL SIGNS:  BP (!) 156/69 (BP Location: Left arm, Patient Position: Chair, Cuff Size: Adult Regular)   Pulse 84   Ht 1.651 m (5' 5\")   Wt 56.7 kg (125 lb)   SpO2 98%   BMI 20.80 kg/m    Body mass index is 20.8 kg/m .  Wt Readings from Last 2 Encounters:   08/20/21 56.7 kg (125 lb)   05/17/21 55.8 kg (123 lb)       PHYSICAL EXAM  Gen: pleasant woman sitting comfortably in NAD  Head: nc/at  Eyes: EOMI  Neck: supple, no thyromegaly  CV: nml s1/s2, 2/6 systolic ejection murmur at left sternal border that radiates to bilateral carotids; no JVD  Chest: clear lungs  Abd: soft, NT, NABS  Ext: warm, 1+ bilateral pedal edema  Skin: no rash on limited exam  Neuro: awake, alert, oriented, nml speech    LABS: personally reviewed  CMP  Recent Labs   Lab Test 05/17/21  0930 03/05/21  0536 03/04/21  0629 03/03/21  0000 11/16/20  0947 10/08/20  0522 10/07/20  1834 09/09/20  2052 09/09/20  1230    140 140  --  140 138 137 138 138   POTASSIUM 3.7 3.3* 3.9 3.8 4.1 3.5 3.5 3.9 3.8   CHLORIDE 109 108 109  --  109 105 106 107 104   CO2 27 25 25  --  26 27 25 26 31   ANIONGAP 6 7 6  --  5 6 5 5 3   * 94 106* 155* 98 115* 108* 178* 99   BUN 26 23 25  --  27 22 25 24 30   CR 0.98 0.89 0.94 1.17* 1.26* 0.86 0.88 0.84 0.91   GFRESTIMATED 53* 60* 56* 43* 39* 63 60* 64 58*   GFRESTBLACK 61 69 65  --  45* 73 70 74 67   DREW 9.3 9.0 8.5  --  9.4 8.6 8.1* 8.4* 9.6   MAG  --   --  2.1  --   --  2.1 2.0 2.0  --    PHOS  --   --  3.3  --   --  3.1 3.2  --   --    PROTTOTAL  --   --   --   --   --   --  5.3*  --  7.3   ALBUMIN  --   --   --   --   --   --  2.6*  --  3.6   BILITOTAL  --   --   --   --   --   --  0.4  --  0.6   ALKPHOS  --   --   --   --   --   --  59  --  76   AST  -- "   --   --  29  --   --  17  --  19   ALT  --   --   --  35  --   --  15  --  21     CBC  Recent Labs   Lab Test 03/05/21  0536 03/04/21  0629 11/16/20  0947 10/08/20  1604   WBC 10.1 16.3* 5.7 9.0   RBC 3.87 3.27* 4.08 3.20*   HGB 9.6* 8.2* 10.6* 8.7*   HCT 32.1* 26.7* 35.9 28.4*   MCV 83 82 88 89   MCH 24.8* 25.1* 26.0* 27.2   MCHC 29.9* 30.7* 29.5* 30.6*   RDW 15.8* 16.0* 13.2 13.7   * 99* 159 143*     INR  Recent Labs   Lab Test 10/07/20  0916 09/09/20  1230   INR 0.97 0.98     Recent Labs   Lab Test 09/09/20  1821   CHOL 242*   HDL 62   *   TRIG 66     No lab results found.    Most recent ECHO: reviewed  5/17/21 TTE  Interpretation Summary  S/P TAVR with 23 mm Paredes Arik 3 valve on 10/07/2020. Mean aortic gradient  12mmHg. Mild paravalvular AI.  No significant changes noted.  ______________________________________________________________________________  Left Ventricle  Global and regional left ventricular function is normal with an EF of 60-65%.  Left ventricular wall thickness is normal. Left ventricular size is normal.  Diastolic function not assessed due to significant mitral annular  calcification. No regional wall motion abnormalities are seen.     Right Ventricle  Right ventricular function, chamber size, wall motion, and thickness are  normal.

## 2021-08-20 NOTE — TELEPHONE ENCOUNTER
Prior Authorization Approval    Authorization Effective Date: 7/21/2021  Authorization Expiration Date: 8/20/2022  Medication: Repatha Sureclick 140MG/ML  auto-injectors (APPROVED)  Approved Dose/Quantity: 28 days  Reference #:     Insurance Company: [employer] - Phone [phone] Fax [phone]  Expected CoPay: $50     CoPay Card Available: Yes    Foundation Assistance Needed: Dimitris Ready  Which Pharmacy is filling the prescription (Not needed for infusion/clinic administered): [facility] - [address]  Pharmacy Notified:    Patient Notified:      Patient can enroll for copay assistance by calling [phone] ([phone]).

## 2021-08-20 NOTE — NURSING NOTE
Chief Complaint   Patient presents with     New Patient     New Pt Vsiit - Prevention      Vitals were taken and medications where reconciled.   Kiet Beard, EMT  9:37 AM

## 2021-08-20 NOTE — LETTER
8/20/2021      RE: Lydia Dietz  [address] [facility] [address]       Dear Colleague,    Thank you for the opportunity to participate in the care of your patient, Lydia Dietz, at the [facility] [place] [facility]. Please see a copy of my visit note below.    PREVENTIVE CARDIOLOGY INITIAL CONSULTATION    HPI:  Lydia Dietz is a 86 year old female who receives primary care from Dr. Christal Ferreira. She was referred to Cardiology clinic by THOMAS Murphy who manages Lydia's CAD and TAVR.  She was referred for statin intolerance and plan for lipid-lowering therapy.  She is in clinic today with her .    Lydia Dietz is a pleasant woman with a past medical history of HTN, HLD w/statin intolerance, CKD stage III, coronary artery disease status-post PCI of the LAD and RCA 9/9/2020 and severe aortic valvular stenosis status-post transfemoral transcatheter aortic valve replacement (TAVR) with a 23 mm Paredes Arik 3 on 10/7/20 w/mild-moderate PVL.    Lydia has attempted to take multiple statins over many years including simvastatin, atorvastatin, simvastatin, pravastatin.  All have resulted in intolerable side effects including myalgias which rapidly resolved after stopping medication.  Her LDL cholesterol is 167 mg/dL.    Lydia's main complaint today is left lower back pain.  This is under current evaluation and she recently underwent a steroid injection.  She denies chest pain or dyspnea.  She has mild, chronic pedal edema.    Blood pressure in clinic is mildly elevated.  Home BPs: 120s-130s/60s    ASSESSMENT AND PLAN  Lydia Dietz is a 86 year old female  with a past medical history of HTN, HLD w/statin intolerance, CKD stage III, coronary artery disease status-post PCI of the LAD and RCA 9/9/2020 and severe aortic valvular stenosis status-post transfemoral transcatheter aortic valve replacement (TAVR) with a 23 mm  Reggie Elise 3 on 10/7/20 w/mild-moderate PVL.  We will plan to start PCSK9 inhibitor.  Lydia is interested in finding a new primary care provider so I gave her the names of several with our clinic here.    Plan:  -Start evolocumab 140 mg subcutaneous every 2 weeks  -Repeat lipid panel in 2 months  -Check TSH and vitamin D as part of work-up for statin intolerance  -Follow-up in 1 year with cholesterol panel    Thank you for the opportunity to participate in the care of Ms. Lydia Dietz. Please feel free to contact me with any additional questions or concerns.    Palomo Logan MD    Preventive Cardiology  Pager: [phone]    The total time of this encounter amounted to 38 minutes. This time included time spent with the patient, reviewing records, ordering tests, and performing post visit documentation.   PAST MEDICAL HISTORY:  Patient Active Problem List   Diagnosis     Wedge compression fracture of t11-T12 vertebra, initial encounter for closed fracture (H)     Coronary artery disease involving native coronary artery, angina presence unspecified, unspecified whether native or transplanted heart     Status post coronary angiogram     Severe aortic stenosis     Adenomatous polyp of colon     Allergic rhinitis     Back pain, thoracic     Benign neoplasm of colon     Calculus of gallbladder     Chronic left-sided low back pain without sciatica     CKD (chronic kidney disease) stage 3, GFR 30-59 ml/min     Counseling on health care directive     Counseling regarding advanced directives and goals of care     Degeneration, intervertebral disc, lumbosacral     Abdominal hernia     Diffuse cystic mastopathy     Endometriosis     Essential hypertension     Hyperlipidemia LDL goal <70     IFG (impaired fasting glucose)     Microscopic hematuria     Osteoarthrosis     Renal cyst, left     Symptoms involving cardiovascular system     Varicella     Aortic stenosis, severe     Chest pain     Statin  intolerance     Past Medical History:   Diagnosis Date     Allergies      Anemia      Coronary artery disease      HLD (hyperlipidemia)      HTN (hypertension)      Impaired fasting glucose      Osteoarthritis      Severe aortic stenosis        CURRENT MEDICATIONS:  Current Outpatient Medications   Medication Sig Dispense Refill     aspirin (ASA) 81 MG EC tablet Take 81 mg by mouth every morning        evolocumab (REPATHA SURECLICK) 140 MG/ML prefilled autoinjector Inject 1 mL (140 mg) Subcutaneous every 14 days 2 mL 12     fish oil-omega-3 fatty acids 1000 MG capsule Take 2 g by mouth daily       fluticasone (FLONASE) 50 MCG/ACT nasal spray Spray 2 sprays in nostril every morning        metoprolol succinate ER (TOPROL-XL) 25 MG 24 hr tablet Take 25 mg by mouth every morning        ticagrelor (BRILINTA) 90 MG tablet Take 1 tablet (90 mg) by mouth 2 times daily Start this evening. 180 tablet 3     triamterene-HCTZ (MAXZIDE-25) 37.5-25 MG tablet          PAST SURGICAL HISTORY:  Past Surgical History:   Procedure Laterality Date     AS LAP INCISIONAL HERNIA REPAIR       BREAST BIOPSY, CORE RT/LT      X3     CHOLECYSTECTOMY       COLECTOMY PARTIAL  2019     CV LEFT HEART CATH N/A 9/9/2020    Procedure: Left Heart Cath;  Surgeon: Leonard Pastor MD;  Location:  HEART CARDIAC CATH LAB     CV PCI ANGIOPLASTY N/A 9/9/2020    Procedure: CV PCI ANGIOPLASTY;  Surgeon: Leonard Pastor MD;  Location: [facility] CARDIAC CATH LAB     CV TRANSCATHETER AORTIC VALVE REPLACEMENT N/A 10/7/2020    Procedure: Transfemoral, subclavian (DINH) or transapical transcatheter aortic valve replacement 23mm dinh valve placed. cardiovascular standby.;  Surgeon: Leonard Pastor MD;  Location: [facility] LAPAROSCOPY, OVIDUCT/OVARY; REMOVAL       HEART CATH FEMORAL CANNULIZATION WITH OPEN STANDBY REPAIR AORTIC VALVE N/A 10/7/2020    Procedure: Cardiac standby. Perfusion standby.;  Surgeon: Richi Olmos MD;  Location:  OR      "HYSTERECTOMY TOTAL ABDOMINAL         ALLERGIES  Rosuvastatin, Seasonal allergies, Amlodipine, Atorvastatin, Fenofibrate, Fluconazole, Losartan, Pravastatin, Simvastatin, and Niacin    FAMILY HX:  No family history on file.    SOCIAL HX:  Social History     Tobacco Use     Smoking status: Former Smoker     Quit date: 1980     Years since quittin.6     Smokeless tobacco: Never Used     Tobacco comment: 1 pack per week if that maybe for 5 years    Substance Use Topics     Alcohol use: Yes     Comment: occ      Drug use: Never        ROS:  Comprehensive ROS is negative except as noted in HPI.    VITAL SIGNS:  BP (!) 156/69 (BP Location: Left arm, Patient Position: Chair, Cuff Size: Adult Regular)   Pulse 84   Ht 1.651 m (5' 5\")   Wt 56.7 kg (125 lb)   SpO2 98%   BMI 20.80 kg/m    Body mass index is 20.8 kg/m .  Wt Readings from Last 2 Encounters:   21 56.7 kg (125 lb)   21 55.8 kg (123 lb)       PHYSICAL EXAM  Gen: pleasant woman sitting comfortably in NAD  Head: nc/at  Eyes: EOMI  Neck: supple, no thyromegaly  CV: nml s1/s2, 2/6 systolic ejection murmur at left sternal border that radiates to bilateral carotids; no JVD  Chest: clear lungs  Abd: soft, NT, NABS  Ext: warm, 1+ bilateral pedal edema  Skin: no rash on limited exam  Neuro: awake, alert, oriented, nml speech    LABS: personally reviewed  CMP  Recent Labs   Lab Test 21  0930 21  0536 21  0629 21  0000 20  0947 10/08/20  0522 10/07/20  1834 20  2052 20  1230    140 140  --  140 138 137 138 138   POTASSIUM 3.7 3.3* 3.9 3.8 4.1 3.5 3.5 3.9 3.8   CHLORIDE 109 108 109  --  109 105 106 107 104   CO2 27 25 25  --  26 27 25 26 31   ANIONGAP 6 7 6  --  5 6 5 5 3   * 94 106* 155* 98 115* 108* 178* 99   BUN 26 23 25  --  27 22 25 24 30   CR 0.98 0.89 0.94 1.17* 1.26* 0.86 0.88 0.84 0.91   GFRESTIMATED 53* 60* 56* 43* 39* 63 60* 64 58*   GFRESTBLACK 61 69 65  --  45* 73 70 74 67   DREW 9.3 9.0 8.5 "  --  9.4 8.6 8.1* 8.4* 9.6   MAG  --   --  2.1  --   --  2.1 2.0 2.0  --    PHOS  --   --  3.3  --   --  3.1 3.2  --   --    PROTTOTAL  --   --   --   --   --   --  5.3*  --  7.3   ALBUMIN  --   --   --   --   --   --  2.6*  --  3.6   BILITOTAL  --   --   --   --   --   --  0.4  --  0.6   ALKPHOS  --   --   --   --   --   --  59  --  76   AST  --   --   --  29  --   --  17  --  19   ALT  --   --   --  35  --   --  15  --  21     CBC  Recent Labs   Lab Test 03/05/21  0536 03/04/21  0629 11/16/20  0947 10/08/20  1604   WBC 10.1 16.3* 5.7 9.0   RBC 3.87 3.27* 4.08 3.20*   HGB 9.6* 8.2* 10.6* 8.7*   HCT 32.1* 26.7* 35.9 28.4*   MCV 83 82 88 89   MCH 24.8* 25.1* 26.0* 27.2   MCHC 29.9* 30.7* 29.5* 30.6*   RDW 15.8* 16.0* 13.2 13.7   * 99* 159 143*     INR  Recent Labs   Lab Test 10/07/20  0916 09/09/20  1230   INR 0.97 0.98     Recent Labs   Lab Test 09/09/20  1821   CHOL 242*   HDL 62   *   TRIG 66     No lab results found.    Most recent ECHO: reviewed  5/17/21 TTE  Interpretation Summary  S/P TAVR with 23 mm Paredes Arik 3 valve on 10/07/2020. Mean aortic gradient  12mmHg. Mild paravalvular AI.  No significant changes noted.  ______________________________________________________________________________  Left Ventricle  Global and regional left ventricular function is normal with an EF of 60-65%.  Left ventricular wall thickness is normal. Left ventricular size is normal.  Diastolic function not assessed due to significant mitral annular  calcification. No regional wall motion abnormalities are seen.     Right Ventricle  Right ventricular function, chamber size, wall motion, and thickness are  normal.        Please do not hesitate to contact me if you have any questions/concerns.     Sincerely,     Palomo Logan MD

## 2021-08-23 ENCOUNTER — TELEPHONE (OUTPATIENT)
Dept: CARDIOLOGY | Facility: CLINIC | Age: 86
End: 2021-08-23

## 2021-08-23 NOTE — TELEPHONE ENCOUNTER
[facility] Call Center    Phone Message    May a detailed message be left on voicemail: yes     Reason for Call: Other: Lydia called in stating she wanted Charlene to call her back regarding a survey she received and she wants to let her know who she wants to add as her primary. Please call and discuss. Thanks!     Action Taken: Message routed to:  [facility] ([facility] Cardio    Travel Screening: Not Applicable

## 2021-08-23 NOTE — TELEPHONE ENCOUNTER
Called pt back to discuss questions about starting evolocumab. Pt and  stating they are not able to fill prescription through [facility] because it is an injectable med which mail order pharmacy will not do.     They are also wondering about scheduling a nurse visit for the first injection to walk through the steps.     I have reached out to Sis Campbell about switching to the [facility] in [facility] per pt request. Once we know we can get the medication filled I will reach out to pt and schedule a nurse visit for the first injection.     Aaron Miranda RN   Cardiology Nurse Coordinator

## 2021-08-24 ENCOUNTER — TELEPHONE (OUTPATIENT)
Dept: CARDIOLOGY | Facility: CLINIC | Age: 86
End: 2021-08-24

## 2021-08-24 DIAGNOSIS — Z78.9 STATIN INTOLERANCE: ICD-10-CM

## 2021-08-24 DIAGNOSIS — I25.10 CORONARY ARTERY DISEASE INVOLVING NATIVE CORONARY ARTERY WITHOUT ANGINA PECTORIS, UNSPECIFIED WHETHER NATIVE OR TRANSPLANTED HEART: ICD-10-CM

## 2021-08-24 DIAGNOSIS — E78.5 HYPERLIPIDEMIA LDL GOAL <70: ICD-10-CM

## 2021-08-24 RX ORDER — EVOLOCUMAB 140 MG/ML
140 INJECTION, SOLUTION SUBCUTANEOUS
Qty: 2 ML | Refills: 12 | Status: SHIPPED | OUTPATIENT
Start: 2021-08-24 | End: 2021-10-20

## 2021-08-24 NOTE — TELEPHONE ENCOUNTER
"Pt unable to have medication filled by [facility] as her insurance no longer considers PCSK9s \"specialty\" so fewer mail order pharmacies are dispensing and having patients transition to \"retail\" pharmacies per Sis Campbell.     I have sent a new script to [facility] pharmacy which should be able to fill mediation. I also passed along information about Repatha's copay assistance program which will hopefully bring her cost down to $5/month. Sis had attempted to enroll pt herself but she was unable to do so. Pt will need to call them to set this up.  [phone] ([phone])    Pt also states she would like to come in for first injection. I stated I would call her back tomorrow to set this time and date up so she can watch a demonstration.     Aaron Miranda, RN   Cardiology Nurse Coordinator       "

## 2021-08-27 ENCOUNTER — TELEPHONE (OUTPATIENT)
Dept: CARDIOLOGY | Facility: CLINIC | Age: 86
End: 2021-08-27

## 2021-08-27 NOTE — TELEPHONE ENCOUNTER
Spoke with pt. States she was able to  her medication from the [facility] yestserday (8/26). We will set up a nurse visit on Monday August 30, 2021 at 10:30 AM per pt request to walk through the steps of the first dose.     Pt reports understanding and denies any further questions at this time.     Aaron Miranda RN   Cardiology Nurse Coordinator

## 2021-08-30 ENCOUNTER — ALLIED HEALTH/NURSE VISIT (OUTPATIENT)
Dept: CARDIOLOGY | Facility: CLINIC | Age: 86
End: 2021-08-30
Payer: MEDICARE

## 2021-08-30 VITALS — DIASTOLIC BLOOD PRESSURE: 70 MMHG | SYSTOLIC BLOOD PRESSURE: 169 MMHG

## 2021-08-30 DIAGNOSIS — E78.5 HYPERLIPIDEMIA LDL GOAL <70: Primary | ICD-10-CM

## 2021-09-01 ENCOUNTER — TELEPHONE (OUTPATIENT)
Dept: CARDIOLOGY | Facility: CLINIC | Age: 86
End: 2021-09-01

## 2021-09-01 NOTE — TELEPHONE ENCOUNTER
[facility] Call Center    Phone Message    May a detailed message be left on voicemail: yes     Reason for Call: Other: pt stated on her AVS that she has a few labs to complete, she was never told about those and would like some clarification, please call Lydia, thank you      Action Taken: Message routed to:  [facility] heart    Travel Screening: Not Applicable

## 2021-09-02 NOTE — TELEPHONE ENCOUNTER
Attempted to call Lydia back to discuss labs needed. Pt did not answer but left a message instructing Lydia to call us back so we could discuss timing of labs.     Aaron Miranda, RN   Cardiology Nurse Coordinator

## 2021-09-03 ENCOUNTER — TELEPHONE (OUTPATIENT)
Dept: CARDIOLOGY | Facility: CLINIC | Age: 86
End: 2021-09-03

## 2021-09-03 NOTE — TELEPHONE ENCOUNTER
Called pt back to clarify when she needs labs done. Pt was concerned she had missed labs. I stated the labs were ordered the day of her appointment but they were not missed that day.     I told Lydia we will want to check her labs again in about 2 months to see where her lipids are after starting Repatha. If those labs look good, Dr. Checo Logan will see her back in 1 year with labs prior to that appointment.    Lydia reports understanding and denies any further questions.     Aaron Miranda RN   Cardiology Nurse Coordinator

## 2021-09-09 NOTE — NURSING NOTE
"Pt came into clinic for demonstration prior to first repatha dose.     Had pt watch demonstration video on Repatha website. Pt had some questions about where she could inject shot. I stated she could inject in her upper thighs, lower abdomen (at least 2\" away from belly button), or the back of the arm is someone is administering the shot for her.     Pt then successfully administered shot independently. Educated pt and  on what supplies they will need at home such as alcohol swabs, gauze pads, bandages and sharps container.  was able to buy sharps container at the Pharmacy.     Pt had questions about follow up. I stated we wanted her to have labs checked in 2 months, the orders are in so she just needs to set up a lab appointment.     Pt and  denied further questions at this time.     Aaron Miranda RN   Cardiology Nurse Coordinator     "

## 2021-09-13 ENCOUNTER — TELEPHONE (OUTPATIENT)
Dept: CARDIOLOGY | Facility: CLINIC | Age: 86
End: 2021-09-13

## 2021-09-13 DIAGNOSIS — E78.5 HYPERLIPIDEMIA LDL GOAL <70: Primary | ICD-10-CM

## 2021-09-13 NOTE — TELEPHONE ENCOUNTER
[facility] Call Center    Phone Message    May a detailed message be left on voicemail: no     Reason for Call: Other: Geovany called to speak with Catie Matos RN. Please reach ou to Geovany at [phone].     Action Taken: Message routed to:  [facility] Cardiology    Travel Screening: Not Applicable

## 2021-09-15 NOTE — TELEPHONE ENCOUNTER
Pt was supposed to take shot on Monday. But states it took a few attempts and when she pulled out the needle all of the medication came out. Her  called the drug store and they stated it would take about 2 days. They still have not received dose, should be getting dose on 9/16.     I stated she should take it when she receives it tomorrow. Then continue taking every 2 weeks after that.    Has some questions again about administrating dose. States they received a video from their neighbors on how to administer medication as well. We walked through administrations steps again. Pt states she also had labs drawn. She was not supposed to have labs drawn for about 2 more months. I placed new orders for labs and stated I would call pt to remind her when to go have them drawn.     Aaron Miranda RN   Cardiology Nurse Coordinator

## 2021-10-11 ENCOUNTER — TELEPHONE (OUTPATIENT)
Dept: CARDIOLOGY | Facility: CLINIC | Age: 86
End: 2021-10-11

## 2021-10-11 NOTE — TELEPHONE ENCOUNTER
Called pt back per request.     Started taking Brilinta in September. Wondering if she needs to stay on long term or just one year. I am going to confirm with TAVR team that pt is okay to stop taking Brilinta at this time as she had procedure done in October 2022.     Also notified pt she is due for labs at the end of this month. She had a lot of questions about when to come in so I stated she should go to the [facility] labs on Friday October 29 to have lipid panel checked. I will fax the orders over to them today.     I stated I would reach out to pt when I hear back from TAVR team about stopping Brilinta. Pt and  report understanding and deny any further questions at this time.     Aaron Miranda, RN   Cardiology Nurse Coordinator

## 2021-10-11 NOTE — TELEPHONE ENCOUNTER
[facility] Call Center    Phone Message    May a detailed message be left on voicemail: yes     Reason for Call: Other: Lydia calling wanting to talk to Kylah. She states it's a long message so she would prefer just to talk with her. Thank you!     Action Taken: Message routed to:  [facility] Cardio    Travel Screening: Not Applicable

## 2021-10-12 ENCOUNTER — TELEPHONE (OUTPATIENT)
Dept: CARDIOLOGY | Facility: CLINIC | Age: 86
End: 2021-10-12

## 2021-10-12 NOTE — TELEPHONE ENCOUNTER
Date: 10/12/2021    Time of Call: 11:01 AM     [ TORB ] Ordering provider: Gracia Murphy NP  Order: STOP Brilinta     Order received by: EVENS Haddad      Follow-up/additional notes: Called pt back after confirming she is okay to stop Brilinta per the TAVR team. Pt is to continue taking aspirin daily.     Pt did not answer but left VM stating she can stop brilinta per her TAVR team. Instructed pt to call us back if she had any questions.     Aaron Miranda RN   Cardiology Nurse Coordinator

## 2021-10-15 DIAGNOSIS — E78.5 HYPERLIPIDEMIA LDL GOAL <70: ICD-10-CM

## 2021-10-15 DIAGNOSIS — I25.10 CORONARY ARTERY DISEASE INVOLVING NATIVE CORONARY ARTERY WITHOUT ANGINA PECTORIS, UNSPECIFIED WHETHER NATIVE OR TRANSPLANTED HEART: ICD-10-CM

## 2021-10-15 DIAGNOSIS — Z78.9 STATIN INTOLERANCE: ICD-10-CM

## 2021-10-18 ENCOUNTER — TELEPHONE (OUTPATIENT)
Dept: CARDIOLOGY | Facility: CLINIC | Age: 86
End: 2021-10-18

## 2021-10-18 NOTE — TELEPHONE ENCOUNTER
[facility] Call Center    Phone Message    May a detailed message be left on voicemail: yes     Reason for Call: Order(s): Other:   Reason for requested: Needing verbal orders for evolocumab (REPATHA SURECLICK) 140 MG/ML prefilled autoinjector due to it being damaged when received it.  Date needed: ASAP  Provider name: Dr. Casey Logan      Action Taken: Message routed to:  [facility] Cardio    Travel Screening: Not Applicable

## 2021-10-18 NOTE — TELEPHONE ENCOUNTER
Received message from [employer] who is the company responsible for shipping the patient's Repatha.  Patient must have initiated the call to them to report on of the syringes was damaged/broken and in order to replace, the ordering physician had to approve.  I called and spoke with pharmacist there today at [phone].  They will ship patient a replacement at no charge.

## 2021-10-20 NOTE — TELEPHONE ENCOUNTER
evolocumab (REPATHA SURECLICK) 140 MG/ML prefilled autoinjector      Last Written Prescription Date:  8/24/21 to [facility]  Last Fill Quantity: 2 ml,   # refills: 12  Last Office Visit : Palomo Iverson MD  Cardiovascular Disease  8/20/2021  [facility]  Recommended 1 year follow up  Future Office visit:  None scheduled    Routing refill request to provider for review/approval because:  Drug not on cardiology refill protocol

## 2021-10-21 RX ORDER — EVOLOCUMAB 140 MG/ML
140 INJECTION, SOLUTION SUBCUTANEOUS
Qty: 2 ML | Refills: 0 | Status: SHIPPED | OUTPATIENT
Start: 2021-10-21 | End: 2022-01-17

## 2021-10-21 NOTE — TELEPHONE ENCOUNTER
Pt needs to have labs drawn in a few weeks. Will provide short refill for now until those results come back.     Aaron Miranda, RN   Cardiology Nurse Coordinator

## 2021-10-29 DIAGNOSIS — Z95.2 S/P TAVR (TRANSCATHETER AORTIC VALVE REPLACEMENT): Primary | ICD-10-CM

## 2021-10-29 RX ORDER — AMOXICILLIN 500 MG/1
2000 CAPSULE ORAL SEE ADMIN INSTRUCTIONS
Qty: 4 CAPSULE | Refills: 0 | Status: SHIPPED | OUTPATIENT
Start: 2021-10-29 | End: 2024-01-24

## 2021-10-29 NOTE — PROGRESS NOTES
Date: 10/29/2021    Time of Call: 11:07 AM     Diagnosis:  S/p TAVR     [ TORB ] Ordering provider: Daphne Murphy CNP  Order:   Amoxicillin 2000mg, by mouth, one hour prior to dental exam     Order received by: Elizabeth Mata RN     Follow-up/additional notes:   Lydia requested ABX to be ordered and sent to her pharmacy.  This was ordered and completed.

## 2021-11-21 NOTE — PROGRESS NOTES
[facility] HEART [facility]  CARDIOVASCULAR DIVISION    VALVE CLINIC RETURN VISIT      Lydia Dietz is a 86 year old female with a PMH of coronary artery disease status-post PCI of the LAD and RCA 9/9/2020, HLD w/statin intolerance on Repatha, HTN, CKD stage III and severe aortic valvular stenosis status-post TAVR with a 23 mm Paredes Arik 3 on 10/7/20 who presents for annual follow-up. She was last evaluated in valve clinic 1 month post TAVR, echo showed stable mean PG of 11 mmHg and mild-moderate PVL w/ associated large protruding annular calcification. Given that she was asymptomatic and potential procedural risk associated with large protruding calcium, decision was made to medically manage PVL. She was seen by my colleague Dr. Checo Logan for statin intolerance and was started on Repatha. Her lipids have improved significantly.     Lydia is accompanied to the visit by her , Geovany. Since she was last evaluated she has been feeling well. She remains active walking around the house 20-30 minutes a day. She also climbs the stairs in house 3 x daily. She denies any chest pain or significant shortness of breath. She does notice that she is a little winded with exercise but states that this is not new and does not limit her in anyway. She denies any orthopnea, PND, leg swelling, weight gain, palpitations, lightheadedness or syncope. She does feel that her legs feel weak/tired with exercise. She denies any claudication, rest pain or ulcerations. She felt really well when she was participating in cardiac rehab after her TAVR - she now feels like she isn't in as quite of good of shape. She would like to join the gym again. Home blood pressures have been 123/50-60s, she is consistently high in clinic due to anxiety.      PAST MEDICAL HISTORY:     Past Medical History:   Diagnosis Date     Allergies      Anemia      Coronary artery disease      HLD (hyperlipidemia)      HTN (hypertension)      Impaired  fasting glucose      Osteoarthritis      Severe aortic stenosis       PAST SURGICAL HISTORY:     Past Surgical History:   Procedure Laterality Date     AS LAP INCISIONAL HERNIA REPAIR       BREAST BIOPSY, CORE RT/LT      X3     CHOLECYSTECTOMY       COLECTOMY PARTIAL  2019     CV LEFT HEART CATH N/A 9/9/2020    Procedure: Left Heart Cath;  Surgeon: Leonard Pastor MD;  Location:  HEART CARDIAC CATH LAB     CV PCI ANGIOPLASTY N/A 9/9/2020    Procedure: CV PCI ANGIOPLASTY;  Surgeon: Leonard Pastor MD;  Location:  HEART CARDIAC CATH LAB     CV TRANSCATHETER AORTIC VALVE REPLACEMENT N/A 10/7/2020    Procedure: Transfemoral, subclavian (DINH) or transapical transcatheter aortic valve replacement 23mm dinh valve placed. cardiovascular standby.;  Surgeon: Leonard Pastor MD;  Location: [facility] OR      LAPAROSCOPY, OVIDUCT/OVARY; REMOVAL       HEART CATH FEMORAL CANNULIZATION WITH OPEN STANDBY REPAIR AORTIC VALVE N/A 10/7/2020    Procedure: Cardiac standby. Perfusion standby.;  Surgeon: Richi Olmos MD;  Location: [facility] OR     HYSTERECTOMY TOTAL ABDOMINAL        CURRENT MEDICATIONS:     Current Outpatient Medications   Medication     amoxicillin (AMOXIL) 500 MG capsule     aspirin (ASA) 81 MG EC tablet     evolocumab (REPATHA SURECLICK) 140 MG/ML prefilled autoinjector     fish oil-omega-3 fatty acids 1000 MG capsule     fluticasone (FLONASE) 50 MCG/ACT nasal spray     metoprolol succinate ER (TOPROL-XL) 25 MG 24 hr tablet     triamterene-HCTZ (MAXZIDE-25) 37.5-25 MG tablet     No current facility-administered medications for this visit.      ALLERGIES:      Allergies   Allergen Reactions     Rosuvastatin Muscle Pain (Myalgia)     Seasonal Allergies      Amlodipine      Other reaction(s): Edema,generalized     Atorvastatin      PN: Reaction: BLURRED VISION     Fenofibrate Muscle Pain (Myalgia)     Fluconazole Nausea     Losartan      PN: Reaction: Back pain and cramping     Pravastatin      PN:   Reaction: JOINT AND MUSCLE PAIN     Simvastatin Nausea     Niacin Rash        FAMILY HISTORY:       No family history on file.        SOCIAL HISTORY:     Social History     Socioeconomic History     Marital status:      Spouse name: Not on file     Number of children: Not on file     Years of education: Not on file     Highest education level: Not on file   Occupational History     Not on file   Tobacco Use     Smoking status: Former Smoker     Quit date:      Years since quittin.9     Smokeless tobacco: Never Used     Tobacco comment: 1 pack per week if that maybe for 5 years    Substance and Sexual Activity     Alcohol use: Yes     Comment: occ      Drug use: Never     Sexual activity: Not on file   Other Topics Concern     Not on file   Social History Narrative     Not on file     Social Determinants of Health     Financial Resource Strain: Not on file   Food Insecurity: Not on file   Transportation Needs: Not on file   Physical Activity: Not on file   Stress: Not on file   Social Connections: Not on file   Intimate Partner Violence: Not on file   Housing Stability: Not on file      REVIEW OF SYSTEMS:     Constitutional: No fevers or chills  Skin: No new rash or itching  Eyes: No acute change in vision  Ears/Nose/Throat: No purulent rhinorrhea, new hearing loss, or new vertigo  Respiratory: No cough or hemoptysis  Cardiovascular: See HPI  Gastrointestinal: No change in appetite, vomiting, hematemesis or diarrhea  Genitourinary: No dysuria or hematuria  Musculoskeletal: No new back pain, neck pain or muscle pain  Neurologic: No new headaches, focal weakness or behavior changes  Psychiatric: No hallucinations, excessive alcohol consumption or illegal drug usage  Hematologic/Lymphatic/Immunologic: No bleeding, chills, fever, night sweats or weight loss  Endocrine: No new cold intolerance, heat intolerance, polyphagia, polydipsia or polyuria      PHYSICAL EXAMINATION:     BP (!) 174/63 (BP Location:  "Right arm, Patient Position: Chair, Cuff Size: Adult Regular)   Pulse 61   Ht 1.631 m (5' 4.21\")   Wt 53.8 kg (118 lb 11.2 oz)   SpO2 99%   BMI 20.24 kg/m       Exam:  Constitutional: healthy, alert, no distress and tearful and anxious  Head: Normocephalic. No masses, lesions, tenderness or abnormalities  Neck: Neck supple. No adenopathy. Thyroid symmetric, normal size,, Carotids without bruits.  ENT: ENT exam normal, no neck nodes or sinus tenderness  Cardiovascular: No edema or JVD. 2/6 murmur heard best at the apex  Respiratory: Lungs clear  Musculoskeletal: extremities normal- no gross deformities noted, gait normal and normal muscle tone  Skin: No skin lesions   Neurologic: Gait normal. Reflexes normal and symmetric. Sensation grossly WNL.  Psychiatric: mentation appears normal and affect normal/bright     LABORATORY DATA:     Last Comprehensive Metabolic Panel:  Sodium   Date Value Ref Range Status   11/22/2021 143 133 - 144 mmol/L Final   05/17/2021 142 133 - 144 mmol/L Final     Potassium   Date Value Ref Range Status   11/22/2021 4.0 3.4 - 5.3 mmol/L Final   05/17/2021 3.7 3.4 - 5.3 mmol/L Final     Chloride   Date Value Ref Range Status   11/22/2021 106 94 - 109 mmol/L Final   05/17/2021 109 94 - 109 mmol/L Final     Carbon Dioxide   Date Value Ref Range Status   05/17/2021 27 20 - 32 mmol/L Final     Carbon Dioxide (CO2)   Date Value Ref Range Status   11/22/2021 28 20 - 32 mmol/L Final     Anion Gap   Date Value Ref Range Status   11/22/2021 9 3 - 14 mmol/L Final   05/17/2021 6 3 - 14 mmol/L Final     Glucose   Date Value Ref Range Status   11/22/2021 106 (H) 70 - 99 mg/dL Final   05/17/2021 101 (H) 70 - 99 mg/dL Final     Urea Nitrogen   Date Value Ref Range Status   11/22/2021 27 7 - 30 mg/dL Final   05/17/2021 26 7 - 30 mg/dL Final     Creatinine   Date Value Ref Range Status   11/22/2021 0.96 0.52 - 1.04 mg/dL Final   05/17/2021 0.98 0.52 - 1.04 mg/dL Final     GFR Estimate   Date Value Ref " Range Status   11/22/2021 54 (L) >60 mL/min/1.73m2 Final     Comment:     As of July 11, 2021, eGFR is calculated by the CKD-EPI creatinine equation, without race adjustment. eGFR can be influenced by muscle mass, exercise, and diet. The reported eGFR is an estimation only and is only applicable if the renal function is stable.   05/17/2021 53 (L) >60 mL/min/[1.73_m2] Final     Comment:     Non  GFR Calc  Starting 12/18/2018, serum creatinine based estimated GFR (eGFR) will be   calculated using the Chronic Kidney Disease Epidemiology Collaboration   (CKD-EPI) equation.       Calcium   Date Value Ref Range Status   11/22/2021 9.4 8.5 - 10.1 mg/dL Final   05/17/2021 9.3 8.5 - 10.1 mg/dL Final     CBC RESULTS: Recent Labs   Lab Test 11/22/21  0842   WBC 5.8   RBC 4.54   HGB 11.0*   HCT 37.7   MCV 83   MCH 24.2*   MCHC 29.2*   RDW 16.8*   *     Recent Labs   Lab Test 11/22/21  0842 09/09/20  1821   CHOL 180 242*   HDL 68 62   LDL 95 167*   TRIG 87 66          PROCEDURES & FURTHER ASSESSMENTS:   EKG today 11/22/21:  Personally reviewed and interpreted   NSR with occasional PVCs    ECHO today 11/22/21:   Interpretation Summary  S/P TAVR with 23 mm Paredes Arik 3 valve on 10/07/2020.Mean aortic gradient  11mmHg. Trivial AI.  The inferior vena cava is normal.  No significant changes noted.  ______________________________________________________________________________  Left Ventricle  Global and regional left ventricular function is normal with an EF of 60-65%.  Left ventricular wall thickness is normal. Left ventricular size is normal.  Left ventricular diastolic function is indeterminate. No regional wall motion  abnormalities are seen.     Right Ventricle  Right ventricular function, chamber size, wall motion, and thickness are  normal.     Atria  The right atria appears normal. Mild left atrial enlargement is present.     Mitral Valve  Moderate mitral annular calcification is present. Trace to  mild mitral  insufficiency is present. The mean mitral valve gradient is 3.7 mmHg. The peak  mitral valve gradient is 9.9 mmHg.     Aortic Valve  S/P TAVR with 23 mm Paredes Arik 3 valve on 10/07/2020.Mean aortic gradient  11mmHg. Trivial AI.     Tricuspid Valve  The valve leaflets are not well visualized. Trace tricuspid insufficiency is  present.     Pulmonic Valve  The valve leaflets are not well visualized. On Doppler interrogation, there is  no significant stenosis or regurgitation.     Vessels  The thoracic aorta is normal. The pulmonary artery and bifurcation cannot be  assessed. The inferior vena cava is normal.      ECG 5/17/20:  Personally reviewed and interpreted by me  NSR with LAD, incomplete RBBB, no ischemia    ECHO 5/17/21:  Interpretation Summary  S/P TAVR with 23 mm Paredes Arik 3 valve on 10/07/2020. Mean aortic gradient  12mmHg. Mild paravalvular AI.  No significant changes noted.  ______________________________________________________________________________  Left Ventricle  Global and regional left ventricular function is normal with an EF of 60-65%.  Left ventricular wall thickness is normal. Left ventricular size is normal.  Diastolic function not assessed due to significant mitral annular  calcification. No regional wall motion abnormalities are seen.     Right Ventricle  Right ventricular function, chamber size, wall motion, and thickness are  normal.     Atria  Both atria appear normal. The atrial septum is intact as assessed by color  Doppler .     Mitral Valve  Moderate mitral annular calcification is present. The mean mitral valve  gradient is 6.7 mmHg. The peak mitral valve gradient is 15.2 mmHg.     Aortic Valve  S/P TAVR with 23 mm Paredes Arik 3 valve on 10/07/2020. Mean aortic gradient  12mmHg. Mild paravalvular AI.     Tricuspid Valve  The tricuspid valve is normal. The right ventricular systolic pressure is  approximated at 20.4 mmHg plus the right atrial pressure. Trace  tricuspid  insufficiency is present.     Pulmonic Valve  The pulmonic valve is normal.    ECG dated 11/16/20: NSR HR 83, , QRS 88, QTc 474    Echocardiogram dated 11/16/20:  Interpretation Summary  s/p 23 mm Paredes Arik 3 valve TAVR on 10/07/2020. The valve is well-seated  there is mild-moderate paravalvular regurgitation and the jet is quite  eccentric. The jet appears to be most prominent in the 4-6 o'clock position.  While the patient's BP is higher during this examination, the Vmax is 2.1 m/s  and the mean gradient is 11 mmHg (similar to the last study).     Global and regional left ventricular function is normal with an EF of 55-60%.  Grade II or moderate diastolic dysfunction.  Global right ventricular function is normal. The right ventricle is normal  size.  This study was compared with the study from 10/08/2020. The paravalvular  aortic regurgitation appears to be more prominent on today's study on direct  visual comparison. The Vmax and mean gradient are not significantly different.    CT heart 11/16/20:       IMPRESSIONS:     1.  There is a 23 mm Paredes Arik transcatheter valve in the aortic  position.   2.  There is no evidence of hypoattenuating leaflet thrombosis. Small  paravalvular leak between the left and right coronary cusps (2-3  o'clock position), with associated large protruding annular  calcification.  3.  Please review Radiology report for incidental noncardiac findings  that will follow separately.     FINDINGS:      1.  There is a 23 mm Paredes Arik  transcatheter valve in the aortic  position. It is well-seated.  2.  There is no evidence of hypoattenuating leaflet thrombosis.  There  is normal leaflet opening.   3.  The visualized portions of the aortic root and ascending aorta are  normal in size  4.  The systemic venous connections are normal.   5.  The cardiac chambers demonstrate normal atrioventricular and  ventriculoarterial concordance.  6.  Coronary arteries originate  from their respective cusps.   7.  There are severe coronary artery calcifications.  8.  The pericardium is unremarkable.  There is no pericardial  effusion.   9.  There is no intracardiac thrombus.    NYHA Class: I     CLINICAL IMPRESSION:     Lydia Dietz is a 86 year old female with a past medical history of HTN, HLD w/statin intolerance on Repatha, CKD stage III, coronary artery disease status-post PCI of the LAD and RCA 9/9/2020 and severe aortic valvular stenosis status-post transfemoral transcatheter aortic valve replacement (TAVR) with a 23 mm Paredes Arik 3 on 10/7/20 w/mild-moderate PVL who presents for 1 year follow-up.     1. Aortic stenosis s/p TAVR w/mild PVL: Doing well clinically with improvement in dyspnea and fatigue since the procedure. She is active without cardiac symptoms. Her ECHO today shows normal TAVR function with mean PG of 11 mmHg with trivial AI. Plan to continue medical management of PVL with repeat echo in 1 year. Should PVL worsen, or if she develops symptoms of heart failure or hemolysis, could consider percutaneous intervention. Continue ASA 81 mg and SBE prophylaxis lifelong.    2. CAD: Asymptomatic. Continue ASA 81 mg lifelong.     3. HTN with white coat hypertension: Well controlled at home. Elevated in clinic today due to anxiety. Continue current regimen with metoprolol succinate and Maxide.     4. HLD: Hx of statin intolerance. Recently seen by Dr. Checo Logan and started on Repatha 140 mg Q 14 days with marked improvement in lipids. LDL is down from 167-->95. Continue current dose of Repatha. Repeat lipids 1 year.     5. CKD, stage 3: Stable GFR 54 today. Continue to monitor .    RTC: 1 year with echo, labs prior       Daphne Jessica Murphy NP        CC  Patient Care Team:  Christal Ferreira MD as PCP - General (Family Practice)  Praveen Costello MD as MD (Orthopedics)  Tayla Hartman (Cardiology)  Sophia Boudreaux PA-C as Assigned Surgical Provider  Jeffrey  Daphne Lopez NP as Assigned Heart and Vascular Provider  Neel Skinner MD as Assigned Pulmonology Provider

## 2021-11-22 ENCOUNTER — LAB (OUTPATIENT)
Dept: LAB | Facility: CLINIC | Age: 86
End: 2021-11-22
Payer: MEDICARE

## 2021-11-22 ENCOUNTER — ANCILLARY PROCEDURE (OUTPATIENT)
Dept: CARDIOLOGY | Facility: CLINIC | Age: 86
End: 2021-11-22
Attending: NURSE PRACTITIONER
Payer: MEDICARE

## 2021-11-22 VITALS
OXYGEN SATURATION: 99 % | BODY MASS INDEX: 20.26 KG/M2 | HEART RATE: 61 BPM | DIASTOLIC BLOOD PRESSURE: 63 MMHG | WEIGHT: 118.7 LBS | SYSTOLIC BLOOD PRESSURE: 174 MMHG | HEIGHT: 64 IN

## 2021-11-22 DIAGNOSIS — I25.10 CORONARY ARTERY DISEASE INVOLVING NATIVE CORONARY ARTERY OF NATIVE HEART WITHOUT ANGINA PECTORIS: ICD-10-CM

## 2021-11-22 DIAGNOSIS — Z95.2 S/P TAVR (TRANSCATHETER AORTIC VALVE REPLACEMENT): ICD-10-CM

## 2021-11-22 DIAGNOSIS — Z78.9 STATIN INTOLERANCE: ICD-10-CM

## 2021-11-22 DIAGNOSIS — E78.5 HYPERLIPIDEMIA LDL GOAL <70: ICD-10-CM

## 2021-11-22 DIAGNOSIS — Z95.2 S/P TAVR (TRANSCATHETER AORTIC VALVE REPLACEMENT): Primary | ICD-10-CM

## 2021-11-22 DIAGNOSIS — I25.10 CORONARY ARTERY DISEASE INVOLVING NATIVE CORONARY ARTERY WITHOUT ANGINA PECTORIS, UNSPECIFIED WHETHER NATIVE OR TRANSPLANTED HEART: ICD-10-CM

## 2021-11-22 DIAGNOSIS — E78.5 HYPERLIPIDEMIA LDL GOAL <100: ICD-10-CM

## 2021-11-22 LAB
ANION GAP SERPL CALCULATED.3IONS-SCNC: 9 MMOL/L (ref 3–14)
BUN SERPL-MCNC: 27 MG/DL (ref 7–30)
CALCIUM SERPL-MCNC: 9.4 MG/DL (ref 8.5–10.1)
CHLORIDE BLD-SCNC: 106 MMOL/L (ref 94–109)
CHOLEST SERPL-MCNC: 180 MG/DL
CO2 SERPL-SCNC: 28 MMOL/L (ref 20–32)
CREAT SERPL-MCNC: 0.96 MG/DL (ref 0.52–1.04)
ERYTHROCYTE [DISTWIDTH] IN BLOOD BY AUTOMATED COUNT: 16.8 % (ref 10–15)
FASTING STATUS PATIENT QL REPORTED: YES
GFR SERPL CREATININE-BSD FRML MDRD: 54 ML/MIN/1.73M2
GLUCOSE BLD-MCNC: 106 MG/DL (ref 70–99)
HCT VFR BLD AUTO: 37.7 % (ref 35–47)
HDLC SERPL-MCNC: 68 MG/DL
HGB BLD-MCNC: 11 G/DL (ref 11.7–15.7)
LDLC SERPL CALC-MCNC: 95 MG/DL
LVEF ECHO: NORMAL
MCH RBC QN AUTO: 24.2 PG (ref 26.5–33)
MCHC RBC AUTO-ENTMCNC: 29.2 G/DL (ref 31.5–36.5)
MCV RBC AUTO: 83 FL (ref 78–100)
NONHDLC SERPL-MCNC: 112 MG/DL
PLATELET # BLD AUTO: 136 10E3/UL (ref 150–450)
POTASSIUM BLD-SCNC: 4 MMOL/L (ref 3.4–5.3)
RBC # BLD AUTO: 4.54 10E6/UL (ref 3.8–5.2)
SODIUM SERPL-SCNC: 143 MMOL/L (ref 133–144)
TRIGL SERPL-MCNC: 87 MG/DL
WBC # BLD AUTO: 5.8 10E3/UL (ref 4–11)

## 2021-11-22 PROCEDURE — 85027 COMPLETE CBC AUTOMATED: CPT | Performed by: PATHOLOGY

## 2021-11-22 PROCEDURE — 80048 BASIC METABOLIC PNL TOTAL CA: CPT | Performed by: PATHOLOGY

## 2021-11-22 PROCEDURE — 93005 ELECTROCARDIOGRAM TRACING: CPT

## 2021-11-22 PROCEDURE — 80061 LIPID PANEL: CPT | Performed by: PATHOLOGY

## 2021-11-22 PROCEDURE — 36415 COLL VENOUS BLD VENIPUNCTURE: CPT | Performed by: PATHOLOGY

## 2021-11-22 PROCEDURE — 93306 TTE W/DOPPLER COMPLETE: CPT | Performed by: INTERNAL MEDICINE

## 2021-11-22 PROCEDURE — G0463 HOSPITAL OUTPT CLINIC VISIT: HCPCS | Mod: 25

## 2021-11-22 PROCEDURE — 99214 OFFICE O/P EST MOD 30 MIN: CPT | Mod: 25 | Performed by: NURSE PRACTITIONER

## 2021-11-22 ASSESSMENT — MIFFLIN-ST. JEOR: SCORE: 976.8

## 2021-11-22 ASSESSMENT — PAIN SCALES - GENERAL: PAINLEVEL: NO PAIN (0)

## 2021-11-22 NOTE — PATIENT INSTRUCTIONS
You were seen today in the Structural Heart Clinic at the [facility].    Cardiology provider you saw during your visit: Daphne Murphy NP    Instructions:   1. Your ECHO shows stable TAVR function, EKG shows normal rhythm and labs show stable kidney function  2. Continue Aspirin 81 mg daily lifelong   3. For all future dental cleanings and procedures you will need to take antibiotics prior - see instructions below.   4. I will send Dr. Checo Logan your lipid panel - they will contact you if medication adjustments are needed.   5. Follow-up with new PCP next week.   6. Follow-up Dr. Checo Logan in 1 year with echo and labs prior     Prevention of Infective (Bacterial) Endocarditis:  You are at increased risk for developing adverse outcomes from infective endocarditis (IE), also known as bacterial endocarditis (BE) because of the new device in your heart. The guidelines for prevention of IE are to give patients antibiotics prior to any dental procedures that involve manipulation of gingival tissue or the periapical region of teeth, or perforation of the oral mucosa:      It is recommended to take Amoxicillin 2 gm by mouth as a single dose 30 to 60 minutes before procedure.     OR if allergic to Penicillin or Ampicillin:     Cephalexin 2 gm by mouth, or  Clindamycin 600 mg by mouth, or  Azithromycin or Clarithromycin 500 mg PO       Questions and [phone].   First press #1 for the [employer] and then press #3 for Medical Questions to reach the Cardiology triage nurse.     On Call Cardiologist for after hours or on weekends: [phone], press option #4 and ask to speak to the on-call Cardiologist.

## 2021-11-22 NOTE — NURSING NOTE
Chief Complaint   Patient presents with     Follow Up     85 yo FM, s/p TAVR here for one year s/p TAVR follow-up     Vitals were taken, medications reconciled, and EKG performed.    Jesus Chandler  10:35 AM

## 2021-11-22 NOTE — PROGRESS NOTES
Structural Heart Coordinator visit:  Provided additional education regarding TAVR/MitraClip procedure, after being present for discussion with physician. Explained the work-up process and next steps for patient. Patient provided our direct contact number and instructed to call with any questions.     1 year S/P TAVR     KCCQ Results:   1a. 5  1b. 5  1c. 2  2. 5  3. 7  4. 7  5. 5  6. 5  7. 5  8a. 5  8b. 5  8c. 5    Ita Hillman CMA  Structural Heart   [facility]

## 2021-11-22 NOTE — LETTER
11/22/2021      RE: Lydia Dietz  [address] [facility] [address]       Dear Colleague,    Thank you for the opportunity to participate in the care of your patient, Lydia Dietz, at the [facility] HEART [facility]INIC [place] at [facility]. Please see a copy of my visit note below.              [facility] HEART CARE  CARDIOVASCULAR DIVISION    VALVE CLINIC RETURN VISIT      Lydia Dietz is a 86 year old female with a PMH of coronary artery disease status-post PCI of the LAD and RCA 9/9/2020, HLD w/statin intolerance on Repatha, HTN, CKD stage III and severe aortic valvular stenosis status-post TAVR with a 23 mm Paredes Arik 3 on 10/7/20 who presents for annual follow-up. She was last evaluated in valve clinic 1 month post TAVR, echo showed stable mean PG of 11 mmHg and mild-moderate PVL w/ associated large protruding annular calcification. Given that she was asymptomatic and potential procedural risk associated with large protruding calcium, decision was made to medically manage PVL. She was seen by my colleague Dr. Checo Logan for statin intolerance and was started on Repatha. Her lipids have improved significantly.     Lydia is accompanied to the visit by her , Geovany. Since she was last evaluated she has been feeling well. She remains active walking around the house 20-30 minutes a day. She also climbs the stairs in house 3 x daily. She denies any chest pain or significant shortness of breath. She does notice that she is a little winded with exercise but states that this is not new and does not limit her in anyway. She denies any orthopnea, PND, leg swelling, weight gain, palpitations, lightheadedness or syncope. She does feel that her legs feel weak/tired with exercise. She denies any claudication, rest pain or ulcerations. She felt really well when she was participating in cardiac rehab after her TAVR - she now feels like she isn't in as  quite of good of shape. She would like to join the gym again. Home blood pressures have been 123/50-60s, she is consistently high in clinic due to anxiety.      PAST MEDICAL HISTORY:     Past Medical History:   Diagnosis Date     Allergies      Anemia      Coronary artery disease      HLD (hyperlipidemia)      HTN (hypertension)      Impaired fasting glucose      Osteoarthritis      Severe aortic stenosis       PAST SURGICAL HISTORY:     Past Surgical History:   Procedure Laterality Date     AS LAP INCISIONAL HERNIA REPAIR       BREAST BIOPSY, CORE RT/LT      X3     CHOLECYSTECTOMY       COLECTOMY PARTIAL  2019     CV LEFT HEART CATH N/A 9/9/2020    Procedure: Left Heart Cath;  Surgeon: Leonard Pastor MD;  Location:  HEART CARDIAC CATH LAB     CV PCI ANGIOPLASTY N/A 9/9/2020    Procedure: CV PCI ANGIOPLASTY;  Surgeon: Leonard Pastor MD;  Location:  HEART CARDIAC CATH LAB     CV TRANSCATHETER AORTIC VALVE REPLACEMENT N/A 10/7/2020    Procedure: Transfemoral, subclavian (DINH) or transapical transcatheter aortic valve replacement 23mm dinh valve placed. cardiovascular standby.;  Surgeon: Leonard Pastor MD;  Location: [facility] OR     HC LAPAROSCOPY, OVIDUCT/OVARY; REMOVAL       HEART CATH FEMORAL CANNULIZATION WITH OPEN STANDBY REPAIR AORTIC VALVE N/A 10/7/2020    Procedure: Cardiac standby. Perfusion standby.;  Surgeon: Richi Olmos MD;  Location: [facility] OR     HYSTERECTOMY TOTAL ABDOMINAL        CURRENT MEDICATIONS:     Current Outpatient Medications   Medication     amoxicillin (AMOXIL) 500 MG capsule     aspirin (ASA) 81 MG EC tablet     evolocumab (REPATHA SURECLICK) 140 MG/ML prefilled autoinjector     fish oil-omega-3 fatty acids 1000 MG capsule     fluticasone (FLONASE) 50 MCG/ACT nasal spray     metoprolol succinate ER (TOPROL-XL) 25 MG 24 hr tablet     triamterene-HCTZ (MAXZIDE-25) 37.5-25 MG tablet     No current facility-administered medications for this visit.      ALLERGIES:       Allergies   Allergen Reactions     Rosuvastatin Muscle Pain (Myalgia)     Seasonal Allergies      Amlodipine      Other reaction(s): Edema,generalized     Atorvastatin      PN: Reaction: BLURRED VISION     Fenofibrate Muscle Pain (Myalgia)     Fluconazole Nausea     Losartan      PN: Reaction: Back pain and cramping     Pravastatin      PN:  Reaction: JOINT AND MUSCLE PAIN     Simvastatin Nausea     Niacin Rash        FAMILY HISTORY:       No family history on file.        SOCIAL HISTORY:     Social History     Socioeconomic History     Marital status:      Spouse name: Not on file     Number of children: Not on file     Years of education: Not on file     Highest education level: Not on file   Occupational History     Not on file   Tobacco Use     Smoking status: Former Smoker     Quit date:      Years since quittin.9     Smokeless tobacco: Never Used     Tobacco comment: 1 pack per week if that maybe for 5 years    Substance and Sexual Activity     Alcohol use: Yes     Comment: occ      Drug use: Never     Sexual activity: Not on file   Other Topics Concern     Not on file   Social History Narrative     Not on file     Social Determinants of Health     Financial Resource Strain: Not on file   Food Insecurity: Not on file   Transportation Needs: Not on file   Physical Activity: Not on file   Stress: Not on file   Social Connections: Not on file   Intimate Partner Violence: Not on file   Housing Stability: Not on file      REVIEW OF SYSTEMS:     Constitutional: No fevers or chills  Skin: No new rash or itching  Eyes: No acute change in vision  Ears/Nose/Throat: No purulent rhinorrhea, new hearing loss, or new vertigo  Respiratory: No cough or hemoptysis  Cardiovascular: See HPI  Gastrointestinal: No change in appetite, vomiting, hematemesis or diarrhea  Genitourinary: No dysuria or hematuria  Musculoskeletal: No new back pain, neck pain or muscle pain  Neurologic: No new headaches, focal weakness  "or behavior changes  Psychiatric: No hallucinations, excessive alcohol consumption or illegal drug usage  Hematologic/Lymphatic/Immunologic: No bleeding, chills, fever, night sweats or weight loss  Endocrine: No new cold intolerance, heat intolerance, polyphagia, polydipsia or polyuria      PHYSICAL EXAMINATION:     BP (!) 174/63 (BP Location: Right arm, Patient Position: Chair, Cuff Size: Adult Regular)   Pulse 61   Ht 1.631 m (5' 4.21\")   Wt 53.8 kg (118 lb 11.2 oz)   SpO2 99%   BMI 20.24 kg/m       Exam:  Constitutional: healthy, alert, no distress and tearful and anxious  Head: Normocephalic. No masses, lesions, tenderness or abnormalities  Neck: Neck supple. No adenopathy. Thyroid symmetric, normal size,, Carotids without bruits.  ENT: ENT exam normal, no neck nodes or sinus tenderness  Cardiovascular: No edema or JVD. 2/6 murmur heard best at the apex  Respiratory: Lungs clear  Musculoskeletal: extremities normal- no gross deformities noted, gait normal and normal muscle tone  Skin: No skin lesions   Neurologic: Gait normal. Reflexes normal and symmetric. Sensation grossly WNL.  Psychiatric: mentation appears normal and affect normal/bright     LABORATORY DATA:     Last Comprehensive Metabolic Panel:  Sodium   Date Value Ref Range Status   11/22/2021 143 133 - 144 mmol/L Final   05/17/2021 142 133 - 144 mmol/L Final     Potassium   Date Value Ref Range Status   11/22/2021 4.0 3.4 - 5.3 mmol/L Final   05/17/2021 3.7 3.4 - 5.3 mmol/L Final     Chloride   Date Value Ref Range Status   11/22/2021 106 94 - 109 mmol/L Final   05/17/2021 109 94 - 109 mmol/L Final     Carbon Dioxide   Date Value Ref Range Status   05/17/2021 27 20 - 32 mmol/L Final     Carbon Dioxide (CO2)   Date Value Ref Range Status   11/22/2021 28 20 - 32 mmol/L Final     Anion Gap   Date Value Ref Range Status   11/22/2021 9 3 - 14 mmol/L Final   05/17/2021 6 3 - 14 mmol/L Final     Glucose   Date Value Ref Range Status   11/22/2021 106 (H) " 70 - 99 mg/dL Final   05/17/2021 101 (H) 70 - 99 mg/dL Final     Urea Nitrogen   Date Value Ref Range Status   11/22/2021 27 7 - 30 mg/dL Final   05/17/2021 26 7 - 30 mg/dL Final     Creatinine   Date Value Ref Range Status   11/22/2021 0.96 0.52 - 1.04 mg/dL Final   05/17/2021 0.98 0.52 - 1.04 mg/dL Final     GFR Estimate   Date Value Ref Range Status   11/22/2021 54 (L) >60 mL/min/1.73m2 Final     Comment:     As of July 11, 2021, eGFR is calculated by the CKD-EPI creatinine equation, without race adjustment. eGFR can be influenced by muscle mass, exercise, and diet. The reported eGFR is an estimation only and is only applicable if the renal function is stable.   05/17/2021 53 (L) >60 mL/min/[1.73_m2] Final     Comment:     Non  GFR Calc  Starting 12/18/2018, serum creatinine based estimated GFR (eGFR) will be   calculated using the Chronic Kidney Disease Epidemiology Collaboration   (CKD-EPI) equation.       Calcium   Date Value Ref Range Status   11/22/2021 9.4 8.5 - 10.1 mg/dL Final   05/17/2021 9.3 8.5 - 10.1 mg/dL Final     CBC RESULTS: Recent Labs   Lab Test 11/22/21  0842   WBC 5.8   RBC 4.54   HGB 11.0*   HCT 37.7   MCV 83   MCH 24.2*   MCHC 29.2*   RDW 16.8*   *     Recent Labs   Lab Test 11/22/21  0842 09/09/20  1821   CHOL 180 242*   HDL 68 62   LDL 95 167*   TRIG 87 66          PROCEDURES & FURTHER ASSESSMENTS:   EKG today 11/22/21:  Personally reviewed and interpreted   NSR with occasional PVCs    ECHO today 11/22/21:   Interpretation Summary  S/P TAVR with 23 mm Paredes Arik 3 valve on 10/07/2020.Mean aortic gradient  11mmHg. Trivial AI.  The inferior vena cava is normal.  No significant changes noted.  ______________________________________________________________________________  Left Ventricle  Global and regional left ventricular function is normal with an EF of 60-65%.  Left ventricular wall thickness is normal. Left ventricular size is normal.  Left ventricular  diastolic function is indeterminate. No regional wall motion  abnormalities are seen.     Right Ventricle  Right ventricular function, chamber size, wall motion, and thickness are  normal.     Atria  The right atria appears normal. Mild left atrial enlargement is present.     Mitral Valve  Moderate mitral annular calcification is present. Trace to mild mitral  insufficiency is present. The mean mitral valve gradient is 3.7 mmHg. The peak  mitral valve gradient is 9.9 mmHg.     Aortic Valve  S/P TAVR with 23 mm Paredes Arik 3 valve on 10/07/2020.Mean aortic gradient  11mmHg. Trivial AI.     Tricuspid Valve  The valve leaflets are not well visualized. Trace tricuspid insufficiency is  present.     Pulmonic Valve  The valve leaflets are not well visualized. On Doppler interrogation, there is  no significant stenosis or regurgitation.     Vessels  The thoracic aorta is normal. The pulmonary artery and bifurcation cannot be  assessed. The inferior vena cava is normal.      ECG 5/17/20:  Personally reviewed and interpreted by me  NSR with LAD, incomplete RBBB, no ischemia    ECHO 5/17/21:  Interpretation Summary  S/P TAVR with 23 mm Paredes Arik 3 valve on 10/07/2020. Mean aortic gradient  12mmHg. Mild paravalvular AI.  No significant changes noted.  ______________________________________________________________________________  Left Ventricle  Global and regional left ventricular function is normal with an EF of 60-65%.  Left ventricular wall thickness is normal. Left ventricular size is normal.  Diastolic function not assessed due to significant mitral annular  calcification. No regional wall motion abnormalities are seen.     Right Ventricle  Right ventricular function, chamber size, wall motion, and thickness are  normal.     Atria  Both atria appear normal. The atrial septum is intact as assessed by color  Doppler .     Mitral Valve  Moderate mitral annular calcification is present. The mean mitral  valve  gradient is 6.7 mmHg. The peak mitral valve gradient is 15.2 mmHg.     Aortic Valve  S/P TAVR with 23 mm Paredes Arik 3 valve on 10/07/2020. Mean aortic gradient  12mmHg. Mild paravalvular AI.     Tricuspid Valve  The tricuspid valve is normal. The right ventricular systolic pressure is  approximated at 20.4 mmHg plus the right atrial pressure. Trace tricuspid  insufficiency is present.     Pulmonic Valve  The pulmonic valve is normal.    ECG dated 11/16/20: NSR HR 83, , QRS 88, QTc 474    Echocardiogram dated 11/16/20:  Interpretation Summary  s/p 23 mm Paredes Arik 3 valve TAVR on 10/07/2020. The valve is well-seated  there is mild-moderate paravalvular regurgitation and the jet is quite  eccentric. The jet appears to be most prominent in the 4-6 o'clock position.  While the patient's BP is higher during this examination, the Vmax is 2.1 m/s  and the mean gradient is 11 mmHg (similar to the last study).     Global and regional left ventricular function is normal with an EF of 55-60%.  Grade II or moderate diastolic dysfunction.  Global right ventricular function is normal. The right ventricle is normal  size.  This study was compared with the study from 10/08/2020. The paravalvular  aortic regurgitation appears to be more prominent on today's study on direct  visual comparison. The Vmax and mean gradient are not significantly different.    CT heart 11/16/20:       IMPRESSIONS:     1.  There is a 23 mm Paredes Arik transcatheter valve in the aortic  position.   2.  There is no evidence of hypoattenuating leaflet thrombosis. Small  paravalvular leak between the left and right coronary cusps (2-3  o'clock position), with associated large protruding annular  calcification.  3.  Please review Radiology report for incidental noncardiac findings  that will follow separately.     FINDINGS:      1.  There is a 23 mm Paredes Arik  transcatheter valve in the aortic  position. It is well-seated.  2.   There is no evidence of hypoattenuating leaflet thrombosis.  There  is normal leaflet opening.   3.  The visualized portions of the aortic root and ascending aorta are  normal in size  4.  The systemic venous connections are normal.   5.  The cardiac chambers demonstrate normal atrioventricular and  ventriculoarterial concordance.  6.  Coronary arteries originate from their respective cusps.   7.  There are severe coronary artery calcifications.  8.  The pericardium is unremarkable.  There is no pericardial  effusion.   9.  There is no intracardiac thrombus.    NYHA Class: I     CLINICAL IMPRESSION:     Lydia Dietz is a 86 year old female with a past medical history of HTN, HLD w/statin intolerance on Repatha, CKD stage III, coronary artery disease status-post PCI of the LAD and RCA 9/9/2020 and severe aortic valvular stenosis status-post transfemoral transcatheter aortic valve replacement (TAVR) with a 23 mm Paredes Arik 3 on 10/7/20 w/mild-moderate PVL who presents for 1 year follow-up.     1. Aortic stenosis s/p TAVR w/mild PVL: Doing well clinically with improvement in dyspnea and fatigue since the procedure. She is active without cardiac symptoms. Her ECHO today shows normal TAVR function with mean PG of 11 mmHg with trivial AI. Plan to continue medical management of PVL with repeat echo in 1 year. Should PVL worsen, or if she develops symptoms of heart failure or hemolysis, could consider percutaneous intervention. Continue ASA 81 mg and SBE prophylaxis lifelong.    2. CAD: Asymptomatic. Continue ASA 81 mg lifelong.     3. HTN with white coat hypertension: Well controlled at home. Elevated in clinic today due to anxiety. Continue current regimen with metoprolol succinate and Maxide.     4. HLD: Hx of statin intolerance. Recently seen by Dr. Checo Logan and started on Repatha 140 mg Q 14 days with marked improvement in lipids. LDL is down from 167-->95. Continue current dose of Repatha. Repeat lipids 1  year.     5. CKD, stage 3: Stable GFR 54 today. Continue to monitor .    RTC: 1 year with echo, labs prior       Daphne Murphy NP        CC  Patient Care Team:  Christal Ferreira MD as PCP - General (Family Practice)  Praveen Costello MD as MD (Orthopedics)  Tayla Hartman (Cardiology)  Sophia Boudreaux PA-C as Assigned Surgical Provider  Daphne Murphy NP as Assigned Heart and Vascular Provider  Neel Skinner MD as Assigned Pulmonology Provider     Structural Heart Coordinator visit:  Provided additional education regarding TAVR/MitraClip procedure, after being present for discussion with physician. Explained the work-up process and next steps for patient. Patient provided our direct contact number and instructed to call with any questions.     1 year S/P TAVR     KCCQ Results:   1a. 5  1b. 5  1c. 2  2. 5  3. 7  4. 7  5. 5  6. 5  7. 5  8a. 5  8b. 5  8c. 5    Ita Hillman CMA  Structural Heart   [facility]

## 2021-11-23 LAB
ATRIAL RATE - MUSE: 61 BPM
DIASTOLIC BLOOD PRESSURE - MUSE: NORMAL MMHG
INTERPRETATION ECG - MUSE: NORMAL
P AXIS - MUSE: 72 DEGREES
PR INTERVAL - MUSE: 176 MS
QRS DURATION - MUSE: 110 MS
QT - MUSE: 442 MS
QTC - MUSE: 444 MS
R AXIS - MUSE: -31 DEGREES
SYSTOLIC BLOOD PRESSURE - MUSE: NORMAL MMHG
T AXIS - MUSE: 31 DEGREES
VENTRICULAR RATE- MUSE: 61 BPM

## 2021-11-29 NOTE — PROGRESS NOTES
"HPI:    Lydia comes into establish care today. She has gotten care at [facility] in the past. She states her BP is much better controlled at home. She states her worst complaint is L sided mid back/rib pain. She has had this for several years. She states she has been evaluated for this in the past both here (Dr. Whipple, ortho spine 5/7/2019 and [facility] (Care Everywhere). She has had back injections (epidural?), See Care Everywhere 8/12/2021) with some partial relief. She has been prescribed topical pain medication. She does not use that much tylenol. She has had CT chest imaging and CT abdominal/pelvic (3/3/2021)  in the past. This CT scan did show multiple B simple renal cysts; largest L 3.4 cm.  She had in Care Everywhere Thoracic CT spine scan 7/8/2021. Her  states she had \"back hernia\" surgery many years ago for back pain. He thinks this was on the left side. He states two surgeries with \"mesh\" was placed two times. Lydia feels that this procedure did relief her pain but she can't relate her current pain with that previous surgery. She states her L sided pain is worse with standing/walking and sitting in a car. Pain is not present when she lies down to sleep. No skin rash is or was present? She does not smoke nor abuse EtOH. No other HEENT, cardiopulmonary, abdominal, , GYN, neurological, systemic, psychiatric, lymphatic, endocrine, vascular complaints. She denies any radicular sxs.     Past Medical History:   Diagnosis Date     Allergies      Anemia      Coronary artery disease      HLD (hyperlipidemia)      HTN (hypertension)      Impaired fasting glucose      Osteoarthritis      Severe aortic stenosis      Past Surgical History:   Procedure Laterality Date     AS LAP INCISIONAL HERNIA REPAIR       BREAST BIOPSY, CORE RT/LT      X3     CHOLECYSTECTOMY       COLECTOMY PARTIAL  2019     CV LEFT HEART CATH N/A 9/9/2020    Procedure: Left Heart Cath;  Surgeon: Leonard Pastor MD;  " Location:  HEART CARDIAC CATH LAB     CV PCI ANGIOPLASTY N/A 9/9/2020    Procedure: CV PCI ANGIOPLASTY;  Surgeon: Leonard Pastor MD;  Location:  HEART CARDIAC CATH LAB     CV TRANSCATHETER AORTIC VALVE REPLACEMENT N/A 10/7/2020    Procedure: Transfemoral, subclavian (PAREDES) or transapical transcatheter aortic valve replacement 23mm paredes valve placed. cardiovascular standby.;  Surgeon: Leonard Pastor MD;  Location: [facility] OR     HC LAPAROSCOPY, OVIDUCT/OVARY; REMOVAL       HEART CATH FEMORAL CANNULIZATION WITH OPEN STANDBY REPAIR AORTIC VALVE N/A 10/7/2020    Procedure: Cardiac standby. Perfusion standby.;  Surgeon: Richi Olmos MD;  Location: [facility] OR     HYSTERECTOMY TOTAL ABDOMINAL       PE:    Vitals noted, gen, nad, cooperative, alert, neck supple nl rom, lungs with good air movement, RRR, S1, S2, no MRG, abdomen, no acute findings. She has mild/moderate posterior L sided chest wall tenderness to palpation. No skin rash. She does not have tenderness to palpation of either thoracic or lumbar spine. Grossly normal neurological exam.     Resting Echo; 11/22/2021:  Procedure  Echocardiogram with two-dimensional, color and spectral Doppler performed.  ______________________________________________________________________________  Interpretation Summary  S/P TAVR with 23 mm Paredes Arik 3 valve on 10/07/2020.Mean aortic gradient  11mmHg. Trivial AI.  The inferior vena cava is normal.  No significant changes noted.  ______________________________________________________________________________  Left Ventricle  Global and regional left ventricular function is normal with an EF of 60-65%.  Left ventricular wall thickness is normal. Left ventricular size is normal.  Left ventricular diastolic function is indeterminate. No regional wall motion  abnormalities are seen.     Right Ventricle  Right ventricular function, chamber size, wall motion, and thickness are  normal.     Atria  The right atria appears  normal. Mild left atrial enlargement is present.     Mitral Valve  Moderate mitral annular calcification is present. Trace to mild mitral  insufficiency is present. The mean mitral valve gradient is 3.7 mmHg. The peak  mitral valve gradient is 9.9 mmHg.     Aortic Valve  S/P TAVR with 23 mm Paredes Arik 3 valve on 10/07/2020.Mean aortic gradient  11mmHg. Trivial AI.     Tricuspid Valve  The valve leaflets are not well visualized. Trace tricuspid insufficiency is  present.     Pulmonic Valve  The valve leaflets are not well visualized. On Doppler interrogation, there is  no significant stenosis or regurgitation.     Vessels  The thoracic aorta is normal. The pulmonary artery and bifurcation cannot be  assessed. The inferior vena cava is normal.     Pericardium  No pericardial effusion is present.     Compared to Previous Study  No significant changes noted.      A/P:    1. Immunizations; she has gotten 3 doses of the Pfizer COVID vaccination. She got influenza vaccine 10/21/2021. Check with insurance regarding Shingrix  2. HTN; on Metoprolol and Maxide; electrolytes and Cr normal on 11/22/2021   3. Mammogram; 9/19/2016 in Care Everywhere   4. Dermatology; no current concerns.   5. Lipids; 11/22/2021 HDL 68 and LDL  95  6. Colonoscopy in Care Everywhere 8/8/2017 and repeat in 5 years   7. TSH 9/9/2021 1.96  8. 11/22/2021 platelets 136 Hgb 11.0 MCV 83 with RDW 16.8. 6/23/2021 iron 83, TIBC 436 and Fe sat 4%, Ferritin 27. Repeat labs today   9. CT chest with stable T11 compression fracture; Bone density scan 2/2016? In Care Everywhere. Ordered bone density scan today 11/30/202   10. Cardiology note from Ms. Murphy 11/22/2021. H/o CAD, s/p PCI LAD 9/9/2020, statin intolerance,, s/p TAVR 10/7/2020 for aortic stenosis. She takes antibiotics before dental procedures. She is on Repatha for lipids.   11. Lung nodules. She was seen by Dr. Skinner, pulmonary 6/9/2021 and no need for further Chest CT scans.   12. L sided back  pain. Need to review previous imaging and records (L sided back hernia surgery with mesh?). Ordered bone scan. Ordered light chains along with SPEP/immunofixation.     45 minutes spent on the date of the encounter doing chart review, history and exam, documentation and further activities as noted above    I will see them back in a few weeks after reviewing records. I can see them on the same day as their testing.

## 2021-11-30 ENCOUNTER — CARE COORDINATION (OUTPATIENT)
Dept: CARDIOLOGY | Facility: CLINIC | Age: 86
End: 2021-11-30

## 2021-11-30 ENCOUNTER — LAB (OUTPATIENT)
Dept: LAB | Facility: CLINIC | Age: 86
End: 2021-11-30
Payer: MEDICARE

## 2021-11-30 ENCOUNTER — OFFICE VISIT (OUTPATIENT)
Dept: INTERNAL MEDICINE | Facility: CLINIC | Age: 86
End: 2021-11-30
Payer: MEDICARE

## 2021-11-30 VITALS
HEART RATE: 61 BPM | DIASTOLIC BLOOD PRESSURE: 69 MMHG | HEIGHT: 65 IN | OXYGEN SATURATION: 99 % | WEIGHT: 123 LBS | BODY MASS INDEX: 20.49 KG/M2 | RESPIRATION RATE: 16 BRPM | SYSTOLIC BLOOD PRESSURE: 189 MMHG

## 2021-11-30 DIAGNOSIS — Z95.2 S/P TAVR (TRANSCATHETER AORTIC VALVE REPLACEMENT): ICD-10-CM

## 2021-11-30 DIAGNOSIS — M80.08XA AGE-RELATED OSTEOPOROSIS WITH CURRENT PATHOLOGICAL FRACTURE, VERTEBRA(E), INITIAL ENCOUNTER FOR FRACTURE (H): ICD-10-CM

## 2021-11-30 DIAGNOSIS — D64.9 ANEMIA, UNSPECIFIED TYPE: ICD-10-CM

## 2021-11-30 DIAGNOSIS — G89.29 OTHER CHRONIC BACK PAIN: Primary | ICD-10-CM

## 2021-11-30 DIAGNOSIS — Z91.89 AT RISK FOR DECREASED BONE DENSITY: ICD-10-CM

## 2021-11-30 DIAGNOSIS — M54.89 OTHER CHRONIC BACK PAIN: ICD-10-CM

## 2021-11-30 DIAGNOSIS — I25.10 CORONARY ARTERY DISEASE INVOLVING NATIVE CORONARY ARTERY OF NATIVE HEART WITHOUT ANGINA PECTORIS: ICD-10-CM

## 2021-11-30 DIAGNOSIS — M54.89 OTHER CHRONIC BACK PAIN: Primary | ICD-10-CM

## 2021-11-30 DIAGNOSIS — G89.29 OTHER CHRONIC BACK PAIN: ICD-10-CM

## 2021-11-30 LAB
ALBUMIN SERPL-MCNC: 3.3 G/DL (ref 3.4–5)
ALP SERPL-CCNC: 78 U/L (ref 40–150)
ALT SERPL W P-5'-P-CCNC: 22 U/L (ref 0–50)
ANION GAP SERPL CALCULATED.3IONS-SCNC: 8 MMOL/L (ref 3–14)
AST SERPL W P-5'-P-CCNC: 22 U/L (ref 0–45)
BASOPHILS # BLD AUTO: 0.1 10E3/UL (ref 0–0.2)
BASOPHILS NFR BLD AUTO: 1 %
BILIRUB SERPL-MCNC: 0.3 MG/DL (ref 0.2–1.3)
BUN SERPL-MCNC: 26 MG/DL (ref 7–30)
CALCIUM SERPL-MCNC: 9.4 MG/DL (ref 8.5–10.1)
CHLORIDE BLD-SCNC: 107 MMOL/L (ref 94–109)
CHOLEST SERPL-MCNC: 179 MG/DL
CO2 SERPL-SCNC: 27 MMOL/L (ref 20–32)
CREAT SERPL-MCNC: 0.93 MG/DL (ref 0.52–1.04)
CRP SERPL-MCNC: <2.9 MG/L (ref 0–8)
EOSINOPHIL # BLD AUTO: 0.1 10E3/UL (ref 0–0.7)
EOSINOPHIL NFR BLD AUTO: 1 %
ERYTHROCYTE [DISTWIDTH] IN BLOOD BY AUTOMATED COUNT: 16.6 % (ref 10–15)
ERYTHROCYTE [SEDIMENTATION RATE] IN BLOOD BY WESTERGREN METHOD: 34 MM/HR (ref 0–30)
FASTING STATUS PATIENT QL REPORTED: YES
FERRITIN SERPL-MCNC: 19 NG/ML (ref 8–252)
FOLATE SERPL-MCNC: 10.6 NG/ML
GFR SERPL CREATININE-BSD FRML MDRD: 57 ML/MIN/1.73M2
GLUCOSE BLD-MCNC: 102 MG/DL (ref 70–99)
HCT VFR BLD AUTO: 36.6 % (ref 35–47)
HDLC SERPL-MCNC: 73 MG/DL
HGB BLD-MCNC: 11 G/DL (ref 11.7–15.7)
IMM GRANULOCYTES # BLD: 0.1 10E3/UL
IMM GRANULOCYTES NFR BLD: 1 %
IRON SATN MFR SERPL: 9 % (ref 15–46)
IRON SERPL-MCNC: 40 UG/DL (ref 35–180)
LDLC SERPL CALC-MCNC: 88 MG/DL
LYMPHOCYTES # BLD AUTO: 1.3 10E3/UL (ref 0.8–5.3)
LYMPHOCYTES NFR BLD AUTO: 17 %
MCH RBC QN AUTO: 24.5 PG (ref 26.5–33)
MCHC RBC AUTO-ENTMCNC: 30.1 G/DL (ref 31.5–36.5)
MCV RBC AUTO: 82 FL (ref 78–100)
MONOCYTES # BLD AUTO: 0.8 10E3/UL (ref 0–1.3)
MONOCYTES NFR BLD AUTO: 10 %
NEUTROPHILS # BLD AUTO: 5.4 10E3/UL (ref 1.6–8.3)
NEUTROPHILS NFR BLD AUTO: 70 %
NONHDLC SERPL-MCNC: 106 MG/DL
NRBC # BLD AUTO: 0 10E3/UL
NRBC BLD AUTO-RTO: 0 /100
PLATELET # BLD AUTO: 137 10E3/UL (ref 150–450)
POTASSIUM BLD-SCNC: 4.1 MMOL/L (ref 3.4–5.3)
PROT SERPL-MCNC: 7 G/DL (ref 6.8–8.8)
RBC # BLD AUTO: 4.49 10E6/UL (ref 3.8–5.2)
SODIUM SERPL-SCNC: 142 MMOL/L (ref 133–144)
TIBC SERPL-MCNC: 425 UG/DL (ref 240–430)
TOTAL PROTEIN SERUM FOR ELP: 6.9 G/DL (ref 6.8–8.8)
TRIGL SERPL-MCNC: 89 MG/DL
TSH SERPL DL<=0.005 MIU/L-ACNC: 1.34 MU/L (ref 0.4–4)
VIT B12 SERPL-MCNC: 416 PG/ML (ref 193–986)
WBC # BLD AUTO: 7.6 10E3/UL (ref 4–11)

## 2021-11-30 PROCEDURE — 86334 IMMUNOFIX E-PHORESIS SERUM: CPT | Mod: 26 | Performed by: PATHOLOGY

## 2021-11-30 PROCEDURE — 84155 ASSAY OF PROTEIN SERUM: CPT | Performed by: INTERNAL MEDICINE

## 2021-11-30 PROCEDURE — 82728 ASSAY OF FERRITIN: CPT | Performed by: PATHOLOGY

## 2021-11-30 PROCEDURE — 86334 IMMUNOFIX E-PHORESIS SERUM: CPT | Mod: TC | Performed by: INTERNAL MEDICINE

## 2021-11-30 PROCEDURE — 86140 C-REACTIVE PROTEIN: CPT | Performed by: PATHOLOGY

## 2021-11-30 PROCEDURE — 36415 COLL VENOUS BLD VENIPUNCTURE: CPT | Performed by: PATHOLOGY

## 2021-11-30 PROCEDURE — 80061 LIPID PANEL: CPT | Performed by: PATHOLOGY

## 2021-11-30 PROCEDURE — 85025 COMPLETE CBC W/AUTO DIFF WBC: CPT | Performed by: PATHOLOGY

## 2021-11-30 PROCEDURE — 82607 VITAMIN B-12: CPT | Performed by: PATHOLOGY

## 2021-11-30 PROCEDURE — 85652 RBC SED RATE AUTOMATED: CPT | Performed by: PATHOLOGY

## 2021-11-30 PROCEDURE — 80053 COMPREHEN METABOLIC PANEL: CPT | Performed by: PATHOLOGY

## 2021-11-30 PROCEDURE — 83550 IRON BINDING TEST: CPT | Performed by: PATHOLOGY

## 2021-11-30 PROCEDURE — 83883 ASSAY NEPHELOMETRY NOT SPEC: CPT | Performed by: INTERNAL MEDICINE

## 2021-11-30 PROCEDURE — 99204 OFFICE O/P NEW MOD 45 MIN: CPT | Performed by: INTERNAL MEDICINE

## 2021-11-30 PROCEDURE — 84165 PROTEIN E-PHORESIS SERUM: CPT | Mod: 26 | Performed by: PATHOLOGY

## 2021-11-30 PROCEDURE — 84443 ASSAY THYROID STIM HORMONE: CPT | Performed by: PATHOLOGY

## 2021-11-30 PROCEDURE — 84165 PROTEIN E-PHORESIS SERUM: CPT | Mod: TC | Performed by: PATHOLOGY

## 2021-11-30 PROCEDURE — 82746 ASSAY OF FOLIC ACID SERUM: CPT | Performed by: INTERNAL MEDICINE

## 2021-11-30 ASSESSMENT — PAIN SCALES - GENERAL: PAINLEVEL: NO PAIN (0)

## 2021-11-30 ASSESSMENT — MIFFLIN-ST. JEOR: SCORE: 1008.8

## 2021-11-30 NOTE — NURSING NOTE
Lydia Dietz is a 86 year old female patient that presents today in clinic for the following:    Chief Complaint   Patient presents with     Establish Care     Discuss back pain     The patient's allergies and medications were reviewed as noted. A set of vitals were recorded as noted without incident. The patient does not have any other questions for the provider.    Josefa Jain, EMT at 7:26 AM on 11/30/2021

## 2021-12-01 LAB
ALBUMIN SERPL ELPH-MCNC: 4 G/DL (ref 3.7–5.1)
ALPHA1 GLOB SERPL ELPH-MCNC: 0.4 G/DL (ref 0.2–0.4)
ALPHA2 GLOB SERPL ELPH-MCNC: 0.9 G/DL (ref 0.5–0.9)
B-GLOBULIN SERPL ELPH-MCNC: 0.9 G/DL (ref 0.6–1)
GAMMA GLOB SERPL ELPH-MCNC: 0.8 G/DL (ref 0.7–1.6)
KAPPA LC FREE SER-MCNC: 1.74 MG/DL (ref 0.33–1.94)
KAPPA LC FREE/LAMBDA FREE SER NEPH: 1.37 {RATIO} (ref 0.26–1.65)
LAMBDA LC FREE SERPL-MCNC: 1.27 MG/DL (ref 0.57–2.63)
M PROTEIN SERPL ELPH-MCNC: 0 G/DL
PROT PATTERN SERPL ELPH-IMP: NORMAL
PROT PATTERN SERPL IFE-IMP: NORMAL

## 2021-12-17 ENCOUNTER — HOSPITAL ENCOUNTER (OUTPATIENT)
Dept: NUCLEAR MEDICINE | Facility: CLINIC | Age: 86
Setting detail: OBSERVATION
End: 2021-12-17
Attending: INTERNAL MEDICINE
Payer: MEDICARE

## 2021-12-17 ENCOUNTER — OFFICE VISIT (OUTPATIENT)
Dept: INTERNAL MEDICINE | Facility: CLINIC | Age: 86
End: 2021-12-17
Payer: MEDICARE

## 2021-12-17 ENCOUNTER — HOSPITAL ENCOUNTER (OUTPATIENT)
Dept: NUCLEAR MEDICINE | Facility: CLINIC | Age: 86
Setting detail: OBSERVATION
Discharge: HOME | End: 2021-12-17
Attending: INTERNAL MEDICINE | Admitting: INTERNAL MEDICINE
Payer: MEDICARE

## 2021-12-17 ENCOUNTER — ANCILLARY PROCEDURE (OUTPATIENT)
Dept: BONE DENSITY | Facility: CLINIC | Age: 86
End: 2021-12-17
Attending: INTERNAL MEDICINE
Payer: MEDICARE

## 2021-12-17 VITALS
OXYGEN SATURATION: 98 % | DIASTOLIC BLOOD PRESSURE: 78 MMHG | RESPIRATION RATE: 16 BRPM | HEIGHT: 65 IN | BODY MASS INDEX: 20.49 KG/M2 | WEIGHT: 123 LBS | SYSTOLIC BLOOD PRESSURE: 181 MMHG | HEART RATE: 90 BPM

## 2021-12-17 DIAGNOSIS — M80.08XA AGE-RELATED OSTEOPOROSIS WITH CURRENT PATHOLOGICAL FRACTURE, VERTEBRA(E), INITIAL ENCOUNTER FOR FRACTURE (H): ICD-10-CM

## 2021-12-17 DIAGNOSIS — D64.9 ANEMIA, UNSPECIFIED TYPE: ICD-10-CM

## 2021-12-17 DIAGNOSIS — Z91.89 AT RISK FOR DECREASED BONE DENSITY: ICD-10-CM

## 2021-12-17 DIAGNOSIS — G89.29 OTHER CHRONIC BACK PAIN: ICD-10-CM

## 2021-12-17 DIAGNOSIS — M54.89 OTHER CHRONIC BACK PAIN: ICD-10-CM

## 2021-12-17 DIAGNOSIS — M54.89 OTHER CHRONIC BACK PAIN: Primary | ICD-10-CM

## 2021-12-17 DIAGNOSIS — G89.29 OTHER CHRONIC BACK PAIN: Primary | ICD-10-CM

## 2021-12-17 PROCEDURE — 99215 OFFICE O/P EST HI 40 MIN: CPT | Performed by: INTERNAL MEDICINE

## 2021-12-17 PROCEDURE — 343N000001 HC RX 343: Performed by: INTERNAL MEDICINE

## 2021-12-17 PROCEDURE — A9503 TC99M MEDRONATE: HCPCS | Performed by: INTERNAL MEDICINE

## 2021-12-17 PROCEDURE — 77080 DXA BONE DENSITY AXIAL: CPT

## 2021-12-17 PROCEDURE — 78306 BONE IMAGING WHOLE BODY: CPT | Mod: 26 | Performed by: STUDENT IN AN ORGANIZED HEALTH CARE EDUCATION/TRAINING PROGRAM

## 2021-12-17 PROCEDURE — 78306 BONE IMAGING WHOLE BODY: CPT

## 2021-12-17 RX ORDER — TC 99M MEDRONATE 20 MG/10ML
25 INJECTION, POWDER, LYOPHILIZED, FOR SOLUTION INTRAVENOUS ONCE
Status: COMPLETED | OUTPATIENT
Start: 2021-12-17 | End: 2021-12-17

## 2021-12-17 RX ORDER — TRAMADOL HYDROCHLORIDE 50 MG/1
50 TABLET ORAL
Qty: 15 TABLET | Refills: 0 | Status: SHIPPED | OUTPATIENT
Start: 2021-12-17 | End: 2021-12-20

## 2021-12-17 RX ADMIN — TC 99M MEDRONATE 26.3 MCI.: 20 INJECTION, POWDER, LYOPHILIZED, FOR SOLUTION INTRAVENOUS at 10:02

## 2021-12-17 ASSESSMENT — MIFFLIN-ST. JEOR: SCORE: 1008.8

## 2021-12-17 ASSESSMENT — PAIN SCALES - GENERAL: PAINLEVEL: NO PAIN (0)

## 2021-12-17 NOTE — NURSING NOTE
Chief Complaint   Patient presents with     Recheck Medication     Patient comes in for a follow up.         Dewey Li MA on 12/17/2021 at 2:27 PM

## 2021-12-17 NOTE — PROGRESS NOTES
HPI;    Ms. Dietz comes in for follow up today. Her  is present today. She still states L sided mid lateral back pain (more than one year). She had CT scan thoracic spine 7/8/2021 (in Care Everywhere). She had a spinal injection 8/12/2021. She states little reduction in pain. Otherwise, no additional HEENT, cardiopulmonary, abdominal, , GYN, neurological, systemic, psychiatric, lymphatic, endocrine complaints.     Past Medical History:   Diagnosis Date     Allergies      Anemia      Coronary artery disease      HLD (hyperlipidemia)      HTN (hypertension)      Impaired fasting glucose      Osteoarthritis      Severe aortic stenosis      Past Surgical History:   Procedure Laterality Date     AS LAP INCISIONAL HERNIA REPAIR       BREAST BIOPSY, CORE RT/LT      X3     CHOLECYSTECTOMY       COLECTOMY PARTIAL  2019     CV LEFT HEART CATH N/A 9/9/2020    Procedure: Left Heart Cath;  Surgeon: Leonard Pastor MD;  Location:  [facility] CATH LAB     CV PCI ANGIOPLASTY N/A 9/9/2020    Procedure: CV PCI ANGIOPLASTY;  Surgeon: Leonard Pastor MD;  Location:  [facility]     CV TRANSCATHETER AORTIC VALVE REPLACEMENT N/A 10/7/2020    Procedure: Transfemoral, subclavian (SIFUENTES) or transapical transcatheter aortic valve replacement 23mm sifuentes valve placed. cardiovascular standby.;  Surgeon: Leonard Pastor MD;  [facility]      LAPAROSCOPY, OVIDUCT/OVARY; REMOVAL       HEART CATH FEMORAL CANNULIZATION WITH OPEN STANDBY REPAIR AORTIC VALVE N/A 10/7/2020    Procedure: Cardiac standby. Perfusion standby.;  Surgeon: Richi Olmos MD;  [facility]: [facility]     HYSTERECTOMY TOTAL ABDOMINAL       PE:    Vitals noted, gen, nad, cooperative, alert, neck supple nl rom, lungs with good air movement, RRR, S1, S2, no MRG, adomen, no acute findings. She has mild/moderate tenderness L lateral mid back area. No Paraspinous tenderness to palpation.     CT Thoracic spine Care Everywhere  7/8/2021:  Impression  Performed by [facility]  INDICATION: Mid-back pain, compression fracture suspected;Mid-back pain     TECHNIQUE:  Non-contrast CT scan of the thoracic spine with axial acquisition and 2-D reformatting.       COMPARISON:  CTA chest abdomen pelvis 08/03/2020     FINDINGS:  Moderate compression fracture of the superior T11 endplate anteriorly is similar to prior CT with retropulsion causing mild spinal canal narrowing. No evidence of new fracture. Osteopenic bones. Mild multilevel thoracic degenerative disc changes. Moderate to severe lower cervical degenerative disc changes. Mild to moderate degenerative disc changes at L1-L2. Mild exaggeration of the thoracic kyphosis centered at T11. Grade 1 retrolisthesis at L1-L2. Mild posterior disc osteophyte complexes at C5-C6 and C6-C7 with likely moderate right neural foraminal narrowing at C5-C6. Mild posterior disc osteophyte complex at L1-L2 with mild superiorly extending central disc extrusion. Posterior disc osteophyte complex with unchanged superiorly extending calcified left central disc extrusion at T8-T9 with narrowing of the left lateral recess and minimal spinal canal narrowing. Moderate left neural foraminal narrowing at T10-T11. Osseous fusion across the T10-T11 facet joints. Left greater than right renal cysts. Atherosclerotic vascular calcifications.     IMPRESSION:   1. Findings are similar to prior CT without evidence of new fracture or significantly worsened degenerative changes.   2. Unchanged moderate compression fracture of T11 with retropulsion causing mild spinal canal narrowing with moderate left neural foraminal narrowing at T10-T11 and osseous fusion across the T10-T11 facet joints.   3. Unchanged posterior disc osteophyte complex with superiorly extending calcified left central disc extrusion at T8-T9 which narrows the left lateral recess.   4. Other degenerative findings as above.   5. No high-grade spinal canal  narrowing.    CT Renal Mass (11/8/2019) Care Everywhere:  Impression  Performed by [facility]  COMPARISON:  10/17/2016.     TECHNIQUE:  Axial images of the kidneys was performed before and following the administration of IOPAMIDOL 61 % IV SOLN 100 mL, utilizing a dedicated renal mass protocol.     FINDINGS:       Lung bases: Clear.     Kidneys: In the anterior/lateral portion of the left kidney, there is a 3.1 x 3.2 cm simple appearing cystic structure without significant enhancement. This is minimally enlarged compared to 10/17/2016. Elsewhere in both kidneys, there are smaller cysts and structures are too small to further characterize but most likely represent cysts.     Remainder of abdomen/pelvis: Small probable cyst in the stef hepatis (series 3 image 25), not significant change since 10/17/2016. No new or enlarging liver lesion. No intrahepatic or extra hepatic biliary dilatation. The gallbladder appears surgically removed. The pancreas is unremarkable. The adrenal glands, spleen are within normal limits. Suture line in the right upper quadrant, likely from right hemicolectomy. Colonic diverticulosis without CT evidence for diverticulitis. No significant dilation of bowel. The uterus appears surgically removed. No suspicious adnexal lesion. The urinary bladder is unremarkable. No adenopathy. Subcutaneous fat and musculature are within normal limits. Atherosclerotic calcifications.     Bones: No aggressive appearing bony lesion or acute fracture. There is a chronic appearing wedge compression deformity with approximately 50-60% anterior vertebral body height loss at T11.     IMPRESSION:  Simple appearing cysts in both kidneys, largest which measures up to 3.2 cm and the left kidney. This is slightly enlarged since 10/17/2016 but maintains a benign appearance.          A/P:    1. Colonoscopy Care Everywhere 12/5/2018 that showed a 22 mm polyp and recommended she have a colo-rectal evaluation.     From Care  "Everywhere note 11/6/2019:    \"She had a colonoscopy in 2017, Dr. Hernandez removed a large adenomatous polyp piecemeal and had her return in 6 months for a repeat removal of the remnants. She was seen again last year for colonoscopy and had some adenomatous polyps removed at the colonoscopy but because they were not able to localize the previous area of concern she was advised to follow up with colorectal surgeon; they were leaving for [place] and they were not able to get in to see a surgeon here so they followed up at a local GI clinic in [facility], colorectal surgeon was Dr. Dangelo, I am not able to localize the records but they will try to send copies of the records for our chart. She states that he removed 10 inches of colon, the area of concern, adenomatous polyp presence but no cancer was found, they were relieved about this. They are going to follow up with the same GI specialist when they return to [place] this year\"    2. TAVR off Brilinta, discontinued in October and follows in Cardiology; last seen 11/22/2021.   3. Anemia; Hgb 11.0 11/30/2021 with MCV 82 RDW 16.6%. Chronic Hgb was 8.7 10/8/2020.   4. HTN: she remains on her same medications. She brought in home readings that were quite well controlled and mostly in the  and 130 range  5. Back pain; L sided lateral. Ordered CT chest/abominal/pelvic imaging. Sent in Rx. For Tramadol and discussed side effects.   6. Her  states back (lumbar?) hernia surgery with placement of  Mesh? Reordered Lumbar MRI and refer to Dr. Goss, Ortho spine.       40 minutes spent on the date of the encounter doing chart review, history and exam, documentation and further activities as noted above    "

## 2021-12-17 NOTE — PATIENT INSTRUCTIONS
To schedule a CT and an MRI, please call radiology scheduling at [phone]. Please call [phone] to follow up with Dr. Moses when you see Dr. Goss. Thank you!

## 2021-12-18 ENCOUNTER — TELEPHONE (OUTPATIENT)
Dept: INTERNAL MEDICINE | Facility: CLINIC | Age: 86
End: 2021-12-18

## 2021-12-18 DIAGNOSIS — Z91.89 AT RISK FOR DECREASED BONE DENSITY: Primary | ICD-10-CM

## 2021-12-18 NOTE — TELEPHONE ENCOUNTER
Placed endocrinology referral this encounter    Dear Lydia;    Your bone density scan show low values and I recommend you visit with one of our endocrinology providers. I placed a referral today and you can call [phone] to schedule this appointment.    PAMELA Moses MD

## 2021-12-20 ENCOUNTER — TELEPHONE (OUTPATIENT)
Dept: INTERNAL MEDICINE | Facility: CLINIC | Age: 86
End: 2021-12-20
Payer: MEDICARE

## 2021-12-20 NOTE — TELEPHONE ENCOUNTER
[facility] Call Center    Phone Message    May a detailed message be left on voicemail: yes     Reason for Call: Other: Patient and her usband have questions on why the MRI and CT is needed. Please call back and discuss.     Action Taken: Message routed to:  [facility]    Travel Screening: Not Applicable

## 2021-12-21 ENCOUNTER — TELEPHONE (OUTPATIENT)
Dept: INTERNAL MEDICINE | Facility: CLINIC | Age: 86
End: 2021-12-21
Payer: MEDICARE

## 2021-12-21 NOTE — TELEPHONE ENCOUNTER
I spoke with Lydia today. We discussed the ordered imaging and the last appointment. We reviewed her past imaging as well. Lydia voiced understanding. She will call to schedule the CT and MRI when possible.    Lydia also updated that she thinks she has a bladder infection. She was planning to see a local provider (Dr. Beard), though just wanted to update this clinic to ensure that's an appropriate plan. Discussed that she should be evaluated for UTI concerns, and seeing a local provider is appropriate. Lydia plans to be evaluated today at her local clinic.    Heaven Burton RN ([place])

## 2021-12-21 NOTE — TELEPHONE ENCOUNTER
[facility] Call Center    Phone Message    May a detailed message be left on voicemail: yes     Reason for Call: Other: Patient calling to state she has a scheduled appointment for her MRI and CT scan and will also be meeting with Dr. Goss. Please call with concerns. thank you.      Action Taken: Message routed to:  [facility]    Travel Screening: Not Applicable

## 2022-01-06 NOTE — TELEPHONE ENCOUNTER
RECORDS RECEIVED FROM: Risk for decreased bone density, abnormal DEXA, referred by Dr. Moses   DATE RECEIVED: 3/16/2022   NOTES (FOR ALL VISITS) STATUS DETAILS   OFFICE NOTES from referring provider [facility]:  12/17/21, 11/30/21 - [facility] OV with Dr. Moses   OFFICE NOTES from other specialist Care Everywhere [facility]:  6/23/21 - [facility] OV with Dr. Ferreira   ED NOTES N/A    OPERATIVE REPORT  (thyroid, pituitary, adrenal, parathyroid) N/A    MEDICATION LIST Internal    IMAGING      DEXASCAN [facility]:  12/17/21 - DEXA   MRI (BRAIN) N/A    XR (Chest) In process [facility]:  3/3/21 - XR Chest   CT (HEAD/NECK/CHEST/ABDOMEN) Received / [facility]:  ([facility]) 1/11/22 - CT Chest/Abd/Pelvis  6/9/21 - CT Chest  3/3/21 - CT Abd/Pelvis  3/3/21 - CTA Chest    [facility]:  8/3/20 - CTA Chest/Abd/Pelvis   NUCLEAR  N/A    ULTRASOUND (HEAD/NECK) N/A    LABS     DIABETES: HBGA1C, CREATININE, FASTING LIPIDS, MICROALBUMIN URINE, POTASSIUM, TSH, T4    THYROID: TSH, T4, CBC, THYRODLONULIN, TOTAL T3, FREE T4, CALCITONIN, CEA Care Everywhere / Internal   [facility]:  11/30/21 - Lipid  11/30/21 - CBC  11/30/21 - CMP  11/30/21 - TSH  11/22/21 - BMP  3/3/21 - Potassium  3/3/21 - Creatinine    [facility]:  6/23/21 - HBGA1C    [facility]:  3/3/21 - Urinalysis     Records Requested  01/06/22    Facility  [facility]  Fax: [phone]   Outcome * 1/6/22 3:49 PM Faxed req to [facility] for images to be pushed to "[employer] PACs. - Natalie

## 2022-01-11 ENCOUNTER — ANCILLARY PROCEDURE (OUTPATIENT)
Dept: MRI IMAGING | Facility: CLINIC | Age: 87
End: 2022-01-11
Attending: INTERNAL MEDICINE
Payer: MEDICARE

## 2022-01-11 ENCOUNTER — ANCILLARY PROCEDURE (OUTPATIENT)
Dept: CT IMAGING | Facility: CLINIC | Age: 87
End: 2022-01-11
Attending: INTERNAL MEDICINE
Payer: MEDICARE

## 2022-01-11 DIAGNOSIS — M54.89 OTHER CHRONIC BACK PAIN: ICD-10-CM

## 2022-01-11 DIAGNOSIS — G89.29 OTHER CHRONIC BACK PAIN: ICD-10-CM

## 2022-01-11 LAB
CREAT BLD-MCNC: 1.1 MG/DL (ref 0.5–1)
GFR SERPL CREATININE-BSD FRML MDRD: 49 ML/MIN/1.73M2
RADIOLOGIST FLAGS: NORMAL

## 2022-01-11 PROCEDURE — G1004 CDSM NDSC: HCPCS | Mod: GC | Performed by: RADIOLOGY

## 2022-01-11 PROCEDURE — 71260 CT THORAX DX C+: CPT | Mod: MG | Performed by: RADIOLOGY

## 2022-01-11 PROCEDURE — 72148 MRI LUMBAR SPINE W/O DYE: CPT | Performed by: RADIOLOGY

## 2022-01-11 PROCEDURE — 74177 CT ABD & PELVIS W/CONTRAST: CPT | Mod: MG | Performed by: RADIOLOGY

## 2022-01-11 RX ORDER — IOPAMIDOL 755 MG/ML
76 INJECTION, SOLUTION INTRAVASCULAR ONCE
Status: COMPLETED | OUTPATIENT
Start: 2022-01-11 | End: 2022-01-11

## 2022-01-11 RX ADMIN — IOPAMIDOL 76 ML: 755 INJECTION, SOLUTION INTRAVASCULAR at 13:03

## 2022-01-14 ENCOUNTER — TELEPHONE (OUTPATIENT)
Dept: INTERNAL MEDICINE | Facility: CLINIC | Age: 87
End: 2022-01-14

## 2022-01-14 DIAGNOSIS — R60.1 GENERALIZED EDEMA: ICD-10-CM

## 2022-01-14 DIAGNOSIS — R93.5 ABNORMAL ABDOMINAL CT SCAN: ICD-10-CM

## 2022-01-14 DIAGNOSIS — I25.10 CORONARY ARTERY DISEASE INVOLVING NATIVE CORONARY ARTERY WITHOUT ANGINA PECTORIS, UNSPECIFIED WHETHER NATIVE OR TRANSPLANTED HEART: ICD-10-CM

## 2022-01-14 DIAGNOSIS — E04.1 THYROID NODULE: Primary | ICD-10-CM

## 2022-01-14 DIAGNOSIS — Z78.9 STATIN INTOLERANCE: ICD-10-CM

## 2022-01-14 DIAGNOSIS — E78.5 HYPERLIPIDEMIA LDL GOAL <70: ICD-10-CM

## 2022-01-14 NOTE — TELEPHONE ENCOUNTER
Dear Beatriz;    Please see results letter I sent to Ms. Dietz and help coordinate. She should be able to get these ultrasound studies when she sees me on 2/7/2022.    Thanks, PAMELA Moses        Dear Lydia;    The results of your CT scan are attached and I have some recommendations:    (1) Keep your 2/7/2022 follow up appointment with me to go overall the results    (2) I ordered a thyroid ultrasound for thyroid nodules that could be done when you see me    (3) I ordered a left leg ultrasound to evaluate for thrombus    (4) We will discuss if you need an upper endoscopy     My nurse Beatriz will give you a call to help coordinate all these issues    PAMELA Moses MD

## 2022-01-17 RX ORDER — EVOLOCUMAB 140 MG/ML
140 INJECTION, SOLUTION SUBCUTANEOUS
Qty: 14 ML | Refills: 1 | Status: SHIPPED | OUTPATIENT
Start: 2022-01-17 | End: 2022-08-11

## 2022-01-17 RX ORDER — TRIAMTERENE/HYDROCHLOROTHIAZID 37.5-25 MG
1 TABLET ORAL DAILY
Qty: 90 TABLET | Refills: 0 | Status: SHIPPED | OUTPATIENT
Start: 2022-01-17 | End: 2022-05-04

## 2022-01-18 ENCOUNTER — TELEPHONE (OUTPATIENT)
Dept: INTERNAL MEDICINE | Facility: CLINIC | Age: 87
End: 2022-01-18
Payer: MEDICARE

## 2022-01-18 NOTE — TELEPHONE ENCOUNTER
Please check into this and call patient.    It looks like she has been on this for this for a few years?    Thanks, PAMELA Moses    Pt states she has been taking Maxzide for several years. The only problem she has had is  a pain on her left side. Pt cannot explain the pain-states it goes away when she has stopped taking the Maxzide.   Pt reminded to keep appt on the 7th of Feb with Dr Moses.  Beatriz López RN 1:38 PM on 1/18/2022.

## 2022-02-06 NOTE — PROGRESS NOTES
HPI:    Last visit with me 12/17/2021. Her  is present.  She still has some L lower back pain. We reviewed all her imaging and lab tests. She has some dark spots on her legs R more than L. She states very old smoking history. Limited EtOH use currently (occ. Wine). No other HEENT, cardiopulmonary, abdominal, , neurological, systemic, psychiatric, lymphatic, endocrine, vascular complaints.     Past Medical History:   Diagnosis Date     Allergies      Anemia      Coronary artery disease      HLD (hyperlipidemia)      HTN (hypertension)      Impaired fasting glucose      Osteoarthritis      Severe aortic stenosis      Past Surgical History:   Procedure Laterality Date     AS LAP INCISIONAL HERNIA REPAIR       BREAST BIOPSY, CORE RT/LT      X3     CHOLECYSTECTOMY       COLECTOMY PARTIAL  2019     CV LEFT HEART CATH N/A 9/9/2020    Procedure: Left Heart Cath;  Surgeon: Leonard Pastor MD;  Location:  [facility] CARDIAC CATH LAB     CV PCI ANGIOPLASTY N/A 9/9/2020    Procedure: CV PCI ANGIOPLASTY;  Surgeon: Leonard Pastor MD;  Location:  HEART CARDIAC CATH LAB     CV TRANSCATHETER AORTIC VALVE REPLACEMENT N/A 10/7/2020    Procedure: Transfemoral, subclavian (SIFUENTES) or transapical transcatheter aortic valve replacement 23mm sifuentes valve placed. cardiovascular standby.;  Surgeon: Leonard Pastor MD;  [facility]      LAPAROSCOPY, OVIDUCT/OVARY; REMOVAL       HEART CATH FEMORAL CANNULIZATION WITH OPEN STANDBY REPAIR AORTIC VALVE N/A 10/7/2020    Procedure: Cardiac standby. Perfusion standby.;  Surgeon: Richi Olmos MD;  Location: [facility] OR     HYSTERECTOMY TOTAL ABDOMINAL       PE:    Vitals noted, gen, nad cooperative, alert, neck supple nl rom, lungs with good air movement, RRR, S1, S2, no MRG, abdomen, no acute findings. Grossly normal neurological exam. Senile purpura on R LE. No skin breakdown     A/P:    1. Orthopspine appt. With Dr. Goss, 2/22/2022 for back pain. MRI lumbar spine  "1/11/2022  2. Placed Endocrinology referral 12/18/2021 for low bone density. DEXA scan 12/17/2021  3. CT chest/abdomen/pelvis 1/11/2022 showing thyroid nodule(s), possible L leg thrombus? And lower esophagus thickening  4. Ordered on 1/14/2022 U/S L lower extremity to evaluate for mass vs. Thrombus  5. Ordered thyroid U/S 1/14/2022 for thyroid nodules seen on CT imaging from 1/11/2022; TSH 1.34 on 11/30/2021  6. Ordered EGD 2/6/2022 for lower esophagus thicking seen on CT from 1/11/2022  7. HTN; on Metoprolol, Maxide  8. Increased lipids on Evolocumab. Lipids 11/22/2021 with HDL 68, LDL 95  9. TAVR done 10/7/2020; last seen Cardiology 11/22/2021  10. Colonoscopy Care Everywhere 12/5/2018 that showed a 22 mm polyp and recommended she have a colo-rectal evaluation.      From Care Everywhere note 11/6/2019:     \"She had a colonoscopy in 2017, Dr. Hernandez removed a large adenomatous polyp piecemeal and had her return in 6 months for a repeat removal of the remnants. She was seen again last year for colonoscopy and had some adenomatous polyps removed at the colonoscopy but because they were not able to localize the previous area of concern she was advised to follow up with colorectal surgeon; they were leaving for [place] and they were not able to get in to see a surgeon here so they followed up at a local GI clinic in [facility], colorectal surgeon was Dr. Dangelo, I am not able to localize the records but they will try to send copies of the records for our chart. She states that he removed 10 inches of colon, the area of concern, adenomatous polyp presence but no cancer was found, they were relieved about this. They are going to follow up with the same GI specialist when they return to [place] this year\"    11. Anemia; reordered labs 2/7/2022        40 minutes spent on the date of the encounter doing chart review, history and exam, documentation and further activities as noted above    "

## 2022-02-07 ENCOUNTER — ANCILLARY PROCEDURE (OUTPATIENT)
Dept: ULTRASOUND IMAGING | Facility: CLINIC | Age: 87
End: 2022-02-07
Attending: INTERNAL MEDICINE
Payer: COMMERCIAL

## 2022-02-07 ENCOUNTER — OFFICE VISIT (OUTPATIENT)
Dept: INTERNAL MEDICINE | Facility: CLINIC | Age: 87
End: 2022-02-07
Payer: MEDICARE

## 2022-02-07 ENCOUNTER — LAB (OUTPATIENT)
Dept: LAB | Facility: CLINIC | Age: 87
End: 2022-02-07
Payer: MEDICARE

## 2022-02-07 VITALS
SYSTOLIC BLOOD PRESSURE: 166 MMHG | RESPIRATION RATE: 16 BRPM | HEIGHT: 64 IN | HEART RATE: 70 BPM | WEIGHT: 124.1 LBS | OXYGEN SATURATION: 96 % | BODY MASS INDEX: 21.19 KG/M2 | DIASTOLIC BLOOD PRESSURE: 64 MMHG

## 2022-02-07 DIAGNOSIS — R60.1 GENERALIZED EDEMA: ICD-10-CM

## 2022-02-07 DIAGNOSIS — D64.9 ANEMIA, UNSPECIFIED TYPE: ICD-10-CM

## 2022-02-07 DIAGNOSIS — E04.1 THYROID NODULE: ICD-10-CM

## 2022-02-07 DIAGNOSIS — R93.5 ABNORMAL ABDOMINAL CT SCAN: ICD-10-CM

## 2022-02-07 DIAGNOSIS — R93.5 ABNORMAL ABDOMINAL CT SCAN: Primary | ICD-10-CM

## 2022-02-07 LAB
ALBUMIN SERPL-MCNC: 3.7 G/DL (ref 3.4–5)
ALP SERPL-CCNC: 78 U/L (ref 40–150)
ALT SERPL W P-5'-P-CCNC: 21 U/L (ref 0–50)
ANION GAP SERPL CALCULATED.3IONS-SCNC: 7 MMOL/L (ref 3–14)
AST SERPL W P-5'-P-CCNC: 22 U/L (ref 0–45)
BASOPHILS # BLD AUTO: 0 10E3/UL (ref 0–0.2)
BASOPHILS NFR BLD AUTO: 1 %
BILIRUB SERPL-MCNC: 0.5 MG/DL (ref 0.2–1.3)
BUN SERPL-MCNC: 22 MG/DL (ref 7–30)
CALCIUM SERPL-MCNC: 9.4 MG/DL (ref 8.5–10.1)
CHLORIDE BLD-SCNC: 107 MMOL/L (ref 94–109)
CO2 SERPL-SCNC: 28 MMOL/L (ref 20–32)
CREAT SERPL-MCNC: 1.13 MG/DL (ref 0.52–1.04)
EOSINOPHIL # BLD AUTO: 0 10E3/UL (ref 0–0.7)
EOSINOPHIL NFR BLD AUTO: 1 %
ERYTHROCYTE [DISTWIDTH] IN BLOOD BY AUTOMATED COUNT: 14.6 % (ref 10–15)
FERRITIN SERPL-MCNC: 29 NG/ML (ref 8–252)
FOLATE SERPL-MCNC: 12.3 NG/ML
GFR SERPL CREATININE-BSD FRML MDRD: 48 ML/MIN/1.73M2
GLUCOSE BLD-MCNC: 100 MG/DL (ref 70–99)
HCT VFR BLD AUTO: 41.9 % (ref 35–47)
HGB BLD-MCNC: 12.8 G/DL (ref 11.7–15.7)
IMM GRANULOCYTES # BLD: 0 10E3/UL
IMM GRANULOCYTES NFR BLD: 0 %
IRON SATN MFR SERPL: 23 % (ref 15–46)
IRON SERPL-MCNC: 94 UG/DL (ref 35–180)
LYMPHOCYTES # BLD AUTO: 1.1 10E3/UL (ref 0.8–5.3)
LYMPHOCYTES NFR BLD AUTO: 18 %
MCH RBC QN AUTO: 26 PG (ref 26.5–33)
MCHC RBC AUTO-ENTMCNC: 30.5 G/DL (ref 31.5–36.5)
MCV RBC AUTO: 85 FL (ref 78–100)
MONOCYTES # BLD AUTO: 0.6 10E3/UL (ref 0–1.3)
MONOCYTES NFR BLD AUTO: 10 %
NEUTROPHILS # BLD AUTO: 4.5 10E3/UL (ref 1.6–8.3)
NEUTROPHILS NFR BLD AUTO: 70 %
NRBC # BLD AUTO: 0 10E3/UL
NRBC BLD AUTO-RTO: 0 /100
PLATELET # BLD AUTO: 141 10E3/UL (ref 150–450)
POTASSIUM BLD-SCNC: 4 MMOL/L (ref 3.4–5.3)
PROT SERPL-MCNC: 7.2 G/DL (ref 6.8–8.8)
RADIOLOGIST FLAGS: ABNORMAL
RBC # BLD AUTO: 4.92 10E6/UL (ref 3.8–5.2)
RETICS # AUTO: 0.05 10E6/UL (ref 0.03–0.1)
RETICS/RBC NFR AUTO: 1.1 % (ref 0.5–2)
SODIUM SERPL-SCNC: 142 MMOL/L (ref 133–144)
TIBC SERPL-MCNC: 407 UG/DL (ref 240–430)
VIT B12 SERPL-MCNC: 498 PG/ML (ref 193–986)
WBC # BLD AUTO: 6.3 10E3/UL (ref 4–11)

## 2022-02-07 PROCEDURE — 85025 COMPLETE CBC W/AUTO DIFF WBC: CPT | Performed by: PATHOLOGY

## 2022-02-07 PROCEDURE — 83550 IRON BINDING TEST: CPT | Performed by: PATHOLOGY

## 2022-02-07 PROCEDURE — 93971 EXTREMITY STUDY: CPT | Mod: LT | Performed by: RADIOLOGY

## 2022-02-07 PROCEDURE — 82728 ASSAY OF FERRITIN: CPT | Performed by: PATHOLOGY

## 2022-02-07 PROCEDURE — 85045 AUTOMATED RETICULOCYTE COUNT: CPT | Performed by: PATHOLOGY

## 2022-02-07 PROCEDURE — 82746 ASSAY OF FOLIC ACID SERUM: CPT | Performed by: INTERNAL MEDICINE

## 2022-02-07 PROCEDURE — 82607 VITAMIN B-12: CPT | Performed by: PATHOLOGY

## 2022-02-07 PROCEDURE — 99215 OFFICE O/P EST HI 40 MIN: CPT | Performed by: INTERNAL MEDICINE

## 2022-02-07 PROCEDURE — 36415 COLL VENOUS BLD VENIPUNCTURE: CPT | Performed by: PATHOLOGY

## 2022-02-07 PROCEDURE — 76536 US EXAM OF HEAD AND NECK: CPT | Performed by: STUDENT IN AN ORGANIZED HEALTH CARE EDUCATION/TRAINING PROGRAM

## 2022-02-07 PROCEDURE — 80053 COMPREHEN METABOLIC PANEL: CPT | Performed by: PATHOLOGY

## 2022-02-07 ASSESSMENT — MIFFLIN-ST. JEOR: SCORE: 994.42

## 2022-02-07 NOTE — NURSING NOTE
"Lydia Dietz is a 86 year old female patient that presents today in clinic for the following:    Chief Complaint   Patient presents with     RECHECK     Follow-up     Results     MRI     The patient's allergies and medications were reviewed as noted. A set of vitals were recorded as noted without incident: BP (!) 166/64 (BP Location: Right arm, Patient Position: Sitting, Cuff Size: Adult Regular)   Pulse 70   Resp 16   Ht 1.62 m (5' 3.78\")   Wt 56.3 kg (124 lb 1.6 oz)   SpO2 96%   Breastfeeding No   BMI 21.45 kg/m  . The patient was asked if they had any of the following symptoms in the last forty-eight hours: (1) fever or chills, (2) cough, (3) shortness of breath or difficulty breathing, (4) fatigue, (5) muscle or body aches, (6) headache, (7) new loss of taste or smell, (8) sore throat, (9) congestion or runny nose, (10) nausea or vomiting, and (11) diarrhea. Lydia Dietz denies having any of the following symptoms in the last forty-eight hours: (1) fever or chills, (2) cough, (3) shortness of breath or difficulty breathing, (4) fatigue, (5) muscle or body aches, (6) headache, (7) new loss of taste or smell, (8) sore throat, (9) congestion or runny nose, (10) nausea or vomiting, and (11) diarrhea. The patient does not have any other questions for the provider.    Mikael Campos, EMT at 12:55 PM on 2/7/2022  "

## 2022-02-07 NOTE — TELEPHONE ENCOUNTER
RECORDS RECEIVED FROM: chronic back pain / MRI done 01/11/22 / Dr. Praveen Moses    DATE RECEIVED: Feb 22, 2022     NOTES STATUS DETAILS   OFFICE NOTE from referring provider Internal Praveen Moses MD     MEDICATION LIST Internal    NM Internal 12/17/21   MRI Internal 1/11/22   CT SCAN Internal 1/11/22   XRAYS (IMAGES & REPORTS) Internal 12/17/21

## 2022-02-10 ENCOUNTER — TELEPHONE (OUTPATIENT)
Dept: INTERNAL MEDICINE | Facility: CLINIC | Age: 87
End: 2022-02-10
Payer: MEDICARE

## 2022-02-10 NOTE — TELEPHONE ENCOUNTER
[facility] Call Center    Phone Message    May a detailed message be left on voicemail: yes     Reason for Call: Other: Patient would like a call back about a few procedures she was referred for.      Action Taken: Message routed to:  [facility]    Travel Screening: Not Applicable

## 2022-02-11 ENCOUNTER — TELEPHONE (OUTPATIENT)
Dept: GASTROENTEROLOGY | Facility: CLINIC | Age: 87
End: 2022-02-11
Payer: MEDICARE

## 2022-02-11 ENCOUNTER — HOSPITAL ENCOUNTER (OUTPATIENT)
Facility: CLINIC | Age: 87
End: 2022-02-11
Attending: INTERNAL MEDICINE | Admitting: INTERNAL MEDICINE
Payer: MEDICARE

## 2022-02-11 DIAGNOSIS — Z11.59 ENCOUNTER FOR SCREENING FOR OTHER VIRAL DISEASES: Primary | ICD-10-CM

## 2022-02-11 DIAGNOSIS — R93.5 ABNORMAL CT OF THE ABDOMEN: Primary | ICD-10-CM

## 2022-02-11 LAB
PATH REPORT.COMMENTS IMP SPEC: NORMAL
PATH REPORT.COMMENTS IMP SPEC: NORMAL
PATH REPORT.FINAL DX SPEC: NORMAL
PATH REPORT.MICROSCOPIC SPEC OTHER STN: NORMAL
PATH REPORT.MICROSCOPIC SPEC OTHER STN: NORMAL
PATH REPORT.RELEVANT HX SPEC: NORMAL

## 2022-02-11 RX ORDER — BISACODYL 5 MG
TABLET, DELAYED RELEASE (ENTERIC COATED) ORAL
Qty: 4 TABLET | Refills: 0 | Status: SHIPPED | OUTPATIENT
Start: 2022-02-11 | End: 2024-01-24

## 2022-02-11 NOTE — TELEPHONE ENCOUNTER
[facility] Call Center    Phone Message    May a detailed message be left on voicemail: yes     Reason for Call: Other: Patient calling back to schedule. Was transferred to endoscopy scheduling PH#.     Action Taken: Message routed to:  [facility]    Travel Screening: Not Applicable

## 2022-02-11 NOTE — TELEPHONE ENCOUNTER
Screening Questions  Blue=prep questions Red=location Green=sedation   1. Are you active on mychart? NO     2. What insurance is in the chart? BCBS and medicare     3.  Ordering/Referring Provider: Praveen Moses MD     4. BMI 22.0, If greater than 40 review exclusion criteria also will need EXTENDED PREP    5.  Respiratory Screening (If yes to any of the following HOSPITAL setting only):     Do you use daily home oxygen? No   Do you have mod to severe Obstructive Sleep Apnea? No  (can be seen at [facility] or hospital setting)    Do you have Pulmonary Hypertension? No    Do you have UNCONTROLLED asthma? No     6. Have you had a heart or lung transplant? No   (If yes, please review exclusion criteria)    7. Are you currently on dialysis? No   (If yes, schedule in HOSPITAL setting only)(If yes, please send Golytely prep)    8. Do you have chronic kidney disease? CKD stage 3 (If yes, please send Golytely prep)    9. Have you had a stroke or Transient ischemic attack (TIA) within 6 months? No  (If yes, do not schedule at [facility])    10. In the past 6 months, have you had any heart related issues including cardiomyopathy or heart attack? No  (If yes, please review exclusion criteria)           If yes, did it require cardiac stenting or other implantable device?did have a valve replaced   (If yes, please review exclusion criteria)      11. Do you have any implantable devices in your body (pacemaker, defib, LVAD)? no  (If yes, schedule at [facility])    12. Do you take nitroglycerin? If yes, how often? No  (if yes, schedule at HOSPITAL setting)    13. Are you currently taking any blood thinners?no  (If yes- inform patient to follow up with PCP or provider for follow up instructions)     14. Are you a diabetic? No  (If yes, please send Golytely prep)    15. (Females) Are you currently pregnant?   If yes, how many weeks?      16. Are you taking any prescription pain medications on a routine schedule? No  If yes, MAC sedation and patient  will need EXTENDED PREP.    17. Do you have any chemical dependencies such as alcohol, street drugs, or methadone? No  If yes, MAC sedation     18. Do you have any history of post-traumatic stress syndrome, severe anxiety or history of psychosis? No   If yes, MAC sedation.     19. Do you transfer independently? Yes     20.  Do you have any issues with constipation? No    If yes, pt will need EXTENDED PREP     21. Preferred Pharmacy for Pre Prescription [facility] [address] [place] [address]    Scheduling Details    Which Colonoscopy Prep was Sent?: Miralax  Type of Procedure Scheduled: colon and EGD   Surgeon: Shyam  Date of Procedure: 2/18/2022  Location: [facility]  Caller (Please ask for phone number if not scheduled by patient): Lydia Dietz      Sedation Type: MAC  Conscious Sedation- Needs  for 6 hours after the procedure  MAC/General-Needs  for 24 hours after procedure    Pre-op Required at [facility] [place], [place] and OR for MAC sedation: no  (if yes advise patient they will need a pre-op prior to procedure)      Informed patient they will need an adult  yes  Cannot take any type of public or medical transportation alone    Pre-Procedure Covid test to be completed at [facility] or Externally: yes at     Confirmed Nurse will call to complete assessment yes    Additional comments: no ([facility] PATIENTS NEED EXTENDED PREP)

## 2022-02-11 NOTE — TELEPHONE ENCOUNTER
Attempted to call patient x2 but patient hung up or calls abruptly ended. Will attempt to reach patient at another time.    Dewey Li MA on 2/11/2022 at 9:05 AM

## 2022-02-11 NOTE — TELEPHONE ENCOUNTER
Pre assessment questions completed for upcoming colonoscopy/EGD procedure scheduled on 2.18.2022    COVID test scheduled 2.14.2022    Reviewed procedural arrival time 0945 and facility location [facility].    Designated  policy reviewed. Instructed to have someone stay 24 hours post procedure.     Anticoagulation/blood thinners? no    Electronic implanted devices? no    Reviewed Golytely prep instructions with patient. No fiber/iron supplements or foods that contain nuts/seeds prior to procedure.     [facility] to [facility] [address] [place] [address]    Patient verbalized understanding and had no questions or concerns at this time.    Jaimee Joy RN

## 2022-02-12 ENCOUNTER — TELEPHONE (OUTPATIENT)
Dept: INTERNAL MEDICINE | Facility: CLINIC | Age: 87
End: 2022-02-12

## 2022-02-12 DIAGNOSIS — E04.1 THYROID NODULE: Primary | ICD-10-CM

## 2022-02-12 NOTE — TELEPHONE ENCOUNTER
Placed endocrine referral this encounter    Dear Ms. J Luis;    Your thyroid ultrasound has some nodules and I recommend you be evaluated by the endocrinology providers. I placed a referral today and you can call [phone] to schedule this appointment.    PAMELA Moses MD

## 2022-02-14 ENCOUNTER — TELEPHONE (OUTPATIENT)
Dept: INTERNAL MEDICINE | Facility: CLINIC | Age: 87
End: 2022-02-14
Payer: MEDICARE

## 2022-02-14 NOTE — TELEPHONE ENCOUNTER
[facility] Call Center    Phone Message    May a detailed message be left on voicemail: yes     Reason for Call: Medication Question or concern regarding medication   Prescription Clarification  Name of Medication: evolocumab (REPATHA SURECLICK) 140 MG/ML prefilled autoinjector     Prescribing Provider: Dr. Moses      What on the order needs clarification? Pt needing to know if she can take this medication prior to her colonoscopy this Thursday 2/17          Action Taken: Message routed to:  [facility]

## 2022-02-14 NOTE — TELEPHONE ENCOUNTER
PT was confused about why she had an Endocrinology referral and did not wish to schedule at this time - will discuss w/ pcp during next follow up

## 2022-02-14 NOTE — TELEPHONE ENCOUNTER
Rescheduled procedure.     Pre assessment questions completed for upcoming colonoscopy/EGD procedure scheduled on 2/17/22    COVID test scheduled 2/14/22 - [facility]. Facility will fax results.     Reviewed procedural arrival time 1215 and facility location [facility].    Designated  policy reviewed. Instructed to have someone stay 6 hours post procedure.     Reviewed Golytely prep instructions with patient. No fiber/iron supplements or foods that contain nuts/seeds 7 days prior to procedure.     Patient verbalized understanding and had no questions or concerns at this time.    Beverly Weaver RN

## 2022-02-15 ENCOUNTER — TELEPHONE (OUTPATIENT)
Dept: INTERNAL MEDICINE | Facility: CLINIC | Age: 87
End: 2022-02-15
Payer: MEDICARE

## 2022-02-15 ENCOUNTER — TELEPHONE (OUTPATIENT)
Dept: CARDIOLOGY | Facility: CLINIC | Age: 87
End: 2022-02-15
Payer: MEDICARE

## 2022-02-15 NOTE — TELEPHONE ENCOUNTER
[facility] Call Center    Phone Message    May a detailed message be left on voicemail: yes     Reason for Call: Pt of Palomo Logan MD - Pt is having a colonoscopy this Thurssday, 2/17. She takes Repatha. Is it okay she gets the shot for this on the same day as her colonoscopy? Please call and let pt know.    Action Taken: Message routed to:  [facility]    Travel Screening: Not Applicable

## 2022-02-15 NOTE — TELEPHONE ENCOUNTER
Patient and  returning call.     Patient's  wanting to know what prep is needed for EGD and what time procedure was at as they knew about the colonoscopy but not the EGD.     Informed  that there is not a separate time or prep for EGD. That arrival time is 1215 as discussed yesterday and to just follow along with colonoscopy instructions and ensure NPO 4 hours prior to arrival. Stated that EGD will be done along with colonoscopy on 2/17/22.      and patient verbalized understanding and had no further questions or concerns at this time.     Beverly Weaver, RN

## 2022-02-15 NOTE — TELEPHONE ENCOUNTER
Patient was confused by a EGD was order.  Reason: abnormal CT abdominal findings.   Patient would like to discuss.    Josue Moon CMA ([facility]) at 10:57 AM on 2/15/2022 '    Pt called and informed she can hold the (Repatha Sureclick) the day of the EGD and start it on the next day. Cont every 14 days as usual.  Beatriz López RN 2:36 PM on 2/16/2022.  Message left for pt with these directions Clinic numbers given for questions or concerns.  Beatriz López RN 2:37 PM on 2/16/2022.

## 2022-02-15 NOTE — TELEPHONE ENCOUNTER
Called pt and notified there is no contraindication to taking repatha on the day of her colonoscopy.

## 2022-02-15 NOTE — TELEPHONE ENCOUNTER
[facility] Call Center    Phone Message     May a detailed message be left on voicemail: yes     Reason for Call: Patient calling to talk to the nurse. She is confused and would like to talk to the nurse about some information she received and would like a some explanation to help her understand better for her and her .  Action Taken: Message routed to:  [facility]    Travel Screening: Not Applicable

## 2022-02-17 ENCOUNTER — HOSPITAL ENCOUNTER (OUTPATIENT)
Facility: CLINIC | Age: 87
Discharge: HOME | End: 2022-02-17
Attending: INTERNAL MEDICINE | Admitting: INTERNAL MEDICINE
Payer: MEDICARE

## 2022-02-17 VITALS
BODY MASS INDEX: 19.87 KG/M2 | RESPIRATION RATE: 26 BRPM | HEIGHT: 65 IN | TEMPERATURE: 97.7 F | OXYGEN SATURATION: 100 % | SYSTOLIC BLOOD PRESSURE: 132 MMHG | HEART RATE: 79 BPM | DIASTOLIC BLOOD PRESSURE: 68 MMHG | WEIGHT: 119.27 LBS

## 2022-02-17 LAB
COLONOSCOPY: NORMAL
UPPER GI ENDOSCOPY: NORMAL

## 2022-02-17 PROCEDURE — 250N000011 HC RX IP 250 OP 636: Performed by: INTERNAL MEDICINE

## 2022-02-17 PROCEDURE — G0105 COLORECTAL SCRN; HI RISK IND: HCPCS | Performed by: INTERNAL MEDICINE

## 2022-02-17 PROCEDURE — G0500 MOD SEDAT ENDO SERVICE >5YRS: HCPCS | Performed by: INTERNAL MEDICINE

## 2022-02-17 PROCEDURE — 43235 EGD DIAGNOSTIC BRUSH WASH: CPT | Performed by: INTERNAL MEDICINE

## 2022-02-17 PROCEDURE — 99153 MOD SED SAME PHYS/QHP EA: CPT | Performed by: INTERNAL MEDICINE

## 2022-02-17 PROCEDURE — 250N000009 HC RX 250: Performed by: INTERNAL MEDICINE

## 2022-02-17 PROCEDURE — 45378 DIAGNOSTIC COLONOSCOPY: CPT | Performed by: INTERNAL MEDICINE

## 2022-02-17 RX ORDER — ONDANSETRON 2 MG/ML
4 INJECTION INTRAMUSCULAR; INTRAVENOUS EVERY 6 HOURS PRN
Status: DISCONTINUED | OUTPATIENT
Start: 2022-02-17 | End: 2022-02-17 | Stop reason: HOSPADM

## 2022-02-17 RX ORDER — FENTANYL CITRATE 50 UG/ML
INJECTION, SOLUTION INTRAMUSCULAR; INTRAVENOUS PRN
Status: COMPLETED | OUTPATIENT
Start: 2022-02-17 | End: 2022-02-17

## 2022-02-17 RX ORDER — PROCHLORPERAZINE MALEATE 5 MG
5 TABLET ORAL EVERY 6 HOURS PRN
Status: DISCONTINUED | OUTPATIENT
Start: 2022-02-17 | End: 2022-02-17 | Stop reason: HOSPADM

## 2022-02-17 RX ORDER — FLUMAZENIL 0.1 MG/ML
0.2 INJECTION, SOLUTION INTRAVENOUS
Status: DISCONTINUED | OUTPATIENT
Start: 2022-02-17 | End: 2022-02-17 | Stop reason: HOSPADM

## 2022-02-17 RX ORDER — LIDOCAINE 40 MG/G
CREAM TOPICAL
Status: DISCONTINUED | OUTPATIENT
Start: 2022-02-17 | End: 2022-02-17 | Stop reason: HOSPADM

## 2022-02-17 RX ORDER — ONDANSETRON 2 MG/ML
4 INJECTION INTRAMUSCULAR; INTRAVENOUS
Status: DISCONTINUED | OUTPATIENT
Start: 2022-02-17 | End: 2022-02-17 | Stop reason: HOSPADM

## 2022-02-17 RX ORDER — NALOXONE HYDROCHLORIDE 0.4 MG/ML
0.2 INJECTION, SOLUTION INTRAMUSCULAR; INTRAVENOUS; SUBCUTANEOUS
Status: DISCONTINUED | OUTPATIENT
Start: 2022-02-17 | End: 2022-02-17 | Stop reason: HOSPADM

## 2022-02-17 RX ORDER — NALOXONE HYDROCHLORIDE 0.4 MG/ML
0.4 INJECTION, SOLUTION INTRAMUSCULAR; INTRAVENOUS; SUBCUTANEOUS
Status: DISCONTINUED | OUTPATIENT
Start: 2022-02-17 | End: 2022-02-17 | Stop reason: HOSPADM

## 2022-02-17 RX ORDER — ONDANSETRON 4 MG/1
4 TABLET, ORALLY DISINTEGRATING ORAL EVERY 6 HOURS PRN
Status: DISCONTINUED | OUTPATIENT
Start: 2022-02-17 | End: 2022-02-17 | Stop reason: HOSPADM

## 2022-02-17 RX ADMIN — FENTANYL CITRATE 50 MCG: 50 INJECTION, SOLUTION INTRAMUSCULAR; INTRAVENOUS at 12:52

## 2022-02-17 RX ADMIN — FENTANYL CITRATE 50 MCG: 50 INJECTION, SOLUTION INTRAMUSCULAR; INTRAVENOUS at 12:44

## 2022-02-17 RX ADMIN — MIDAZOLAM 1 MG: 1 INJECTION INTRAMUSCULAR; INTRAVENOUS at 12:40

## 2022-02-17 RX ADMIN — MIDAZOLAM 1 MG: 1 INJECTION INTRAMUSCULAR; INTRAVENOUS at 12:44

## 2022-02-17 RX ADMIN — FENTANYL CITRATE 50 MCG: 50 INJECTION, SOLUTION INTRAMUSCULAR; INTRAVENOUS at 12:40

## 2022-02-17 RX ADMIN — TOPICAL ANESTHETIC 1 SPRAY: 200 SPRAY DENTAL; PERIODONTAL at 12:39

## 2022-02-17 RX ADMIN — MIDAZOLAM 1 MG: 1 INJECTION INTRAMUSCULAR; INTRAVENOUS at 12:52

## 2022-02-17 NOTE — H&P
Lydia Dietz  [id number]  female  86 year old      Reason for procedure/surgery: abnormal CT, hx colon polyps    Patient Active Problem List   Diagnosis     Wedge compression fracture of T11-T12 vertebra, initial encounter for closed fracture (H)     Coronary artery disease involving native coronary artery, angina presence unspecified, unspecified whether native or transplanted heart     Status post coronary angiogram     Severe aortic stenosis     Adenomatous polyp of colon     Allergic rhinitis     Back pain, thoracic     Benign neoplasm of colon     Calculus of gallbladder     Chronic left-sided low back pain without sciatica     CKD (chronic kidney disease) stage 3, GFR 30-59 ml/min     Counseling on health care directive     Counseling regarding advanced directives and goals of care     Degeneration, intervertebral disc, lumbosacral     Abdominal hernia     Diffuse cystic mastopathy     Endometriosis     Essential hypertension     Hyperlipidemia LDL goal <70     IFG (impaired fasting glucose)     Microscopic hematuria     Osteoarthrosis     Renal cyst, left     Symptoms involving cardiovascular system     Varicella     Aortic stenosis, severe     Chest pain     Statin intolerance       Past Surgical History:    Past Surgical History:   Procedure Laterality Date     AS LAP INCISIONAL HERNIA REPAIR       BREAST BIOPSY, CORE RT/LT      X3     CHOLECYSTECTOMY       COLECTOMY PARTIAL  2019     CV LEFT HEART CATH N/A 9/9/2020    Procedure: Left Heart Cath;  Surgeon: Leonard Pastor MD;  Location:  HEART CARDIAC CATH LAB     CV PCI ANGIOPLASTY N/A 9/9/2020    Procedure: CV PCI ANGIOPLASTY;  Surgeon: Leonard Pastor MD;  Location:  HEART CARDIAC CATH LAB     CV TRANSCATHETER AORTIC VALVE REPLACEMENT N/A 10/7/2020    Procedure: Transfemoral, subclavian (DINH) or transapical transcatheter aortic valve replacement 23mm dinh valve placed. cardiovascular standby.;  Surgeon: Leonard Pastor MD;   "Location: [facility] OR     HC LAPAROSCOPY, OVIDUCT/OVARY; REMOVAL       HEART CATH FEMORAL CANNULIZATION WITH OPEN STANDBY REPAIR AORTIC VALVE N/A 10/7/2020    Procedure: Cardiac standby. Perfusion standby.;  Surgeon: Richi Olmos MD;  Location: [facility] OR     HYSTERECTOMY TOTAL ABDOMINAL         Past Medical History:   Past Medical History:   Diagnosis Date     Allergies      Anemia      Coronary artery disease      HLD (hyperlipidemia)      HTN (hypertension)      Impaired fasting glucose      Osteoarthritis      Severe aortic stenosis        Social History:   Social History     Tobacco Use     Smoking status: Former Smoker     Quit date:      Years since quittin.1     Smokeless tobacco: Never Used     Tobacco comment: 1 pack per week if that maybe for 5 years    Substance Use Topics     Alcohol use: Yes     Comment: occ        Family History: History reviewed. No pertinent family history.    Allergies:   Allergies   Allergen Reactions     Rosuvastatin Muscle Pain (Myalgia)     Seasonal Allergies      Amlodipine      Other reaction(s): Edema,generalized     Atorvastatin      PN: Reaction: BLURRED VISION     Fenofibrate Muscle Pain (Myalgia)     Fluconazole Nausea     Hydrochlorothiazide      Leg cramps, felt sluggish     Losartan      PN: Reaction: Back pain and cramping     Pravastatin      PN:  Reaction: JOINT AND MUSCLE PAIN     Simvastatin Nausea     Niacin Rash       Active Medications:   No current outpatient medications on file.       Systemic Review:   CONSTITUTIONAL: NEGATIVE for fever, chills, change in weight  ENT/MOUTH: NEGATIVE for ear, mouth and throat problems  RESP: NEGATIVE for significant cough or SOB  CV: NEGATIVE for chest pain, palpitations or peripheral edema    Physical Examination:   Vital Signs: BP (!) 153/89   Pulse 97   Temp 97.7  F (36.5  C) (Oral)   Resp 16   Ht 1.651 m (5' 5\")   Wt 54.1 kg (119 lb 4.3 oz)   SpO2 100%   BMI 19.85 kg/m    GENERAL: healthy, alert and no " distress  NECK: no adenopathy, no asymmetry, masses, or scars  RESP: lungs clear to auscultation - no rales, rhonchi or wheezes  CV: regular rate and rhythm, normal S1 S2, no S3 or S4, no murmur, click or rub, no peripheral edema and peripheral pulses strong  ABDOMEN: soft, nontender, no hepatosplenomegaly, no masses and bowel sounds normal  MS: no gross musculoskeletal defects noted, no edema    Plan: Appropriate to proceed as scheduled.      Karlos Howe MD  2/17/2022    PCP:  Praveen Moses

## 2022-02-17 NOTE — DISCHARGE INSTRUCTIONS
Discharge Instructions after Colonoscopy  or Sigmoidoscopy    Today you had a ____ Colonoscopy ____ Sigmoidoscopy    Activity and Diet  You were given medicine for pain. You may be dizzy or sleepy.  For 24 hours:    Do not drive or use heavy equipment.    Do not make important decisions.    Do not drink any alcohol.  You may return to your normal diet and medicines.    Discomfort    Air was placed in your colon during the exam in order to see it. Walking helps to pass the air.    You may take Tylenol (acetaminophen) for pain unless your doctor has told you not to.  Do not take aspirin or ibuprofen (Advil, Motrin, or other anti-inflammatory  drugs) for _____ days.    Follow-up  ____ We took small tissue samples or polyps to study. Your doctor will call you with the results  within two weeks.    When to call:    Call right away if you have:    Unusual pain in belly or chest pain not relieved with passing air.    More than 1 to 2 Tablespoons of bleeding from your rectum.    Fever above 100.6  F (37.5  C).    If you have severe pain, bleeding, or shortness of breath, go to an emergency room.    If you have questions, call:  Monday to Friday, 8 a.m. to 4:30 p.m.  Central Scheduling Department: [phone]    After hours  Hospital: [phone] (Ask for the GI fellow on call)Discharge Instructions after  Upper Endoscopy (EGD)    Activity and Diet  You were given medicine for pain. You may be dizzy or sleepy.  For 24 hours:    Do not drive or use heavy equipment.    Do not make important decisions.    Do not drink any alcohol.  ___ You may return to your regular diet.    Discomfort  You may have a sore throat for 2 to 3 days. It may help to:    Avoid hot liquids for 24 hours.    Use sore throat lozenges.    Gargle as needed with salt water up to 4 times a day. Mix 1 cup of warm water  with 1 teaspoon of salt. Do not swallow.  ___ Your esophagus was dilated (opened) or banded during the exam:    Drink only cool liquids for  the rest of the day. Eat a soft diet for the next few days.    You may have a sore chest for 2 to 3 days.    You may take Tylenol (acetaminophen) for pain unless your doctor has told you not to.    Do not take aspirin or ibuprofen (Advil, Motrin) or other NSAIDS  (anti-inflammatory drugs) for ___ days.    Follow-up  ___ We took small tissue samples for study. If you do not have a follow-up visit scheduled,  call your provider s office in 2 weeks for the results.    Other instructions________________________________________________________    When to call us:  Problems are rare. Call right away if you have:    Unusual throat pain or trouble swallowing    Unusual pain in belly or chest that is not relieved by belching or passing air    Black stools (tar-like looking bowel movement)    Temperature above 100.6  F. (37.5  C).    If you vomit blood or have severe pain, go to an emergency room.    If you have questions, call:  Monday to Friday, 8 a.m. to 4:30 p.m.: Central Scheduling Department:[phone]    After hours: Hospital: [phone] (Ask for the GI fellow on call)

## 2022-02-17 NOTE — OR NURSING
EGD and Colonoscopy with no interventions performed under moderate sedation. Patient tolerated well. Transferred to  and report given to recovery RN.

## 2022-02-21 DIAGNOSIS — M54.50 LUMBAR PAIN: Primary | ICD-10-CM

## 2022-02-21 NOTE — PROGRESS NOTES
HPI:    Lydia comes in for follow up today. Her  is present as well. Overall stable. Still with chronic back pain more L sided. She remains on her anti-hypertensive medication. Otherwise no additional HEENT, cardiopulmonary, abdominal, , GYN, neurological, systemic, psychiatric, lymphatic, endocrine, vascular complaints.     Past Medical History:   Diagnosis Date     Allergies      Anemia      Coronary artery disease      HLD (hyperlipidemia)      HTN (hypertension)      Impaired fasting glucose      Osteoarthritis      Severe aortic stenosis      Past Surgical History:   Procedure Laterality Date     AS LAP INCISIONAL HERNIA REPAIR       BREAST BIOPSY, CORE RT/LT      X3     CHOLECYSTECTOMY       COLECTOMY PARTIAL  2019     COLONOSCOPY N/A 2/17/2022    Procedure: COLONOSCOPY;  Surgeon: Karlos Holley MD;  Location:  GI     CV LEFT HEART CATH N/A 9/9/2020    Procedure: Left Heart Cath;  Surgeon: Leonard Pastor MD;  Location:  HEART CARDIAC CATH LAB     CV PCI ANGIOPLASTY N/A 9/9/2020    Procedure: CV PCI ANGIOPLASTY;  Surgeon: Leonard Pastor MD;  Location:  HEART CARDIAC CATH LAB     CV TRANSCATHETER AORTIC VALVE REPLACEMENT N/A 10/7/2020    Procedure: Transfemoral, subclavian (SIFUENTES) or transapical transcatheter aortic valve replacement 23mm sifuentes valve placed. cardiovascular standby.;  Surgeon: Leonard Pastor MD;  Location:  OR     ESOPHAGOSCOPY, GASTROSCOPY, DUODENOSCOPY (EGD), COMBINED N/A 2/17/2022    Procedure: ESOPHAGOGASTRODUODENOSCOPY (EGD);  Surgeon: Karlos Holley MD;  Location:  GI     HC LAPAROSCOPY, OVIDUCT/OVARY; REMOVAL       HEART CATH FEMORAL CANNULIZATION WITH OPEN STANDBY REPAIR AORTIC VALVE N/A 10/7/2020    Procedure: Cardiac standby. Perfusion standby.;  Surgeon: Richi Olmos MD;  Location: [facility] OR     HYSTERECTOMY TOTAL ABDOMINAL       PE:    Vitals noted, gen, nad, cooperative, alert, neck supple, no B carotid bruits, lungs with good  air movement, RRR, S1, S2, no MRG, abdomen, no acute findings. L groin with small palpable mass, Grossly normal neurological exam.     A/P:    1. Back pain; she has orthospine appointment with Dr. Goss 2/22/2022. MRI lunbar spine 1/11/2022.   2. Anemia; Hgb 11/30/2021 was 11.0, RDW 16.6%, MCV 82. Iron, ferritin, B12, folate all normal on 2/7/2022. EGD and colonoscopy  2/7/2022; no acute findings. She had a CT chest/abdomen/pelvis study 1/11/2022.  3. L groin mass? Seen on CT and U/S findings. Will have her see General Surgery and placed referral today 2/22/2022  4. Thyroid U/S 2/7/2022 for thyroid nodule; Endocrinology appt. 4/27/2022  5. Pulmonary nodules see on Chest CT scanf rom 1/11/2022 and ordered repeat for 6 months.   6. Low bone density; DEXA 12/17/2021 Most negative T score -2.2. She has endocrinology appt. 4/27/2022.     40 minutes spent on the date of the encounter doing chart review, history and exam, documentation and further activities as noted above

## 2022-02-22 ENCOUNTER — PRE VISIT (OUTPATIENT)
Dept: ORTHOPEDICS | Facility: CLINIC | Age: 87
End: 2022-02-22

## 2022-02-22 ENCOUNTER — OFFICE VISIT (OUTPATIENT)
Dept: INTERNAL MEDICINE | Facility: CLINIC | Age: 87
End: 2022-02-22
Payer: COMMERCIAL

## 2022-02-22 ENCOUNTER — ANCILLARY PROCEDURE (OUTPATIENT)
Dept: GENERAL RADIOLOGY | Facility: CLINIC | Age: 87
End: 2022-02-22
Attending: ORTHOPAEDIC SURGERY
Payer: MEDICARE

## 2022-02-22 ENCOUNTER — OFFICE VISIT (OUTPATIENT)
Dept: ORTHOPEDICS | Facility: CLINIC | Age: 87
End: 2022-02-22
Payer: COMMERCIAL

## 2022-02-22 VITALS
RESPIRATION RATE: 16 BRPM | TEMPERATURE: 98 F | WEIGHT: 119 LBS | SYSTOLIC BLOOD PRESSURE: 158 MMHG | OXYGEN SATURATION: 99 % | HEIGHT: 65 IN | HEART RATE: 88 BPM | BODY MASS INDEX: 19.83 KG/M2 | DIASTOLIC BLOOD PRESSURE: 70 MMHG

## 2022-02-22 VITALS — BODY MASS INDEX: 19.83 KG/M2 | HEIGHT: 65 IN | WEIGHT: 119 LBS

## 2022-02-22 DIAGNOSIS — R19.09 GROIN MASS: ICD-10-CM

## 2022-02-22 DIAGNOSIS — S22.080A COMPRESSION FRACTURE OF T11 VERTEBRA, INITIAL ENCOUNTER (H): Primary | ICD-10-CM

## 2022-02-22 DIAGNOSIS — G89.29 OTHER CHRONIC BACK PAIN: ICD-10-CM

## 2022-02-22 DIAGNOSIS — M54.89 OTHER CHRONIC BACK PAIN: ICD-10-CM

## 2022-02-22 DIAGNOSIS — R91.8 PULMONARY NODULES: Primary | ICD-10-CM

## 2022-02-22 PROCEDURE — 99215 OFFICE O/P EST HI 40 MIN: CPT | Performed by: INTERNAL MEDICINE

## 2022-02-22 PROCEDURE — 72110 X-RAY EXAM L-2 SPINE 4/>VWS: CPT | Performed by: RADIOLOGY

## 2022-02-22 PROCEDURE — 99214 OFFICE O/P EST MOD 30 MIN: CPT | Mod: GC | Performed by: ORTHOPAEDIC SURGERY

## 2022-02-22 RX ORDER — LIDOCAINE 4 G/G
1 PATCH TOPICAL EVERY 24 HOURS
Qty: 7 PATCH | Refills: 3 | Status: SHIPPED | OUTPATIENT
Start: 2022-02-22 | End: 2024-01-24

## 2022-02-22 ASSESSMENT — PAIN SCALES - GENERAL: PAINLEVEL: NO PAIN (0)

## 2022-02-22 NOTE — PROGRESS NOTES
Spine Surgery Consultation    REFERRING PHYSICIAN: Praveen Moses   PRIMARY CARE PHYSICIAN: Praveen Moses           Chief Complaint:   Consult (low back pain, referred by Dr. Moses, has bee having pain on the left side of her lower back, has been going on for the past 3 years that strted out light and progressivly got worse, )      History of Present Illness:  Symptom Profile Including: location of symptoms, onset, severity, exacerbating/alleviating factors, previous treatments:        Lydia Dietz is a 86 year old female with history of T11 old compression fx who presents for evaluation of low back pain. She states that her pain has been ongoing for years. She reports that her pain is mainly on the left side of the middle and lower back region as well as the midline of the back. She notes that her pain is limited to only her back and it does not radiate anywhere else. She denies any numbness or tingling in her legs or any weakness. Denies any loss of bowel or bladder function. She denies any other concerns or symptoms.    Past treatments tried:  - Physical therapy: Yes  - Injections: multiple. Last 8/12/2021 of left T8-9 TFESI with no relief. Some relief with left interlaminar L4 6/12/2019  - Medications: Ibuprofen and Tylenol         Past Medical History:     Past Medical History:   Diagnosis Date     Allergies      Anemia      Coronary artery disease      HLD (hyperlipidemia)      HTN (hypertension)      Impaired fasting glucose      Osteoarthritis      Severe aortic stenosis             Past Surgical History:     Past Surgical History:   Procedure Laterality Date     AS LAP INCISIONAL HERNIA REPAIR       BREAST BIOPSY, CORE RT/LT      X3     CHOLECYSTECTOMY       COLECTOMY PARTIAL  2019     COLONOSCOPY N/A 2/17/2022    Procedure: COLONOSCOPY;  Surgeon: Karlos Holley MD;  Location: [facility] GI     CV LEFT HEART CATH N/A 9/9/2020    Procedure: Left Heart Cath;  Surgeon: Leonard Pastor MD;   Location:  HEART CARDIAC CATH LAB     CV PCI ANGIOPLASTY N/A 2020    Procedure: CV PCI ANGIOPLASTY;  Surgeon: Leonard Pastor MD;  Location:  HEART CARDIAC CATH LAB     CV TRANSCATHETER AORTIC VALVE REPLACEMENT N/A 10/7/2020    Procedure: Transfemoral, subclavian (SIFUENTES) or transapical transcatheter aortic valve replacement 23mm sifuentes valve placed. cardiovascular standby.;  Surgeon: Leonard Pastor MD;  Location:  OR     ESOPHAGOSCOPY, GASTROSCOPY, DUODENOSCOPY (EGD), COMBINED N/A 2022    Procedure: ESOPHAGOGASTRODUODENOSCOPY (EGD);  Surgeon: Karlos Holley MD;  Location:  GI     HC LAPAROSCOPY, OVIDUCT/OVARY; REMOVAL       HEART CATH FEMORAL CANNULIZATION WITH OPEN STANDBY REPAIR AORTIC VALVE N/A 10/7/2020    Procedure: Cardiac standby. Perfusion standby.;  Surgeon: Richi Olmos MD;  Location: [facility] OR     HYSTERECTOMY TOTAL ABDOMINAL              Social History:     Social History     Tobacco Use     Smoking status: Former Smoker     Quit date:      Years since quittin.1     Smokeless tobacco: Never Used     Tobacco comment: 1 pack per week if that maybe for 5 years    Substance Use Topics     Alcohol use: Yes     Comment: occ             Family History:   History reviewed. No pertinent family history.         Allergies:     Allergies   Allergen Reactions     Rosuvastatin Muscle Pain (Myalgia)     Seasonal Allergies      Amlodipine      Other reaction(s): Edema,generalized     Atorvastatin      PN: Reaction: BLURRED VISION     Fenofibrate Muscle Pain (Myalgia)     Fluconazole Nausea     Hydrochlorothiazide      Leg cramps, felt sluggish     Losartan      PN: Reaction: Back pain and cramping     Pravastatin      PN:  Reaction: JOINT AND MUSCLE PAIN     Simvastatin Nausea     Niacin Rash            Medications:     Current Outpatient Medications   Medication     amoxicillin (AMOXIL) 500 MG capsule     aspirin (ASA) 81 MG EC tablet     bisacodyl (DULCOLAX) 5 MG EC tablet  "    evolocumab (REPATHA SURECLICK) 140 MG/ML prefilled autoinjector     fish oil-omega-3 fatty acids 1000 MG capsule     fluticasone (FLONASE) 50 MCG/ACT nasal spray     metoprolol succinate ER (TOPROL-XL) 25 MG 24 hr tablet     polyethylene glycol (GOLYTELY) 236 g suspension     triamterene-HCTZ (MAXZIDE-25) 37.5-25 MG tablet     No current facility-administered medications for this visit.             Review of Systems:     A 10 point ROS was performed and reviewed. Specific responses to these questions are noted at the end of the document.         Physical Exam:   Vitals: Ht 1.651 m (5' 5\")   Wt 54 kg (119 lb)   BMI 19.80 kg/m    Constitutional: awake, alert, cooperative, no apparent distress, appears stated age.    Eyes: The sclera are white.  Ears, Nose, Throat: The trachea is midline.  Psychiatric: The patient has a normal affect.  Respiratory: breathing non-labored  Cardiovascular: The extremities are warm and perfused.  Skin: no obvious rashes or lesions.  Musculoskeletal, Neurologic, and Spine:   Cervical spine:    Appearance -no gross step-offs.    Motor -     C5: Deltoids R 5/5 and L 5/5 strength    C6: Biceps R 5/5 and L 5/5 strength     C7: Triceps R 5/5 and L 5/5 strength     C8:  R 5/5 and L 5/5 strength     T1: Dorsal interossei R 5/5 and L 5/5 strength        Sensation: intact to light touch in C5-T1      Special Tests -          Morales's Test - Negative       Lumbar Spine:    Appearance - No gross stepoffs or deformities. No tenderness to palpation midline or paraspinal region.    Motor -     L2-3: Hip flexion 5/5 R and 5/5 L strength          L3/4:  Knee extension R 5/5 and L 5/5 strength         L4/5:  Foot dorsiflexion R 5/5 L 5/5 and       EHL dorsiflexion R 4/5 L 4/5 strength         S1:  Plantarflexion/Peroneal Muscles  R 5/5 and L 5/5 strength    Sensation: intact to light touch L3-S1 distribution BLE      Neurologic:      REFLEXES Left Right   Biceps 2+ 2+   Triceps 2+ 2+ "   Brachioradialis 2+ 2+   Patella 2+ 2+   Ankle jerk 2+ 2+   Babinski No upgoing great toe No upgoing great toe   Clonus 0 beats 0 beats              Imaging:   We ordered and independently reviewed new radiographs at this clinic visit. The results were discussed with the patient.  Findings include:    Reviewed lumbar XR obtained 2/22/2022 consistent with degenerative findings and osteoperosis    Lumbar MRI on 1/11/2022 reviewed and I agree with the impression below:  IMPRESSION: Multilevel spondylosis with the most pronounced findings  at L2-L3, L3-L4 and L4-L5 where there are bilateral lateral recess  narrowing and multilevel neural foraminal narrowing.              Assessment and Plan:   Assessment:  86 year old female with chronic T11 compression fracture and osteoporosis. I believe that her pain is mainly contributed by the fracture. However, considering the degenerative changes, and osteoporosis I believe that Lydia would not benefit from surgery. Her options mainly include invasive multilevel fusion, which I do not recommend for her.      Plan:  1. Lumbar Corset brace  2. Follow up with Dr. Moses for evaluation and further management of osteoporosis  3. Follow up as needed      Aftab Hdez MD  Orthopedic Surgery Resident    Patient was seen and examined with Dr. Goss who independently evaluated the patient and agrees with the assessment and exam.     Respectfully,  Primitivo Goss MD  Spine Surgery  [facility]    Attending MD (Dr. Primitivo Goss) :  I reviewed and verified the history and physical exam of the patient and discussed the patient's management with the other clinical providers involved in this patient's care including any involved residents or physicians assistants. I reviewed the above note and agree with the documented findings and plan of care, which were communicated to the patient.      Primitivo Goss MD

## 2022-02-22 NOTE — LETTER
2/22/2022         RE: Lydia Dietz  [address] [facility] [address]        Dear Colleague,    Thank you for referring your patient, Lydia Dietz, to the [facility] [place]. Please see a copy of my visit note below.    Spine Surgery Consultation    REFERRING PHYSICIAN: Praveen Moses   PRIMARY CARE PHYSICIAN: Praveen Moses           Chief Complaint:   Consult (low back pain, referred by Dr. Moses, has bee having pain on the left side of her lower back, has been going on for the past 3 years that strted out light and progressivly got worse, )      History of Present Illness:  Symptom Profile Including: location of symptoms, onset, severity, exacerbating/alleviating factors, previous treatments:        Lydia Dietz is a 86 year old female with history of T11 old compression fx who presents for evaluation of low back pain. She states that her pain has been ongoing for years. She reports that her pain is mainly on the left side of the middle and lower back region as well as the midline of the back. She notes that her pain is limited to only her back and it does not radiate anywhere else. She denies any numbness or tingling in her legs or any weakness. Denies any loss of bowel or bladder function. She denies any other concerns or symptoms.    Past treatments tried:  - Physical therapy: Yes  - Injections: multiple. Last 8/12/2021 of left T8-9 TFESI with no relief. Some relief with left interlaminar L4 6/12/2019  - Medications: Ibuprofen and Tylenol         Past Medical History:     Past Medical History:   Diagnosis Date     Allergies      Anemia      Coronary artery disease      HLD (hyperlipidemia)      HTN (hypertension)      Impaired fasting glucose      Osteoarthritis      Severe aortic stenosis             Past Surgical History:     Past Surgical History:   Procedure Laterality Date     AS LAP INCISIONAL HERNIA REPAIR       BREAST BIOPSY, CORE RT/LT       X3     CHOLECYSTECTOMY       COLECTOMY PARTIAL  2019     COLONOSCOPY N/A 2022    Procedure: COLONOSCOPY;  Surgeon: Karlos Holley MD;  Location: [facility]     CV LEFT HEART CATH N/A 2020    Procedure: Left Heart Cath;  Surgeon: Leonard Pastor MD;  Location:  HEART CARDIAC CATH LAB     CV PCI ANGIOPLASTY N/A 2020    Procedure: CV PCI ANGIOPLASTY;  Surgeon: Leonard Pastor MD;  Location:  HEART CARDIAC CATH LAB     CV TRANSCATHETER AORTIC VALVE REPLACEMENT N/A 10/7/2020    Procedure: Transfemoral, subclavian (SIFUENTES) or transapical transcatheter aortic valve replacement 23mm sifuentes valve placed. cardiovascular standby.;  Surgeon: Leonard Pastor MD;  Location: [facility] OR     ESOPHAGOSCOPY, GASTROSCOPY, DUODENOSCOPY (EGD), COMBINED N/A 2022    Procedure: ESOPHAGOGASTRODUODENOSCOPY (EGD);  Surgeon: Karlos Holley MD;  Location: [facility]     HC LAPAROSCOPY, OVIDUCT/OVARY; REMOVAL       HEART CATH FEMORAL CANNULIZATION WITH OPEN STANDBY REPAIR AORTIC VALVE N/A 10/7/2020    Procedure: Cardiac standby. Perfusion standby.;  Surgeon: Richi Olmos MD;  Location: [facility] OR     HYSTERECTOMY TOTAL ABDOMINAL              Social History:     Social History     Tobacco Use     Smoking status: Former Smoker     Quit date:      Years since quittin.1     Smokeless tobacco: Never Used     Tobacco comment: 1 pack per week if that maybe for 5 years    Substance Use Topics     Alcohol use: Yes     Comment: occ             Family History:   History reviewed. No pertinent family history.         Allergies:     Allergies   Allergen Reactions     Rosuvastatin Muscle Pain (Myalgia)     Seasonal Allergies      Amlodipine      Other reaction(s): Edema,generalized     Atorvastatin      PN: Reaction: BLURRED VISION     Fenofibrate Muscle Pain (Myalgia)     Fluconazole Nausea     Hydrochlorothiazide      Leg cramps, felt sluggish     Losartan      PN: Reaction: Back pain and cramping     Pravastatin  "     PN:  Reaction: JOINT AND MUSCLE PAIN     Simvastatin Nausea     Niacin Rash            Medications:     Current Outpatient Medications   Medication     amoxicillin (AMOXIL) 500 MG capsule     aspirin (ASA) 81 MG EC tablet     bisacodyl (DULCOLAX) 5 MG EC tablet     evolocumab (REPATHA SURECLICK) 140 MG/ML prefilled autoinjector     fish oil-omega-3 fatty acids 1000 MG capsule     fluticasone (FLONASE) 50 MCG/ACT nasal spray     metoprolol succinate ER (TOPROL-XL) 25 MG 24 hr tablet     polyethylene glycol (GOLYTELY) 236 g suspension     triamterene-HCTZ (MAXZIDE-25) 37.5-25 MG tablet     No current facility-administered medications for this visit.             Review of Systems:     A 10 point ROS was performed and reviewed. Specific responses to these questions are noted at the end of the document.         Physical Exam:   Vitals: Ht 1.651 m (5' 5\")   Wt 54 kg (119 lb)   BMI 19.80 kg/m    Constitutional: awake, alert, cooperative, no apparent distress, appears stated age.    Eyes: The sclera are white.  Ears, Nose, Throat: The trachea is midline.  Psychiatric: The patient has a normal affect.  Respiratory: breathing non-labored  Cardiovascular: The extremities are warm and perfused.  Skin: no obvious rashes or lesions.  Musculoskeletal, Neurologic, and Spine:   Cervical spine:    Appearance -no gross step-offs.    Motor -     C5: Deltoids R 5/5 and L 5/5 strength    C6: Biceps R 5/5 and L 5/5 strength     C7: Triceps R 5/5 and L 5/5 strength     C8:  R 5/5 and L 5/5 strength     T1: Dorsal interossei R 5/5 and L 5/5 strength        Sensation: intact to light touch in C5-T1      Special Tests -          Morales's Test - Negative       Lumbar Spine:    Appearance - No gross stepoffs or deformities. No tenderness to palpation midline or paraspinal region.    Motor -     L2-3: Hip flexion 5/5 R and 5/5 L strength          L3/4:  Knee extension R 5/5 and L 5/5 strength         L4/5:  Foot dorsiflexion R 5/5 L " 5/5 and       EHL dorsiflexion R 4/5 L 4/5 strength         S1:  Plantarflexion/Peroneal Muscles  R 5/5 and L 5/5 strength    Sensation: intact to light touch L3-S1 distribution BLE      Neurologic:      REFLEXES Left Right   Biceps 2+ 2+   Triceps 2+ 2+   Brachioradialis 2+ 2+   Patella 2+ 2+   Ankle jerk 2+ 2+   Babinski No upgoing great toe No upgoing great toe   Clonus 0 beats 0 beats              Imaging:   We ordered and independently reviewed new radiographs at this clinic visit. The results were discussed with the patient.  Findings include:    Reviewed lumbar XR obtained 2/22/2022 consistent with degenerative findings and osteoperosis    Lumbar MRI on 1/11/2022 reviewed and I agree with the impression below:  IMPRESSION: Multilevel spondylosis with the most pronounced findings  at L2-L3, L3-L4 and L4-L5 where there are bilateral lateral recess  narrowing and multilevel neural foraminal narrowing.              Assessment and Plan:   Assessment:  86 year old female with chronic T11 compression fracture and osteoporosis. I believe that her pain is mainly contributed by the fracture. However, considering the degenerative changes, and osteoporosis I believe that Lydia would not benefit from surgery. Her options mainly include invasive multilevel fusion, which I do not recommend for her.      Plan:  1. Lumbar Corset brace  2. Follow up with Dr. Moses for evaluation and further management of osteoporosis  3. Follow up as needed      Aftab Hdez MD  Orthopedic Surgery Resident    Patient was seen and examined with Dr. Goss who independently evaluated the patient and agrees with the assessment and exam.     Respectfully,  Primitivo Goss MD  Spine Surgery  [facility]    Attending MD (Dr. Primitivo Goss) :  I reviewed and verified the history and physical exam of the patient and discussed the patient's management with the other clinical providers involved in this patient's care  including any involved residents or physicians assistants. I reviewed the above note and agree with the documented findings and plan of care, which were communicated to the patient.      Primitivo Goss MD

## 2022-02-22 NOTE — NURSING NOTE
"Reason For Visit:   Chief Complaint   Patient presents with     Consult     low back pain, referred by Dr. Moses, has bee having pain on the left side of her lower back, has been going on for the past 3 years that strted out light and progressivly got worse,        Primary MD: Praveen Moses  Ref. MD: Aurelia     ?  No  Occupation retired .  Currently working? No.  Work status?  Retired.  Date of injury: about 3 years ago   Type of injury: chronic .  Date of surgery: none   Type of surgery: none .  Smoker: No  Request smoking cessation information: No    Ht 1.651 m (5' 5\")   Wt 54 kg (119 lb)   BMI 19.80 kg/m           Oswestry (DACIA) Questionnaire    OSWESTRY DISABILITY INDEX 5/7/2019   Count 8   Sum 14   Oswestry Score (%) 35   Some recent data might be hidden            Neck Disability Index (NDI) Questionnaire    No flowsheet data found.                Promis 10 Assessment    PROMIS 10 5/7/2019   In general, would you say your health is: Very good   In general, would you say your quality of life is: Excellent   In general, how would you rate your physical health? Fair   In general, how would you rate your mental health, including your mood and your ability to think? Very good   In general, how would you rate your satisfaction with your social activities and relationships? Very good   In general, please rate how well you carry out your usual social activities and roles Very good   To what extent are you able to carry out your everyday physical activities such as walking, climbing stairs, carrying groceries, or moving a chair? Completely   How often have you been bothered by emotional problems such as feeling anxious, depressed or irritable? Sometimes   How would you rate your fatigue on average? Moderate   How would you rate your pain on average?   0 = No Pain  to  10 = Worst Imaginable Pain 10   In general, would you say your health is: 4   In general, would you say your quality of life is: 5 "   In general, how would you rate your physical health? 2   In general, how would you rate your mental health, including your mood and your ability to think? 4   In general, how would you rate your satisfaction with your social activities and relationships? 4   In general, please rate how well you carry out your usual social activities and roles. (This includes activities at home, at work and in your community, and responsibilities as a parent, child, spouse, employee, friend, etc.) 4   To what extent are you able to carry out your everyday physical activities such as walking, climbing stairs, carrying groceries, or moving a chair? 5   In the past 7 days, how often have you been bothered by emotional problems such as feeling anxious, depressed, or irritable? 3   In the past 7 days, how would you rate your fatigue on average? 3   In the past 7 days, how would you rate your pain on average, where 0 means no pain, and 10 means worst imaginable pain? 10   Global Mental Health Score 16   Global Physical Health Score 11   PROMIS TOTAL - SUBSCORES 27   Some recent data might be hidden                Richi Vigil ATC

## 2022-02-22 NOTE — NURSING NOTE
Chief Complaint   Patient presents with     RECHECK     Patient comes in for a follow up.         Dewey Li MA on 2/22/2022 at 11:53 AM

## 2022-02-24 ENCOUNTER — TELEPHONE (OUTPATIENT)
Dept: INTERNAL MEDICINE | Facility: CLINIC | Age: 87
End: 2022-02-24
Payer: MEDICARE

## 2022-02-24 NOTE — TELEPHONE ENCOUNTER
Talked the the pt and her  about Gen Surgery referral and chest CT referral placed by pcp. PT is going to call the general surgery clinic to get clarification and schedule appt, pt will also call imaging to schedule chest CT which needs to be done in 6mo

## 2022-02-25 NOTE — TELEPHONE ENCOUNTER
REFERRAL INFORMATION:    Referring Provider:  Dr. Praveen Moses     Referring Clinic:  [facility] Medicine     Reason for Visit/Diagnosis: Left groin mass       FUTURE VISIT INFORMATION:    Appointment Date: 3/4/2022    Appointment Time: 11:30 AM      NOTES RECORD STATUS  DETAILS   OFFICE NOTE from Referring Provider Internal 2/22/2022 Office visit with Dr. Moses      OFFICE NOTE from Other Specialists N/A    HOSPITAL DISCHARGE SUMMARY/ ED VISITS  N/A    OPERATIVE REPORT N/A    ENDOSCOPY (EGD)  Internal 2/17/2022   PERTINENT LABS Internal/ Care Everywhere    PATHOLOGY REPORTS (RELATED) N/A    IMAGING (CT, MRI, US, XR)  Internal US Lower Extremity: 2/7/2022  CT Chest Abdomen Pelvis: 1/11/2022  CT Abdomen Pelvis: 3/3/2021

## 2022-02-26 ENCOUNTER — TELEPHONE (OUTPATIENT)
Dept: INTERNAL MEDICINE | Facility: CLINIC | Age: 87
End: 2022-02-26
Payer: MEDICARE

## 2022-02-26 NOTE — TELEPHONE ENCOUNTER
Dear Beatriz;    Lydia saw Dr. Goss, ortho spine 2/22/2022 and gave her a recommendation for a brace. When they left they still didn't know what Dr. Goss had recommended. Review of the note states she should have a Lumbar coset brace. Please call Lydia's  with this information. He is aware we were going to follow up on this.    Thanks, PAMELA Moses

## 2022-02-28 NOTE — TELEPHONE ENCOUNTER
RECORDS RECEIVED FROM: internal /ce    DATE RECEIVED: 4/27/22    NOTES (FOR ALL VISITS) STATUS DETAILS   OFFICE NOTES from referring provider internal  Dr Moses   OFFICE NOTES from other specialist ce [facility]:  6/23/21 - [facility] OV with Dr. Ferreira     ED NOTES     OPERATIVE REPORT  (thyroid, pituitary, adrenal, parathyroid)     MEDICATION LIST internal     IMAGING      DEXASCAN Internal  [facility]:  12/17/21 - DEXA   MRI (BRAIN)     XR (Chest) ce [facility]- 3.3.21,    CT (HEAD/NECK/CHEST/ABDOMEN) ce/internal  Internal- 1.11.22, 6.9.21, 3.3.21, 11.16.20  HP- 3.3.21  [facility]- 8/3/20   NUCLEAR      ULTRASOUND (HEAD/NECK) internal  2/7/22,    LABS     DIABETES: HBGA1C, CREATININE, FASTING LIPIDS, MICROALBUMIN URINE, POTASSIUM, TSH, T4    THYROID: TSH, T4, CBC, THYRODLONULIN, TOTAL T3, FREE T4, CALCITONIN, CEA Internal/ce [facility]:  11/30/21 - Lipid  11/30/21 - CBC  11/30/21 - CMP  11/30/21 - TSH  11/22/21 - BMP  3/3/21 - Potassium  3/3/21 - Creatinine     [facility]:  6/23/21 - HBGA1C     [facility]:  3/3/21 - Urinalysis

## 2022-03-03 ENCOUNTER — PATIENT OUTREACH (OUTPATIENT)
Dept: SURGERY | Facility: CLINIC | Age: 87
End: 2022-03-03
Payer: MEDICARE

## 2022-03-03 NOTE — PROGRESS NOTES
Patient Telephone Reminder Call    Date of call:  03/03/22  Phone numbers:  Home number on file [phone] (home)    Reached patient/confirmed appointment:  Yes  Appointment with:   Dr. Praveen Goss  Reason for visit: consult for groin mass

## 2022-03-04 ENCOUNTER — OFFICE VISIT (OUTPATIENT)
Dept: SURGERY | Facility: CLINIC | Age: 87
End: 2022-03-04
Attending: INTERNAL MEDICINE
Payer: MEDICARE

## 2022-03-04 ENCOUNTER — PRE VISIT (OUTPATIENT)
Dept: SURGERY | Facility: CLINIC | Age: 87
End: 2022-03-04

## 2022-03-04 VITALS
HEIGHT: 65 IN | SYSTOLIC BLOOD PRESSURE: 142 MMHG | BODY MASS INDEX: 20.46 KG/M2 | WEIGHT: 122.8 LBS | DIASTOLIC BLOOD PRESSURE: 66 MMHG | OXYGEN SATURATION: 97 % | HEART RATE: 90 BPM

## 2022-03-04 DIAGNOSIS — R19.09 LEFT GROIN MASS: Primary | ICD-10-CM

## 2022-03-04 PROCEDURE — 99203 OFFICE O/P NEW LOW 30 MIN: CPT | Performed by: SURGERY

## 2022-03-04 ASSESSMENT — PAIN SCALES - GENERAL: PAINLEVEL: NO PAIN (0)

## 2022-03-04 NOTE — PROGRESS NOTES
"I saw Lydia Dietz today in clinic with her  for evaluation of a left groin mass.  This was seen on imaging- she is unaware of it.  She has no issues with her groin/leg. No pain/numbness/swelling, no palpable mass.    She has a history of a TAVR on 10/7/2020- access was obtained in both groins- an arterial and venous access performed on the left.    I reviewed her CT scans and Ultrasounds.  A CT scan from prior to the TAVT does not show this lesion.  After the TAVR there is a hypodensity posterior to the femoral vein.  This is also shown on US- no flow appreciated.  It is about 1.5cm in size- up to 2cm in size on the US.    PE:  BP (!) 142/66   Pulse 90   Ht 1.651 m (5' 5\")   Wt 55.7 kg (122 lb 12.8 oz)   SpO2 97%   BMI 20.43 kg/m    A+Ox3, NAD, pleasant  RRR  No resp distress or wheezing  Abd soft, nontender  Left groin- skin intact, no erythema, no palpable mass, nontender to palpation. Palpable femoral pulse.    A/P:  Presumed lymphocele or seroma (possible hematoma) after TAVR access.  I discussed with her the different possibilities (including the low chance of some unclear malignant lesion).  She is appropriately hesitant to have this worked up.  I do not think a biopsy would be a good idea.  I offered to her repeat US imaging in ~6months, she is agreeable to this.    -Repeat US in 6 months, will call with results  -Return precautions given    Total time spent (reviewing imaging, exam, counseling, documentation)- 25 min  "

## 2022-03-04 NOTE — LETTER
"3/4/2022       RE: Lydia Dietz  [address] [facility] [address]     Dear Colleague,    Thank you for referring your patient, Lydia Dietz, to the [facility] GENERAL SURGERY [facility] [place] [facility]. Please see a copy of my visit note below.    I saw Lydia Dietz today in clinic with her  for evaluation of a left groin mass.  This was seen on imaging- she is unaware of it.  She has no issues with her groin/leg. No pain/numbness/swelling, no palpable mass.    She has a history of a TAVR on 10/7/2020- access was obtained in both groins- an arterial and venous access performed on the left.    I reviewed her CT scans and Ultrasounds.  A CT scan from prior to the TAVT does not show this lesion.  After the TAVR there is a hypodensity posterior to the femoral vein.  This is also shown on US- no flow appreciated.  It is about 1.5cm in size- up to 2cm in size on the US.    PE:  BP (!) 142/66   Pulse 90   Ht 1.651 m (5' 5\")   Wt 55.7 kg (122 lb 12.8 oz)   SpO2 97%   BMI 20.43 kg/m    A+Ox3, NAD, pleasant  RRR  No resp distress or wheezing  Abd soft, nontender  Left groin- skin intact, no erythema, no palpable mass, nontender to palpation. Palpable femoral pulse.    A/P:  Presumed lymphocele or seroma (possible hematoma) after TAVR access.  I discussed with her the different possibilities (including the low chance of some unclear malignant lesion).  She is appropriately hesitant to have this worked up.  I do not think a biopsy would be a good idea.  I offered to her repeat US imaging in ~6months, she is agreeable to this.    -Repeat US in 6 months, will call with results  -Return precautions given    Total time spent (reviewing imaging, exam, counseling, documentation)- 25 min      Praveen Goss MD  "

## 2022-03-04 NOTE — PATIENT INSTRUCTIONS
You met with Dr. Praveen Goss.      Today's visit instructions:    Dr. Goss would like you to repeat your ultrasound in 6 months. We will arrange a telephone appointment to review those results. We will call you in August to set up a telephone visit for September after your ultrasound.       If you have questions please contact Jocelyn RN or Trista RN during regular clinic hours, Monday through Friday 7:30 AM - 4:00 PM, or you can contact us via [employer] at anytime.       If you have urgent needs after-hours, weekends, or holidays please call the hospital at [phone] and ask to speak with our on-call General Surgery Team.    Appointment [phone]  Nurse Advice (Jocelyn or Trista): [phone]   Surgery Scheduler (Ghanshyam): [phone]  Fax: [phone]

## 2022-03-04 NOTE — NURSING NOTE
"Chief Complaint   Patient presents with     Consult     left groin mass       Vitals:    03/04/22 1230   BP: (!) 142/66   Pulse: 90   SpO2: 97%   Weight: 55.7 kg (122 lb 12.8 oz)   Height: 1.651 m (5' 5\")       Body mass index is 20.43 kg/m .          ANTON DELONG EMT    "

## 2022-03-15 ENCOUNTER — VIRTUAL VISIT (OUTPATIENT)
Dept: INTERNAL MEDICINE | Facility: CLINIC | Age: 87
End: 2022-03-15
Payer: MEDICARE

## 2022-03-15 DIAGNOSIS — D64.9 ANEMIA, UNSPECIFIED TYPE: Primary | ICD-10-CM

## 2022-03-15 DIAGNOSIS — I10 BENIGN ESSENTIAL HYPERTENSION: ICD-10-CM

## 2022-03-15 PROCEDURE — 99214 OFFICE O/P EST MOD 30 MIN: CPT | Mod: 95 | Performed by: INTERNAL MEDICINE

## 2022-03-15 RX ORDER — METOPROLOL SUCCINATE 25 MG/1
25 TABLET, EXTENDED RELEASE ORAL EVERY MORNING
Qty: 90 TABLET | Refills: 3 | Status: SHIPPED | OUTPATIENT
Start: 2022-03-15 | End: 2023-08-11

## 2022-03-15 NOTE — PROGRESS NOTES
Telephone visit    Ms. Dietz agrees to a telephone visit    Endocrinology appt. 4/27/2022 for thyroid nodule and low bone density. She had a thyroid U/S 2/7/2022. DEXA scan 12/17/2021 most negative T score -2.2    Repeat Chest CT scan 8/22/2022 for lung nodules and last study 1/11/2022    Repeat abdominal U/S scheduled for 9/6/2022. Seen 3/4/2022 by Dr. Goss, General Surgery for L groin mass after TAVR access and thought to be a lymphocele/seroma/hematoma     TAVR 10/7/2020     Last visit with me 2/22/2022    EGD and colonoscopy 2/7/2022    Anemia; Iron studies 2/7/2022 normal, Ferritin 29 (low normal), B12 and folate checked. Last Hgb 11.0 on 11/30/2021. Ordered future today 3/15/2022. She had a CT abdomen/pelvis 1/11/2022    Back pain; she saw Dr. Goss, ortho spine 2/22/2022. T11 compression fracture. She is using a Corset Brace that she finds beneficial. Moreover, she is placing Lidocaine patches on her back and she feels that reduces her pain.     HTN; she will remain on Maxide and Metoprolol (refilled today 3/15/2022)    I will see her back on 4/27/2022 same day as Endocrinology    PAMELA Moses MD    Total time today 11 minutes and 13 seconds.

## 2022-03-15 NOTE — NURSING NOTE
Chief Complaint   Patient presents with     RECHECK     Patient comes in for a follow up.         Dewey Li MA on 3/15/2022 at 10:44 AM

## 2022-03-15 NOTE — Clinical Note
"Heart Care Device Change-Out Checklist (NIKKY Checklist)    Device Data  S-ICD    :  [employer]  Model:  1010 SQ-RX   Serial Number:  [id number]  Implant location: Left Mid axillary region    Implant Date: 03/17/2014  NIKKY Date:  06/13/2020  Device Diagnosis:  MADIT II, Primary Prevention    Device Alert(s):  Yes  Description:  \"-shortened replacement interval [<90 days].\"    Lead Data  (Print Device History and attach to this document. Enter each lead  and model number.)  Last Interrogation Date: 07/07/2020     - Electrode: [employer] 3010 Q-Trak SQ Electrode     Old Leads Present/Abandoned: No and See scanned Device Summary Data sheets for Model & Serial Number    Lead Alert(s):  No    Diagnostic Information  Intrinsic Rhythm:  Sinus Rhythm, rate in the 70s bpm    Atrial Fibrillation: N/A    Pacing Percentages  N/A  Pacemaker Dependent? N/A    History of VT/VF therapy    ATP: N/A  Appropriate Shocks:  No history of shocks except for during implant.    Ejection Fraction  Last EF Date:  12/19/2019    By Stress Test  Last EF Measurement:  34%      Special Instructions/Timeframe for change-out:  [employer] consulted and \"recommends device change out within 20 days of NIKKY, but since we are after that day, at day 24 today, we recommend change out ASAP.\"    Routed to EP:  Yes: Dr. Tam Bateman, as he is out of the office this week Dr. Sriram Myers also copied on today's checklist.      Device RN: Patricia Palencia, RN, MA  Device Nurse  [facility]     " (1) see me in-person 4/27/2022; same day as endocrinology

## 2022-03-16 ENCOUNTER — PRE VISIT (OUTPATIENT)
Dept: ENDOCRINOLOGY | Facility: CLINIC | Age: 87
End: 2022-03-16

## 2022-03-31 ENCOUNTER — TELEPHONE (OUTPATIENT)
Dept: INTERNAL MEDICINE | Facility: CLINIC | Age: 87
End: 2022-03-31
Payer: MEDICARE

## 2022-03-31 NOTE — TELEPHONE ENCOUNTER
Health Call Center    Phone Message    May a detailed message be left on voicemail: yes     Reason for Call: Medication Question or concern regarding medication   Prescription Clarification  Name of Medication: robitussin, tylenol  Prescribing Provider: Dr. Moses   What on the order needs clarification? Patient is concerned about robitussin and tylenol that she has been taking. Patient said both meds have a warning that states  can cause liver damage . Patient requesting call back from Dr. Moses s nurse to discuss.  Pt called about the directions on cough syrups. Returned cough syrup and picked up a different one that will not cause cancer. Pt cough is now minimal. Increase fluids,radhames, cough drops, no fever.  Beatriz López RN 11:05 AM on 4/4/2022.            Action Taken: Message routed to:  [facility] TONY    Travel Screening: Not Applicable

## 2022-04-12 NOTE — PROGRESS NOTES
-[facility]  Dr Marrero, Endocrinology Department    [place] [address] [id number]  [place] [address]  Appointment [phone]  Fax: [phone]  [place]: Monday - Thursday      Start taking calcium supplement: total should be 1200 mg/day.  Increase vit D- Take 3000 international unit(s) and 5000 international unit(s) on alternate days.  Labs in 2-3 months.  Please make a lab appointment for blood work and follow up clinic appointment in 1 week after that to discuss results.  (Recommend in person clinic visit)       SPIRITUAL HEALTH SERVICES  [facility] 6C  ON-CALL VISIT     REFERRAL SOURCE: pt requested      Pt stated she was feeling good and hoping to go home soon. Pt's spouse was at bedside as pt had a book she was reading. Pt and spouse identify as Jew and demonstrate a supportive relationship. Pt requested prayer.     PLAN: No further follow-up. Will refer to unit .     Rev. Steffanei Maddox MDiv, [facility]  Staff    Pager [phone]  * [facility] remains available 24/7 for emergent requests/referrals, either by having the switchboard page the on-call  or by entering an ASAP/STAT consult in Epic (this will also page the on-call ).*

## 2022-04-26 NOTE — PROGRESS NOTES
HPI:    Telephone visit with us 3/15/2022. Overall stable still with back pain and she uses Lidocaine patches and acetaminophen. Otherwise, no additional HEENT, cardiopulmonary, abdominal, , neurological, systemic, psychiatric, lymphatic, endocrine, vascular complaints.     Past Medical History:   Diagnosis Date     Allergies      Anemia      Coronary artery disease      HLD (hyperlipidemia)      HTN (hypertension)      Impaired fasting glucose      Osteoarthritis      Severe aortic stenosis      Past Surgical History:   Procedure Laterality Date     AS LAP INCISIONAL HERNIA REPAIR       BREAST BIOPSY, CORE RT/LT      X3     CHOLECYSTECTOMY       COLECTOMY PARTIAL  2019     COLONOSCOPY N/A 2/17/2022    Procedure: COLONOSCOPY;  Surgeon: Karlos Holley MD;  Location:  GI     CV LEFT HEART CATH N/A 9/9/2020    Procedure: Left Heart Cath;  Surgeon: Leonard Pastor MD;  Location:  HEART CARDIAC CATH LAB     CV PCI ANGIOPLASTY N/A 9/9/2020    Procedure: CV PCI ANGIOPLASTY;  Surgeon: Leonard Pastor MD;  Location:  HEART CARDIAC CATH LAB     CV TRANSCATHETER AORTIC VALVE REPLACEMENT N/A 10/7/2020    Procedure: Transfemoral, subclavian (SIFUENTES) or transapical transcatheter aortic valve replacement 23mm sifuentes valve placed. cardiovascular standby.;  Surgeon: Leonard Pastor MD;  Location:  OR     ESOPHAGOSCOPY, GASTROSCOPY, DUODENOSCOPY (EGD), COMBINED N/A 2/17/2022    Procedure: ESOPHAGOGASTRODUODENOSCOPY (EGD);  Surgeon: Karlos Holley MD;  Location:  GI     HC LAPAROSCOPY, OVIDUCT/OVARY; REMOVAL       HEART CATH FEMORAL CANNULIZATION WITH OPEN STANDBY REPAIR AORTIC VALVE N/A 10/7/2020    Procedure: Cardiac standby. Perfusion standby.;  Surgeon: Richi Olmos MD;  Location: [facility] OR     HYSTERECTOMY TOTAL ABDOMINAL       PE:    Vitals noted, gen, nad, cooperative, alert, neck supple nl rom, lungs with good air movement, RRR, S1, S2, no MRG, abdomen, no acute findings. Grossly  normal neurological exam.     A/P:    1. Endocrinology appt. Today 4/27/202 for thyroid nodule and low bone density. DEXA scan 12/17/2021 with lowest T score -2.2  2. Cardiology follow up 8/19/2022 with Dr. Casey Logan. She is on Repatha for increased lipids   3. Immunizations; COVID Pfizer vaccine x 3. Td/Tdap 4/26/2016, Prevnar 13 done 4/27/2015  4. HTN; on Metoprolol and Maxide. Electrolytes and Creatinine stable 2/7/2022  5. Back pain; she is using lidocaine patches. She saw Dr. Goss, ortho spine 2/22/2022 for T11 compression fracture.   6. Lung nodules and future repeat Chest CT scan scheduled 8/22/2022. Last study 1/11/2022  7. Anemia; ordered CBC 3/15/2022. Iron studies normal 2/7/2022, Ferritin low normal 29. She had EGD and colonoscopy 2/7/2022. She had CT chest/abdomen/pelvic imaging 1/11/2022.   8. Repeat abdominal U/S scheduled for 9/6/2022. Seen 3/4/2022 by Dr. Goss, General Surgery for L groin mass after TAVR access and thought to be a lymphocele/seroma/hematoma    9. TAVR 10/7/2020   10. Thrombocytopenia; platelets 4/27/2022 136. She had CT abdominal/pelvic imaging 1/11/2022 liver and spleen no worrisome finding. Will follow.     30 minutes spent on the date of the encounter doing chart review, history and exam, documentation and further activities as noted above

## 2022-04-27 ENCOUNTER — OFFICE VISIT (OUTPATIENT)
Dept: INTERNAL MEDICINE | Facility: CLINIC | Age: 87
End: 2022-04-27
Payer: MEDICARE

## 2022-04-27 ENCOUNTER — LAB (OUTPATIENT)
Dept: LAB | Facility: CLINIC | Age: 87
End: 2022-04-27
Payer: MEDICARE

## 2022-04-27 ENCOUNTER — PRE VISIT (OUTPATIENT)
Dept: ENDOCRINOLOGY | Facility: CLINIC | Age: 87
End: 2022-04-27

## 2022-04-27 ENCOUNTER — HOSPITAL ENCOUNTER (OUTPATIENT)
Facility: CLINIC | Age: 87
Discharge: HOME | End: 2022-04-27
Attending: STUDENT IN AN ORGANIZED HEALTH CARE EDUCATION/TRAINING PROGRAM | Admitting: INTERNAL MEDICINE
Payer: MEDICARE

## 2022-04-27 ENCOUNTER — OFFICE VISIT (OUTPATIENT)
Dept: ENDOCRINOLOGY | Facility: CLINIC | Age: 87
End: 2022-04-27
Attending: INTERNAL MEDICINE
Payer: MEDICARE

## 2022-04-27 VITALS
BODY MASS INDEX: 21.35 KG/M2 | SYSTOLIC BLOOD PRESSURE: 145 MMHG | RESPIRATION RATE: 16 BRPM | HEIGHT: 64 IN | DIASTOLIC BLOOD PRESSURE: 56 MMHG | OXYGEN SATURATION: 98 % | HEART RATE: 66 BPM

## 2022-04-27 VITALS
HEART RATE: 70 BPM | SYSTOLIC BLOOD PRESSURE: 151 MMHG | BODY MASS INDEX: 21.24 KG/M2 | HEIGHT: 64 IN | WEIGHT: 124.4 LBS | DIASTOLIC BLOOD PRESSURE: 74 MMHG

## 2022-04-27 DIAGNOSIS — M85.80 OSTEOPENIA, UNSPECIFIED LOCATION: ICD-10-CM

## 2022-04-27 DIAGNOSIS — E04.1 THYROID NODULE: Primary | ICD-10-CM

## 2022-04-27 DIAGNOSIS — M54.89 OTHER CHRONIC BACK PAIN: Primary | ICD-10-CM

## 2022-04-27 DIAGNOSIS — D64.9 ANEMIA, UNSPECIFIED TYPE: ICD-10-CM

## 2022-04-27 DIAGNOSIS — G89.29 OTHER CHRONIC BACK PAIN: Primary | ICD-10-CM

## 2022-04-27 LAB
BASOPHILS # BLD AUTO: 0 10E3/UL (ref 0–0.2)
BASOPHILS NFR BLD AUTO: 1 %
CALCIUM SERPL-MCNC: 9.6 MG/DL (ref 8.5–10.1)
CREAT SERPL-MCNC: 1.12 MG/DL (ref 0.52–1.04)
EOSINOPHIL # BLD AUTO: 0.1 10E3/UL (ref 0–0.7)
EOSINOPHIL NFR BLD AUTO: 1 %
ERYTHROCYTE [DISTWIDTH] IN BLOOD BY AUTOMATED COUNT: 13 % (ref 10–15)
GFR SERPL CREATININE-BSD FRML MDRD: 48 ML/MIN/1.73M2
HCT VFR BLD AUTO: 40.7 % (ref 35–47)
HGB BLD-MCNC: 12.4 G/DL (ref 11.7–15.7)
IMM GRANULOCYTES # BLD: 0.1 10E3/UL
IMM GRANULOCYTES NFR BLD: 1 %
LYMPHOCYTES # BLD AUTO: 1.4 10E3/UL (ref 0.8–5.3)
LYMPHOCYTES NFR BLD AUTO: 19 %
MCH RBC QN AUTO: 26.4 PG (ref 26.5–33)
MCHC RBC AUTO-ENTMCNC: 30.5 G/DL (ref 31.5–36.5)
MCV RBC AUTO: 87 FL (ref 78–100)
MONOCYTES # BLD AUTO: 0.6 10E3/UL (ref 0–1.3)
MONOCYTES NFR BLD AUTO: 8 %
NEUTROPHILS # BLD AUTO: 5.2 10E3/UL (ref 1.6–8.3)
NEUTROPHILS NFR BLD AUTO: 70 %
NRBC # BLD AUTO: 0 10E3/UL
NRBC BLD AUTO-RTO: 0 /100
PHOSPHATE SERPL-MCNC: 3.5 MG/DL (ref 2.5–4.5)
PLATELET # BLD AUTO: 136 10E3/UL (ref 150–450)
PTH-INTACT SERPL-MCNC: 43 PG/ML (ref 15–65)
RBC # BLD AUTO: 4.7 10E6/UL (ref 3.8–5.2)
TOTAL PROTEIN SERUM FOR ELP: 6.8 G/DL (ref 6.8–8.8)
WBC # BLD AUTO: 7.4 10E3/UL (ref 4–11)

## 2022-04-27 PROCEDURE — 84165 PROTEIN E-PHORESIS SERUM: CPT | Mod: 26

## 2022-04-27 PROCEDURE — 99205 OFFICE O/P NEW HI 60 MIN: CPT | Performed by: STUDENT IN AN ORGANIZED HEALTH CARE EDUCATION/TRAINING PROGRAM

## 2022-04-27 PROCEDURE — 36415 COLL VENOUS BLD VENIPUNCTURE: CPT | Performed by: PATHOLOGY

## 2022-04-27 PROCEDURE — 83970 ASSAY OF PARATHORMONE: CPT | Performed by: PATHOLOGY

## 2022-04-27 PROCEDURE — 82310 ASSAY OF CALCIUM: CPT | Performed by: PATHOLOGY

## 2022-04-27 PROCEDURE — 82306 VITAMIN D 25 HYDROXY: CPT

## 2022-04-27 PROCEDURE — 84100 ASSAY OF PHOSPHORUS: CPT | Performed by: PATHOLOGY

## 2022-04-27 PROCEDURE — 85025 COMPLETE CBC W/AUTO DIFF WBC: CPT | Performed by: PATHOLOGY

## 2022-04-27 PROCEDURE — 84155 ASSAY OF PROTEIN SERUM: CPT

## 2022-04-27 PROCEDURE — 99214 OFFICE O/P EST MOD 30 MIN: CPT | Performed by: INTERNAL MEDICINE

## 2022-04-27 PROCEDURE — 82565 ASSAY OF CREATININE: CPT | Performed by: PATHOLOGY

## 2022-04-27 PROCEDURE — 99000 SPECIMEN HANDLING OFFICE-LAB: CPT | Performed by: PATHOLOGY

## 2022-04-27 RX ORDER — ALENDRONATE SODIUM 70 MG/1
70 TABLET ORAL
Qty: 12 TABLET | Refills: 3 | Status: SHIPPED | OUTPATIENT
Start: 2022-04-27 | End: 2024-01-24

## 2022-04-27 ASSESSMENT — PAIN SCALES - GENERAL
PAINLEVEL: NO PAIN (0)
PAINLEVEL: MILD PAIN (3)

## 2022-04-27 NOTE — NURSING NOTE
Lydia Dietz is a 86 year old female patient that presents today in clinic for the following:    Chief Complaint   Patient presents with     RECHECK     Patient comes for a follow up.      The patient's allergies and medications were reviewed as noted. A set of vitals were recorded as noted without incident. The patient does not have any other questions for the provider.    Mihai Go, EMT at 3:29 PM on 4/27/2022

## 2022-04-27 NOTE — NURSING NOTE
"Chief Complaint   Patient presents with     Thyroid Problem     Thyroid nodule     Vital signs:      BP: (!) 151/74 Pulse: 70           Height: 162.6 cm (5' 4\") Weight: 56.4 kg (124 lb 6.4 oz)  Estimated body mass index is 21.35 kg/m  as calculated from the following:    Height as of this encounter: 1.626 m (5' 4\").    Weight as of this encounter: 56.4 kg (124 lb 6.4 oz).        "

## 2022-04-27 NOTE — LETTER
4/27/2022       RE: Lydia Dietz  [id number] [facility] [address]     Dear Colleague,    Thank you for referring your patient, Lydia Ditez, to the [facility] [place] at [facility]. Please see a copy of my visit note below.    Endocrinology Clinic Visit 4/27/2022    NAME:  Lydia Dietz  PCP:  Praveen Moses  MRN:  [id number]  Reason for Consult:  Thyroid nodule and osteopenia   Requesting Provider:  Praveen Moses    Chief Complaint     Chief Complaint   Patient presents with     Thyroid Problem     Thyroid nodule       History of Present Illness     Lydia Dietz is a 86 year old female who is seen in clinic for thyroid nodule and osteopenia    # Thyroid nodule:  She had CT scans for chronic pains including CT C/AP which showed thyroid nodule. Thyroid US 3/3022 reviewed by me showed multiple small nodules; largest is right solid heterogeneous hyperechoic 1.7 cm , the others are subcentimetric. Patient has not felt any thyroid mass or neck. No previous hx of radiation. Most recent TSH was 11/2021 wnl.      # osteopenia  # hx of compression fracture  # back pain  She denied any previous significant accident or falls with fractures. She has chronic back pain and was found to have T11 compression fracture. She is seeing Dr. Goss, ortho spine , using a Corset Brace that she finds beneficial.   The patient had a DXA scan on 12/2021 which showed: The most negative and valid T-score of -2.2 at the level of the right total hip. There is a dxa done back in 2016 at [facility] found in careverywhere but no reported/records. Patein denied being on any previous OP treatment.    Calcium intake:   - Dietary: she eats cheese and yogurt everyday, 1 cup of ice cream everyday. No milk  - Supplements: no    Vitamin D intake:   - Supplements: no  Last vitamin D level was  on 54 on 9/2021.    Osteoporosis Risk factors:    - Previous fractures: stable T11 compression fracture found out on images.   - Family history of fragility fracture in parent: no  - Current smoking: no  - Steroid Use: no  - Rheumatoid  arthritis: no  - Alcohol (3 or more units per day): no  - Other medications known to affect BMD: no  - Menstrual history: age at menopause: she does not remember   - Estrogen use after menopause: no  - Tendency for falls: no  - GI history: Malabsorption (IBD, Celiac, gastric bypass ): no  - History of kidney stones: no  - History of thyroid disease: no  - Physical activity: walks everyday  - Weight history: stable    Social : retired, lives with her  who was with her today, has 2  and 6 grandchildren.      Problem List     Patient Active Problem List   Diagnosis     Wedge compression fracture of T11-T12 vertebra, initial encounter for closed fracture (H)     Coronary artery disease involving native coronary artery, angina presence unspecified, unspecified whether native or transplanted heart     Status post coronary angiogram     Severe aortic stenosis     Adenomatous polyp of colon     Allergic rhinitis     Back pain, thoracic     Benign neoplasm of colon     Calculus of gallbladder     Chronic left-sided low back pain without sciatica     CKD (chronic kidney disease) stage 3, GFR 30-59 ml/min     Counseling on health care directive     Counseling regarding advanced directives and goals of care     Degeneration, intervertebral disc, lumbosacral     Abdominal hernia     Diffuse cystic mastopathy     Endometriosis     Essential hypertension     Hyperlipidemia LDL goal <70     IFG (impaired fasting glucose)     Microscopic hematuria     Osteoarthrosis     Renal cyst, left     Symptoms involving cardiovascular system     Varicella     Aortic stenosis, severe     Chest pain     Statin intolerance        Medications     Current Outpatient Medications   Medication     alendronate (FOSAMAX) 70 MG tablet     amoxicillin (AMOXIL)  500 MG capsule     aspirin (ASA) 81 MG EC tablet     bisacodyl (DULCOLAX) 5 MG EC tablet     evolocumab (REPATHA SURECLICK) 140 MG/ML prefilled autoinjector     fish oil-omega-3 fatty acids 1000 MG capsule     fluticasone (FLONASE) 50 MCG/ACT nasal spray     Lidocaine (LIDOCARE) 4 % Patch     metoprolol succinate ER (TOPROL-XL) 25 MG 24 hr tablet     polyethylene glycol (GOLYTELY) 236 g suspension     triamterene-HCTZ (MAXZIDE-25) 37.5-25 MG tablet     No current facility-administered medications for this visit.        Allergies     Allergies   Allergen Reactions     Rosuvastatin Muscle Pain (Myalgia)     Seasonal Allergies      Amlodipine      Other reaction(s): Edema,generalized     Atorvastatin      PN: Reaction: BLURRED VISION     Fenofibrate Muscle Pain (Myalgia)     Fluconazole Nausea     Hydrochlorothiazide      Leg cramps, felt sluggish     Losartan      PN: Reaction: Back pain and cramping     Pravastatin      PN:  Reaction: JOINT AND MUSCLE PAIN     Simvastatin Nausea     Niacin Rash       Medical / Surgical History     Past Medical History:   Diagnosis Date     Allergies      Anemia      Coronary artery disease      HLD (hyperlipidemia)      HTN (hypertension)      Impaired fasting glucose      Osteoarthritis      Severe aortic stenosis      Past Surgical History:   Procedure Laterality Date     AS LAP INCISIONAL HERNIA REPAIR       BREAST BIOPSY, CORE RT/LT      X3     CHOLECYSTECTOMY       COLECTOMY PARTIAL  2019     COLONOSCOPY N/A 2/17/2022    Procedure: COLONOSCOPY;  Surgeon: Karlos Holley MD;  Location:  GI     CV LEFT HEART CATH N/A 9/9/2020    Procedure: Left Heart Cath;  Surgeon: Leonard Pastor MD;  Location:  HEART CARDIAC CATH LAB     CV PCI ANGIOPLASTY N/A 9/9/2020    Procedure: CV PCI ANGIOPLASTY;  Surgeon: Leonard Pastor MD;  Location:  HEART CARDIAC CATH LAB     CV TRANSCATHETER AORTIC VALVE REPLACEMENT N/A 10/7/2020    Procedure: Transfemoral, subclavian (SIFUENTES)  "or transapical transcatheter aortic valve replacement 23mm dinh valve placed. cardiovascular standby.;  Surgeon: Leonard Pastor MD;  Location: [facility] OR     ESOPHAGOSCOPY, GASTROSCOPY, DUODENOSCOPY (EGD), COMBINED N/A 2022    Procedure: ESOPHAGOGASTRODUODENOSCOPY (EGD);  Surgeon: Karlos Holley MD;  Location: [facility] GI     HC LAPAROSCOPY, OVIDUCT/OVARY; REMOVAL       HEART CATH FEMORAL CANNULIZATION WITH OPEN STANDBY REPAIR AORTIC VALVE N/A 10/7/2020    Procedure: Cardiac standby. Perfusion standby.;  Surgeon: Richi Olmos MD;  Location: [facility] OR     HYSTERECTOMY TOTAL ABDOMINAL         Social History     Social History     Socioeconomic History     Marital status:      Spouse name: Not on file     Number of children: Not on file     Years of education: Not on file     Highest education level: Not on file   Occupational History     Not on file   Tobacco Use     Smoking status: Former Smoker     Quit date:      Years since quittin.3     Smokeless tobacco: Never Used     Tobacco comment: 1 pack per week if that maybe for 5 years    Substance and Sexual Activity     Alcohol use: Yes     Comment: occ      Drug use: Never     Sexual activity: Not on file   Other Topics Concern     Not on file   Social History Narrative     Not on file     Social Determinants of Health     Financial Resource Strain: Not on file   Food Insecurity: Not on file   Transportation Needs: Not on file   Physical Activity: Not on file   Stress: Not on file   Social Connections: Not on file   Intimate Partner Violence: Not on file   Housing Stability: Not on file       Family History     No family history on file.    ROS     12 ROS completed, pertinent positive and negative in HPI    Physical Exam   BP (!) 151/74 (BP Location: Left arm, Patient Position: Sitting, Cuff Size: Adult Regular)   Pulse 70   Ht 1.626 m (5' 4\")   Wt 56.4 kg (124 lb 6.4 oz)   BMI 21.35 kg/m       General: Comfortable, no obvious distress, " normal body habitus  Eyes: Sclera anicteric, moist conjunctiva  HENT: Atraumatic, oropharynx clear, moist mucous membranes with no mucosal ulcerations  Neck: Trachea midline, supple. Thyroid: Thyroid is normal in size and texture  CV: normal rate.   Resp:  good effort, no evidence of loud wheezing  Abdomen:  obese, non distended.   Skin: No rashes, lesions, or subcutaneous nodules on exposed skin.   Psych: Alert and oriented x 3. Appropriate affect, good insight  Extremities: No peripheral edema  Musculoskeletal: Appropriate muscle bulk and strength  Neuro: Moves all four extremities. No focal deficits on limited exam. Gait normal.     Labs/Imaging     Pertinent Labs were reviewed and updated in [facility].  Radiology Results were  reviewed and updated in EPIC.    Summary of recent findings:   No results found for: A1C    TSH   Date Value Ref Range Status   11/30/2021 1.34 0.40 - 4.00 mU/L Final       Creatinine   Date Value Ref Range Status   04/27/2022 1.12 (H) 0.52 - 1.04 mg/dL Final   05/17/2021 0.98 0.52 - 1.04 mg/dL Final       Recent Labs   Lab Test 11/30/21  0850 11/22/21  0842   CHOL 179 180   HDL 73 68   LDL 88 95   TRIG 89 87       No results found for: [id number], [id number], [id number]    I personally reviewed the patient's outside records from [facility] EMR and Care Everywhere. Summary of pertinent findings in [facility].    Impression / Plan     1. Thyroid nodules  incidentally found on CT scan as part of work up for chronic pain. I reviewed US images with patient and her  and had a general discussion about thyroid function and structure. She has 2 very small nodules with no indication for FNA, largest nodule is 1.7 cm heterogeneous hyperechoic. We discussed that the largest nodule is low risk for cancer but given the size of > 1.5 cm there is indication for FNA by [facility] guidelines. The other option is to wait for now and do US in a year to look for changes. She prefers to wait for now which is not  unreasonable.    Plan:  Thyroid US in 1 year    # osteopenia  # hx of compression fracture  # back pain    Lowest T score of -2.2. unclear hx of compression fracture, old with no prior hx of high impact falls or accidents which might be considered a fragility fracture. FRAX score elevated hip of 19 and major OP fracture is 32%. She does meet criteria for OP treatment. Her risk factors include age and post-menauposal state. Labs unremarkable including a screening for MM which was negative. We reviewed some general information about OP. We discussed importance of fall precaution. I reviewed with her ca and vitd recommendations. Labs related to calcium metabolism reviewed with her. I discussed fosamax side effects including esophagitis, ONJ, atypical fracture of the femur. I reviewed her appropriate way of taking fosamax.    # thickened esophagus on CT 3/2021  EGD was done 2/2022 with evidence of Tortuous esophagus.. she denied any trouble swallowing. No hx of esophagitis or difficult to treat GERD. No contraindication for fosmax use.    # 10 mm adrenal nodule on CT 6/2022  Small left adrenal nodule, CT with contrast thus unable to get washout characteristics. Nodule is small in size and not concerning. Clinically no concern for functionality.        Test and/or medications prescribed today:  Orders Placed This Encounter   Procedures     US Thyroid     Calcium     Phosphorus     Parathyroid Hormone Intact     Vitamin D Deficiency (D3 Only)     Creatinine         Follow up: 1 year      Cuate Hensley MD  Endocrinology, Diabetes and Metabolism  [facility]    90 minutes spent on the date of the encounter doing chart review, history and exam, documentation and further activities per the note

## 2022-04-27 NOTE — PROGRESS NOTES
Endocrinology Clinic Visit 4/27/2022    NAME:  Lydia Dietz  PCP:  Praveen Moses  MRN:  [id number]  Reason for Consult:  Thyroid nodule and osteopenia   Requesting Provider:  Praveen Moses    Chief Complaint     Chief Complaint   Patient presents with     Thyroid Problem     Thyroid nodule       History of Present Illness     Lydia Dietz is a 86 year old female who is seen in clinic for thyroid nodule and osteopenia    # Thyroid nodule:  She had CT scans for chronic pains including CT C/AP which showed thyroid nodule. Thyroid US 3/3022 reviewed by me showed multiple small nodules; largest is right solid heterogeneous hyperechoic 1.7 cm , the others are subcentimetric. Patient has not felt any thyroid mass or neck. No previous hx of radiation. Most recent TSH was 11/2021 wnl.      # osteopenia  # hx of compression fracture  # back pain  She denied any previous significant accident or falls with fractures. She has chronic back pain and was found to have T11 compression fracture. She is seeing Dr. Goss, ortho spine , using a Corset Brace that she finds beneficial.   The patient had a DXA scan on 12/2021 which showed: The most negative and valid T-score of -2.2 at the level of the right total hip. There is [facility]. Becky denied being on any previous OP treatment.    Calcium intake:   - Dietary: she eats cheese and yogurt everyday, 1 cup of ice cream everyday. No milk  - Supplements: no    Vitamin D intake:   - Supplements: no  Last vitamin D level was  on 54 on 9/2021.    Osteoporosis Risk factors:   - Previous fractures: stable T11 compression fracture found out on images.   - Family history of fragility fracture in parent: no  - Current smoking: no  - Steroid Use: no  - Rheumatoid  arthritis: no  - Alcohol (3 or more units per day): no  - Other medications known to affect BMD: no  - Menstrual history: age at menopause:  she does not remember   - Estrogen use after menopause: no  - Tendency for falls: no  - GI history: Malabsorption (IBD, Celiac, gastric bypass ): no  - History of kidney stones: no  - History of thyroid disease: no  - Physical activity: walks everyday  - Weight history: stable    Social : retired, lives with her  who was with her today, has 2  and 6 grandchildren.      Problem List     Patient Active Problem List   Diagnosis     Wedge compression fracture of T11-T12 vertebra, initial encounter for closed fracture (H)     Coronary artery disease involving native coronary artery, angina presence unspecified, unspecified whether native or transplanted heart     Status post coronary angiogram     Severe aortic stenosis     Adenomatous polyp of colon     Allergic rhinitis     Back pain, thoracic     Benign neoplasm of colon     Calculus of gallbladder     Chronic left-sided low back pain without sciatica     CKD (chronic kidney disease) stage 3, GFR 30-59 ml/min     Counseling on health care directive     Counseling regarding advanced directives and goals of care     Degeneration, intervertebral disc, lumbosacral     Abdominal hernia     Diffuse cystic mastopathy     Endometriosis     Essential hypertension     Hyperlipidemia LDL goal <70     IFG (impaired fasting glucose)     Microscopic hematuria     Osteoarthrosis     Renal cyst, left     Symptoms involving cardiovascular system     Varicella     Aortic stenosis, severe     Chest pain     Statin intolerance        Medications     Current Outpatient Medications   Medication     alendronate (FOSAMAX) 70 MG tablet     amoxicillin (AMOXIL) 500 MG capsule     aspirin (ASA) 81 MG EC tablet     bisacodyl (DULCOLAX) 5 MG EC tablet     evolocumab (REPATHA SURECLICK) 140 MG/ML prefilled autoinjector     fish oil-omega-3 fatty acids 1000 MG capsule     fluticasone (FLONASE) 50 MCG/ACT nasal spray     Lidocaine (LIDOCARE) 4 % Patch     metoprolol succinate ER (TOPROL-XL)  25 MG 24 hr tablet     polyethylene glycol (GOLYTELY) 236 g suspension     triamterene-HCTZ (MAXZIDE-25) 37.5-25 MG tablet     No current facility-administered medications for this visit.        Allergies     Allergies   Allergen Reactions     Rosuvastatin Muscle Pain (Myalgia)     Seasonal Allergies      Amlodipine      Other reaction(s): Edema,generalized     Atorvastatin      PN: Reaction: BLURRED VISION     Fenofibrate Muscle Pain (Myalgia)     Fluconazole Nausea     Hydrochlorothiazide      Leg cramps, felt sluggish     Losartan      PN: Reaction: Back pain and cramping     Pravastatin      PN:  Reaction: JOINT AND MUSCLE PAIN     Simvastatin Nausea     Niacin Rash       Medical / Surgical History     Past Medical History:   Diagnosis Date     Allergies      Anemia      Coronary artery disease      HLD (hyperlipidemia)      HTN (hypertension)      Impaired fasting glucose      Osteoarthritis      Severe aortic stenosis      Past Surgical History:   Procedure Laterality Date     AS LAP INCISIONAL HERNIA REPAIR       BREAST BIOPSY, CORE RT/LT      X3     CHOLECYSTECTOMY       COLECTOMY PARTIAL  2019     COLONOSCOPY N/A 2/17/2022    Procedure: COLONOSCOPY;  Surgeon: Karlos Holley MD;  Location:  GI     CV LEFT HEART CATH N/A 9/9/2020    Procedure: Left Heart Cath;  Surgeon: Leonard Pastor MD;  Location:  HEART CARDIAC CATH LAB     CV PCI ANGIOPLASTY N/A 9/9/2020    Procedure: CV PCI ANGIOPLASTY;  Surgeon: Leonard Pastor MD;  Location:  HEART CARDIAC CATH LAB     CV TRANSCATHETER AORTIC VALVE REPLACEMENT N/A 10/7/2020    Procedure: Transfemoral, subclavian (DINH) or transapical transcatheter aortic valve replacement 23mm dinh valve placed. cardiovascular standby.;  Surgeon: Leonard Pastor MD;  Location:  OR     ESOPHAGOSCOPY, GASTROSCOPY, DUODENOSCOPY (EGD), COMBINED N/A 2/17/2022    Procedure: ESOPHAGOGASTRODUODENOSCOPY (EGD);  Surgeon: Karlos Holley MD;  Location:  "[facility] GI     HC LAPAROSCOPY, OVIDUCT/OVARY; REMOVAL       HEART CATH FEMORAL CANNULIZATION WITH OPEN STANDBY REPAIR AORTIC VALVE N/A 10/7/2020    Procedure: Cardiac standby. Perfusion standby.;  Surgeon: Richi Olmos MD;  Location: [facility] OR     HYSTERECTOMY TOTAL ABDOMINAL         Social History     Social History     Socioeconomic History     Marital status:      Spouse name: Not on file     Number of children: Not on file     Years of education: Not on file     Highest education level: Not on file   Occupational History     Not on file   Tobacco Use     Smoking status: Former Smoker     Quit date:      Years since quittin.3     Smokeless tobacco: Never Used     Tobacco comment: 1 pack per week if that maybe for 5 years    Substance and Sexual Activity     Alcohol use: Yes     Comment: occ      Drug use: Never     Sexual activity: Not on file   Other Topics Concern     Not on file   Social History Narrative     Not on file     Social Determinants of Health     Financial Resource Strain: Not on file   Food Insecurity: Not on file   Transportation Needs: Not on file   Physical Activity: Not on file   Stress: Not on file   Social Connections: Not on file   Intimate Partner Violence: Not on file   Housing Stability: Not on file       Family History     No family history on file.    ROS     12 ROS completed, pertinent positive and negative in HPI    Physical Exam   BP (!) 151/74 (BP Location: Left arm, Patient Position: Sitting, Cuff Size: Adult Regular)   Pulse 70   Ht 1.626 m (5' 4\")   Wt 56.4 kg (124 lb 6.4 oz)   BMI 21.35 kg/m       General: Comfortable, no obvious distress, normal body habitus  Eyes: Sclera anicteric, moist conjunctiva  HENT: Atraumatic, oropharynx clear, moist mucous membranes with no mucosal ulcerations  Neck: Trachea midline, supple. Thyroid: Thyroid is normal in size and texture  CV: normal rate.   Resp:  good effort, no evidence of loud wheezing  Abdomen:  obese, non " distended.   Skin: No rashes, lesions, or subcutaneous nodules on exposed skin.   Psych: Alert and oriented x 3. Appropriate affect, good insight  Extremities: No peripheral edema  Musculoskeletal: Appropriate muscle bulk and strength  Neuro: Moves all four extremities. No focal deficits on limited exam. Gait normal.     Labs/Imaging     Pertinent Labs were reviewed and updated in [facility].  Radiology Results were  reviewed and updated in EPIC.    Summary of recent findings:   No results found for: A1C    TSH   Date Value Ref Range Status   11/30/2021 1.34 0.40 - 4.00 mU/L Final       Creatinine   Date Value Ref Range Status   04/27/2022 1.12 (H) 0.52 - 1.04 mg/dL Final   05/17/2021 0.98 0.52 - 1.04 mg/dL Final       Recent Labs   Lab Test 11/30/21  0850 11/22/21  0842   CHOL 179 180   HDL 73 68   LDL 88 95   TRIG 89 87       No results found for: [id number], [id number], [id number]    I personally reviewed the patient's outside records from [facility] EMR and Care Everywhere. Summary of pertinent findings in HPI.    Impression / Plan     1. Thyroid nodules  incidentally found on CT scan as part of work up for chronic pain. I reviewed US images with patient and her  and had a general discussion about thyroid function and structure. She has 2 very small nodules with no indication for FNA, largest nodule is 1.7 cm heterogeneous hyperechoic. We discussed that the largest nodule is low risk for cancer but given the size of > 1.5 cm there is indication for FNA by [facility] guidelines. The other option is to wait for now and do US in a year to look for changes. She prefers to wait for now which is not unreasonable.    Plan:  Thyroid US in 1 year    # osteopenia  # hx of compression fracture  # back pain    Lowest T score of -2.2. unclear hx of compression fracture, old with no prior hx of high impact falls or accidents which might be considered a fragility fracture. FRAX score elevated hip of 19 and major OP fracture is 32%. She  does meet criteria for OP treatment. Her risk factors include age and post-menauposal state. Labs unremarkable including a screening for MM which was negative. We reviewed some general information about OP. We discussed importance of fall precaution. I reviewed with her ca and vitd recommendations. Labs related to calcium metabolism reviewed with her. I discussed fosamax side effects including esophagitis, ONJ, atypical fracture of the femur. I reviewed her appropriate way of taking fosamax.    # thickened esophagus on CT 3/2021  EGD was done 2/2022 with evidence of Tortuous esophagus.. she denied any trouble swallowing. No hx of esophagitis or difficult to treat GERD. No contraindication for fosmax use.    # 10 mm adrenal nodule on CT 6/2022  Small left adrenal nodule, CT with contrast thus unable to get washout characteristics. Nodule is small in size and not concerning. Clinically no concern for functionality.        Test and/or medications prescribed today:  Orders Placed This Encounter   Procedures     US Thyroid     Calcium     Phosphorus     Parathyroid Hormone Intact     Vitamin D Deficiency (D3 Only)     Creatinine         Follow up: 1 year      Cuate Hensley MD  Endocrinology, Diabetes and Metabolism  [facility]    90 minutes spent on the date of the encounter doing chart review, history and exam, documentation and further activities per the note

## 2022-04-27 NOTE — PATIENT INSTRUCTIONS
It was nice meeting you.    Please schedule a lab appointment    I sent Fosamax to your pharmacy.

## 2022-04-28 LAB
ALBUMIN SERPL ELPH-MCNC: 4.1 G/DL (ref 3.7–5.1)
ALPHA1 GLOB SERPL ELPH-MCNC: 0.3 G/DL (ref 0.2–0.4)
ALPHA2 GLOB SERPL ELPH-MCNC: 0.8 G/DL (ref 0.5–0.9)
B-GLOBULIN SERPL ELPH-MCNC: 0.9 G/DL (ref 0.6–1)
DEPRECATED CALCIDIOL+CALCIFEROL SERPL-MC: 52 UG/L (ref 20–75)
GAMMA GLOB SERPL ELPH-MCNC: 0.7 G/DL (ref 0.7–1.6)
M PROTEIN SERPL ELPH-MCNC: 0 G/DL
PROT PATTERN SERPL ELPH-IMP: NORMAL

## 2022-05-02 DIAGNOSIS — R60.1 GENERALIZED EDEMA: ICD-10-CM

## 2022-05-02 DIAGNOSIS — E78.5 HYPERLIPIDEMIA LDL GOAL <70: ICD-10-CM

## 2022-05-02 DIAGNOSIS — E04.1 THYROID NODULE: ICD-10-CM

## 2022-05-02 DIAGNOSIS — R93.5 ABNORMAL ABDOMINAL CT SCAN: ICD-10-CM

## 2022-05-02 DIAGNOSIS — Z78.9 STATIN INTOLERANCE: ICD-10-CM

## 2022-05-04 RX ORDER — TRIAMTERENE/HYDROCHLOROTHIAZID 37.5-25 MG
1 TABLET ORAL DAILY
Qty: 90 TABLET | Refills: 1 | Status: SHIPPED | OUTPATIENT
Start: 2022-05-04 | End: 2023-08-11

## 2022-05-04 NOTE — TELEPHONE ENCOUNTER
triamterene-HCTZ (MAXZIDE-25) 37.5-25 MG tablet  Take 1 tablet by mouth daily -       Last Written Prescription Date:  1/17/22  Last Fill Quantity: 90,   # refills: 0  Last Office Visit : 4/27/22  Future Office visit:  none    Routing refill request to provider for review/approval because:  Failed protocol. Abnormal creatinine on 4/27/22 = 1.12 (H)  Lab Test 04/27/22  1425   CR 1.12*

## 2022-05-16 ENCOUNTER — TELEPHONE (OUTPATIENT)
Dept: INTERNAL MEDICINE | Facility: CLINIC | Age: 87
End: 2022-05-16
Payer: MEDICARE

## 2022-05-16 DIAGNOSIS — K08.89 PAIN, DENTAL: Primary | ICD-10-CM

## 2022-05-16 RX ORDER — AMOXICILLIN 500 MG/1
500 CAPSULE ORAL
Qty: 4 CAPSULE | Refills: 1 | Status: SHIPPED | OUTPATIENT
Start: 2022-05-16 | End: 2023-08-13

## 2022-05-16 NOTE — TELEPHONE ENCOUNTER
[facility] Call Center    Phone Message    May a detailed message be left on voicemail: yes     Reason for Call: Medication Question or concern regarding medication   Prescription Clarification  Name of Medication: antibiotic  Prescribing Provider: N/A   Pharmacy: [facility] - [address]   What on the order needs clarification? Patient has a 05/23/2022 dental visits for teeth cleaning and has metal in her body. Needs antibiotic to be called into the pharmacy. Requesting call back from Nurse Henderson to discuss this.      Patient PH#: [phone]    Action Taken: Message routed to:  [facility]    Travel Screening: Not Applicable

## 2022-08-02 ENCOUNTER — PATIENT OUTREACH (OUTPATIENT)
Dept: SURGERY | Facility: CLINIC | Age: 87
End: 2022-08-02

## 2022-08-02 NOTE — PROGRESS NOTES
Dr. Goss wanted patient to have US in September and phone visit afterward to discuss results. US scheduled by patient for 9/6/22. Called patient and LVM that telephone appt was scheduled with Dr. Goss to review results on 9/7/22 at 0900. Advised patient to return call if that appointment date/time will not work for her.

## 2022-08-08 DIAGNOSIS — E78.5 HYPERLIPIDEMIA LDL GOAL <70: ICD-10-CM

## 2022-08-08 DIAGNOSIS — Z78.9 STATIN INTOLERANCE: ICD-10-CM

## 2022-08-08 DIAGNOSIS — I25.10 CORONARY ARTERY DISEASE INVOLVING NATIVE CORONARY ARTERY WITHOUT ANGINA PECTORIS, UNSPECIFIED WHETHER NATIVE OR TRANSPLANTED HEART: ICD-10-CM

## 2022-08-11 RX ORDER — EVOLOCUMAB 140 MG/ML
140 INJECTION, SOLUTION SUBCUTANEOUS
Qty: 14 ML | Refills: 11 | Status: SHIPPED | OUTPATIENT
Start: 2022-08-11 | End: 2023-09-12

## 2022-08-11 NOTE — TELEPHONE ENCOUNTER
evolocumab (REPATHA SURECLICK) 140 MG/ML prefilled autoinjector   Last Written Prescription Date:   1/17/2022  Last Fill Quantity: 14,   # refills: 1  Last Office Visit :  4/27/2022  Future Office visit:  None    Routing refill request to provider for review/approval because:  Drug not on the [facility], P or [facility] refill protocol or controlled substance      Helen Osorio RN  Central Triage Red Flags/Med Refills

## 2022-08-12 ENCOUNTER — TELEPHONE (OUTPATIENT)
Dept: CARDIOLOGY | Facility: CLINIC | Age: 87
End: 2022-08-12

## 2022-08-12 ENCOUNTER — LAB (OUTPATIENT)
Dept: LAB | Facility: CLINIC | Age: 87
End: 2022-08-12
Payer: MEDICARE

## 2022-08-12 DIAGNOSIS — M85.80 OSTEOPENIA: ICD-10-CM

## 2022-08-12 DIAGNOSIS — Z00.00 PREVENTATIVE HEALTH CARE: ICD-10-CM

## 2022-08-12 DIAGNOSIS — E78.5 HYPERLIPIDEMIA LDL GOAL <100: ICD-10-CM

## 2022-08-12 DIAGNOSIS — Z11.59 ENCOUNTER FOR SCREENING FOR OTHER VIRAL DISEASES: ICD-10-CM

## 2022-08-12 LAB
HOLD SPECIMEN: NORMAL
HOLD SPECIMEN: NORMAL

## 2022-08-12 PROCEDURE — 84443 ASSAY THYROID STIM HORMONE: CPT

## 2022-08-12 PROCEDURE — 82306 VITAMIN D 25 HYDROXY: CPT | Performed by: INTERNAL MEDICINE

## 2022-08-12 PROCEDURE — 36415 COLL VENOUS BLD VENIPUNCTURE: CPT

## 2022-08-12 PROCEDURE — 80061 LIPID PANEL: CPT

## 2022-08-12 NOTE — TELEPHONE ENCOUNTER
[facility] Call Center    Phone Message    May a detailed message be left on voicemail: yes     Reason for Call: Other: Charu who is an LCT called stating Van[facility]T Desmond did not sign the ABN for the patient. Please call Charu back to further discuss, thank you.     Action Taken: Message routed to:  Other: Cardiology    Travel Screening: Not Applicable

## 2022-08-15 ENCOUNTER — TELEPHONE (OUTPATIENT)
Dept: CARDIOLOGY | Facility: CLINIC | Age: 87
End: 2022-08-15

## 2022-08-15 NOTE — TELEPHONE ENCOUNTER
"Returned call.    Was notified that TSH and vitamin D labs were ordered last year by Dr. Checo Logan--however the diagnosis code was \"statin intolerance,\" which is not a covered diagnosis for these labs.    Will reach out to Dr. Checo Logan to ask if he's okay to re-order with a different dx code.  "

## 2022-08-15 NOTE — TELEPHONE ENCOUNTER
[facility] Call Center    Phone Message    May a detailed message be left on voicemail: yes     Reason for Call: Medication Question or concern regarding medication   Prescription Clarification  Name of Medication: evolocumab (REPATHA SURECLICK) 140 MG/ML prefilled autoinjector  Prescribing Provider: Dr Casey Logan   Pharmacy: [facility] [address] [place] [address]   What on the order needs clarification?    Lydia called because the cost of her evolocumab (REPATHA SURECLICK) 140 MG/ML prefilled autoinjector medication went up by about $50. Is there a more affordable alternative for her to take. Please give Lydia a call back at [phone]. Thank you!    Action Taken: Other: Cardiology    Travel Screening: Not Applicable

## 2022-08-16 DIAGNOSIS — M85.80 OSTEOPENIA: ICD-10-CM

## 2022-08-16 DIAGNOSIS — E78.5 HYPERLIPIDEMIA LDL GOAL <100: Primary | ICD-10-CM

## 2022-08-16 DIAGNOSIS — Z00.00 PREVENTATIVE HEALTH CARE: Primary | ICD-10-CM

## 2022-08-16 LAB
CHOLEST SERPL-MCNC: 189 MG/DL
HDLC SERPL-MCNC: 71 MG/DL
LDLC SERPL CALC-MCNC: 93 MG/DL
NONHDLC SERPL-MCNC: 118 MG/DL
TRIGL SERPL-MCNC: 123 MG/DL
TSH SERPL DL<=0.005 MIU/L-ACNC: 1.35 MU/L (ref 0.4–4)

## 2022-08-16 NOTE — TELEPHONE ENCOUNTER
Called pt.     Pt states she has a copay card that was allowing her to get her repatha for $5 and that this card was good until 2024. However, now medication cost went up to $50 dollars and pt is not sure why.    Writer advised that pt contact number on copay card or contact her insurance to determine why cost of medication went up.    Pt/family verbalized understanding.

## 2022-08-17 LAB — DEPRECATED CALCIDIOL+CALCIFEROL SERPL-MC: 68 UG/L (ref 20–75)

## 2022-08-19 ENCOUNTER — VIRTUAL VISIT (OUTPATIENT)
Dept: CARDIOLOGY | Facility: CLINIC | Age: 87
End: 2022-08-19
Attending: INTERNAL MEDICINE
Payer: MEDICARE

## 2022-08-19 DIAGNOSIS — Z95.2 S/P TAVR (TRANSCATHETER AORTIC VALVE REPLACEMENT): ICD-10-CM

## 2022-08-19 DIAGNOSIS — Z78.9 STATIN INTOLERANCE: ICD-10-CM

## 2022-08-19 DIAGNOSIS — I25.10 CORONARY ARTERY DISEASE INVOLVING NATIVE CORONARY ARTERY OF NATIVE HEART WITHOUT ANGINA PECTORIS: ICD-10-CM

## 2022-08-19 DIAGNOSIS — E78.5 HYPERLIPIDEMIA LDL GOAL <70: ICD-10-CM

## 2022-08-19 PROCEDURE — 99442 PR PHYSICIAN TELEPHONE EVALUATION 11-20 MIN: CPT | Mod: 95 | Performed by: INTERNAL MEDICINE

## 2022-08-19 NOTE — PROGRESS NOTES
Lydia is a 87 year old who is being evaluated via a billable telephone visit.      What phone number would you like to be contacted at? [phone]   How would you like to obtain your AVS? Mail a copy   S[facility] Lr/FLOWER    Phone call duration: 15 minutes    PREVENTIVE CARDIOLOGY - follow up   Lydia Dietz is an 87 year old female who receives primary care from Dr. Christal Ferreira. She was referred to Cardiology clinic by THOMAS Murphy who manages Lydia's CAD and TAVR.  She was referred for statin intolerance and plan for lipid-lowering therapy.  She has a telephone visit today for follow up.     8/20/21  Lydia Dietz is a pleasant woman with a past medical history of HTN, HLD w/statin intolerance, CKD stage III, coronary artery disease status-post PCI of the LAD and RCA 9/9/2020 and severe aortic valvular stenosis status-post transfemoral transcatheter aortic valve replacement (TAVR) with a 23 mm Paredes Arik 3 on 10/7/20 w/mild-moderate PVL.    Lydia has attempted to take multiple statins over many years including simvastatin, atorvastatin, simvastatin, pravastatin.  All have resulted in intolerable side effects including myalgias which rapidly resolved after stopping medication.      Interval History 8/19/22  Lydia's biggest complaint is low back pain. She denies chest pain or dyspnea. She goes on walks with her  every other day and dose stretching and body weight exercises at home. No lightheadedness, orthopnea, LE edema.    She is tolerating repatha well. LDL is 93mg/dL.   Blood pressure in clinic visits has been elevated, but at home her last BP was 132/62.    ASSESSMENT AND PLAN  Lydia Dietz is an 87 year old female  with a past medical history of HTN, HLD w/statin intolerance, CKD stage III, coronary artery disease status-post PCI of the LAD and RCA 9/9/2020 and severe aortic valvular stenosis status-post transfemoral transcatheter aortic valve replacement (TAVR) with a 23 mm  Paredes Arik 3 on 10/7/20 w/mild-moderate PVL.      She started evolocumab with a reduction in LDL from 167 to 93mg/dL. This is an acceptable LDL level given her side effects to statins. TSH and Vit D were normal. I recommended she stop her fish oil.    She is due for a follow up in the TAVR clinic with echo to evaluate her aortic valve in November.     Plan:  -Continue evolocumab 140 mg subcutaneous every 2 weeks  -Stop fish oil  -follow up with Daphne Murphy in November  -Follow-up with me in 1 year with cholesterol panel    Thank you for the opportunity to participate in the care of Ms. Lydia Dietz. Please feel free to contact me with any additional questions or concerns.    Palomo Logan MD    Preventive Cardiology  Pager: [phone]    I spent 15 minutes on the phone and 10 minutes reviewing the chart and completely documentation.    PAST MEDICAL HISTORY:  Patient Active Problem List   Diagnosis     Wedge compression fracture of T11-T12 vertebra, initial encounter for closed fracture (H)     Coronary artery disease involving native coronary artery, angina presence unspecified, unspecified whether native or transplanted heart     Status post coronary angiogram     Severe aortic stenosis     Adenomatous polyp of colon     Allergic rhinitis     Back pain, thoracic     Benign neoplasm of colon     Calculus of gallbladder     Chronic left-sided low back pain without sciatica     CKD (chronic kidney disease) stage 3, GFR 30-59 ml/min     Counseling on health care directive     Counseling regarding advanced directives and goals of care     Degeneration, intervertebral disc, lumbosacral     Abdominal hernia     Diffuse cystic mastopathy     Endometriosis     Essential hypertension     Hyperlipidemia LDL goal <70     IFG (impaired fasting glucose)     Microscopic hematuria     Osteoarthrosis     Renal cyst, left     Symptoms involving cardiovascular system     Varicella     Aortic stenosis,  severe     Chest pain     Statin intolerance     Past Medical History:   Diagnosis Date     Allergies      Anemia      Coronary artery disease      HLD (hyperlipidemia)      HTN (hypertension)      Impaired fasting glucose      Osteoarthritis      Severe aortic stenosis        CURRENT MEDICATIONS:  Current Outpatient Medications   Medication Sig Dispense Refill     alendronate (FOSAMAX) 70 MG tablet Take 1 tablet (70 mg) by mouth every 7 days Take 60 minutes before am meal with 8 oz. water. Remain upright for 30 minutes. 12 tablet 3     amoxicillin (AMOXIL) 500 MG capsule Take 1 capsule (500 mg) by mouth once as needed (prn) Take 4 tablets(2000 mg) 1 hour before dental work 4 capsule 1     amoxicillin (AMOXIL) 500 MG capsule Take 4 capsules (2,000 mg) by mouth See Admin Instructions Take 4 caps (2000mg), by mouth, one hour prior to dental procedure. (Patient not taking: No sig reported) 4 capsule 0     bisacodyl (DULCOLAX) 5 MG EC tablet Take 2 tabs by mouth at 3pm one day prior to procedure.  Take 2 tabs by mouth at 11pm the night before procedure. (Patient not taking: Reported on 4/27/2022) 4 tablet 0     evolocumab (REPATHA SURECLICK) 140 MG/ML prefilled autoinjector Inject 1 mL (140 mg) Subcutaneous every 14 days 14 mL 11     fish oil-omega-3 fatty acids 1000 MG capsule Take 2 g by mouth daily       fluticasone (FLONASE) 50 MCG/ACT nasal spray Spray 2 sprays in nostril every morning        Lidocaine (LIDOCARE) 4 % Patch Place 1 patch onto the skin every 24 hours To prevent lidocaine toxicity, patient should be patch free for 12 hrs daily. 7 patch 3     metoprolol succinate ER (TOPROL-XL) 25 MG 24 hr tablet Take 1 tablet (25 mg) by mouth every morning 90 tablet 3     polyethylene glycol (GOLYTELY) 236 g suspension Take as directed in patient instructions.  Day before procedure at 6:00pm drink 8oz glass of mixture every 15 min until the jug is half empty.  Store the rest in the refrigerator.   At 11pm drink second  half of container.  Drink one 8oz glass every 15 minutes until jug is empty. (Patient not taking: Reported on 2022) 4000 mL 0     triamterene-HCTZ (MAXZIDE-25) 37.5-25 MG tablet Take 1 tablet by mouth daily 90 tablet 1       PAST SURGICAL HISTORY:  Past Surgical History:   Procedure Laterality Date     AS LAP INCISIONAL HERNIA REPAIR       BREAST BIOPSY, CORE RT/LT      X3     CHOLECYSTECTOMY       COLECTOMY PARTIAL  2019     COLONOSCOPY N/A 2022    Procedure: COLONOSCOPY;  Surgeon: Karlos Holley MD;  Location: [facility]     CV LEFT HEART CATH N/A 2020    Procedure: Left Heart Cath;  Surgeon: Leonard Pastor MD;  Location:  HEART CARDIAC CATH LAB     CV PCI ANGIOPLASTY N/A 2020    Procedure: CV PCI ANGIOPLASTY;  Surgeon: Leonard Pastor MD;  Location:  HEART CARDIAC CATH LAB     CV TRANSCATHETER AORTIC VALVE REPLACEMENT N/A 10/7/2020    Procedure: Transfemoral, subclavian (SIFUENTES) or transapical transcatheter aortic valve replacement 23mm sifuentes valve placed. cardiovascular standby.;  Surgeon: Leonard Pastor MD;  Location: [facility]     ESOPHAGOSCOPY, GASTROSCOPY, DUODENOSCOPY (EGD), COMBINED N/A 2022    Procedure: ESOPHAGOGASTRODUODENOSCOPY (EGD);  Surgeon: Karlos Holley MD;  Location: [facility]     HC LAPAROSCOPY, OVIDUCT/OVARY; REMOVAL       HEART CATH FEMORAL CANNULIZATION WITH OPEN STANDBY REPAIR AORTIC VALVE N/A 10/7/2020    Procedure: Cardiac standby. Perfusion standby.;  Surgeon: Richi Olmos MD;  Location: [facility]     HYSTERECTOMY TOTAL ABDOMINAL         ALLERGIES  Rosuvastatin, Seasonal allergies, Amlodipine, Atorvastatin, Fenofibrate, Fluconazole, Hydrochlorothiazide, Losartan, Pravastatin, Simvastatin, and Niacin    FAMILY HX:  No family history on file.    SOCIAL HX:  Social History     Tobacco Use     Smoking status: Former Smoker     Quit date:      Years since quittin.6     Smokeless tobacco: Never Used     Tobacco comment: 1 pack per week  if that maybe for 5 years    Substance Use Topics     Alcohol use: Yes     Comment: occ      Drug use: Never        ROS:  As noted in HPI.    VITAL SIGNS:  There were no vitals taken for this visit.  There is no height or weight on file to calculate BMI.  Wt Readings from Last 2 Encounters:   04/27/22 56.4 kg (124 lb 6.4 oz)   03/04/22 55.7 kg (122 lb 12.8 oz)       PHYSICAL EXAM  Unable to complete on this telephone encounter.    LABS: personally reviewed  CMP  Recent Labs   Lab Test 04/27/22  1425 02/07/22  1402 01/11/22  1301 11/30/21  0850 11/22/21  0842 11/22/21  0842 05/17/21  0930 03/05/21  0536 03/04/21  0629 03/03/21  0000 11/16/20  0947 10/08/20  1604 10/08/20  0522 10/07/20  1834 09/10/20  0624 09/09/20 2052 09/09/20  1230   NA  --  142  --  142  --  143 142 140 140  --  140   < > 138 137   < > 138 138   POTASSIUM  --  4.0  --  4.1  --  4.0 3.7 3.3* 3.9 3.8 4.1   < > 3.5 3.5   < > 3.9 3.8   CHLORIDE  --  107  --  107  --  106 109 108 109  --  109   < > 105 106   < > 107 104   CO2  --  28  --  27  --  28 27 25 25  --  26   < > 27 25   < > 26 31   ANIONGAP  --  7  --  8  --  9 6 7 6  --  5   < > 6 5   < > 5 3   GLC  --  100*  --  102*  --  106* 101* 94 106* 155* 98   < > 115* 108*   < > 178* 99   BUN  --  22  --  26  --  27 26 23 25  --  27   < > 22 25   < > 24 30   CR 1.12* 1.13* 1.1* 0.93   < > 0.96 0.98 0.89 0.94 1.17* 1.26*   < > 0.86 0.88   < > 0.84 0.91   GFRESTIMATED 48* 48* 49* 57*   < > 54* 53* 60* 56* 43* 39*   < > 63 60*   < > 64 58*   GFRESTBLACK  --   --   --   --   --   --  61 69 65  --  45*   < > 73 70   < > 74 67   DREW 9.6 9.4  --  9.4  --  9.4 9.3 9.0 8.5  --  9.4   < > 8.6 8.1*   < > 8.4* 9.6   MAG  --   --   --   --   --   --   --   --  2.1  --   --   --  2.1 2.0  --  2.0  --    PHOS 3.5  --   --   --   --   --   --   --  3.3  --   --   --  3.1 3.2  --   --   --    PROTTOTAL  --  7.2  --  7.0  --   --   --   --   --   --   --   --   --  5.3*  --   --  7.3   ALBUMIN  --  3.7  --  3.3*  --    --   --   --   --   --   --   --   --  2.6*  --   --  3.6   BILITOTAL  --  0.5  --  0.3  --   --   --   --   --   --   --   --   --  0.4  --   --  0.6   ALKPHOS  --  78  --  78  --   --   --   --   --   --   --   --   --  59  --   --  76   AST  --  22  --  22  --   --   --   --   --  29  --   --   --  17  --   --  19   ALT  --  21  --  22  --   --   --   --   --  35  --   --   --  15  --   --  21    < > = values in this interval not displayed.     CBC  Recent Labs   Lab Test 04/27/22  1425 02/07/22  1402 11/30/21  0850 11/22/21  0842   WBC 7.4 6.3 7.6 5.8   RBC 4.70 4.92 4.49 4.54   HGB 12.4 12.8 11.0* 11.0*   HCT 40.7 41.9 36.6 37.7   MCV 87 85 82 83   MCH 26.4* 26.0* 24.5* 24.2*   MCHC 30.5* 30.5* 30.1* 29.2*   RDW 13.0 14.6 16.6* 16.8*   * 141* 137* 136*     INR  Recent Labs   Lab Test 10/07/20  0916 09/09/20  1230   INR 0.97 0.98     Recent Labs   Lab Test 08/12/22  0952 11/30/21  0850   CHOL 189 179   HDL 71 73   LDL 93 88   TRIG 123 89     No lab results found.    Most recent ECHO:   11/22/21  S/P TAVR with 23 mm Paredes Arik 3 valve on 10/07/2020.Mean aortic gradient  11mmHg. Trivial AI.  The inferior vena cava is normal.  No significant changes noted.    5/17/21 TTE  Interpretation Summary  S/P TAVR with 23 mm Paredes Arik 3 valve on 10/07/2020. Mean aortic gradient  12mmHg. Mild paravalvular AI.  No significant changes noted.  ______________________________________________________________________________  Left Ventricle  Global and regional left ventricular function is normal with an EF of 60-65%.  Left ventricular wall thickness is normal. Left ventricular size is normal.  Diastolic function not assessed due to significant mitral annular  calcification. No regional wall motion abnormalities are seen.     Right Ventricle  Right ventricular function, chamber size, wall motion, and thickness are  normal.

## 2022-08-19 NOTE — LETTER
8/19/2022      RE: Lydia Dietz  [id number] [facility] [address]       Dear Colleague,    Thank you for the opportunity to participate in the care of your patient, Lydia Dietz, at the [facility] [place] [facility]. Please see a copy of my visit note below.      PREVENTIVE CARDIOLOGY - follow up   Lydia Dietz is an 87 year old female who receives primary care from Dr. Christal Ferreira. She was referred to Cardiology clinic by THOMAS Murphy who manages Lydia's CAD and TAVR.  She was referred for statin intolerance and plan for lipid-lowering therapy.  She has a telephone visit today for follow up.     8/20/21  Lydia Dietz is a pleasant woman with a past medical history of HTN, HLD w/statin intolerance, CKD stage III, coronary artery disease status-post PCI of the LAD and RCA 9/9/2020 and severe aortic valvular stenosis status-post transfemoral transcatheter aortic valve replacement (TAVR) with a 23 mm Paredes Arik 3 on 10/7/20 w/mild-moderate PVL.    Lydia has attempted to take multiple statins over many years including simvastatin, atorvastatin, simvastatin, pravastatin.  All have resulted in intolerable side effects including myalgias which rapidly resolved after stopping medication.      Interval History 8/19/22  Lydia's biggest complaint is low back pain. She denies chest pain or dyspnea. She goes on walks with her  every other day and dose stretching and body weight exercises at home. No lightheadedness, orthopnea, LE edema.    She is tolerating repatha well. LDL is 93mg/dL.   Blood pressure in clinic visits has been elevated, but at home her last BP was 132/62.    ASSESSMENT AND PLAN  Lydia Dietz is an 87 year old female  with a past medical history of HTN, HLD w/statin intolerance, CKD stage III, coronary artery disease status-post PCI of the LAD and RCA 9/9/2020 and severe aortic valvular stenosis  status-post transfemoral transcatheter aortic valve replacement (TAVR) with a 23 mm Paredes Arik 3 on 10/7/20 w/mild-moderate PVL.      She started evolocumab with a reduction in LDL from 167 to 93mg/dL. This is an acceptable LDL level given her side effects to statins. TSH and Vit D were normal. I recommended she stop her fish oil.    She is due for a follow up in the TAVR clinic with echo to evaluate her aortic valve in November.     Plan:  -Continue evolocumab 140 mg subcutaneous every 2 weeks  -Stop fish oil  -follow up with Daphne Murphy in November  -Follow-up with me in 1 year with cholesterol panel    Thank you for the opportunity to participate in the care of Ms. Lydia Dietz. Please feel free to contact me with any additional questions or concerns.    Palomo Logan MD    Preventive Cardiology  Pager: [phone]    I spent 15 minutes on the phone and 10 minutes reviewing the chart and completely documentation.    PAST MEDICAL HISTORY:  Patient Active Problem List   Diagnosis     Wedge compression fracture of T11-T12 vertebra, initial encounter for closed fracture (H)     Coronary artery disease involving native coronary artery, angina presence unspecified, unspecified whether native or transplanted heart     Status post coronary angiogram     Severe aortic stenosis     Adenomatous polyp of colon     Allergic rhinitis     Back pain, thoracic     Benign neoplasm of colon     Calculus of gallbladder     Chronic left-sided low back pain without sciatica     CKD (chronic kidney disease) stage 3, GFR 30-59 ml/min     Counseling on health care directive     Counseling regarding advanced directives and goals of care     Degeneration, intervertebral disc, lumbosacral     Abdominal hernia     Diffuse cystic mastopathy     Endometriosis     Essential hypertension     Hyperlipidemia LDL goal <70     IFG (impaired fasting glucose)     Microscopic hematuria     Osteoarthrosis     Renal cyst,  left     Symptoms involving cardiovascular system     Varicella     Aortic stenosis, severe     Chest pain     Statin intolerance     Past Medical History:   Diagnosis Date     Allergies      Anemia      Coronary artery disease      HLD (hyperlipidemia)      HTN (hypertension)      Impaired fasting glucose      Osteoarthritis      Severe aortic stenosis        CURRENT MEDICATIONS:  Current Outpatient Medications   Medication Sig Dispense Refill     alendronate (FOSAMAX) 70 MG tablet Take 1 tablet (70 mg) by mouth every 7 days Take 60 minutes before am meal with 8 oz. water. Remain upright for 30 minutes. 12 tablet 3     amoxicillin (AMOXIL) 500 MG capsule Take 1 capsule (500 mg) by mouth once as needed (prn) Take 4 tablets(2000 mg) 1 hour before dental work 4 capsule 1     amoxicillin (AMOXIL) 500 MG capsule Take 4 capsules (2,000 mg) by mouth See Admin Instructions Take 4 caps (2000mg), by mouth, one hour prior to dental procedure. (Patient not taking: No sig reported) 4 capsule 0     bisacodyl (DULCOLAX) 5 MG EC tablet Take 2 tabs by mouth at 3pm one day prior to procedure.  Take 2 tabs by mouth at 11pm the night before procedure. (Patient not taking: Reported on 4/27/2022) 4 tablet 0     evolocumab (REPATHA SURECLICK) 140 MG/ML prefilled autoinjector Inject 1 mL (140 mg) Subcutaneous every 14 days 14 mL 11     fish oil-omega-3 fatty acids 1000 MG capsule Take 2 g by mouth daily       fluticasone (FLONASE) 50 MCG/ACT nasal spray Spray 2 sprays in nostril every morning        Lidocaine (LIDOCARE) 4 % Patch Place 1 patch onto the skin every 24 hours To prevent lidocaine toxicity, patient should be patch free for 12 hrs daily. 7 patch 3     metoprolol succinate ER (TOPROL-XL) 25 MG 24 hr tablet Take 1 tablet (25 mg) by mouth every morning 90 tablet 3     polyethylene glycol (GOLYTELY) 236 g suspension Take as directed in patient instructions.  Day before procedure at 6:00pm drink 8oz glass of mixture every 15 min  until the jug is half empty.  Store the rest in the refrigerator.   At 11pm drink second half of container.  Drink one 8oz glass every 15 minutes until jug is empty. (Patient not taking: Reported on 4/27/2022) 4000 mL 0     triamterene-HCTZ (MAXZIDE-25) 37.5-25 MG tablet Take 1 tablet by mouth daily 90 tablet 1       PAST SURGICAL HISTORY:  Past Surgical History:   Procedure Laterality Date     AS LAP INCISIONAL HERNIA REPAIR       BREAST BIOPSY, CORE RT/LT      X3     CHOLECYSTECTOMY       COLECTOMY PARTIAL  2019     COLONOSCOPY N/A 2/17/2022    Procedure: COLONOSCOPY;  Surgeon: Karlos Holley MD;  Location: [facility]     CV LEFT HEART CATH N/A 9/9/2020    Procedure: Left Heart Cath;  Surgeon: Leonard Pastor MD;  Location:  HEART CARDIAC CATH LAB     CV PCI ANGIOPLASTY N/A 9/9/2020    Procedure: CV PCI ANGIOPLASTY;  Surgeon: Leonard Pastor MD;  Location:  HEART CARDIAC CATH LAB     CV TRANSCATHETER AORTIC VALVE REPLACEMENT N/A 10/7/2020    Procedure: Transfemoral, subclavian (SIFUENTES) or transapical transcatheter aortic valve replacement 23mm sifuentes valve placed. cardiovascular standby.;  Surgeon: Leonard Pastor MD;  Location: [facility]     ESOPHAGOSCOPY, GASTROSCOPY, DUODENOSCOPY (EGD), COMBINED N/A 2/17/2022    Procedure: ESOPHAGOGASTRODUODENOSCOPY (EGD);  Surgeon: Karlos Holley MD;  Location: [facility]     HC LAPAROSCOPY, OVIDUCT/OVARY; REMOVAL       HEART CATH FEMORAL CANNULIZATION WITH OPEN STANDBY REPAIR AORTIC VALVE N/A 10/7/2020    Procedure: Cardiac standby. Perfusion standby.;  Surgeon: Richi Olmos MD;  Location: [facility]     HYSTERECTOMY TOTAL ABDOMINAL         ALLERGIES  Rosuvastatin, Seasonal allergies, Amlodipine, Atorvastatin, Fenofibrate, Fluconazole, Hydrochlorothiazide, Losartan, Pravastatin, Simvastatin, and Niacin    FAMILY HX:  No family history on file.    SOCIAL HX:  Social History     Tobacco Use     Smoking status: Former Smoker     Quit date: 1980     Years  since quittin.6     Smokeless tobacco: Never Used     Tobacco comment: 1 pack per week if that maybe for 5 years    Substance Use Topics     Alcohol use: Yes     Comment: occ      Drug use: Never        ROS:  As noted in HPI.    VITAL SIGNS:  There were no vitals taken for this visit.  There is no height or weight on file to calculate BMI.  Wt Readings from Last 2 Encounters:   22 56.4 kg (124 lb 6.4 oz)   22 55.7 kg (122 lb 12.8 oz)       PHYSICAL EXAM  Unable to complete on this telephone encounter.    LABS: personally reviewed  CMP  Recent Labs   Lab Test 22  1425 22  1402 22  1301 21  0850 21  0842 21  0842 21  0930 21  0536 21  0629 21  0000 20  0947 10/08/20  1604 10/08/20  0522 10/07/20  1834 09/10/20  0624 20  2052 20  1230   NA  --  142  --  142  --  143 142 140 140  --  140   < > 138 137   < > 138 138   POTASSIUM  --  4.0  --  4.1  --  4.0 3.7 3.3* 3.9 3.8 4.1   < > 3.5 3.5   < > 3.9 3.8   CHLORIDE  --  107  --  107  --  106 109 108 109  --  109   < > 105 106   < > 107 104   CO2  --  28  --  27  --  28 27 25 25  --  26   < > 27 25   < > 26 31   ANIONGAP  --  7  --  8  --  9 6 7 6  --  5   < > 6 5   < > 5 3   GLC  --  100*  --  102*  --  106* 101* 94 106* 155* 98   < > 115* 108*   < > 178* 99   BUN  --  22  --  26  --  27 26 23 25  --  27   < > 22 25   < > 24 30   CR 1.12* 1.13* 1.1* 0.93   < > 0.96 0.98 0.89 0.94 1.17* 1.26*   < > 0.86 0.88   < > 0.84 0.91   GFRESTIMATED 48* 48* 49* 57*   < > 54* 53* 60* 56* 43* 39*   < > 63 60*   < > 64 58*   GFRESTBLACK  --   --   --   --   --   --  61 69 65  --  45*   < > 73 70   < > 74 67   DREW 9.6 9.4  --  9.4  --  9.4 9.3 9.0 8.5  --  9.4   < > 8.6 8.1*   < > 8.4* 9.6   MAG  --   --   --   --   --   --   --   --  2.1  --   --   --  2.1 2.0  --  2.0  --    PHOS 3.5  --   --   --   --   --   --   --  3.3  --   --   --  3.1 3.2  --   --   --    PROTTOTAL  --  7.2  --  7.0  --    --   --   --   --   --   --   --   --  5.3*  --   --  7.3   ALBUMIN  --  3.7  --  3.3*  --   --   --   --   --   --   --   --   --  2.6*  --   --  3.6   BILITOTAL  --  0.5  --  0.3  --   --   --   --   --   --   --   --   --  0.4  --   --  0.6   ALKPHOS  --  78  --  78  --   --   --   --   --   --   --   --   --  59  --   --  76   AST  --  22  --  22  --   --   --   --   --  29  --   --   --  17  --   --  19   ALT  --  21  --  22  --   --   --   --   --  35  --   --   --  15  --   --  21    < > = values in this interval not displayed.     CBC  Recent Labs   Lab Test 04/27/22  1425 02/07/22  1402 11/30/21  0850 11/22/21  0842   WBC 7.4 6.3 7.6 5.8   RBC 4.70 4.92 4.49 4.54   HGB 12.4 12.8 11.0* 11.0*   HCT 40.7 41.9 36.6 37.7   MCV 87 85 82 83   MCH 26.4* 26.0* 24.5* 24.2*   MCHC 30.5* 30.5* 30.1* 29.2*   RDW 13.0 14.6 16.6* 16.8*   * 141* 137* 136*     INR  Recent Labs   Lab Test 10/07/20  0916 09/09/20  1230   INR 0.97 0.98     Recent Labs   Lab Test 08/12/22  0952 11/30/21  0850   CHOL 189 179   HDL 71 73   LDL 93 88   TRIG 123 89     No lab results found.    Most recent ECHO:   11/22/21  S/P TAVR with 23 mm Paredes Arik 3 valve on 10/07/2020.Mean aortic gradient  11mmHg. Trivial AI.  The inferior vena cava is normal.  No significant changes noted.    5/17/21 TTE  Interpretation Summary  S/P TAVR with 23 mm Paredes Arik 3 valve on 10/07/2020. Mean aortic gradient  12mmHg. Mild paravalvular AI.  No significant changes noted.  ______________________________________________________________________________  Left Ventricle  Global and regional left ventricular function is normal with an EF of 60-65%.  Left ventricular wall thickness is normal. Left ventricular size is normal.  Diastolic function not assessed due to significant mitral annular  calcification. No regional wall motion abnormalities are seen.     Right Ventricle  Right ventricular function, chamber size, wall motion, and thickness  are  normal.        Please do not hesitate to contact me if you have any questions/concerns.     Sincerely,     Palomo Logan MD

## 2022-08-19 NOTE — NURSING NOTE
Chief Complaint   Patient presents with     Follow Up     Annual, labs 8/12, pt states no changes in medications/updates since last reviewed     Patient declined individual allergy and medication review by support staff because they deny any changes and state that all information remains accurate since last reviewed/verified.     Milena Espinoza, VF/CMA

## 2022-08-19 NOTE — NURSING NOTE
Stop fish oil.     Follow up with HILARIO Nov/December.     Follow up with Dr. Checo Logan in 1 year with labs and echo prior.    Aaron Miranda, RN   Cardiology Nurse Coordinator

## 2022-08-19 NOTE — PATIENT INSTRUCTIONS
"You were seen today in the Cardiovascular Clinic at the [facility].     Cardiology Providers you saw during your visit: Dr. Checo Logan     Diagnosis:   Encounter Diagnoses   Name Primary?    Hyperlipidemia LDL goal <70     Statin intolerance     S/P TAVR (transcatheter aortic valve replacement)     Coronary artery disease involving native coronary artery of native heart without angina pectoris         Recommendations:   1. Stop fish oil.  2. Follow up with Nurse practitioner in 3-4 months.   3. Follow up with Dr. Checo Logan in 1 year with labs and echo before the visit.       Please feel free to call me with any questions or concerns.       Saniya Morales RN     Questions: [phone].   First press #1 for the [facility] and then press #4 for \"Medical Questions\" to reach us Cardiology Nurses.     [phone].   First press #1 for the [facility] and then press #1     On Call Cardiologist for after hours or on weekends: [phone]   option #4 and ask to speak to the on-call Cardiologist.          If you need a medication refill please contact your pharmacy.  Please allow 3 business days for your refill to be completed.  ________________________________________________________________________________________________________________________________         "

## 2022-08-22 ENCOUNTER — ANCILLARY PROCEDURE (OUTPATIENT)
Dept: CT IMAGING | Facility: CLINIC | Age: 87
End: 2022-08-22
Attending: INTERNAL MEDICINE
Payer: MEDICARE

## 2022-08-22 DIAGNOSIS — R91.8 PULMONARY NODULES: ICD-10-CM

## 2022-08-22 PROCEDURE — 71250 CT THORAX DX C-: CPT | Mod: GC | Performed by: RADIOLOGY

## 2022-08-26 ENCOUNTER — TELEPHONE (OUTPATIENT)
Dept: INTERNAL MEDICINE | Facility: CLINIC | Age: 87
End: 2022-08-26

## 2022-08-26 DIAGNOSIS — R91.8 PULMONARY NODULES: Primary | ICD-10-CM

## 2022-08-26 NOTE — TELEPHONE ENCOUNTER
Placed Chest CT order for 8/2023    Dear Lydia;    Your chest CT scan is stable but you have lung nodules. I recommend we repeat your Chest CT scan in one year. I placed an order for 8/2023 today    PAMELA Moses MD

## 2022-08-29 NOTE — TELEPHONE ENCOUNTER
[facility]    Phone Message    May a detailed message be left on voicemail: yes     Reason for Call: Requesting Results   Name/type of test: CT chest  Date of test: 8/22/2022  Was test done at a location other than [facility] (Please fill in the location if not [facility])?: No      Action Taken: Message routed to:  [facility]    Travel Screening: Not Applicable

## 2022-08-30 NOTE — TELEPHONE ENCOUNTER
Called patient, spoke to her and her , gave her CT results and told her she will get a follow up CT next year 8/26/23.     Vikram Renee RN on 8/30/2022 at 8:16 AM

## 2022-09-06 ENCOUNTER — ANCILLARY PROCEDURE (OUTPATIENT)
Dept: ULTRASOUND IMAGING | Facility: CLINIC | Age: 87
End: 2022-09-06
Attending: SURGERY
Payer: MEDICARE

## 2022-09-06 VITALS — BODY MASS INDEX: 20.33 KG/M2 | WEIGHT: 122 LBS | HEIGHT: 65 IN

## 2022-09-06 DIAGNOSIS — R19.09 LEFT GROIN MASS: ICD-10-CM

## 2022-09-06 PROCEDURE — 76705 ECHO EXAM OF ABDOMEN: CPT | Performed by: RADIOLOGY

## 2022-09-06 ASSESSMENT — PAIN SCALES - GENERAL: PAINLEVEL: EXTREME PAIN (9)

## 2022-09-06 NOTE — NURSING NOTE
"Chief Complaint   Patient presents with     RECHECK     6 month follow up       Vitals:    09/06/22 1439   Weight: 55.3 kg (122 lb)   Height: 1.651 m (5' 5\")       Body mass index is 20.3 kg/m .                          Richi Chavarria, EMT    "

## 2022-09-06 NOTE — PROGRESS NOTES
Lydia is a 87 year old who is being evaluated via a billable telephone visit.      What phone number would you like to be contacted at? [phone]  How would you like to obtain your AVS? Federico  Phone call duration: 3 minutes    During this telephone visit the patient is located in [place], patient verifies this as the location during the entirety of this visit.       I called Lydia Dietz today for follow-up of her recent repeat US of her left groin.  I saw her back in March for a groin mass. She has a history of a TAVR.  I felt this mass was likely a hematoma or lymphocele related to this procedure.      Today she reports no symptoms at her leg. No pain, no redness, no drainage. Cannot feel the mass.    Her US shows decreased size in this mass.    PE: Fluent speech  No resp distress  Alert, interactive    A/P:  Resolving fluid collection in the left groin- presumed hematoma vs lymphocele. The plan we agreed on is to pursue no further testing/imaging of this.  She will call us should she develop any new symptoms at the site.    Total time: 10 min (imaging review, counseling, documentation)

## 2022-09-07 ENCOUNTER — VIRTUAL VISIT (OUTPATIENT)
Dept: SURGERY | Facility: CLINIC | Age: 87
End: 2022-09-07
Payer: MEDICARE

## 2022-09-07 DIAGNOSIS — R19.09 LEFT GROIN MASS: Primary | ICD-10-CM

## 2022-09-07 PROCEDURE — 99441 PR PHYSICIAN TELEPHONE EVALUATION 5-10 MIN: CPT | Mod: 95 | Performed by: SURGERY

## 2022-09-07 NOTE — PATIENT INSTRUCTIONS
You met with Dr. Praveen Goss.      Today's visit instructions:    Return to the Surgery Clinic on an as needed basis.        If you have questions please contact Jocelyn RN or Trista RN during regular clinic hours, Monday through Friday 7:30 AM - 4:00 PM, or you can contact us via [facility] [employer] at anytime.       If you have urgent needs after-hours, weekends, or holidays please call the hospital at [phone] and ask to speak with our on-call General Surgery Team.    Appointment [phone]  Nurse Advice (Jocelyn or Trista): [phone]   Surgery Scheduler (Ghanshyam): [phone]  Fax: [phone]

## 2022-09-07 NOTE — LETTER
9/7/2022     RE: Lydia Dietz  [address] [address] [facility] [address]     Dear Colleague,    Thank you for referring your patient, Lydia Dietz, to the [facility] GENERAL SURGERY CLINIC [place] at [facility]. Please see a copy of my visit note below.    Lydia is a 87 year old who is being evaluated via a billable telephone visit.      What phone number would you like to be contacted at? [phone]  How would you like to obtain your AVS? MyChart  Phone call duration: 3 minutes    During this telephone visit the patient is located in [place], patient verifies this as the location during the entirety of this visit.     I called Lydia Dietz today for follow-up of her recent repeat US of her left groin.  I saw her back in March for a groin mass. She has a history of a TAVR.  I felt this mass was likely a hematoma or lymphocele related to this procedure.      Today she reports no symptoms at her leg. No pain, no redness, no drainage. Cannot feel the mass.    Her US shows decreased size in this mass.    PE: Fluent speech  No resp distress  Alert, interactive    A/P:  Resolving fluid collection in the left groin- presumed hematoma vs lymphocele. The plan we agreed on is to pursue no further testing/imaging of this.  She will call us should she develop any new symptoms at the site.    Total time: 10 min (imaging review, counseling, documentation)      Again, thank you for allowing me to participate in the care of your patient.      Sincerely,    Praveen Goss MD

## 2022-09-09 ENCOUNTER — TELEPHONE (OUTPATIENT)
Dept: INTERNAL MEDICINE | Facility: CLINIC | Age: 87
End: 2022-09-09

## 2022-09-09 NOTE — TELEPHONE ENCOUNTER
[facility] Call Center    Phone Message    May a detailed message be left on voicemail: yes     Reason for Call: Medication Question or concern regarding medication   Prescription Clarification  Name of Medication: triamterene-HCTZ (MAXZIDE-25) 37.5-25 MG tablet     Prescribing Provider: Dr. Moses      What on the order needs clarification? Pt requesting call back regarding the above medication, pt wanting to know if she should still be taking it          Action Taken: Message routed to:  [facility] kenneth

## 2022-09-09 NOTE — TELEPHONE ENCOUNTER
She should discontinue HcTz and follow up with me either in-person or by phone    Thanks,  PAMELA Moses

## 2022-09-09 NOTE — TELEPHONE ENCOUNTER
Called, spoke with Lydia and .     Patient has concerns that triamterene-HCTZ (MAXZIDE-25) 37.5-25 MG tablet may be an allergy. Has chronic pain on L side that the patient states Dr. Moses knows about. Is wondering if pain is associated with an allergy. Also wondering if she needs to see a chiropractor.  is concerned about the pain.     She has hydrochlorothiazide on allergy list. Will send note to provider. I advised patient they may need to speak to Dr. Moses in person or over the phone about these concerns.    Julian Vidal, EMT at 3:29 PM on 9/9/2022.  Primary Care Clinic: [phone]

## 2022-09-12 NOTE — TELEPHONE ENCOUNTER
Called and talked with patient. Informed patient to discontinue triamterene-HCTZ (MAXZIDE-25) 37.5-25 MG tablet per Dr. Moses.     Offered to schedule an in person or telephone visit with Dr. Moses. Patient preferred in person. The patient declined appointment times offered on the 16th, 19th, and 20th. The patient accepted an appointment at 12:30 pm on 9/23/22.     Julian Vidal, EMT at 11:51 AM on 9/12/2022.  Primary Care Clinic: [phone]

## 2022-09-13 ENCOUNTER — TELEPHONE (OUTPATIENT)
Dept: INTERNAL MEDICINE | Facility: CLINIC | Age: 87
End: 2022-09-13

## 2022-09-13 NOTE — TELEPHONE ENCOUNTER
[facility] Call Center    Phone Message    May a detailed message be left on voicemail: yes     Reason for Call: Medication Refill Request    Has the patient contacted the pharmacy for the refill? Yes   Name of medication being requested: evolocumab (REPATHA SURECLICK) 140 MG/ML prefilled autoinjector  Provider who prescribed the medication: Dr Moses  Pharmacy: [facility] [address] [place] [address]  Date medication is needed: Per call from patient the above script is due this Thursday but the pharmacy told her that they are waiting for approval from provider. Please review it and reach out to pharmacy or patient.         Action Taken: Message routed to:  [facility]    Travel Screening: Not Applicable

## 2022-09-13 NOTE — TELEPHONE ENCOUNTER
Called pharmacy, they need a PA done for this med. Sent it to [place] med refill team.     Vikram Renee RN on 9/13/2022 at 10:55 AM

## 2022-09-13 NOTE — TELEPHONE ENCOUNTER
Prior Authorization Retail Medication Request    Medication/Dose: evolocumab (REPATHA SURECLICK) 140 MG/ML prefilled autoinjector   Insurance Name:  Medicare      Coverage information:     Subscriber: [id number] ZAYNAB RIVAS     Rel to sub: 01 - Self     Member ID: [id number]     Payor: 9-MEDICARE Ph: [phone]     Benefit plan: 950-MEDICARE Ph: [phone]     Group number: Not given     Member effective dates: from 01/01/16

## 2022-09-14 NOTE — TELEPHONE ENCOUNTER
Central Prior Authorization Team   Phone: [phone]    PA Initiation    Medication: evolocumab (REPATHA SURECLICK) 140 MG/ML prefilled autoinjector   Insurance Company: [employer] - Phone [phone] Fax [phone]  Pharmacy Filling the Rx: [facility] [address] [place] [address]  Filling Pharmacy Phone: [phone]  Filling Pharmacy Fax:    Start Date: 9/14/2022

## 2022-09-16 NOTE — TELEPHONE ENCOUNTER
Prior Authorization Approval    Authorization Effective Date: 8/15/2022  Authorization Expiration Date: 9/14/2023  Medication: evolocumab (REPATHA SURECLICK) 140 MG/ML prefilled autoinjector   Approved Dose/Quantity:   Reference #:     Insurance Company: [employer] - Phone [phone] Fax [phone]  Expected CoPay:       CoPay Card Available:      Foundation Assistance Needed:    Which Pharmacy is filling the prescription (Not needed for infusion/clinic administered): [facility] [address] [place] [address]  Pharmacy Notified: Yes  Patient Notified: Yes

## 2022-09-23 ENCOUNTER — OFFICE VISIT (OUTPATIENT)
Dept: INTERNAL MEDICINE | Facility: CLINIC | Age: 87
End: 2022-09-23
Payer: MEDICARE

## 2022-09-23 VITALS
DIASTOLIC BLOOD PRESSURE: 70 MMHG | BODY MASS INDEX: 21.94 KG/M2 | WEIGHT: 128.5 LBS | HEART RATE: 70 BPM | OXYGEN SATURATION: 97 % | SYSTOLIC BLOOD PRESSURE: 195 MMHG | HEIGHT: 64 IN

## 2022-09-23 DIAGNOSIS — I10 BENIGN ESSENTIAL HYPERTENSION: Primary | ICD-10-CM

## 2022-09-23 PROCEDURE — 99214 OFFICE O/P EST MOD 30 MIN: CPT | Mod: 25 | Performed by: INTERNAL MEDICINE

## 2022-09-23 PROCEDURE — G0008 ADMIN INFLUENZA VIRUS VAC: HCPCS | Performed by: INTERNAL MEDICINE

## 2022-09-23 PROCEDURE — 90662 IIV NO PRSV INCREASED AG IM: CPT | Performed by: INTERNAL MEDICINE

## 2022-09-23 NOTE — NURSING NOTE
Lydia Dietz is a 87 year old female that presents in clinic today for the following:     Chief Complaint   Patient presents with     Follow Up     Recheck Medication       The patient's allergies and medications were reviewed. The patient's vitals were obtained without incident. The patient does not have any other questions for the provider.     Julian Vidal, EMT at 12:36 PM on 9/23/2022.  Primary Care Clinic: [phone]

## 2022-09-23 NOTE — PROGRESS NOTES
HPI:    Last visit with me 4/27/2022 and additional details in that note. Her  Geovany is present today. She has discontinued her Maxide anti-hypertensive medication. She states much less L sided overall pain. Unfortunately, her BP is now elevated and she has B swelling that gets worse during the day. No other HEENT, cardiopulmonary, abdominal, , GYN, neurological, systemic, psychiatric, lymphatic, endocrine, vascular complaints.         Past Medical History:   Diagnosis Date     Allergies      Anemia      Coronary artery disease      HLD (hyperlipidemia)      HTN (hypertension)      Impaired fasting glucose      Osteoarthritis      Severe aortic stenosis      Past Surgical History:   Procedure Laterality Date     AS LAP INCISIONAL HERNIA REPAIR       BREAST BIOPSY, CORE RT/LT      X3     CHOLECYSTECTOMY       COLECTOMY PARTIAL  2019     COLONOSCOPY N/A 2/17/2022    Procedure: COLONOSCOPY;  Surgeon: Karlos Holley MD;  Location:  GI     CV LEFT HEART CATH N/A 9/9/2020    Procedure: Left Heart Cath;  Surgeon: Leonard Pastor MD;  Location:  HEART CARDIAC CATH LAB     CV PCI ANGIOPLASTY N/A 9/9/2020    Procedure: CV PCI ANGIOPLASTY;  Surgeon: Leonard Pastor MD;  Location:  HEART CARDIAC CATH LAB     CV TRANSCATHETER AORTIC VALVE REPLACEMENT N/A 10/7/2020    Procedure: Transfemoral, subclavian (SIFUENTES) or transapical transcatheter aortic valve replacement 23mm sifuentes valve placed. cardiovascular standby.;  Surgeon: Leonard Pastor MD;  Location:  OR     ESOPHAGOSCOPY, GASTROSCOPY, DUODENOSCOPY (EGD), COMBINED N/A 2/17/2022    Procedure: ESOPHAGOGASTRODUODENOSCOPY (EGD);  Surgeon: Karlos Holley MD;  Location:  GI     HC LAPAROSCOPY, OVIDUCT/OVARY; REMOVAL       HEART CATH FEMORAL CANNULIZATION WITH OPEN STANDBY REPAIR AORTIC VALVE N/A 10/7/2020    Procedure: Cardiac standby. Perfusion standby.;  Surgeon: Richi Olmos MD;  Location:  OR     HYSTERECTOMY TOTAL  ABDOMINAL       PE:    Vitals noted elevated , gen, nad, cooperative, alert, neck supple nl rom, lungs with good air movement, RRR, S1, S2, no MRG, abdomen, no acute findings. Grossly normal neurological exam. B foot and ankle swelling.  No calf swelling. No skin breakdown.     Resting echo 11/22/2022:    Procedure  Echocardiogram with two-dimensional, color and spectral Doppler performed.    Interpretation Summary  S/P TAVR with 23 mm Paredes Arik 3 valve on 10/07/2020.Mean aortic gradient  11mmHg. Trivial AI.  The inferior vena cava is normal.  No significant changes noted.    Left Ventricle  Global and regional left ventricular function is normal with an EF of 60-65%.  Left ventricular wall thickness is normal. Left ventricular size is normal.  Left ventricular diastolic function is indeterminate. No regional wall motion  abnormalities are seen.     Right Ventricle  Right ventricular function, chamber size, wall motion, and thickness are  normal.     Atria  The right atria appears normal. Mild left atrial enlargement is present.     Mitral Valve  Moderate mitral annular calcification is present. Trace to mild mitral  insufficiency is present. The mean mitral valve gradient is 3.7 mmHg. The peak  mitral valve gradient is 9.9 mmHg.     Aortic Valve  S/P TAVR with 23 mm Paredes Arik 3 valve on 10/07/2020.Mean aortic gradient  11mmHg. Trivial AI.     Tricuspid Valve  The valve leaflets are not well visualized. Trace tricuspid insufficiency is  present.     Pulmonic Valve  The valve leaflets are not well visualized. On Doppler interrogation, there is  no significant stenosis or regurgitation.     Vessels  The thoracic aorta is normal. The pulmonary artery and bifurcation cannot be  assessed. The inferior vena cava is normal.     Pericardium  No pericardial effusion is present.     Compared to Previous Study  No significant changes noted.      A/P:    1. Endocrinology appt.  4/27/202 for thyroid nodule and low bone  density. DEXA scan 12/17/2021 with lowest T score -2.2  2. Cardiology seen 8/19/2022 with Dr. Casey Logan. She is on Repatha for increased lipids done 8/12/2022 TG's 123, HDL 71 and LDL 93.   3. Immunizations; COVID Pfizer vaccine x 4. Td/Tdap 4/26/2016, Prevnar 13 done 4/27/2015. Influenza vaccine today 9/23/2022.   4. HTN; on Metoprolol  Now off  Maxide with increased BP and leg swelling. She states she is not tolerant to either Losartan or Amlodipine. Will change Metoprolol to Coreg and add low dose Lasix. Will check electrolytes in a week or so. Telephone visit with me in 2-3 weeks to follow up on above. . Electrolytes and Creatinine stable 2/7/2022  5. Back pain; she is using lidocaine patches. She saw Dr. Goss, ortho spine 2/22/2022 for T11 compression fracture.   6. Lung nodules  Chest CT scan done 8/22/2022 and recommend repeat study in one year and placed order on 8/26/2022  7. Anemia; ordered CBC 3/15/2022. Iron studies normal 2/7/2022, Ferritin low normal 29. She had EGD and colonoscopy 2/7/2022. She had CT chest/abdomen/pelvic imaging 1/11/2022.   8. Repeat abdominal U/S  done 9/6/2022 was L groin fluid less than before. Previous U/S 2/7/2022.  Seen 9/7/2022 by Dr. oGss, General Surgery for L groin mass after TAVR access and thought to be a lymphocele/seroma/hematoma    9. TAVR 10/7/2020. Resting echo done 11/22/2021 and ordered future on 8/19/2022. She needs to take antibiotics before dental procedures.   10. Thrombocytopenia; platelets 4/27/2022 136. She had CT abdominal/pelvic imaging 1/11/2022 liver and spleen no worrisome finding.      30 minutes spent on the date of the encounter doing chart review, history and exam, documentation and further activities as noted above

## 2022-10-11 ENCOUNTER — LAB (OUTPATIENT)
Dept: LAB | Facility: CLINIC | Age: 87
End: 2022-10-11
Payer: MEDICARE

## 2022-10-11 DIAGNOSIS — I10 BENIGN ESSENTIAL HYPERTENSION: ICD-10-CM

## 2022-10-11 LAB
ANION GAP SERPL CALCULATED.3IONS-SCNC: 4 MMOL/L (ref 3–14)
BUN SERPL-MCNC: 21 MG/DL (ref 7–30)
CALCIUM SERPL-MCNC: 9.3 MG/DL (ref 8.5–10.1)
CHLORIDE BLD-SCNC: 110 MMOL/L (ref 94–109)
CO2 SERPL-SCNC: 30 MMOL/L (ref 20–32)
CREAT SERPL-MCNC: 0.88 MG/DL (ref 0.52–1.04)
GFR SERPL CREATININE-BSD FRML MDRD: 64 ML/MIN/1.73M2
GLUCOSE BLD-MCNC: 107 MG/DL (ref 70–99)
POTASSIUM BLD-SCNC: 3.9 MMOL/L (ref 3.4–5.3)
SODIUM SERPL-SCNC: 144 MMOL/L (ref 133–144)

## 2022-10-11 PROCEDURE — 80048 BASIC METABOLIC PNL TOTAL CA: CPT

## 2022-10-11 PROCEDURE — 36415 COLL VENOUS BLD VENIPUNCTURE: CPT

## 2022-10-13 ENCOUNTER — VIRTUAL VISIT (OUTPATIENT)
Dept: INTERNAL MEDICINE | Facility: CLINIC | Age: 87
End: 2022-10-13
Payer: MEDICARE

## 2022-10-13 DIAGNOSIS — R35.0 INCREASED FREQUENCY OF URINATION: Primary | ICD-10-CM

## 2022-10-13 PROCEDURE — 99442 PR PHYSICIAN TELEPHONE EVALUATION 11-20 MIN: CPT | Mod: 95 | Performed by: INTERNAL MEDICINE

## 2022-10-13 NOTE — PROGRESS NOTES
Lydia is a 87 year old who is being evaluated via a billable telephone visit.      What phone number would you like to be contacted at? [phone]   How would you like to obtain your AVS? Mail a copy  Phone call duration: 13 minutes        Fadumo Stephenson VF          Telephone visit     Ms. Dietz agrees to a telephone visit    Last in-person visit with me 9/23/2022 and additional details in that note.    We changed Metoprolol to Carvedilol. She states possible intolerance to Losartan? Amlodipine? And/or Maxide?     Electrolytes checked 10/11/2022 as stable (normal).     She states increased urination since starting Lasix (20 mg every other day) She does not feel she has a urinary tract infection but will check UA anyway    She states her BP in the  to 137 range. She is taking both Metoprolol and Coreg and told her to discontinue Metoprolol. She is dizzy and shaking; not sure if this is new. Discussed to stop Metoprolol and change Lasix to every 3rd day. Also discussed if worse I would need to see her in-person for future evaluation but she wants to wait on this.     Telephone visit with me in 2 weeks.    PAMELA Moses MD

## 2022-10-13 NOTE — PATIENT INSTRUCTIONS
Thank you for visiting the Primary Care Center today at the [facility]! The following is some information about our clinic:     Primary Care Center Frequently-Asked Questions    (1) How do I schedule appointments at the [facility]?     Primary Care--to schedule or make changes to an existing appointment, please call our primary care line at [phone].    Labs--to schedule a lab appointment at the [facility] you can use [employer] or call [phone]. If you have a [place] location that is closer to home, you can reach out to that location for scheduling options.     Imaging--if you need to schedule a CT, X-ray, MRI, ultrasound, or other imaging study you can call [phone] to schedule at the [facility] or any other [facility] imaging location.     Referrals--if a referral to another specialty was ordered you can expect a phone call from their scheduling team. If you have not heard from them in a week, please call us or send us a [employer] message to check the status or get a scheduling number. Please note that this only applies to internal [facility] referrals. If the referral is external you would need to contact their office for scheduling.     (2) I have a question about my visit, who do I contact?     You can call us at the primary care line at [phone] to ask questions about your visit. You can also send a secure message through [employer], which is reviewed by clinic staff. Please note that [employer] messages have a twenty-four to forty-eight business hour turnaround time and should not be used for urgent concerns.    (3) How will I get the results of my tests?    If you are signed up for [facility] all tests will be released to you within twenty-four hours of resulting. Please allow three to five days for your doctor to review your results and place a note interpreting the results. If you do not have [facility] you will receive your  results through mail seven to ten business days following the return of the tests. Please note that if there should be any urgent or concerning results that your doctor or their registered nurse will reach out to you the same day as the tests come back. If you have follow up questions about your results or would like to discuss the results in detail please schedule a follow up with your provider either in person or virtually.     (4) How do I get refills of my prescriptions?     You should always first contact your pharmacy for refills of your medications. If submitting a refill request on [employer], please be sure to submit the request only once--repeat requests can cause delays in refill. If you are requesting a NEW medication or a medication related to new symptoms you will need to schedule an appointment with a provider prior to approval. Please note: Routine medication refills have up to one to three business day turnaround whereas controlled substances refills have up to five to seven business day turnaround.    (5) I have new symptoms, what do I do?     If you are having an immediate medical emergency, you should dial 911 for assistance.   For anything urgent that needs to be seen within a few hours to one day you should visit a local urgent care for assistance.  For non-urgent symptoms that need to be seen within a few days to a week you can schedule with an available provider in primary care by going to [employer] or calling [phone].   If you are not sure how serious your symptoms are or you would like to receive medical advice you can always call [phone] to speak with a triage nurse.

## 2022-10-17 ENCOUNTER — LAB (OUTPATIENT)
Dept: LAB | Facility: CLINIC | Age: 87
End: 2022-10-17
Payer: MEDICARE

## 2022-10-17 DIAGNOSIS — R35.0 INCREASED FREQUENCY OF URINATION: ICD-10-CM

## 2022-10-17 DIAGNOSIS — Z78.9 STATIN INTOLERANCE: ICD-10-CM

## 2022-10-17 DIAGNOSIS — E78.5 HYPERLIPIDEMIA LDL GOAL <70: ICD-10-CM

## 2022-10-17 DIAGNOSIS — I25.10 CORONARY ARTERY DISEASE INVOLVING NATIVE CORONARY ARTERY OF NATIVE HEART WITHOUT ANGINA PECTORIS: ICD-10-CM

## 2022-10-17 LAB
ALBUMIN UR-MCNC: 30 MG/DL
APPEARANCE UR: CLEAR
BACTERIA #/AREA URNS HPF: ABNORMAL /HPF
BILIRUB UR QL STRIP: NEGATIVE
CHOLEST SERPL-MCNC: 164 MG/DL
COLOR UR AUTO: YELLOW
FASTING STATUS PATIENT QL REPORTED: NO
GLUCOSE UR STRIP-MCNC: NEGATIVE MG/DL
HDLC SERPL-MCNC: 69 MG/DL
HGB UR QL STRIP: NEGATIVE
HYALINE CASTS #/AREA URNS LPF: ABNORMAL /LPF
KETONES UR STRIP-MCNC: NEGATIVE MG/DL
LDLC SERPL CALC-MCNC: 72 MG/DL
LEUKOCYTE ESTERASE UR QL STRIP: ABNORMAL
NITRATE UR QL: NEGATIVE
NONHDLC SERPL-MCNC: 95 MG/DL
PH UR STRIP: 6.5 [PH] (ref 5–7)
RBC #/AREA URNS AUTO: ABNORMAL /HPF
SP GR UR STRIP: 1.03 (ref 1–1.03)
SQUAMOUS #/AREA URNS AUTO: ABNORMAL /LPF
TRIGL SERPL-MCNC: 114 MG/DL
UROBILINOGEN UR STRIP-MCNC: 2 MG/DL
WBC #/AREA URNS AUTO: ABNORMAL /HPF

## 2022-10-17 PROCEDURE — 87086 URINE CULTURE/COLONY COUNT: CPT

## 2022-10-17 PROCEDURE — 87088 URINE BACTERIA CULTURE: CPT

## 2022-10-17 PROCEDURE — 87186 SC STD MICRODIL/AGAR DIL: CPT

## 2022-10-17 PROCEDURE — 81001 URINALYSIS AUTO W/SCOPE: CPT

## 2022-10-17 PROCEDURE — 80061 LIPID PANEL: CPT

## 2022-10-17 PROCEDURE — 36415 COLL VENOUS BLD VENIPUNCTURE: CPT

## 2022-10-19 ENCOUNTER — TELEPHONE (OUTPATIENT)
Dept: INTERNAL MEDICINE | Facility: CLINIC | Age: 87
End: 2022-10-19

## 2022-10-19 DIAGNOSIS — R82.90 ABNORMAL URINE: Primary | ICD-10-CM

## 2022-10-19 RX ORDER — SULFAMETHOXAZOLE/TRIMETHOPRIM 800-160 MG
1 TABLET ORAL 2 TIMES DAILY
Qty: 10 TABLET | Refills: 0 | Status: SHIPPED | OUTPATIENT
Start: 2022-10-19 | End: 2024-01-24

## 2022-10-19 RX ORDER — SULFAMETHOXAZOLE/TRIMETHOPRIM 800-160 MG
1 TABLET ORAL 2 TIMES DAILY
Qty: 10 TABLET | Refills: 0 | COMMUNITY
Start: 2022-10-19 | End: 2022-10-19

## 2022-10-19 NOTE — PROGRESS NOTES
Lydia is a 87 year old who is being evaluated via a billable telephone visit.    Pt is in [place]      What phone number would you like to be contacted at? [phone]  How would you like to obtain your AVS? Cellerixhart  Phone call duration: 5 minutesTelephone visit     Mae Guillermo RUELAS    Ms. Dietz agrees to a telephone visit    Last telephone visit with me 10/13/2022 and additional details in that note    Abnormal UA from 10/17/2022 and UC shows 10,000-50,000 CFU Pseudomonas aeruginosa that is pan-sensitive. Sent in Rx. For Ciprofloxacin. UA did not show any RBC's She states today, still with increased urinary frequency and some burning. She denies any systemic sxs. No fever, chills. She is eating OK. She can't recall the last time she had a urinary tract infection. No abdominal complaints. No flank pain. Reviewed    Lipid panel 10/17/2022 with LDL 72 and HDL 69. TG's 114. She is on Repatha     HTN: No change in medications.     Will set up a telephone visit for 3 weeks.      PAMELA Moses MD

## 2022-10-19 NOTE — TELEPHONE ENCOUNTER
Dear Vikram;    Please see results note I sent to Lydia    Please give her a call with this information and see where she wants the antibiotics sent?     Please antibiotic order historical for Bactrim this encounter    ThanksPAMELA

## 2022-10-20 ENCOUNTER — VIRTUAL VISIT (OUTPATIENT)
Dept: INTERNAL MEDICINE | Facility: CLINIC | Age: 87
End: 2022-10-20
Payer: MEDICARE

## 2022-10-20 DIAGNOSIS — R82.90 ABNORMAL URINE: Primary | ICD-10-CM

## 2022-10-20 LAB — BACTERIA UR CULT: ABNORMAL

## 2022-10-20 PROCEDURE — 99441 PR PHYSICIAN TELEPHONE EVALUATION 5-10 MIN: CPT | Mod: 95 | Performed by: INTERNAL MEDICINE

## 2022-10-20 NOTE — PATIENT INSTRUCTIONS
Thank you for visiting the Primary Care Center today at the [facility]! The following is some information about our clinic:     Primary Care Center Frequently-Asked Questions    (1) How do I schedule appointments at the [facility]?     Primary Care--to schedule or make changes to an existing appointment, please call our primary care line at [phone].    Labs--to schedule a lab appointment at the [facility] you can use Happy Days or call [phone]. If you have a [place] location that is closer to home, you can reach out to that location for scheduling options.     Imaging--if you need to schedule a CT, X-ray, MRI, ultrasound, or other imaging study you can call [phone] to schedule at the [facility] or any other [facility] imaging location.     Referrals--if a referral to another specialty was ordered you can expect a phone call from their scheduling team. If you have not heard from them in a week, please call us or send us a Happy Days message to check the status or get a scheduling number. Please note that this only applies to internal [facility] referrals. If the referral is external you would need to contact their office for scheduling.     (2) I have a question about my visit, who do I contact?     You can call us at the primary care line at [phone] to ask questions about your visit. You can also send a secure message through Happy Days, which is reviewed by clinic staff. Please note that [employer] messages have a twenty-four to forty-eight business hour turnaround time and should not be used for urgent concerns.    (3) How will I get the results of my tests?    If you are signed up for [employer] all tests will be released to you within twenty-four hours of resulting. Please allow three to five days for your doctor to review your results and place a note interpreting the results. If you do not have [employer] you will receive your  results through mail seven to ten business days following the return of the tests. Please note that if there should be any urgent or concerning results that your doctor or their registered nurse will reach out to you the same day as the tests come back. If you have follow up questions about your results or would like to discuss the results in detail please schedule a follow up with your provider either in person or virtually.     (4) How do I get refills of my prescriptions?     You should always first contact your pharmacy for refills of your medications. If submitting a refill request on garbs, please be sure to submit the request only once--repeat requests can cause delays in refill. If you are requesting a NEW medication or a medication related to new symptoms you will need to schedule an appointment with a provider prior to approval. Please note: Routine medication refills have up to one to three business day turnaround whereas controlled substances refills have up to five to seven business day turnaround.    (5) I have new symptoms, what do I do?     If you are having an immediate medical emergency, you should dial 911 for assistance.   For anything urgent that needs to be seen within a few hours to one day you should visit a local urgent care for assistance.  For non-urgent symptoms that need to be seen within a few days to a week you can schedule with an available provider in primary care by going to [employer] or calling [phone].   If you are not sure how serious your symptoms are or you would like to receive medical advice you can always call [phone] to speak with a triage nurse.

## 2022-11-02 ENCOUNTER — TELEPHONE (OUTPATIENT)
Dept: INTERNAL MEDICINE | Facility: CLINIC | Age: 87
End: 2022-11-02

## 2022-11-02 NOTE — TELEPHONE ENCOUNTER
Spoke with patient and scheduled her for a follow up with Dr. Moses on 1/13.  Patient was provided with contact information if she would like to call back for rescheduling.

## 2022-11-16 DIAGNOSIS — I10 BENIGN ESSENTIAL HYPERTENSION: ICD-10-CM

## 2022-11-18 RX ORDER — FUROSEMIDE 20 MG
20 TABLET ORAL EVERY OTHER DAY
Qty: 60 TABLET | Refills: 1 | OUTPATIENT
Start: 2022-11-18

## 2022-11-18 NOTE — TELEPHONE ENCOUNTER
Furosemide (LASIX) 20mg tablet  Take 1 tablet (20 mg) by mouth every other day      Last Written Prescription Date:  9/26/22  Last Fill Quantity: 60,   # refills: 1  Last Office Visit : 10/15/20 - virtual visit  Future Office visit:  1/13/23    Refused, patient should have remaining refills

## 2022-12-03 ENCOUNTER — NURSE TRIAGE (OUTPATIENT)
Dept: NURSING | Facility: CLINIC | Age: 87
End: 2022-12-03

## 2022-12-03 NOTE — TELEPHONE ENCOUNTER
Patient calling about brown spots on her arms and legs.    Pt says she has had brown spots on both of her legs for the last year that seem to be getting more prominent.     She says a couple weeks ago hit her right leg on the door getting out of her car and now also has a black spot on her leg that is bigger than the others. Also has several brown spots on her right arm that she is concerned about. None of the spots hurt or itch or look infected, she says.    Protocol recommends pt is seen within 3 days. Will route message to PCP and care team to contact patient when the clinic is open on Monday. Pt verbalized understanding.    Mariela Chandler, RN, BSN  [facility]   Triage Nurse Advisor        Reason for Disposition    Caller can't describe it clearly    Additional Information    Negative: [1] Looks infected AND [2] large red area (> 2 in. or 5 cm)    Negative: [1] Fever AND [2] bump is tender to touch    Negative: [1] Fever AND [2] spreading red area or streak    Negative: [1] Looks infected (spreading redness, pus) AND [2] no fever    Negative: Looks like a boil, infected sore, or deep ulcer    Protocols used: SKIN LESION - MOLES OR GROWTHS-A-

## 2022-12-05 NOTE — TELEPHONE ENCOUNTER
Called and spoke with pt about message from Dr. Moses.    Pt is agreeable to his recommendation of seeing any provider to evaluate her spots.     [place] [facility]    Mariela Chandler, RN, BSN  [facility]   Triage Nurse Advisor

## 2022-12-10 ENCOUNTER — OFFICE VISIT (OUTPATIENT)
Dept: INTERNAL MEDICINE | Facility: CLINIC | Age: 87
End: 2022-12-10
Payer: MEDICARE

## 2022-12-10 VITALS
OXYGEN SATURATION: 87 % | WEIGHT: 128 LBS | DIASTOLIC BLOOD PRESSURE: 85 MMHG | BODY MASS INDEX: 21.97 KG/M2 | HEART RATE: 76 BPM | SYSTOLIC BLOOD PRESSURE: 187 MMHG

## 2022-12-10 DIAGNOSIS — L98.9 SKIN LESION: Primary | ICD-10-CM

## 2022-12-10 PROCEDURE — 99213 OFFICE O/P EST LOW 20 MIN: CPT | Performed by: NURSE PRACTITIONER

## 2022-12-10 NOTE — NURSING NOTE
Chief Complaint   Patient presents with     Derm Problem     Pt here for spots on arms and legs, 2 years, has gotten darker recently. Not itchy or painful.       Clinton Thrasher CMA, EMT at 11:48 AM on 12/10/2022.

## 2022-12-10 NOTE — PROGRESS NOTES
S: Lydia Dietz is a 87 year old female accompanied by her spouse for skin lesions which she thinks are new. She struck her right lateral calf on her car door.  Later she noted a black discoloration.  She did not correlate the injury to the skin changes.  She also has anterior right lower leg brown discoloration.  She has a few of those bruised lesions on her forearms.  She has a few scattered red lesions on her left upper chest area w/o any known trauma. She has solar brown spots on her thighs bilaterally.     She has mild right lateral malleolus swelling.      She states her Lasix was dc'd.  She is on ASA 81 mg.  No other blood thinning medication.        Patient Active Problem List   Diagnosis     Wedge compression fracture of T11-T12 vertebra, initial encounter for closed fracture (H)     Coronary artery disease involving native coronary artery, angina presence unspecified, unspecified whether native or transplanted heart     Status post coronary angiogram     Severe aortic stenosis     Adenomatous polyp of colon     Allergic rhinitis     Back pain, thoracic     Benign neoplasm of colon     Calculus of gallbladder     Chronic left-sided low back pain without sciatica     CKD (chronic kidney disease) stage 3, GFR 30-59 ml/min     Counseling on health care directive     Counseling regarding advanced directives and goals of care     Degeneration, intervertebral disc, lumbosacral     Abdominal hernia     Diffuse cystic mastopathy     Endometriosis     Essential hypertension     Hyperlipidemia LDL goal <70     IFG (impaired fasting glucose)     Microscopic hematuria     Osteoarthrosis     Renal cyst, left     Symptoms involving cardiovascular system     Varicella     Aortic stenosis, severe     Chest pain     Statin intolerance            Past Medical History:   Diagnosis Date     Allergies      Anemia      Coronary artery disease      HLD (hyperlipidemia)      HTN (hypertension)      Impaired fasting glucose       Osteoarthritis      Severe aortic stenosis             Past Surgical History:   Procedure Laterality Date     AS LAP INCISIONAL HERNIA REPAIR       BREAST BIOPSY, CORE RT/LT      X3     CHOLECYSTECTOMY       COLECTOMY PARTIAL  2019     COLONOSCOPY N/A 2022    Procedure: COLONOSCOPY;  Surgeon: Karlos Holley MD;  Location: [facility]     CV LEFT HEART CATH N/A 2020    Procedure: Left Heart Cath;  Surgeon: Leonard Pastor MD;  Location:  HEART CARDIAC CATH LAB     CV PCI ANGIOPLASTY N/A 2020    Procedure: CV PCI ANGIOPLASTY;  Surgeon: Leonard Pastor MD;  Location:  HEART CARDIAC CATH LAB     CV TRANSCATHETER AORTIC VALVE REPLACEMENT N/A 10/7/2020    Procedure: Transfemoral, subclavian (SIFUENTES) or transapical transcatheter aortic valve replacement 23mm sifuentes valve placed. cardiovascular standby.;  Surgeon: Leonard Pastor MD;  [facility]     ESOPHAGOSCOPY, GASTROSCOPY, DUODENOSCOPY (EGD), COMBINED N/A 2022    Procedure: ESOPHAGOGASTRODUODENOSCOPY (EGD);  Surgeon: Karlos Holley MD;  [facility]     HC LAPAROSCOPY, OVIDUCT/OVARY; REMOVAL       HEART CATH FEMORAL CANNULIZATION WITH OPEN STANDBY REPAIR AORTIC VALVE N/A 10/7/2020    Procedure: Cardiac standby. Perfusion standby.;  Surgeon: Richi Olmos MD;  [facility]     HYSTERECTOMY TOTAL ABDOMINAL              Social History     Tobacco Use     Smoking status: Former     Types: Cigarettes     Quit date:      Years since quittin.9     Smokeless tobacco: Never     Tobacco comments:     1 pack per week if that maybe for 5 years    Substance Use Topics     Alcohol use: Yes     Comment: occ           No family history on file.            Allergies   Allergen Reactions     Rosuvastatin Muscle Pain (Myalgia)     Seasonal Allergies      Amlodipine      Other reaction(s): Edema,generalized     Atorvastatin      PN: Reaction: BLURRED VISION     Fenofibrate Muscle Pain (Myalgia)     Fluconazole Nausea      Hydrochlorothiazide      Leg cramps, felt sluggish     Losartan      PN: Reaction: Back pain and cramping     Pravastatin      PN:  Reaction: JOINT AND MUSCLE PAIN     Simvastatin Nausea     Niacin Rash            Current Outpatient Medications   Medication Sig Dispense Refill     alendronate (FOSAMAX) 70 MG tablet Take 1 tablet (70 mg) by mouth every 7 days Take 60 minutes before am meal with 8 oz. water. Remain upright for 30 minutes. 12 tablet 3     amoxicillin (AMOXIL) 500 MG capsule Take 1 capsule (500 mg) by mouth once as needed (prn) Take 4 tablets(2000 mg) 1 hour before dental work 4 capsule 1     carvedilol (COREG) 6.25 MG tablet Take 1 tablet (6.25 mg) by mouth 2 times daily (with meals) 180 tablet 1     ciprofloxacin (CIPRO) 500 MG tablet Take 1 tablet (500 mg) by mouth 2 times daily 10 tablet 0     evolocumab (REPATHA SURECLICK) 140 MG/ML prefilled autoinjector Inject 1 mL (140 mg) Subcutaneous every 14 days 14 mL 11     fluticasone (FLONASE) 50 MCG/ACT nasal spray Spray 2 sprays in nostril every morning        furosemide (LASIX) 20 MG tablet Take 1 tablet (20 mg) by mouth every other day 60 tablet 1     Lidocaine (LIDOCARE) 4 % Patch Place 1 patch onto the skin every 24 hours To prevent lidocaine toxicity, patient should be patch free for 12 hrs daily. 7 patch 3     sulfamethoxazole-trimethoprim (BACTRIM DS) 800-160 MG tablet Take 1 tablet by mouth 2 times daily 10 tablet 0     triamterene-HCTZ (MAXZIDE-25) 37.5-25 MG tablet Take 1 tablet by mouth daily 90 tablet 1     amoxicillin (AMOXIL) 500 MG capsule Take 4 capsules (2,000 mg) by mouth See Admin Instructions Take 4 caps (2000mg), by mouth, one hour prior to dental procedure. (Patient not taking: Reported on 11/30/2021) 4 capsule 0     bisacodyl (DULCOLAX) 5 MG EC tablet Take 2 tabs by mouth at 3pm one day prior to procedure.  Take 2 tabs by mouth at 11pm the night before procedure. (Patient not taking: Reported on 4/27/2022) 4 tablet 0      "metoprolol succinate ER (TOPROL-XL) 25 MG 24 hr tablet Take 1 tablet (25 mg) by mouth every morning (Patient not taking: Reported on 10/20/2022) 90 tablet 3     polyethylene glycol (GOLYTELY) 236 g suspension Take as directed in patient instructions.  Day before procedure at 6:00pm drink 8oz glass of mixture every 15 min until the jug is half empty.  Store the rest in the refrigerator.   At 11pm drink second half of container.  Drink one 8oz glass every 15 minutes until jug is empty. (Patient not taking: Reported on 4/27/2022) 4000 mL 0       REVIEW OF SYSTEMS:  See above.    O:   BP (!) 187/85 (BP Location: Right arm, Patient Position: Sitting, Cuff Size: Adult Regular)   Pulse 76   Wt 58.1 kg (128 lb)   SpO2 (!) 87%   BMI 21.97 kg/m    GENERAL APPEARANCE: healthy, alert and no distress.  CV:see vs  NEURO: alert and oriented.    MUSK: ambulatory.  SKIN: She has a 8 x 7 cm navy blue colored subcutaneous lesion on the right lateral calf.   She has darkened skin over the anterior tibia.  Left ecchymotic lesion 3 cm x 3 cm with central clearing over the left anterior tibia.  EXT: warm.  Edema 1 + pitting over right lateral malleolus.   PSYCHE: She repeatedly asked me \"how will the blood leave the tissues?' after trying to explain that it will slowly absorb and the staining or tissue might remain.       A/P:  Peripheral vascular disease of the lower extremities.   Solar skin damage with solar lentigo of the arms and legs.  The patient voiced understanding of the information discussed and all questions were answered.     I spent a total of 25 minutes in the care of this pt during today's office visit. This time includes reviewing the patient's chart and prior history, obtaining a history, performing an examination and evaluation and counseling the patient. This time also includes ordering medications or tests necessary in addition to communication to other member's of the patient's health care team. Time spent in " documentation and care coordination is included.     Kiersten Guo APRN, CNP

## 2022-12-29 ENCOUNTER — TELEPHONE (OUTPATIENT)
Dept: INTERNAL MEDICINE | Facility: CLINIC | Age: 87
End: 2022-12-29

## 2022-12-29 NOTE — TELEPHONE ENCOUNTER
[facility] Call Center    Phone Message    May a detailed message be left on voicemail: yes     Reason for Call: Other: Patient needs to talk to the nurse about her upcoming appointment. Please call back to discuss further.    Action Taken: Message routed to:  [facility] PCC    Travel Screening: Not Applicable

## 2022-12-29 NOTE — TELEPHONE ENCOUNTER
Called patient- she mainly just wanted to confirm she had an appointment with Dr. Moses, did speak to me about appt with Kiersten GUZMAN, asked if it was ok to put lotion on legs and RN confirmed that is fine.     Vikram Renee RN on 12/29/2022 at 3:08 PM

## 2023-01-13 ENCOUNTER — OFFICE VISIT (OUTPATIENT)
Dept: INTERNAL MEDICINE | Facility: CLINIC | Age: 88
End: 2023-01-13
Payer: MEDICARE

## 2023-01-13 ENCOUNTER — LAB (OUTPATIENT)
Dept: LAB | Facility: CLINIC | Age: 88
End: 2023-01-13
Payer: MEDICARE

## 2023-01-13 VITALS
DIASTOLIC BLOOD PRESSURE: 84 MMHG | SYSTOLIC BLOOD PRESSURE: 196 MMHG | WEIGHT: 128.1 LBS | OXYGEN SATURATION: 99 % | HEART RATE: 62 BPM | BODY MASS INDEX: 21.99 KG/M2

## 2023-01-13 DIAGNOSIS — D64.9 ANEMIA, UNSPECIFIED TYPE: ICD-10-CM

## 2023-01-13 DIAGNOSIS — I10 BENIGN ESSENTIAL HYPERTENSION: ICD-10-CM

## 2023-01-13 DIAGNOSIS — Z12.83 SKIN CANCER SCREENING: ICD-10-CM

## 2023-01-13 DIAGNOSIS — R42 DIZZINESS: ICD-10-CM

## 2023-01-13 DIAGNOSIS — D64.9 ANEMIA, UNSPECIFIED TYPE: Primary | ICD-10-CM

## 2023-01-13 DIAGNOSIS — R26.89 BALANCE PROBLEMS: ICD-10-CM

## 2023-01-13 DIAGNOSIS — M54.9 BACK PAIN, UNSPECIFIED BACK LOCATION, UNSPECIFIED BACK PAIN LATERALITY, UNSPECIFIED CHRONICITY: ICD-10-CM

## 2023-01-13 LAB
ALBUMIN SERPL BCG-MCNC: 4.2 G/DL (ref 3.5–5.2)
ALP SERPL-CCNC: 67 U/L (ref 35–104)
ALT SERPL W P-5'-P-CCNC: 16 U/L (ref 10–35)
ANION GAP SERPL CALCULATED.3IONS-SCNC: 6 MMOL/L (ref 7–15)
AST SERPL W P-5'-P-CCNC: 25 U/L (ref 10–35)
BASOPHILS # BLD AUTO: 0 10E3/UL (ref 0–0.2)
BASOPHILS NFR BLD AUTO: 1 %
BILIRUB SERPL-MCNC: 0.4 MG/DL
BUN SERPL-MCNC: 20 MG/DL (ref 8–23)
CALCIUM SERPL-MCNC: 9.5 MG/DL (ref 8.8–10.2)
CHLORIDE SERPL-SCNC: 106 MMOL/L (ref 98–107)
CREAT SERPL-MCNC: 0.81 MG/DL (ref 0.51–0.95)
DEPRECATED HCO3 PLAS-SCNC: 29 MMOL/L (ref 22–29)
EOSINOPHIL # BLD AUTO: 0.1 10E3/UL (ref 0–0.7)
EOSINOPHIL NFR BLD AUTO: 2 %
ERYTHROCYTE [DISTWIDTH] IN BLOOD BY AUTOMATED COUNT: 12.9 % (ref 10–15)
FERRITIN SERPL-MCNC: 57 NG/ML (ref 11–328)
FOLATE SERPL-MCNC: 9.7 NG/ML (ref 4.6–34.8)
GFR SERPL CREATININE-BSD FRML MDRD: 71 ML/MIN/1.73M2
GLUCOSE SERPL-MCNC: 108 MG/DL (ref 70–99)
HCT VFR BLD AUTO: 40.1 % (ref 35–47)
HGB BLD-MCNC: 12.4 G/DL (ref 11.7–15.7)
IMM GRANULOCYTES # BLD: 0.1 10E3/UL
IMM GRANULOCYTES NFR BLD: 1 %
IRON BINDING CAPACITY (ROCHE): 361 UG/DL (ref 240–430)
IRON SATN MFR SERPL: 17 % (ref 15–46)
IRON SERPL-MCNC: 63 UG/DL (ref 37–145)
LYMPHOCYTES # BLD AUTO: 1.3 10E3/UL (ref 0.8–5.3)
LYMPHOCYTES NFR BLD AUTO: 19 %
MCH RBC QN AUTO: 27.2 PG (ref 26.5–33)
MCHC RBC AUTO-ENTMCNC: 30.9 G/DL (ref 31.5–36.5)
MCV RBC AUTO: 88 FL (ref 78–100)
MONOCYTES # BLD AUTO: 0.7 10E3/UL (ref 0–1.3)
MONOCYTES NFR BLD AUTO: 10 %
NEUTROPHILS # BLD AUTO: 4.7 10E3/UL (ref 1.6–8.3)
NEUTROPHILS NFR BLD AUTO: 67 %
NRBC # BLD AUTO: 0 10E3/UL
NRBC BLD AUTO-RTO: 0 /100
PLATELET # BLD AUTO: 118 10E3/UL (ref 150–450)
POTASSIUM SERPL-SCNC: 4.5 MMOL/L (ref 3.4–5.3)
PROT SERPL-MCNC: 6.5 G/DL (ref 6.4–8.3)
RBC # BLD AUTO: 4.56 10E6/UL (ref 3.8–5.2)
RETICS # AUTO: 0.05 10E6/UL (ref 0.03–0.1)
RETICS/RBC NFR AUTO: 1.1 % (ref 0.5–2)
SODIUM SERPL-SCNC: 141 MMOL/L (ref 136–145)
VIT B12 SERPL-MCNC: 397 PG/ML (ref 232–1245)
WBC # BLD AUTO: 7 10E3/UL (ref 4–11)

## 2023-01-13 PROCEDURE — 82607 VITAMIN B-12: CPT | Performed by: INTERNAL MEDICINE

## 2023-01-13 PROCEDURE — 82728 ASSAY OF FERRITIN: CPT | Performed by: INTERNAL MEDICINE

## 2023-01-13 PROCEDURE — 99207 BLOOD MORPHOLOGY PATHOLOGIST REVIEW: CPT | Performed by: STUDENT IN AN ORGANIZED HEALTH CARE EDUCATION/TRAINING PROGRAM

## 2023-01-13 PROCEDURE — 83550 IRON BINDING TEST: CPT | Performed by: PATHOLOGY

## 2023-01-13 PROCEDURE — 85045 AUTOMATED RETICULOCYTE COUNT: CPT | Performed by: PATHOLOGY

## 2023-01-13 PROCEDURE — 99215 OFFICE O/P EST HI 40 MIN: CPT | Performed by: INTERNAL MEDICINE

## 2023-01-13 PROCEDURE — 36415 COLL VENOUS BLD VENIPUNCTURE: CPT | Performed by: PATHOLOGY

## 2023-01-13 PROCEDURE — 80053 COMPREHEN METABOLIC PANEL: CPT | Performed by: PATHOLOGY

## 2023-01-13 PROCEDURE — 82746 ASSAY OF FOLIC ACID SERUM: CPT | Performed by: INTERNAL MEDICINE

## 2023-01-13 PROCEDURE — 85025 COMPLETE CBC W/AUTO DIFF WBC: CPT | Performed by: PATHOLOGY

## 2023-01-13 PROCEDURE — 83540 ASSAY OF IRON: CPT | Performed by: PATHOLOGY

## 2023-01-13 RX ORDER — LIDOCAINE 4 G/G
1 PATCH TOPICAL EVERY 24 HOURS
Qty: 30 PATCH | Refills: 3 | Status: SHIPPED | OUTPATIENT
Start: 2023-01-13 | End: 2023-01-19

## 2023-01-13 RX ORDER — FUROSEMIDE 20 MG
20 TABLET ORAL EVERY OTHER DAY
Qty: 180 TABLET | Refills: 3 | Status: SHIPPED | OUTPATIENT
Start: 2023-01-13 | End: 2024-04-17

## 2023-01-13 NOTE — NURSING NOTE
Lydia Dietz is a 87 year old female that presents in clinic today for the following:     Chief Complaint   Patient presents with     Follow Up     Pt here for a follow up       The patient's allergies and medications were reviewed. The patient's vitals were obtained without incident. The patient does not have any other questions for the provider.     Bren Beard, EMT at 10:07 AM on 1/13/2023.  Primary Care Clinic: [phone]

## 2023-01-13 NOTE — PROGRESS NOTES
HPI:    Ms. Dietz comes in for follow up several issues. Last in-person visit with us 9/23/2022 and additional information in that note. Her  Geovany is present today. She has chronic L sided back/flank pain. She had CT chest/abdomen/pelvic imagining 1/11/2022 and lumbar MRI imaging 1/11/2022. She states lidocaine patches has help in the past. She has worse balance issues and more dizziness (sometimes postural). She has worse balance with walking. No other HEENT, cardiopulmonary, abdominal, , GYN, neurological, systemic, psychiatric, lymphatic, endocrine, vascular complaints.     Past Medical History:   Diagnosis Date     Allergies      Anemia      Coronary artery disease      HLD (hyperlipidemia)      HTN (hypertension)      Impaired fasting glucose      Osteoarthritis      Severe aortic stenosis      Past Surgical History:   Procedure Laterality Date     AS LAP INCISIONAL HERNIA REPAIR       BREAST BIOPSY, CORE RT/LT      X3     CHOLECYSTECTOMY       COLECTOMY PARTIAL  2019     COLONOSCOPY N/A 2/17/2022    Procedure: COLONOSCOPY;  Surgeon: Karlos Holley MD;  Location:  GI     CV LEFT HEART CATH N/A 9/9/2020    Procedure: Left Heart Cath;  Surgeon: Leonard Pastor MD;  Location:  HEART CARDIAC CATH LAB     CV PCI ANGIOPLASTY N/A 9/9/2020    Procedure: CV PCI ANGIOPLASTY;  Surgeon: Leonard Pastor MD;  Location:  HEART CARDIAC CATH LAB     CV TRANSCATHETER AORTIC VALVE REPLACEMENT N/A 10/7/2020    Procedure: Transfemoral, subclavian (SIFUENTES) or transapical transcatheter aortic valve replacement 23mm sifuentes valve placed. cardiovascular standby.;  Surgeon: Leonard Pastor MD;  Location:  OR     ESOPHAGOSCOPY, GASTROSCOPY, DUODENOSCOPY (EGD), COMBINED N/A 2/17/2022    Procedure: ESOPHAGOGASTRODUODENOSCOPY (EGD);  Surgeon: Karlos Holley MD;  Location:  GI     HC LAPAROSCOPY, OVIDUCT/OVARY; REMOVAL       HEART CATH FEMORAL CANNULIZATION WITH OPEN STANDBY REPAIR AORTIC  VALVE N/A 10/7/2020    Procedure: Cardiac standby. Perfusion standby.;  Surgeon: Richi Olmos MD;  Location: [facility]     HYSTERECTOMY TOTAL ABDOMINAL       PE:    Vitals noted, gen, nad, cooperative, alert, neck supple, nl rom, lungs with good air movement, RRR, S1, S2, no MRG, no B carotid bruits, abdomen, no acute findings. Grossly normal neurological exam. R LE with some venous statis changes.     A/P:    1. Endocrinology appt.  4/27/2022 for thyroid nodule and low bone density. DEXA scan 12/17/2021 with lowest T score -2.2. On Alendronate. Vit D normal at 68 on 8/12/2022.   2. Cardiology seen 8/19/2022 with Dr. Casey Logan. She is on Repatha for increased lipids done 10/17/2022 TG's 114, HDL 69 and LDL 72. Placed cardiology referral 1/14/2023.   3. Immunizations; COVID Pfizer vaccine x 5 (bi-valent 10/7/2022). Td/Tdap 4/26/2016, Prevnar 13 done 4/27/2015. Influenza vaccine  9/23/2022.   4. HTN; On Carvedilol and 20 mg of Lasix every other day. Ordered labs today and sent in Rx.   5. Back pain; she is using lidocaine patches. She saw Dr. Goss, ortho spine 2/22/2022 for T11 compression fracture. Sent in Rx. For Lidocaine patches and telephone visit with me in one month.   6. Lung nodules  Chest CT scan done 8/22/2022 and recommend repeat study in one year and placed order on 8/26/2022  7. Anemia; CBC with Hgb 12.4 (MCV 87, RDW 13.0%) on 4/27/2022.. Iron studies normal 2/7/2022, Ferritin low normal 29. She had EGD and colonoscopy 2/7/2022. She had CT chest/abdomen/pelvic imaging 1/11/2022.   8. Repeat abdominal U/S  done 9/6/2022 was L groin fluid less than before. Previous U/S 2/7/2022.  Seen 9/7/2022 by Dr. Goss, General Surgery for L groin mass after TAVR access and thought to be a lymphocele/seroma/hematoma    9. TAVR 10/7/2020. Resting echo done 11/22/2021 and ordered future on 8/19/2022. She needs to take antibiotics before dental procedures. Placed cardiology referral   10. Thrombocytopenia; platelets  4/27/2022 136. She had CT abdominal/pelvic imaging 1/11/2022 liver and spleen no worrisome finding. Ordered peripheral smear 1/14/2023.   11. Dizziness and balance issues. Ordered MRI CNS imaging.         40 minutes spent on the date of the encounter doing chart review, history and exam, documentation and further activities as noted above

## 2023-01-16 ENCOUNTER — TELEPHONE (OUTPATIENT)
Dept: CARDIOLOGY | Facility: CLINIC | Age: 88
End: 2023-01-16

## 2023-01-16 NOTE — TELEPHONE ENCOUNTER
1/16 lvm to f/u with omid morocho, check out instructions from last visit say HILARIO in nov/dec which was never scheduled and omid morocho in august with labs and echo. New order from Dr. Moses for next avail. Sched return card with omid morocho. [place]

## 2023-01-16 NOTE — TELEPHONE ENCOUNTER
[facility] Call Center    Phone Message    May a detailed message be left on voicemail: yes     Reason for Call: Other: Patient is needing a TAVR appointment, please review and call patient to schedule      Action Taken: Message routed to:  [facility] Cardio     Travel Screening: Not Applicable

## 2023-01-18 ENCOUNTER — TELEPHONE (OUTPATIENT)
Dept: INTERNAL MEDICINE | Facility: CLINIC | Age: 88
End: 2023-01-18
Payer: MEDICARE

## 2023-01-18 NOTE — TELEPHONE ENCOUNTER
Last visit on 1/13/2023, Lydia discussed dizziness and balance issues and this led to ordering MRI studies. It's up to them if Lydia wants to proceed.    Thanks, PAMELA Moses

## 2023-01-18 NOTE — TELEPHONE ENCOUNTER
Spoke to patient to relay message from provider below. Pt states she will discuss with her  and schedule if she would like to proceed. Jolene Cota LPN 1/18/2023 2:21 PM

## 2023-01-18 NOTE — TELEPHONE ENCOUNTER
[facility] Call Center    Phone Message    May a detailed message be left on voicemail: yes     Reason for Call: Other: Pt requesting call back. Pt is wanting to know why Dr. Moses placed MRI orders for the pt

## 2023-01-18 NOTE — TELEPHONE ENCOUNTER
Spoke to patients  and wife who were wondering why orders for brain and neck MRI were placed. Stating they do not remember discussing these orders. Jolene Cota LPN 1/18/2023 1:36 PM     no

## 2023-01-19 ENCOUNTER — TELEPHONE (OUTPATIENT)
Dept: INTERNAL MEDICINE | Facility: CLINIC | Age: 88
End: 2023-01-19
Payer: MEDICARE

## 2023-01-19 DIAGNOSIS — D64.9 ANEMIA, UNSPECIFIED TYPE: ICD-10-CM

## 2023-01-19 DIAGNOSIS — I10 BENIGN ESSENTIAL HYPERTENSION: ICD-10-CM

## 2023-01-19 DIAGNOSIS — M54.9 BACK PAIN, UNSPECIFIED BACK LOCATION, UNSPECIFIED BACK PAIN LATERALITY, UNSPECIFIED CHRONICITY: ICD-10-CM

## 2023-01-19 DIAGNOSIS — Z12.83 SKIN CANCER SCREENING: ICD-10-CM

## 2023-01-19 RX ORDER — LIDOCAINE 4 G/G
1 PATCH TOPICAL EVERY 24 HOURS
Qty: 30 PATCH | Refills: 3 | Status: SHIPPED
Start: 2023-01-19 | End: 2024-01-24

## 2023-01-19 NOTE — TELEPHONE ENCOUNTER
[facility] Call Center    Phone Message    May a detailed message be left on voicemail: yes     Reason for Call: Medication Question or concern regarding medication   Prescription Clarification  Name of Medication: Lidocaine (LIDOCARE) 4 % Patch     Prescribing Provider: Dr. Moses   [facility]   What on the order needs clarification? Pt requesting call back, pt has some questions regarding this script

## 2023-01-19 NOTE — TELEPHONE ENCOUNTER
Patient called, making sure it is ok to use Lidocaine 4% equate brand otc since ordered med is expensive. This is fine. Made sure she knows to keep patch off for 12 hrs at a time.     Vikram Renee RN on 1/19/2023 at 12:19 PM

## 2023-01-21 ENCOUNTER — TELEPHONE (OUTPATIENT)
Dept: INTERNAL MEDICINE | Facility: CLINIC | Age: 88
End: 2023-01-21

## 2023-01-21 DIAGNOSIS — D69.1 PLATELET DISORDER (H): Primary | ICD-10-CM

## 2023-01-21 NOTE — TELEPHONE ENCOUNTER
Placed hematology referral this encounter    Dear Lydia;    Your blood count is stable except for lower platelets. I have the following recommendations:    (1) keep your 2/13/2023 follow up appointment with me    (2) I recommend you visit with the hematology providers and I placed a referral today. You can call [phone] to schedule this appointment.    PAMELA Moses MD

## 2023-02-12 NOTE — PROGRESS NOTES
Telephone visit    Ms. Dietz agrees to a telephone visit    Last in-person visit with us 1/13/2023 and additional information in that note    Low platelets and she has an Hematology visit with Dr. Kong 3/22/2023. Last platelets 118 (from peripheral smear).     Dermatology visit with Dr. Lau 4/23/2023    Resting cardiac echo (h/o TAVR) on 8/11/2023 and Cardiology follow up with Dr. Casey Logan on 8/11/2023.     Ordered MRI CNS imaging 1/13/2023 for dizziness. Lydia states (and her  corobortes) that her dizziness seems to happen more frequently when she is anxious or stressed; that is public speaking. Otherwise, she does not describe worse sxs. Recommended if her sxs. Persist or get worse, I would go ahead with scheduling these imaging tests.    No other reported acute concerns.    I will see her 5/2023 in person    PAMELA Moses MD    Total time today 11 minutes

## 2023-02-13 ENCOUNTER — VIRTUAL VISIT (OUTPATIENT)
Dept: INTERNAL MEDICINE | Facility: CLINIC | Age: 88
End: 2023-02-13
Payer: MEDICARE

## 2023-02-13 DIAGNOSIS — R42 DIZZINESS: Primary | ICD-10-CM

## 2023-02-13 PROCEDURE — 99442 PR PHYSICIAN TELEPHONE EVALUATION 11-20 MIN: CPT | Mod: 95 | Performed by: INTERNAL MEDICINE

## 2023-02-13 NOTE — NURSING NOTE
Is the patient currently in the state of [place]? YES    Visit mode:TELEPHONE    If the visit is dropped, the patient can be reconnected by: TELEPHONE VISIT: Phone number: [phone]    Will anyone else be joining the visit? Pt's  will be part of visit      How would you like to obtain your AVS? Mail a copy    Are changes needed to the allergy or medication list? NO    Comments or concerns regarding today's visit: follow up    Fadumo Stephenson VF

## 2023-02-13 NOTE — NURSING NOTE
"Informed patient of DAVID and she just stated \"okay, it's fine with me\"    Fadumo Stephenson VF    "

## 2023-02-13 NOTE — PATIENT INSTRUCTIONS
Thank you for visiting the Primary Care Center today at the [facility]! The following is some information about our clinic:     Primary Care Center Frequently-Asked Questions    (1) How do I schedule appointments at the [facility]?     Primary Care--to schedule or make changes to an existing appointment, please call our primary care line at [phone].    Labs--to schedule a lab appointment at the [facility] you can use [employer] or call [phone]. If you have a [place] location that is closer to home, you can reach out to that location for scheduling options.     Imaging--if you need to schedule a CT, X-ray, MRI, ultrasound, or other imaging study you can call [phone] to schedule at the [facility] or any other [facility] imaging location.     Referrals--if a referral to another specialty was ordered you can expect a phone call from their scheduling team. If you have not heard from them in a week, please call us or send us a [employer] message to check the status or get a scheduling number. Please note that this only applies to internal [facility] referrals. If the referral is external you would need to contact their office for scheduling.     (2) I have a question about my visit, who do I contact?     You can call us at the primary care line at [phone] to ask questions about your visit. You can also send a secure message through [employer], which is reviewed by clinic staff. Please note that [employer] messages have a twenty-four to forty-eight business hour turnaround time and should not be used for urgent concerns.    (3) How will I get the results of my tests?    If you are signed up for [employer] all tests will be released to you within twenty-four hours of resulting. Please allow three to five days for your doctor to review your results and place a note interpreting the results. If you do not have [employer] you will receive your  results through mail seven to ten business days following the return of the tests. Please note that if there should be any urgent or concerning results that your doctor or their registered nurse will reach out to you the same day as the tests come back. If you have follow up questions about your results or would like to discuss the results in detail please schedule a follow up with your provider either in person or virtually.     (4) How do I get refills of my prescriptions?     You should always first contact your pharmacy for refills of your medications. If submitting a refill request on [employer], please be sure to submit the request only once--repeat requests can cause delays in refill. If you are requesting a NEW medication or a medication related to new symptoms you will need to schedule an appointment with a provider prior to approval. Please note: Routine medication refills have up to one to three business day turnaround whereas controlled substances refills have up to five to seven business day turnaround.    (5) I have new symptoms, what do I do?     If you are having an immediate medical emergency, you should dial 911 for assistance.   For anything urgent that needs to be seen within a few hours to one day you should visit a local urgent care for assistance.  For non-urgent symptoms that need to be seen within a few days to a week you can schedule with an available provider in primary care by going to [employer] or calling [phone].   If you are not sure how serious your symptoms are or you would like to receive medical advice you can always call [phone] to speak with a triage nurse.

## 2023-02-23 ENCOUNTER — TELEPHONE (OUTPATIENT)
Dept: CARDIOLOGY | Facility: CLINIC | Age: 88
End: 2023-02-23
Payer: MEDICARE

## 2023-02-23 NOTE — TELEPHONE ENCOUNTER
[facility] Call Center    Phone Message    May a detailed message be left on voicemail: yes     Reason for Call: Other:     Rutherford from [employer] is requesting a call back to discuss current medications .    Action Taken: Other: cardio    Travel Screening: Not Applicable   Thank you!  [facility]

## 2023-02-23 NOTE — TELEPHONE ENCOUNTER
"Writer returned call to Sangeeta. Unfortunately, it was very hard to understand Sangeeta d/t poor connection. I believe she was asking if pt is taking a medication call \"embro.\" I stated I didn't see this on pt's med list.  "

## 2023-03-10 NOTE — TELEPHONE ENCOUNTER
RECORDS STATUS - ALL OTHER DIAGNOSIS      RECORDS RECEIVED FROM: [facility]   DATE RECEIVED: 3/10   NOTES STATUS DETAILS   OFFICE NOTE from referring provider [facility] Praveen Moses MD: 2/13/23   OPERATIVE REPORT Epic Epic  2/17/22: Colonoscopy      12/7/18: Colonoscopy   MEDICATION LIST [facility]    LABS     ANYTHING RELATED TO DIAGNOSIS Epic 1/13/23

## 2023-03-22 ENCOUNTER — ONCOLOGY VISIT (OUTPATIENT)
Dept: ONCOLOGY | Facility: CLINIC | Age: 88
End: 2023-03-22
Attending: INTERNAL MEDICINE
Payer: MEDICARE

## 2023-03-22 ENCOUNTER — PRE VISIT (OUTPATIENT)
Dept: ONCOLOGY | Facility: CLINIC | Age: 88
End: 2023-03-22

## 2023-03-22 VITALS
HEART RATE: 65 BPM | WEIGHT: 121.5 LBS | SYSTOLIC BLOOD PRESSURE: 171 MMHG | DIASTOLIC BLOOD PRESSURE: 71 MMHG | OXYGEN SATURATION: 97 % | RESPIRATION RATE: 16 BRPM | BODY MASS INDEX: 20.74 KG/M2 | HEIGHT: 64 IN

## 2023-03-22 DIAGNOSIS — D69.6 THROMBOCYTOPENIA (H): Primary | ICD-10-CM

## 2023-03-22 DIAGNOSIS — Z11.59 ENCOUNTER FOR SCREENING FOR OTHER VIRAL DISEASES: ICD-10-CM

## 2023-03-22 DIAGNOSIS — D69.1 PLATELET DISORDER (H): ICD-10-CM

## 2023-03-22 LAB
BASOPHILS # BLD AUTO: 0 10E3/UL (ref 0–0.2)
BASOPHILS NFR BLD AUTO: 0 %
EOSINOPHIL # BLD AUTO: 0.1 10E3/UL (ref 0–0.7)
EOSINOPHIL NFR BLD AUTO: 1 %
ERYTHROCYTE [DISTWIDTH] IN BLOOD BY AUTOMATED COUNT: 13 % (ref 10–15)
HCT VFR BLD AUTO: 41 % (ref 35–47)
HGB BLD-MCNC: 13.1 G/DL (ref 11.7–15.7)
IMM GRANULOCYTES # BLD: 0.1 10E3/UL
IMM GRANULOCYTES NFR BLD: 1 %
LYMPHOCYTES # BLD AUTO: 1.1 10E3/UL (ref 0.8–5.3)
LYMPHOCYTES NFR BLD AUTO: 15 %
MCH RBC QN AUTO: 27.8 PG (ref 26.5–33)
MCHC RBC AUTO-ENTMCNC: 32 G/DL (ref 31.5–36.5)
MCV RBC AUTO: 87 FL (ref 78–100)
MONOCYTES # BLD AUTO: 0.7 10E3/UL (ref 0–1.3)
MONOCYTES NFR BLD AUTO: 9 %
NEUTROPHILS # BLD AUTO: 5.5 10E3/UL (ref 1.6–8.3)
NEUTROPHILS NFR BLD AUTO: 74 %
NRBC # BLD AUTO: 0 10E3/UL
NRBC BLD AUTO-RTO: 0 /100
PLATELET # BLD AUTO: 123 10E3/UL (ref 150–450)
RBC # BLD AUTO: 4.71 10E6/UL (ref 3.8–5.2)
WBC # BLD AUTO: 7.4 10E3/UL (ref 4–11)

## 2023-03-22 PROCEDURE — 36415 COLL VENOUS BLD VENIPUNCTURE: CPT | Performed by: INTERNAL MEDICINE

## 2023-03-22 PROCEDURE — 86803 HEPATITIS C AB TEST: CPT | Performed by: INTERNAL MEDICINE

## 2023-03-22 PROCEDURE — 87340 HEPATITIS B SURFACE AG IA: CPT | Performed by: INTERNAL MEDICINE

## 2023-03-22 PROCEDURE — 86704 HEP B CORE ANTIBODY TOTAL: CPT | Performed by: INTERNAL MEDICINE

## 2023-03-22 PROCEDURE — 85025 COMPLETE CBC W/AUTO DIFF WBC: CPT | Performed by: INTERNAL MEDICINE

## 2023-03-22 PROCEDURE — 99204 OFFICE O/P NEW MOD 45 MIN: CPT | Performed by: INTERNAL MEDICINE

## 2023-03-22 PROCEDURE — 86706 HEP B SURFACE ANTIBODY: CPT | Performed by: INTERNAL MEDICINE

## 2023-03-22 PROCEDURE — 87389 HIV-1 AG W/HIV-1&-2 AB AG IA: CPT | Performed by: INTERNAL MEDICINE

## 2023-03-22 ASSESSMENT — PAIN SCALES - GENERAL: PAINLEVEL: MODERATE PAIN (5)

## 2023-03-22 NOTE — PROGRESS NOTES
Hematology initial visit:  Date on this visit: 3/22/2023    Lydia Dietz  is referred by Dr.David HARRY Moses for a hematology consultation. She requires evaluation for thrombocytopenia.  Primary Physician: Praveen Moses     History Of Present Illness:  Ms. Dietz is a 87 year old female who presents with thrombocytopenia.  She is here with her .  She has a history of severe aortic stenosis and had TAVR on 10/7/2020.  Previously had a history of coronary artery disease, s/p PCI to LAD, RCA in September 2020.    She was first noted to have mild thrombocytopenia after TAVR on 10/7/2020 and since then she has had mild thrombocytopenia.    She mentioned that she bruises very easily.  She has noticed easy bruising to the skin of the shin as well as dorsum of the hands and also upper part of the chest.  She is taking aspirin.  Very occasionally she has noticed mild bleeding from the right nostril which stops after putting pressure.  No hematuria, melena or hematochezia.  Otherwise she feels almost her baseline.  She denies any shortness of breath.  She does have chronic fatigue.  No nausea vomiting diarrhea constipation.  No infections.  No night sweats.  No significant weight changes.  No new swellings.    ROS:  A comprehensive ROS was otherwise neg      Past Medical/Surgical History:  Past Medical History:   Diagnosis Date     Allergies      Anemia      Coronary artery disease      HLD (hyperlipidemia)      HTN (hypertension)      Impaired fasting glucose      Osteoarthritis      Severe aortic stenosis      Past Surgical History:   Procedure Laterality Date     AS LAP INCISIONAL HERNIA REPAIR       BREAST BIOPSY, CORE RT/LT      X3     CHOLECYSTECTOMY       COLECTOMY PARTIAL  2019     COLONOSCOPY N/A 2/17/2022    Procedure: COLONOSCOPY;  Surgeon: Karlos Holley MD;  Location:  GI     CV LEFT HEART CATH N/A 9/9/2020    Procedure: Left Heart Cath;  Surgeon: Leonard Pastor MD;  Location:   HEART CARDIAC CATH LAB     CV PCI ANGIOPLASTY N/A 9/9/2020    Procedure: CV PCI ANGIOPLASTY;  Surgeon: Leonard Pastor MD;  Location: [facility] HEART CARDIAC CATH LAB     CV TRANSCATHETER AORTIC VALVE REPLACEMENT N/A 10/7/2020    Procedure: Transfemoral, subclavian (SIFUENTES) or transapical transcatheter aortic valve replacement 23mm sifuentes valve placed. cardiovascular standby.;  Surgeon: Leonard Pastor MD;  Location: [facility] OR     ESOPHAGOSCOPY, GASTROSCOPY, DUODENOSCOPY (EGD), COMBINED N/A 2/17/2022    Procedure: ESOPHAGOGASTRODUODENOSCOPY (EGD);  Surgeon: Karlos oHlley MD;  Location: [facility] GI     HC LAPAROSCOPY, OVIDUCT/OVARY; REMOVAL       HEART CATH FEMORAL CANNULIZATION WITH OPEN STANDBY REPAIR AORTIC VALVE N/A 10/7/2020    Procedure: Cardiac standby. Perfusion standby.;  Surgeon: Richi Olmos MD;  Location: [facility] OR     HYSTERECTOMY TOTAL ABDOMINAL       Allergies:  Allergies as of 03/22/2023 - Reviewed 03/22/2023   Allergen Reaction Noted     Rosuvastatin Muscle Pain (Myalgia) 11/15/2017     Seasonal allergies  05/07/2019     Amlodipine  05/07/2015     Atorvastatin  09/15/2003     Fenofibrate Muscle Pain (Myalgia) 06/04/2012     Fluconazole Nausea 09/15/2003     Hydrochlorothiazide  11/22/2021     Losartan  08/20/2008     Pravastatin  09/15/2003     Simvastatin Nausea 09/15/2003     Niacin Rash 09/15/2003     Current Medications:  Current Outpatient Medications   Medication Sig Dispense Refill     alendronate (FOSAMAX) 70 MG tablet Take 1 tablet (70 mg) by mouth every 7 days Take 60 minutes before am meal with 8 oz. water. Remain upright for 30 minutes. 12 tablet 3     carvedilol (COREG) 6.25 MG tablet Take 1 tablet (6.25 mg) by mouth 2 times daily (with meals) 180 tablet 1     ciprofloxacin (CIPRO) 500 MG tablet Take 1 tablet (500 mg) by mouth 2 times daily 10 tablet 0     evolocumab (REPATHA SURECLICK) 140 MG/ML prefilled autoinjector Inject 1 mL (140 mg) Subcutaneous every 14 days 14 mL 11      fluticasone (FLONASE) 50 MCG/ACT nasal spray Spray 2 sprays in nostril every morning        furosemide (LASIX) 20 MG tablet Take 1 tablet (20 mg) by mouth every other day 180 tablet 3     Lidocaine (LIDOCARE) 4 % Patch Place 1 patch onto the skin every 24 hours To prevent lidocaine toxicity, patient should be patch free for 12 hrs daily. Patient using OTC 30 patch 3     Lidocaine (LIDOCARE) 4 % Patch Place 1 patch onto the skin every 24 hours To prevent lidocaine toxicity, patient should be patch free for 12 hrs daily. 7 patch 3     sulfamethoxazole-trimethoprim (BACTRIM DS) 800-160 MG tablet Take 1 tablet by mouth 2 times daily 10 tablet 0     triamterene-HCTZ (MAXZIDE-25) 37.5-25 MG tablet Take 1 tablet by mouth daily 90 tablet 1     amoxicillin (AMOXIL) 500 MG capsule Take 1 capsule (500 mg) by mouth once as needed (prn) Take 4 tablets(2000 mg) 1 hour before dental work (Patient not taking: Reported on 1/13/2023) 4 capsule 1     amoxicillin (AMOXIL) 500 MG capsule Take 4 capsules (2,000 mg) by mouth See Admin Instructions Take 4 caps (2000mg), by mouth, one hour prior to dental procedure. (Patient not taking: Reported on 11/30/2021) 4 capsule 0     bisacodyl (DULCOLAX) 5 MG EC tablet Take 2 tabs by mouth at 3pm one day prior to procedure.  Take 2 tabs by mouth at 11pm the night before procedure. (Patient not taking: Reported on 4/27/2022) 4 tablet 0     metoprolol succinate ER (TOPROL-XL) 25 MG 24 hr tablet Take 1 tablet (25 mg) by mouth every morning (Patient not taking: Reported on 10/20/2022) 90 tablet 3     polyethylene glycol (GOLYTELY) 236 g suspension Take as directed in patient instructions.  Day before procedure at 6:00pm drink 8oz glass of mixture every 15 min until the jug is half empty.  Store the rest in the refrigerator.   At 11pm drink second half of container.  Drink one 8oz glass every 15 minutes until jug is empty. (Patient not taking: Reported on 4/27/2022) 4000 mL 0      Family History:  No  "family history on file.  No family history of bleeding or clotting disorder.  Social History:  Social History     Socioeconomic History     Marital status:      Spouse name: Not on file     Number of children: Not on file     Years of education: Not on file     Highest education level: Not on file   Occupational History     Not on file   Tobacco Use     Smoking status: Former     Types: Cigarettes     Quit date:      Years since quittin.2     Smokeless tobacco: Never     Tobacco comments:     1 pack per week if that maybe for 5 years    Substance and Sexual Activity     Alcohol use: Yes     Comment: occ      Drug use: Never     Sexual activity: Not on file   Other Topics Concern     Not on file   Social History Narrative     Not on file     Social Determinants of Health     Financial Resource Strain: Not on file   Food Insecurity: Not on file   Transportation Needs: Not on file   Physical Activity: Not on file   Stress: Not on file   Social Connections: Not on file   Intimate Partner Violence: Not on file   Housing Stability: Not on file   She denies smoking.  Drinks alcohol very occasionally.  Physical Exam:  BP (!) 196/69 (BP Location: Left arm)   Pulse 65   Resp 16   Ht 1.626 m (5' 4.02\")   Wt 55.1 kg (121 lb 8 oz)   SpO2 97%   BMI 20.85 kg/m       Repeat blood pressure after some time was 171/76.    Wt Readings from Last 4 Encounters:   23 55.1 kg (121 lb 8 oz)   23 58.1 kg (128 lb 1.6 oz)   12/10/22 58.1 kg (128 lb)   22 58.3 kg (128 lb 8 oz)     CONSTITUTIONAL: She is an elderly frail lady who is in no acute distress  EYES: PERRLA, no palor or icterus.   ENT/MOUTH: no mouth lesions. Ears normal  CVS: s1s2 no m r g .   RESPIRATORY: clear to auscultation b/l  GI: soft non tender no hepatosplenomegaly  NEURO: AAOX3  Grossly non focal neuro exam  INTEGUMENT: She has superficial bruises including both the shins especially on the right side.  She has hemosiderin deposition on " the right shin.    LYMPHATIC: no palpable cervical, supraclavicular, axillary or inguinal LAD  MUSCULOSKELETAL: Unremarkable. No bony tenderness.   EXTREMITIES: no edema  PSYCH: Mentation, mood and affect are normal. Decision making capacity is intact    Laboratory/Imaging Studies    Reviewed    She has had mild thrombocytopenia since October 2020.    1/13/2023  CBC shows WBC 7.  Hemoglobin 12.4.  Platelets 118.  Normal WBC differential.  Peripheral blood smear showed slight thrombocytopenia with normal platelet morphology.  Chemistry unremarkable.  Ferritin 57.  Iron 63.  TIBC 361.  Iron saturation index 17%  Folate 9.7 and vitamin B12 was 397.    4/27/2022.  SPEP was unremarkable    ASSESSMENT/PLAN:    Isolated thrombocytopenia. She was first noted to have mild thrombocytopenia after TAVR on 10/7/2020 and since then she has had mild thrombocytopenia.    Peripheral blood smear was essentially unremarkable.  Vitamin B12 and folate were normal.  At this time I am suspicious that mild thrombocytopenia could be from TAVR.  Thrombocytopenia is common after TAVR procedure but usually resolves soon thereafter.  I would also recommend checking hepatitis B, hepatitis C and HIV serologies and abdominal ultrasound to assess liver and spleen.    Depending on the results of the work-up mentioned above, we will decide about further management.    She has superficial bruising because of the very thin skin and she has been on aspirin and she has had mild thrombocytopenia.  The finding on the right shin likely are because of multiple superficial bruising and hemosiderin deposition.  I do not have any specific recommendation to fix that.    Elevated blood pressure.    Her blood pressure in the clinic was elevated.    Repeat 1 was 171/76.  She is asymptomatic.  I recommend that she checks her blood pressures at home regularly when she is relaxed and keep a log of it.  If it is persistently high then she should talk to her primary  care physician for better blood pressure management.  I am not making changes to her antihypertensive regimen today.    I will see her in follow-up after results of above-mentioned tests are available.    All questions answered.  She is agreeable and comfortable with the plan.    Ade Kong MD

## 2023-03-22 NOTE — LETTER
3/22/2023         RE: Lydia Dietz  [address] [facility] [place] [address]        Dear Colleague,    Thank you for referring your patient, Lydia Dietz, to the [facility]. Please see a copy of my visit note below.    Hematology initial visit:  Date on this visit: 3/22/2023    Lydia Dietz  is referred by Dr.David HARRY Moses for a hematology consultation. She requires evaluation for thrombocytopenia.  Primary Physician: Praveen Moses     History Of Present Illness:  Ms. Dietz is a 87 year old female who presents with thrombocytopenia.  She is here with her .  She has a history of severe aortic stenosis and had TAVR on 10/7/2020.  Previously had a history of coronary artery disease, s/p PCI to LAD, RCA in September 2020.    She was first noted to have mild thrombocytopenia after TAVR on 10/7/2020 and since then she has had mild thrombocytopenia.    She mentioned that she bruises very easily.  She has noticed easy bruising to the skin of the shin as well as dorsum of the hands and also upper part of the chest.  She is taking aspirin.  Very occasionally she has noticed mild bleeding from the right nostril which stops after putting pressure.  No hematuria, melena or hematochezia.  Otherwise she feels almost her baseline.  She denies any shortness of breath.  She does have chronic fatigue.  No nausea vomiting diarrhea constipation.  No infections.  No night sweats.  No significant weight changes.  No new swellings.    ROS:  A comprehensive ROS was otherwise neg      Past Medical/Surgical History:  Past Medical History:   Diagnosis Date     Allergies      Anemia      Coronary artery disease      HLD (hyperlipidemia)      HTN (hypertension)      Impaired fasting glucose      Osteoarthritis      Severe aortic stenosis      Past Surgical History:   Procedure Laterality Date     AS LAP INCISIONAL HERNIA REPAIR       BREAST BIOPSY, CORE RT/LT      X3      CHOLECYSTECTOMY       COLECTOMY PARTIAL  2019     COLONOSCOPY N/A 2/17/2022    Procedure: COLONOSCOPY;  Surgeon: Karlos Holley MD;  Location: [facility]     CV LEFT HEART CATH N/A 9/9/2020    Procedure: Left Heart Cath;  Surgeon: Leonard Pastor MD;  [facility]     CV PCI ANGIOPLASTY N/A 9/9/2020    Procedure: CV PCI ANGIOPLASTY;  Surgeon: Leonard Pastor MD;  [facility]     CV TRANSCATHETER AORTIC VALVE REPLACEMENT N/A 10/7/2020    Procedure: Transfemoral, subclavian (SIFUENTES) or transapical transcatheter aortic valve replacement 23mm sifuentes valve placed. cardiovascular standby.;  Surgeon: Leonard Pastor MD;  Location: [facility]     ESOPHAGOSCOPY, GASTROSCOPY, DUODENOSCOPY (EGD), COMBINED N/A 2/17/2022    Procedure: ESOPHAGOGASTRODUODENOSCOPY (EGD);  Surgeon: Karlos Holley MD;  Location: [facility]     HC LAPAROSCOPY, OVIDUCT/OVARY; REMOVAL       HEART CATH FEMORAL CANNULIZATION WITH OPEN STANDBY REPAIR AORTIC VALVE N/A 10/7/2020    Procedure: Cardiac standby. Perfusion standby.;  Surgeon: Richi Olmos MD;  Location: [facility]     HYSTERECTOMY TOTAL ABDOMINAL       Allergies:  Allergies as of 03/22/2023 - Reviewed 03/22/2023   Allergen Reaction Noted     Rosuvastatin Muscle Pain (Myalgia) 11/15/2017     Seasonal allergies  05/07/2019     Amlodipine  05/07/2015     Atorvastatin  09/15/2003     Fenofibrate Muscle Pain (Myalgia) 06/04/2012     Fluconazole Nausea 09/15/2003     Hydrochlorothiazide  11/22/2021     Losartan  08/20/2008     Pravastatin  09/15/2003     Simvastatin Nausea 09/15/2003     Niacin Rash 09/15/2003     Current Medications:  Current Outpatient Medications   Medication Sig Dispense Refill     alendronate (FOSAMAX) 70 MG tablet Take 1 tablet (70 mg) by mouth every 7 days Take 60 minutes before am meal with 8 oz. water. Remain upright for 30 minutes. 12 tablet 3     carvedilol (COREG) 6.25 MG tablet Take 1 tablet (6.25 mg) by mouth  2 times daily (with meals) 180 tablet 1     ciprofloxacin (CIPRO) 500 MG tablet Take 1 tablet (500 mg) by mouth 2 times daily 10 tablet 0     evolocumab (REPATHA SURECLICK) 140 MG/ML prefilled autoinjector Inject 1 mL (140 mg) Subcutaneous every 14 days 14 mL 11     fluticasone (FLONASE) 50 MCG/ACT nasal spray Spray 2 sprays in nostril every morning        furosemide (LASIX) 20 MG tablet Take 1 tablet (20 mg) by mouth every other day 180 tablet 3     Lidocaine (LIDOCARE) 4 % Patch Place 1 patch onto the skin every 24 hours To prevent lidocaine toxicity, patient should be patch free for 12 hrs daily. Patient using OTC 30 patch 3     Lidocaine (LIDOCARE) 4 % Patch Place 1 patch onto the skin every 24 hours To prevent lidocaine toxicity, patient should be patch free for 12 hrs daily. 7 patch 3     sulfamethoxazole-trimethoprim (BACTRIM DS) 800-160 MG tablet Take 1 tablet by mouth 2 times daily 10 tablet 0     triamterene-HCTZ (MAXZIDE-25) 37.5-25 MG tablet Take 1 tablet by mouth daily 90 tablet 1     amoxicillin (AMOXIL) 500 MG capsule Take 1 capsule (500 mg) by mouth once as needed (prn) Take 4 tablets(2000 mg) 1 hour before dental work (Patient not taking: Reported on 1/13/2023) 4 capsule 1     amoxicillin (AMOXIL) 500 MG capsule Take 4 capsules (2,000 mg) by mouth See Admin Instructions Take 4 caps (2000mg), by mouth, one hour prior to dental procedure. (Patient not taking: Reported on 11/30/2021) 4 capsule 0     bisacodyl (DULCOLAX) 5 MG EC tablet Take 2 tabs by mouth at 3pm one day prior to procedure.  Take 2 tabs by mouth at 11pm the night before procedure. (Patient not taking: Reported on 4/27/2022) 4 tablet 0     metoprolol succinate ER (TOPROL-XL) 25 MG 24 hr tablet Take 1 tablet (25 mg) by mouth every morning (Patient not taking: Reported on 10/20/2022) 90 tablet 3     polyethylene glycol (GOLYTELY) 236 g suspension Take as directed in patient instructions.  Day before procedure at 6:00pm drink 8oz glass of  "mixture every 15 min until the jug is half empty.  Store the rest in the refrigerator.   At 11pm drink second half of container.  Drink one 8oz glass every 15 minutes until jug is empty. (Patient not taking: Reported on 2022) 4000 mL 0      Family History:  No family history on file.  No family history of bleeding or clotting disorder.  Social History:  Social History     Socioeconomic History     Marital status:      Spouse name: Not on file     Number of children: Not on file     Years of education: Not on file     Highest education level: Not on file   Occupational History     Not on file   Tobacco Use     Smoking status: Former     Types: Cigarettes     Quit date:      Years since quittin.2     Smokeless tobacco: Never     Tobacco comments:     1 pack per week if that maybe for 5 years    Substance and Sexual Activity     Alcohol use: Yes     Comment: occ      Drug use: Never     Sexual activity: Not on file   Other Topics Concern     Not on file   Social History Narrative     Not on file     Social Determinants of Health     Financial Resource Strain: Not on file   Food Insecurity: Not on file   Transportation Needs: Not on file   Physical Activity: Not on file   Stress: Not on file   Social Connections: Not on file   Intimate Partner Violence: Not on file   Housing Stability: Not on file   She denies smoking.  Drinks alcohol very occasionally.  Physical Exam:  BP (!) 196/69 (BP Location: Left arm)   Pulse 65   Resp 16   Ht 1.626 m (5' 4.02\")   Wt 55.1 kg (121 lb 8 oz)   SpO2 97%   BMI 20.85 kg/m       Repeat blood pressure after some time was 171/76.    Wt Readings from Last 4 Encounters:   23 55.1 kg (121 lb 8 oz)   23 58.1 kg (128 lb 1.6 oz)   12/10/22 58.1 kg (128 lb)   22 58.3 kg (128 lb 8 oz)     CONSTITUTIONAL: She is an elderly frail lady who is in no acute distress  EYES: PERRLA, no palor or icterus.   ENT/MOUTH: no mouth lesions. Ears normal  CVS: s1s2 no m " r g .   RESPIRATORY: clear to auscultation b/l  GI: soft non tender no hepatosplenomegaly  NEURO: AAOX3  Grossly non focal neuro exam  INTEGUMENT: She has superficial bruises including both the shins especially on the right side.  She has hemosiderin deposition on the right shin.    LYMPHATIC: no palpable cervical, supraclavicular, axillary or inguinal LAD  MUSCULOSKELETAL: Unremarkable. No bony tenderness.   EXTREMITIES: no edema  PSYCH: Mentation, mood and affect are normal. Decision making capacity is intact    Laboratory/Imaging Studies    Reviewed    She has had mild thrombocytopenia since October 2020.    1/13/2023  CBC shows WBC 7.  Hemoglobin 12.4.  Platelets 118.  Normal WBC differential.  Peripheral blood smear showed slight thrombocytopenia with normal platelet morphology.  Chemistry unremarkable.  Ferritin 57.  Iron 63.  TIBC 361.  Iron saturation index 17%  Folate 9.7 and vitamin B12 was 397.    4/27/2022.  SPEP was unremarkable    ASSESSMENT/PLAN:    Isolated thrombocytopenia. She was first noted to have mild thrombocytopenia after TAVR on 10/7/2020 and since then she has had mild thrombocytopenia.    Peripheral blood smear was essentially unremarkable.  Vitamin B12 and folate were normal.  At this time I am suspicious that mild thrombocytopenia could be from TAVR.  Thrombocytopenia is common after TAVR procedure but usually resolves soon thereafter.  I would also recommend checking hepatitis B, hepatitis C and HIV serologies and abdominal ultrasound to assess liver and spleen.    Depending on the results of the work-up mentioned above, we will decide about further management.    She has superficial bruising because of the very thin skin and she has been on aspirin and she has had mild thrombocytopenia.  The finding on the right shin likely are because of multiple superficial bruising and hemosiderin deposition.  I do not have any specific recommendation to fix that.    Elevated blood pressure.    Her  blood pressure in the clinic was elevated.    Repeat 1 was 171/76.  She is asymptomatic.  I recommend that she checks her blood pressures at home regularly when she is relaxed and keep a log of it.  If it is persistently high then she should talk to her primary care physician for better blood pressure management.  I am not making changes to her antihypertensive regimen today.    I will see her in follow-up after results of above-mentioned tests are available.    All questions answered.  She is agreeable and comfortable with the plan.    Ade Kong MD             Again, thank you for allowing me to participate in the care of your patient.        Sincerely,        Ade Kong MD

## 2023-03-22 NOTE — NURSING NOTE
"Oncology Rooming Note    March 22, 2023 1:46 PM   Lydia Dietz is a 87 year old female who presents for:    Chief Complaint   Patient presents with     Oncology Clinic Visit     New patient     Initial Vitals: BP (!) 196/69 (BP Location: Left arm)   Pulse 65   Resp 16   Ht 1.626 m (5' 4.02\")   Wt 55.1 kg (121 lb 8 oz)   SpO2 97%   BMI 20.85 kg/m   Estimated body mass index is 20.85 kg/m  as calculated from the following:    Height as of this encounter: 1.626 m (5' 4.02\").    Weight as of this encounter: 55.1 kg (121 lb 8 oz). Body surface area is 1.58 meters squared.  Moderate Pain (5) Comment: Data Unavailable   No LMP recorded. Patient has had a hysterectomy.  Allergies reviewed: Yes  Medications reviewed: Yes    Medications: Medication refills not needed today.  Pharmacy name entered into [employer]:    [facility] [address] [place] [address] HOME DELIVERY - [facility], [address]    Clinical concerns: New patient       Leta Chanel LPN            "

## 2023-03-23 LAB
HBV CORE AB SERPL QL IA: NONREACTIVE
HBV SURFACE AB SERPL IA-ACNC: 0 M[IU]/ML
HBV SURFACE AB SERPL IA-ACNC: NONREACTIVE M[IU]/ML
HBV SURFACE AG SERPL QL IA: NONREACTIVE
HCV AB SERPL QL IA: NONREACTIVE
HIV 1+2 AB+HIV1 P24 AG SERPL QL IA: NONREACTIVE

## 2023-04-03 ENCOUNTER — ANCILLARY PROCEDURE (OUTPATIENT)
Dept: ULTRASOUND IMAGING | Facility: CLINIC | Age: 88
End: 2023-04-03
Attending: INTERNAL MEDICINE
Payer: MEDICARE

## 2023-04-03 ENCOUNTER — OFFICE VISIT (OUTPATIENT)
Dept: DERMATOLOGY | Facility: CLINIC | Age: 88
End: 2023-04-03
Attending: INTERNAL MEDICINE
Payer: MEDICARE

## 2023-04-03 DIAGNOSIS — L82.1 SEBORRHEIC KERATOSES: Primary | ICD-10-CM

## 2023-04-03 DIAGNOSIS — L73.8 SENILE SEBACEOUS GLAND HYPERPLASIA: ICD-10-CM

## 2023-04-03 DIAGNOSIS — D69.6 THROMBOCYTOPENIA (H): ICD-10-CM

## 2023-04-03 DIAGNOSIS — D22.9 MULTIPLE BENIGN NEVI: ICD-10-CM

## 2023-04-03 DIAGNOSIS — L81.4 SOLAR LENTIGO: ICD-10-CM

## 2023-04-03 DIAGNOSIS — D18.01 CHERRY ANGIOMA: ICD-10-CM

## 2023-04-03 PROCEDURE — 99213 OFFICE O/P EST LOW 20 MIN: CPT | Performed by: DERMATOLOGY

## 2023-04-03 PROCEDURE — 76700 US EXAM ABDOM COMPLETE: CPT | Performed by: RADIOLOGY

## 2023-04-03 RX ORDER — ACETAMINOPHEN 325 MG/1
325-650 TABLET ORAL EVERY 6 HOURS PRN
COMMUNITY

## 2023-04-03 ASSESSMENT — PAIN SCALES - GENERAL: PAINLEVEL: MODERATE PAIN (4)

## 2023-04-03 NOTE — PATIENT INSTRUCTIONS
For the bruising on your skin, you can start applying DerMend cream, which is available at most retailers and pharmacies.      I also recommend wearing compression stockings and keeping your legs elevated as much as possible. You can buy compression stockings at our pharmacy on the 1st floor, and I recommend starting with ones with a compression level of 15-20 mmHg.        Patient Education     Checking for Skin Cancer  You can find cancer early by checking your skin each month. There are 3 kinds of skin cancer. They are melanoma, basal cell carcinoma, and squamous cell carcinoma. Doing monthly skin checks is the best way to find new marks or skin changes. Follow the instructions below for checking your skin.   The ABCDEs of checking moles for melanoma   Check your moles or growths for signs of melanoma using ABCDE:   Asymmetry: the sides of the mole or growth don t match  Border: the edges are ragged, notched, or blurred  Color: the color within the mole or growth varies  Diameter: the mole or growth is larger than 6 mm (size of a pencil eraser)  Evolving: the size, shape, or color of the mole or growth is changing (evolving is not shown in the images below)    Checking for other types of skin cancer  Basal cell carcinoma or squamous cell carcinoma have symptoms such as:     A spot or mole that looks different from all other marks on your skin  Changes in how an area feels, such as itching, tenderness, or pain  Changes in the skin's surface, such as oozing, bleeding, or scaliness  A sore that does not heal  New swelling or redness beyond the border of a mole    Who s at risk?  Anyone can get skin cancer. But you are at greater risk if you have:   Fair skin, light-colored hair, or light-colored eyes  Many moles or abnormal moles on your skin  A history of sunburns from sunlight or tanning beds  A family history of skin cancer  A history of exposure to radiation or chemicals  A weakened immune system  If you have had  skin cancer in the past, you are at risk for recurring skin cancer.   How to check your skin  Do your monthly skin checkups in front of a full-length mirror. Check all parts of your body, including your:   Head (ears, face, neck, and scalp)  Torso (front, back, and sides)  Arms (tops, undersides, upper, and lower armpits)  Hands (palms, backs, and fingers, including under the nails)  Buttocks and genitals  Legs (front, back, and sides)  Feet (tops, soles, toes, including under the nails, and between toes)  If you have a lot of moles, take digital photos of them each month. Make sure to take photos both up close and from a distance. These can help you see if any moles change over time.   Most skin changes are not cancer. But if you see any changes in your skin, call your doctor right away. Only he or she can diagnose a problem. If you have skin cancer, seeing your doctor can be the first step toward getting the treatment that could save your life.   Validroid last reviewed this educational content on 4/1/2019 2000-2020 [employer]. [address] [place] [address]. All rights reserved. This information is not intended as a substitute for professional medical care. Always follow your healthcare professional's instructions.       When should I call my doctor?  If you are worsening or not improving, please, contact us or seek urgent care as noted below.     Who should I call with questions (adults)?  [facility] (adult and pediatric): [phone]  [facility] (adult): [phone]  For urgent needs outside of business hours call the [facility] at [phone] and ask for the dermatology resident on call to be paged  If this is a medical emergency and you are unable to reach an ER, Call [phone]    Who should I call with questions (pediatric)?  [facility]- Pediatric Dermatology  Dr. Mira Fatima, Dr. Vallecillo  [facility], Dr. Rachna Kimball, BOB Gross, Dr. Lexi Morales, Dr. Kim Ross & Dr. Praveen Wall  Non-urgent nurse triage line; [phone]- Gisella and Alexandria HORNER Care Coordinatorjaimie Webster (/Complex ) [phone]    If you need a prescription refill, please contact your pharmacy. Refills are approved or denied by our Physicians during normal business hours, Monday through Fridays  Per office policy, refills will not be granted if you have not been seen within the past year (or sooner depending on your child's condition)    Scheduling Information:  Pediatric Appointment Scheduling and Call Center [phone]  Radiology Scheduling- [phone]  Sedation Unit Scheduling- [phone]  [place] Scheduling- [facility] [phone]; Pediatric Dermatology [phone]  Main  Services: [phone]  Kyrgyz: [phone]  Nigerian: [phone]  Hmong/Singh/Peruvian: [phone]  Preadmission Nursing Department Fax Number: [phone] (Fax all pre-operative paperwork to this number)    For urgent matters arising during evenings, weekends, or holidays that cannot wait for normal business hours please call [phone] and ask for the dermatology resident on call to be paged.

## 2023-04-03 NOTE — LETTER
4/3/2023         RE: Lydia Dietz  [address] [facility] [address]        Dear Colleague,    Thank you for referring your patient, Lydia Dietz, to the [facility]. Please see a copy of my visit note below.    [facility] Dermatology Note  Encounter Date: Apr 3, 2023  Office Visit     Dermatology Problem List:  Last skin check 4/3/23, recommended yearly  1. Venous stasis dermatitis  2. Actinic purpura  - recommended DerMend    ____________________________________________    Assessment & Plan:    # Actinic purpura.  - Recommended DerMend, information provided in AVS.    # Venous insufficiency with associated stasis changes. Chronic, flare.   Reassured patient this is not dangerous. Reviewed discoloration usually takes a long time to resolve.  - Recommended compression stockings (15-20 mmHg)  - Recommended keeping legs elevated as able.    # Benign lesions: Multiple benign nevi, solar lentigos, seborrheic keratoses, sebaceous hyperplasia, cherry angiomas. Explained to patient benign nature of lesion. No treatment is necessary at this time unless the lesion changes or becomes symptomatic.   - ABCDEs of melanoma were discussed and self skin checks were advised.  - Sun precaution was advised including the use of sun screens of SPF 30 or higher, sun protective clothing, and avoidance of tanning beds.    Procedures Performed:   None.    Follow-up: 1 year(s) in-person for a skin check, or earlier for new or changing lesions    Staff and Scribe:     Scribe Disclosure:   I, Anupam Collado, am serving as a scribe to document services personally performed by this physician, Dr. Aldair Lau, based on data collection and the provider's statements to me.     Provider Disclosure:   The documentation recorded by the scribe accurately reflects the services I personally performed and the decisions made by me.    Aldair Lau MD    Department of  Dermatology  [facility]: Phone: [phone], Fax:[phone]  [facility]: Phone: [phone] Fax: [phone]  ____________________________________________    CC: Skin Check (FBSC- area of concern-  bilateral shins, has had for about 1 1/2 years. Left side of neck. 2 spots on left and right upper arm. Dryness on forearms. )    HPI:  Ms. Lydia Dietz is a(n) 87 year old female who presents today as a new patient for a skin check.    Referred to derm for a skin cancer screening.     Today, she is concerned about dyspigmentation on the shins that has been present for over 1 year.   She also notes 6 black and blue spots on the left neck.    She is accompanied by her  Geovany.     The patient otherwise denies any new or concerning lesions. No bleeding, painful, pruritic, or changing lesions. They report no personal history of skin cancer. There is no family history of skin cancer. No history of immunosuppression. No history of indoor tanning. No occupational exposure to ultraviolet light or other forms of radiation. Patient is a retired [employer] employee. They do regularly use sunscreen and protective clothing when outdoors for sun protection. Health otherwise stable. No other skin concerns.       Labs Reviewed:  N/A    Physical Exam:  Vitals: There were no vitals taken for this visit.  SKIN: Full skin, which includes the head/face, both arms, chest, back, abdomen,both legs, genitalia and/or groin buttocks, digits and/or nails, was examined.  - There are dome shaped bright red papules on the trunk and extremities.   - Multiple regular brown pigmented macules and papules are identified on the trunk and extremities.   - Scattered brown macules on sun exposed areas.  - There are waxy stuck on tan to brown papules on the trunk and extremities.    - There are yellow oily papules with central umbilication located  on the forehead.   - Ecchymoses on the bilateral forearms and left neck.  - Brown dyspigmentation on the bilateral shins with 1-2+ pitting edema on the upper bilateral shins.   - No other lesions of concern on areas examined.     Medications:  Current Outpatient Medications   Medication     acetaminophen (TYLENOL) 325 MG tablet     carvedilol (COREG) 6.25 MG tablet     evolocumab (REPATHA SURECLICK) 140 MG/ML prefilled autoinjector     fluticasone (FLONASE) 50 MCG/ACT nasal spray     furosemide (LASIX) 20 MG tablet     Lidocaine (LIDOCARE) 4 % Patch     Lidocaine (LIDOCARE) 4 % Patch     alendronate (FOSAMAX) 70 MG tablet     amoxicillin (AMOXIL) 500 MG capsule     amoxicillin (AMOXIL) 500 MG capsule     bisacodyl (DULCOLAX) 5 MG EC tablet     ciprofloxacin (CIPRO) 500 MG tablet     metoprolol succinate ER (TOPROL-XL) 25 MG 24 hr tablet     polyethylene glycol (GOLYTELY) 236 g suspension     sulfamethoxazole-trimethoprim (BACTRIM DS) 800-160 MG tablet     triamterene-HCTZ (MAXZIDE-25) 37.5-25 MG tablet     No current facility-administered medications for this visit.      Past Medical History:   Patient Active Problem List   Diagnosis     Wedge compression fracture of T11-T12 vertebra, initial encounter for closed fracture (H)     Coronary artery disease involving native coronary artery, angina presence unspecified, unspecified whether native or transplanted heart     Status post coronary angiogram     Severe aortic stenosis     Adenomatous polyp of colon     Allergic rhinitis     Back pain, thoracic     Benign neoplasm of colon     Calculus of gallbladder     Chronic left-sided low back pain without sciatica     CKD (chronic kidney disease) stage 3, GFR 30-59 ml/min     Counseling on health care directive     Counseling regarding advanced directives and goals of care     Degeneration, intervertebral disc, lumbosacral     Abdominal hernia     Diffuse cystic mastopathy     Endometriosis     Essential hypertension      Hyperlipidemia LDL goal <70     IFG (impaired fasting glucose)     Microscopic hematuria     Osteoarthrosis     Renal cyst, left     Symptoms involving cardiovascular system     Varicella     Aortic stenosis, severe     Chest pain     Statin intolerance     Past Medical History:   Diagnosis Date     Allergies      Anemia      Coronary artery disease      HLD (hyperlipidemia)      HTN (hypertension)      Impaired fasting glucose      Osteoarthritis      Severe aortic stenosis         CC Praveen Moses MD  [address] [place] [phone] on close of this encounter.    Lydia Dietz's chief complaint for this visit includes:  Chief Complaint   Patient presents with     Skin Check     FBSC- area of concern-  bilateral shins, has had for about 1 1/2 years. Left side of neck. 2 spots on left and right upper arm. Dryness on forearms.      PCP: Praveen Moses    Referring Provider:  Praveen Moses MD  [address] [place] [id number]    There were no vitals taken for this visit.  Moderate Pain (4)        Allergies   Allergen Reactions     Rosuvastatin Muscle Pain (Myalgia)     Seasonal Allergies Other (See Comments)     Amlodipine      Other reaction(s): Edema,generalized     Atorvastatin      PN: Reaction: BLURRED VISION     Fenofibrate Muscle Pain (Myalgia)     Fluconazole Nausea     Hydrochlorothiazide      Leg cramps, felt sluggish     Losartan      PN: Reaction: Back pain and cramping     Pravastatin      PN:  Reaction: JOINT AND MUSCLE PAIN     Simvastatin Nausea     Niacin Rash         Do you need any medication refills at today's visit?  No.       Ayanna Vidal LPN    Again, thank you for allowing me to participate in the care of your patient.        Sincerely,        Aldair Lau MD

## 2023-04-03 NOTE — PROGRESS NOTES
[facility] Dermatology Note  Encounter Date: Apr 3, 2023  Office Visit     Dermatology Problem List:  Last skin check 4/3/23, recommended yearly  1. Venous stasis dermatitis  2. Actinic purpura  - recommended DerMend    ____________________________________________    Assessment & Plan:    # Actinic purpura.  - Recommended DerMend, information provided in AVS.    # Venous insufficiency with associated stasis changes. Chronic, flare.   Reassured patient this is not dangerous. Reviewed discoloration usually takes a long time to resolve.  - Recommended compression stockings (15-20 mmHg)  - Recommended keeping legs elevated as able.    # Benign lesions: Multiple benign nevi, solar lentigos, seborrheic keratoses, sebaceous hyperplasia, cherry angiomas. Explained to patient benign nature of lesion. No treatment is necessary at this time unless the lesion changes or becomes symptomatic.   - ABCDEs of melanoma were discussed and self skin checks were advised.  - Sun precaution was advised including the use of sun screens of SPF 30 or higher, sun protective clothing, and avoidance of tanning beds.    Procedures Performed:   None.    Follow-up: 1 year(s) in-person for a skin check, or earlier for new or changing lesions    Staff and Scribe:     Scribe Disclosure:   I, Anupam Collado, am serving as a scribe to document services personally performed by this physician, Dr. Aldair Lau, based on data collection and the provider's statements to me.     Provider Disclosure:   The documentation recorded by the scribe accurately reflects the services I personally performed and the decisions made by me.    Aldair Lau MD    Department of Dermatology  [facility] [place]: Phone: [phone], Fax:[phone]  [facility] [place]: Phone: [phone] Fax:  [phone]  ____________________________________________    CC: Skin Check ([facility] area of concern-  bilateral shins, has had for about 1 1/2 years. Left side of neck. 2 spots on left and right upper arm. Dryness on forearms. )    HPI:  Ms. Lydia Dietz is a(n) 87 year old female who presents today as a new patient for a skin check.    Referred to derm for a skin cancer screening.     Today, she is concerned about dyspigmentation on the shins that has been present for over 1 year.   She also notes 6 black and blue spots on the left neck.    She is accompanied by her  Geovany.     The patient otherwise denies any new or concerning lesions. No bleeding, painful, pruritic, or changing lesions. They report no personal history of skin cancer. There is no family history of skin cancer. No history of immunosuppression. No history of indoor tanning. No occupational exposure to ultraviolet light or other forms of radiation. Patient is a retired [employer] employee. They do regularly use sunscreen and protective clothing when outdoors for sun protection. Health otherwise stable. No other skin concerns.       Labs Reviewed:  N/A    Physical Exam:  Vitals: There were no vitals taken for this visit.  SKIN: Full skin, which includes the head/face, both arms, chest, back, abdomen,both legs, genitalia and/or groin buttocks, digits and/or nails, was examined.  - There are dome shaped bright red papules on the trunk and extremities.   - Multiple regular brown pigmented macules and papules are identified on the trunk and extremities.   - Scattered brown macules on sun exposed areas.  - There are waxy stuck on tan to brown papules on the trunk and extremities.    - There are yellow oily papules with central umbilication located on the forehead.   - Ecchymoses on the bilateral forearms and left neck.  - Brown dyspigmentation on the bilateral shins with 1-2+ pitting edema on the upper bilateral shins.   - No other lesions of concern  on areas examined.     Medications:  Current Outpatient Medications   Medication     acetaminophen (TYLENOL) 325 MG tablet     carvedilol (COREG) 6.25 MG tablet     evolocumab (REPATHA SURECLICK) 140 MG/ML prefilled autoinjector     fluticasone (FLONASE) 50 MCG/ACT nasal spray     furosemide (LASIX) 20 MG tablet     Lidocaine (LIDOCARE) 4 % Patch     Lidocaine (LIDOCARE) 4 % Patch     alendronate (FOSAMAX) 70 MG tablet     amoxicillin (AMOXIL) 500 MG capsule     amoxicillin (AMOXIL) 500 MG capsule     bisacodyl (DULCOLAX) 5 MG EC tablet     ciprofloxacin (CIPRO) 500 MG tablet     metoprolol succinate ER (TOPROL-XL) 25 MG 24 hr tablet     polyethylene glycol (GOLYTELY) 236 g suspension     sulfamethoxazole-trimethoprim (BACTRIM DS) 800-160 MG tablet     triamterene-HCTZ (MAXZIDE-25) 37.5-25 MG tablet     No current facility-administered medications for this visit.      Past Medical History:   Patient Active Problem List   Diagnosis     Wedge compression fracture of T11-T12 vertebra, initial encounter for closed fracture (H)     Coronary artery disease involving native coronary artery, angina presence unspecified, unspecified whether native or transplanted heart     Status post coronary angiogram     Severe aortic stenosis     Adenomatous polyp of colon     Allergic rhinitis     Back pain, thoracic     Benign neoplasm of colon     Calculus of gallbladder     Chronic left-sided low back pain without sciatica     CKD (chronic kidney disease) stage 3, GFR 30-59 ml/min     Counseling on health care directive     Counseling regarding advanced directives and goals of care     Degeneration, intervertebral disc, lumbosacral     Abdominal hernia     Diffuse cystic mastopathy     Endometriosis     Essential hypertension     Hyperlipidemia LDL goal <70     IFG (impaired fasting glucose)     Microscopic hematuria     Osteoarthrosis     Renal cyst, left     Symptoms involving cardiovascular system     Varicella     Aortic  stenosis, severe     Chest pain     Statin intolerance     Past Medical History:   Diagnosis Date     Allergies      Anemia      Coronary artery disease      HLD (hyperlipidemia)      HTN (hypertension)      Impaired fasting glucose      Osteoarthritis      Severe aortic stenosis         CC Praveen Moses MD  [address] [place] [phone] on close of this encounter.

## 2023-04-25 ENCOUNTER — VIRTUAL VISIT (OUTPATIENT)
Dept: ONCOLOGY | Facility: CLINIC | Age: 88
End: 2023-04-25
Attending: INTERNAL MEDICINE
Payer: MEDICARE

## 2023-04-25 DIAGNOSIS — D69.6 THROMBOCYTOPENIA (H): Primary | ICD-10-CM

## 2023-04-25 PROCEDURE — 99213 OFFICE O/P EST LOW 20 MIN: CPT | Mod: 95 | Performed by: INTERNAL MEDICINE

## 2023-04-25 NOTE — PROGRESS NOTES
Hematology follow-up visit:  Date on this visit: 4/25/23     Lydia Dietz  is referred by Dr.David HARRY Moses for a hematology consultation. She requires evaluation for thrombocytopenia.  Primary Physician: Praveen Moses     History Of Present Illness:  Ms. Dietz is a 87 year old female who presents with thrombocytopenia.  I initially saw her on 3/22/2022.  Please see my previous note for details.  I have copied and updated from    She has a history of severe aortic stenosis and had TAVR on 10/7/2020.  Previously had a history of coronary artery disease, s/p PCI to LAD, RCA in September 2020.    She was first noted to have mild thrombocytopenia after TAVR on 10/7/2020 and since then she has had mild thrombocytopenia.    She mentioned that she bruises very easily.  She has noticed easy bruising to the skin of the shin as well as dorsum of the hands and also upper part of the chest.  She is taking aspirin.  Very occasionally she has noticed mild bleeding from the right nostril which stops after putting pressure.  No hematuria, melena or hematochezia.  Otherwise she feels almost her baseline.  She denies any shortness of breath.  She does have chronic fatigue.  No nausea vomiting diarrhea constipation.  No infections.  No night sweats.  No significant weight changes.  No new swellings.    I recommended further testing for thrombocytopenia.    Interval history.    This is a telephone visit. She feels overall well. She continues to have easy brusing    ROS:  A comprehensive ROS was otherwise neg    I reviewed other history in epic as below.      Past Medical/Surgical History:  Past Medical History:   Diagnosis Date     Allergies      Anemia      Coronary artery disease      HLD (hyperlipidemia)      HTN (hypertension)      Impaired fasting glucose      Osteoarthritis      Severe aortic stenosis      Past Surgical History:   Procedure Laterality Date     AS LAP INCISIONAL HERNIA REPAIR       BREAST BIOPSY, CORE  RT/LT      X3     CHOLECYSTECTOMY       COLECTOMY PARTIAL  2019     COLONOSCOPY N/A 2/17/2022    Procedure: COLONOSCOPY;  Surgeon: Karlos Holley MD;  Location: [facility]     CV LEFT HEART CATH N/A 9/9/2020    Procedure: Left Heart Cath;  Surgeon: Leonard Pastor MD;  [facility]     CV PCI ANGIOPLASTY N/A 9/9/2020    Procedure: CV PCI ANGIOPLASTY;  Surgeon: Leonard Pastor MD;  Location: [facility]     CV TRANSCATHETER AORTIC VALVE REPLACEMENT N/A 10/7/2020    Procedure: Transfemoral, subclavian (SIFUENTES) or transapical transcatheter aortic valve replacement 23mm sifuentes valve placed. cardiovascular standby.;  Surgeon: Leonard Pastor MD;  Location: [facility]     ESOPHAGOSCOPY, GASTROSCOPY, DUODENOSCOPY (EGD), COMBINED N/A 2/17/2022    Procedure: ESOPHAGOGASTRODUODENOSCOPY (EGD);  Surgeon: Karlos Holley MD;  Location: [facility]     HC LAPAROSCOPY, OVIDUCT/OVARY; REMOVAL       HEART CATH FEMORAL CANNULIZATION WITH OPEN STANDBY REPAIR AORTIC VALVE N/A 10/7/2020    Procedure: Cardiac standby. Perfusion standby.;  Surgeon: Richi Olmos MD;  Location: [facility]     HYSTERECTOMY TOTAL ABDOMINAL       Allergies:  Allergies as of 04/25/2023 - Reviewed 04/25/2023   Allergen Reaction Noted     Rosuvastatin Muscle Pain (Myalgia) 11/15/2017     Seasonal allergies Other (See Comments) 05/07/2019     Amlodipine  05/07/2015     Atorvastatin  09/15/2003     Fenofibrate Muscle Pain (Myalgia) 06/04/2012     Fluconazole Nausea 09/15/2003     Hydrochlorothiazide  11/22/2021     Losartan  08/20/2008     Pravastatin  09/15/2003     Simvastatin Nausea 09/15/2003     Niacin Rash 09/15/2003     Current Medications:  Current Outpatient Medications   Medication Sig Dispense Refill     acetaminophen (TYLENOL) 325 MG tablet Take 325-650 mg by mouth every 6 hours as needed for mild pain       carvedilol (COREG) 6.25 MG tablet Take 1 tablet (6.25 mg) by mouth 2 times daily (with meals) 180  tablet 1     evolocumab (REPATHA SURECLICK) 140 MG/ML prefilled autoinjector Inject 1 mL (140 mg) Subcutaneous every 14 days 14 mL 11     fluticasone (FLONASE) 50 MCG/ACT nasal spray Spray 2 sprays in nostril every morning        furosemide (LASIX) 20 MG tablet Take 1 tablet (20 mg) by mouth every other day 180 tablet 3     Lidocaine (LIDOCARE) 4 % Patch Place 1 patch onto the skin every 24 hours To prevent lidocaine toxicity, patient should be patch free for 12 hrs daily. Patient using OTC 30 patch 3     Lidocaine (LIDOCARE) 4 % Patch Place 1 patch onto the skin every 24 hours To prevent lidocaine toxicity, patient should be patch free for 12 hrs daily. 7 patch 3     alendronate (FOSAMAX) 70 MG tablet Take 1 tablet (70 mg) by mouth every 7 days Take 60 minutes before am meal with 8 oz. water. Remain upright for 30 minutes. (Patient not taking: Reported on 4/3/2023) 12 tablet 3     amoxicillin (AMOXIL) 500 MG capsule Take 1 capsule (500 mg) by mouth once as needed (prn) Take 4 tablets(2000 mg) 1 hour before dental work (Patient not taking: Reported on 1/13/2023) 4 capsule 1     amoxicillin (AMOXIL) 500 MG capsule Take 4 capsules (2,000 mg) by mouth See Admin Instructions Take 4 caps (2000mg), by mouth, one hour prior to dental procedure. (Patient not taking: Reported on 11/30/2021) 4 capsule 0     bisacodyl (DULCOLAX) 5 MG EC tablet Take 2 tabs by mouth at 3pm one day prior to procedure.  Take 2 tabs by mouth at 11pm the night before procedure. (Patient not taking: Reported on 4/27/2022) 4 tablet 0     ciprofloxacin (CIPRO) 500 MG tablet Take 1 tablet (500 mg) by mouth 2 times daily (Patient not taking: Reported on 4/3/2023) 10 tablet 0     metoprolol succinate ER (TOPROL-XL) 25 MG 24 hr tablet Take 1 tablet (25 mg) by mouth every morning (Patient not taking: Reported on 10/20/2022) 90 tablet 3     polyethylene glycol (GOLYTELY) 236 g suspension Take as directed in patient instructions.  Day before procedure at  6:00pm drink 8oz glass of mixture every 15 min until the jug is half empty.  Store the rest in the refrigerator.   At 11pm drink second half of container.  Drink one 8oz glass every 15 minutes until jug is empty. (Patient not taking: Reported on 2022) 4000 mL 0     sulfamethoxazole-trimethoprim (BACTRIM DS) 800-160 MG tablet Take 1 tablet by mouth 2 times daily (Patient not taking: Reported on 4/3/2023) 10 tablet 0     triamterene-HCTZ (MAXZIDE-25) 37.5-25 MG tablet Take 1 tablet by mouth daily (Patient not taking: Reported on 4/3/2023) 90 tablet 1      Family History:  No family history on file.  No family history of bleeding or clotting disorder.  Social History:  Social History     Socioeconomic History     Marital status:      Spouse name: Not on file     Number of children: Not on file     Years of education: Not on file     Highest education level: Not on file   Occupational History     Not on file   Tobacco Use     Smoking status: Former     Types: Cigarettes     Quit date:      Years since quittin.3     Smokeless tobacco: Never     Tobacco comments:     1 pack per week if that maybe for 5 years    Vaping Use     Vaping status: Not on file   Substance and Sexual Activity     Alcohol use: Yes     Comment: occ      Drug use: Never     Sexual activity: Not on file   Other Topics Concern     Not on file   Social History Narrative     Not on file     Social Determinants of Health     Financial Resource Strain: Not on file   Food Insecurity: Not on file   Transportation Needs: Not on file   Physical Activity: Not on file   Stress: Not on file   Social Connections: Not on file   Intimate Partner Violence: Not on file   Housing Stability: Not on file   She denies smoking.  Drinks alcohol very occasionally.  Physical Exam:  There were no vitals taken for this visit.     Repeat blood pressure after some time was 171/76.    Wt Readings from Last 4 Encounters:   23 55.1 kg (121 lb 8 oz)    01/13/23 58.1 kg (128 lb 1.6 oz)   12/10/22 58.1 kg (128 lb)   09/23/22 58.3 kg (128 lb 8 oz)     Constitutional.  Does not seem to be in any apparent distress.  Respiratory.  Breathing does not seem to be labored.  Speaking in complete sentences without coughing.    Neurological.  Alert and oriented.  Psychiatric.  Appropriate mood and mentation.      Laboratory/Imaging Studies    Reviewed    She has had mild thrombocytopenia since October 2020.    3/22/2023  CBC shows - otherwise unremarkable  Hep B / Hep C and HIV unremarkable.     1/13/2023  CBC shows WBC 7.  Hemoglobin 12.4.  Platelets 118.  Normal WBC differential.  Peripheral blood smear showed slight thrombocytopenia with normal platelet morphology.  Chemistry unremarkable.  Ferritin 57.  Iron 63.  TIBC 361.  Iron saturation index 17%  Folate 9.7 and vitamin B12 was 397.    4/27/2022.  SPEP was unremarkable    US abdomen. 4/3/2023  IMPRESSION:   1.  No hepatosplenomegaly.  2.  Status post cholecystectomy.  3.  Renal cysts    ASSESSMENT/PLAN:    Isolated thrombocytopenia. She was first noted to have mild thrombocytopenia after TAVR on 10/7/2020 and since then she has had mild thrombocytopenia.    Peripheral blood smear was essentially unremarkable.  Vitamin B12 and folate were normal.  Screening for hepatitis B, hepatitis C and HIV are negative.  Ultrasound abdomen does not show any hepatosplenomegaly.    At this time I am suspicious that mild thrombocytopenia could be from TAVR.  Thrombocytopenia is common after TAVR procedure but usually resolves soon thereafter.  The other possibility could be mild form of ITP.    At this time since the problem is mild and chronic and stable, I believe there is no need to do any further work-up like a bone marrow biopsy and mild thrombocytopenia can be observed through her primary care physician with repeat counts every few months.    If the platelet count decreases significantly, then she will see me back on an  as-needed basis.  For now she will follow with PCP      I answered all of her and her 's questions to their satisfaction.  They are agreeable and comfortable with the plan.      Ade Kong MD       I spent >20 minutes on this visit on the date of service, including the telephone time, reviewing records and labs and imaging and placing new orders as well as coordination of care and documentation.

## 2023-04-25 NOTE — PATIENT INSTRUCTIONS
I would recommend serial monitoring of platelet count at least every 6 months through primary care physician.    See me back as needed.

## 2023-04-25 NOTE — PROGRESS NOTES
Virtual Visit Details    Type of service:  Telephone Visit   Phone call duration: 15 minutes     I spent >20 minutes on this visit on the date of service, including the telephone time, reviewing records and labs and imaging and placing new orders as well as coordination of care and documentation.

## 2023-04-25 NOTE — LETTER
4/25/2023         RE: Lydia Dietz  [id number] [place] [address]        Dear Colleague,    Thank you for referring your patient, Lydia Dietz, to the [facility]. Please see a copy of my visit note below.    Virtual Visit Details    Type of service:  Telephone Visit   Phone call duration: 15 minutes     I spent >20 minutes on this visit on the date of service, including the telephone time, reviewing records and labs and imaging and placing new orders as well as coordination of care and documentation.      Hematology follow-up visit:  Date on this visit: 4/25/23     Lydia Dietz  is referred by Dr.David HARRY Moses for a hematology consultation. She requires evaluation for thrombocytopenia.  Primary Physician: Praveen Moses     History Of Present Illness:  Ms. Dietz is a 87 year old female who presents with thrombocytopenia.  I initially saw her on 3/22/2022.  Please see my previous note for details.  I have copied and updated from    She has a history of severe aortic stenosis and had TAVR on 10/7/2020.  Previously had a history of coronary artery disease, s/p PCI to LAD, RCA in September 2020.    She was first noted to have mild thrombocytopenia after TAVR on 10/7/2020 and since then she has had mild thrombocytopenia.    She mentioned that she bruises very easily.  She has noticed easy bruising to the skin of the shin as well as dorsum of the hands and also upper part of the chest.  She is taking aspirin.  Very occasionally she has noticed mild bleeding from the right nostril which stops after putting pressure.  No hematuria, melena or hematochezia.  Otherwise she feels almost her baseline.  She denies any shortness of breath.  She does have chronic fatigue.  No nausea vomiting diarrhea constipation.  No infections.  No night sweats.  No significant weight changes.  No new swellings.    I recommended further testing for thrombocytopenia.    Interval  history.    This is a telephone visit. She feels overall well. She continues to have easy brusing    ROS:  A comprehensive ROS was otherwise neg    I reviewed other history in epic as below.      Past Medical/Surgical History:  Past Medical History:   Diagnosis Date     Allergies      Anemia      Coronary artery disease      HLD (hyperlipidemia)      HTN (hypertension)      Impaired fasting glucose      Osteoarthritis      Severe aortic stenosis      Past Surgical History:   Procedure Laterality Date     AS LAP INCISIONAL HERNIA REPAIR       BREAST BIOPSY, CORE RT/LT      X3     CHOLECYSTECTOMY       COLECTOMY PARTIAL  2019     COLONOSCOPY N/A 2/17/2022    Procedure: COLONOSCOPY;  Surgeon: Karlos Holley MD;  [facility]     CV LEFT HEART CATH N/A 9/9/2020    Procedure: Left Heart Cath;  Surgeon: Leonard Pastor MD;  Location:  HEART CARDIAC CATH LAB     CV PCI ANGIOPLASTY N/A 9/9/2020    Procedure: CV PCI ANGIOPLASTY;  Surgeon: Leonard Pastor MD;  Location:  HEART CARDIAC CATH LAB     CV TRANSCATHETER AORTIC VALVE REPLACEMENT N/A 10/7/2020    Procedure: Transfemoral, subclavian (SIFUENTES) or transapical transcatheter aortic valve replacement 23mm sifuentes valve placed. cardiovascular standby.;  Surgeon: Leonard Pastor MD;  [facility]     ESOPHAGOSCOPY, GASTROSCOPY, DUODENOSCOPY (EGD), COMBINED N/A 2/17/2022    Procedure: ESOPHAGOGASTRODUODENOSCOPY (EGD);  Surgeon: Karlos Holley MD;  [facility]: [facility]     HC LAPAROSCOPY, OVIDUCT/OVARY; REMOVAL       HEART CATH FEMORAL CANNULIZATION WITH OPEN STANDBY REPAIR AORTIC VALVE N/A 10/7/2020    Procedure: Cardiac standby. Perfusion standby.;  Surgeon: Richi Olmos MD;  [facility]     HYSTERECTOMY TOTAL ABDOMINAL       Allergies:  Allergies as of 04/25/2023 - Reviewed 04/25/2023   Allergen Reaction Noted     Rosuvastatin Muscle Pain (Myalgia) 11/15/2017     Seasonal allergies Other (See Comments) 05/07/2019     Amlodipine   05/07/2015     Atorvastatin  09/15/2003     Fenofibrate Muscle Pain (Myalgia) 06/04/2012     Fluconazole Nausea 09/15/2003     Hydrochlorothiazide  11/22/2021     Losartan  08/20/2008     Pravastatin  09/15/2003     Simvastatin Nausea 09/15/2003     Niacin Rash 09/15/2003     Current Medications:  Current Outpatient Medications   Medication Sig Dispense Refill     acetaminophen (TYLENOL) 325 MG tablet Take 325-650 mg by mouth every 6 hours as needed for mild pain       carvedilol (COREG) 6.25 MG tablet Take 1 tablet (6.25 mg) by mouth 2 times daily (with meals) 180 tablet 1     evolocumab (REPATHA SURECLICK) 140 MG/ML prefilled autoinjector Inject 1 mL (140 mg) Subcutaneous every 14 days 14 mL 11     fluticasone (FLONASE) 50 MCG/ACT nasal spray Spray 2 sprays in nostril every morning        furosemide (LASIX) 20 MG tablet Take 1 tablet (20 mg) by mouth every other day 180 tablet 3     Lidocaine (LIDOCARE) 4 % Patch Place 1 patch onto the skin every 24 hours To prevent lidocaine toxicity, patient should be patch free for 12 hrs daily. Patient using OTC 30 patch 3     Lidocaine (LIDOCARE) 4 % Patch Place 1 patch onto the skin every 24 hours To prevent lidocaine toxicity, patient should be patch free for 12 hrs daily. 7 patch 3     alendronate (FOSAMAX) 70 MG tablet Take 1 tablet (70 mg) by mouth every 7 days Take 60 minutes before am meal with 8 oz. water. Remain upright for 30 minutes. (Patient not taking: Reported on 4/3/2023) 12 tablet 3     amoxicillin (AMOXIL) 500 MG capsule Take 1 capsule (500 mg) by mouth once as needed (prn) Take 4 tablets(2000 mg) 1 hour before dental work (Patient not taking: Reported on 1/13/2023) 4 capsule 1     amoxicillin (AMOXIL) 500 MG capsule Take 4 capsules (2,000 mg) by mouth See Admin Instructions Take 4 caps (2000mg), by mouth, one hour prior to dental procedure. (Patient not taking: Reported on 11/30/2021) 4 capsule 0     bisacodyl (DULCOLAX) 5 MG EC tablet Take 2 tabs by mouth  at 3pm one day prior to procedure.  Take 2 tabs by mouth at 11pm the night before procedure. (Patient not taking: Reported on 2022) 4 tablet 0     ciprofloxacin (CIPRO) 500 MG tablet Take 1 tablet (500 mg) by mouth 2 times daily (Patient not taking: Reported on 4/3/2023) 10 tablet 0     metoprolol succinate ER (TOPROL-XL) 25 MG 24 hr tablet Take 1 tablet (25 mg) by mouth every morning (Patient not taking: Reported on 10/20/2022) 90 tablet 3     polyethylene glycol (GOLYTELY) 236 g suspension Take as directed in patient instructions.  Day before procedure at 6:00pm drink 8oz glass of mixture every 15 min until the jug is half empty.  Store the rest in the refrigerator.   At 11pm drink second half of container.  Drink one 8oz glass every 15 minutes until jug is empty. (Patient not taking: Reported on 2022) 4000 mL 0     sulfamethoxazole-trimethoprim (BACTRIM DS) 800-160 MG tablet Take 1 tablet by mouth 2 times daily (Patient not taking: Reported on 4/3/2023) 10 tablet 0     triamterene-HCTZ (MAXZIDE-25) 37.5-25 MG tablet Take 1 tablet by mouth daily (Patient not taking: Reported on 4/3/2023) 90 tablet 1      Family History:  No family history on file.  No family history of bleeding or clotting disorder.  Social History:  Social History     Socioeconomic History     Marital status:      Spouse name: Not on file     Number of children: Not on file     Years of education: Not on file     Highest education level: Not on file   Occupational History     Not on file   Tobacco Use     Smoking status: Former     Types: Cigarettes     Quit date: 1980     Years since quittin.3     Smokeless tobacco: Never     Tobacco comments:     1 pack per week if that maybe for 5 years    Vaping Use     Vaping status: Not on file   Substance and Sexual Activity     Alcohol use: Yes     Comment: occ      Drug use: Never     Sexual activity: Not on file   Other Topics Concern     Not on file   Social History Narrative      Not on file     Social Determinants of Health     Financial Resource Strain: Not on file   Food Insecurity: Not on file   Transportation Needs: Not on file   Physical Activity: Not on file   Stress: Not on file   Social Connections: Not on file   Intimate Partner Violence: Not on file   Housing Stability: Not on file   She denies smoking.  Drinks alcohol very occasionally.  Physical Exam:  There were no vitals taken for this visit.     Repeat blood pressure after some time was 171/76.    Wt Readings from Last 4 Encounters:   03/22/23 55.1 kg (121 lb 8 oz)   01/13/23 58.1 kg (128 lb 1.6 oz)   12/10/22 58.1 kg (128 lb)   09/23/22 58.3 kg (128 lb 8 oz)     Constitutional.  Does not seem to be in any apparent distress.  Respiratory.  Breathing does not seem to be labored.  Speaking in complete sentences without coughing.    Neurological.  Alert and oriented.  Psychiatric.  Appropriate mood and mentation.      Laboratory/Imaging Studies    Reviewed    She has had mild thrombocytopenia since October 2020.    3/22/2023  CBC shows - otherwise unremarkable  Hep B / Hep C and HIV unremarkable.     1/13/2023  CBC shows WBC 7.  Hemoglobin 12.4.  Platelets 118.  Normal WBC differential.  Peripheral blood smear showed slight thrombocytopenia with normal platelet morphology.  Chemistry unremarkable.  Ferritin 57.  Iron 63.  TIBC 361.  Iron saturation index 17%  Folate 9.7 and vitamin B12 was 397.    4/27/2022.  SPEP was unremarkable    US abdomen. 4/3/2023  IMPRESSION:   1.  No hepatosplenomegaly.  2.  Status post cholecystectomy.  3.  Renal cysts    ASSESSMENT/PLAN:    Isolated thrombocytopenia. She was first noted to have mild thrombocytopenia after TAVR on 10/7/2020 and since then she has had mild thrombocytopenia.    Peripheral blood smear was essentially unremarkable.  Vitamin B12 and folate were normal.  Screening for hepatitis B, hepatitis C and HIV are negative.  Ultrasound abdomen does not show any  hepatosplenomegaly.    At this time I am suspicious that mild thrombocytopenia could be from TAVR.  Thrombocytopenia is common after TAVR procedure but usually resolves soon thereafter.  The other possibility could be mild form of ITP.    At this time since the problem is mild and chronic and stable, I believe there is no need to do any further work-up like a bone marrow biopsy and mild thrombocytopenia can be observed through her primary care physician with repeat counts every few months.    If the platelet count decreases significantly, then she will see me back on an as-needed basis.  For now she will follow with PCP      I answered all of her and her 's questions to their satisfaction.  They are agreeable and comfortable with the plan.      Ade Kong MD       I spent >20 minutes on this visit on the date of service, including the telephone time, reviewing records and labs and imaging and placing new orders as well as coordination of care and documentation.      Again, thank you for allowing me to participate in the care of your patient.        Sincerely,        Ade Kong MD

## 2023-04-25 NOTE — NURSING NOTE
Is the patient currently in the state of [place]? YES    Visit mode:TELEPHONE    If the visit is dropped, the patient can be reconnected by: TELEPHONE VISIT: Phone number: [phone]     Will anyone else be joining the visit? NO      How would you like to obtain your AVS? Mail a copy    Are changes needed to the allergy or medication list? NO    Reason for visit: Telephone      SURAJ Claire

## 2023-05-16 DIAGNOSIS — I10 BENIGN ESSENTIAL HYPERTENSION: ICD-10-CM

## 2023-05-18 NOTE — TELEPHONE ENCOUNTER
CARVEDILOL (IR) TABS 6.25MG      Last Written Prescription Date:  11/1/22  Last Fill Quantity: 180,   # refills: 1  Last Office Visit : 2/13/23  Future Office visit:  none    Routing refill request to provider for review/approval because:  Blood pressure out of range     BP Readings from Last 3 Encounters:   03/22/23 (!) 171/71   01/13/23 (!) 196/84   12/10/22 (!) 187/85

## 2023-05-19 RX ORDER — CARVEDILOL 6.25 MG/1
6.25 TABLET ORAL 2 TIMES DAILY WITH MEALS
Qty: 180 TABLET | Refills: 3 | Status: SHIPPED | OUTPATIENT
Start: 2023-05-19 | End: 2024-05-25

## 2023-05-31 ENCOUNTER — TELEPHONE (OUTPATIENT)
Dept: INTERNAL MEDICINE | Facility: CLINIC | Age: 88
End: 2023-05-31
Payer: MEDICARE

## 2023-05-31 NOTE — TELEPHONE ENCOUNTER
[facility] Call Center    Phone Message    May a detailed message be left on voicemail: yes     Reason for Call: Other: Patient is requesting a call back to discuss the following medications carvedilol (COREG) 6.25 MG tablet, furosemide (LASIX) 20 MG tablet, please call her at [phone], thank you     Action Taken: Message routed to:  [facility]    Travel Screening: Not Applicable

## 2023-06-01 NOTE — TELEPHONE ENCOUNTER
Called patient- she was unsure on why she was taking a water pill (lasix) if she is drinking water, RN explained that the medication helps the kidneys get rid of excess water to help control blood pressures and prevent swelling. Patient understands and was encouraged to both have good fluid intake (for risk of dehydration if on diuretic) and taking medication. Patient agrees.     Vikram Renee RN on 6/1/2023 at 11:41 AM

## 2023-06-14 ENCOUNTER — TELEPHONE (OUTPATIENT)
Dept: INTERNAL MEDICINE | Facility: CLINIC | Age: 88
End: 2023-06-14
Payer: MEDICARE

## 2023-06-14 NOTE — TELEPHONE ENCOUNTER
[facility] Call Center    Phone Message    May a detailed message be left on voicemail: yes     Reason for Call: Medication Question or concern regarding medication   Prescription Clarification  Name of Medication: furosemide (LASIX) 20 MG tablet  carvedilol (COREG) 6.25 MG tablet  Prescribing Provider: Dr. Moses      What on the order needs clarification? Patient calling requesting a return call to discuss the above medication and the reasoning why she is continuing to take them, Per patient she sees no difference. Please call thank you.           Action Taken: Message routed to:  [facility]    Travel Screening: Not Applicable

## 2023-06-17 NOTE — TELEPHONE ENCOUNTER
Recommend she remain on her same medications and keep her 8/11/2023 cardiology appt. With Dr. Casey Logan. Happy to see her or telephone visit if additional concerns    PAMELA Moses

## 2023-06-19 NOTE — PLAN OF CARE
Time 1024-2765    Reason for admission: SOB, chest pain, rigors   Vitals: /44 (BP Location: Right arm)   Pulse 74   Temp 97.3  F (36.3  C) (Oral)   Resp 16   SpO2 92%    Activity: SBA- up walking in room  Pain: Denies at this time   Neuro: A&Ox4- forgetful   Cardiac: Tele- NSR with peaked T-waves, BP WNL  Respiratory:  Maintaining sats on RA  GI/: LBM 3/3.  WNL  Diet: Reg- good appetite.   Lines: R PIV infusing intermittent Abx   Skin/Wounds: Intact, bruising on forearms   Endocrine: NA  Labs/imaging:       CTA neg for PE, Abd CT neg.   Positive BC from OSH, BC redrawn and pending  EKG- Nonspecific ST abnormality   New changes this shift:    CTA- showed esophogeal wall thickening- team paged, but no response. Passed on to evening RN. TTE and EKG this AM. Abx changed to Rocephin for Pyelonephritis.   Plan:   Expected discharge: Tomorrow, recommended to prior living arrangement once antibiotic plan established and SIRS/Sepsis treated.  Continue to monitor and follow POC    [de-identified] : Patient s/p right knee arthroscopy, partial medial and lateral meniscectomy and partial synovectomy, grade 3 chondral changes on 12/13/22.No swelling, in PT, HA was denied, no swelling. Returns to continue Visco-3 for the right knee.

## 2023-06-20 NOTE — TELEPHONE ENCOUNTER
Contacted pt on phone and given Dr. Moses's recommendations. Pt and spouse verbalize understanding and awareness of scheduled cardiology appts as well as need to continue medications as prescribed and discuss with cardiology provider.   Pt encouraged to call and make an appt with Dr. Moses or one of the other providers in the [facility] as needed.

## 2023-08-05 ENCOUNTER — TELEPHONE (OUTPATIENT)
Dept: CARDIOLOGY | Facility: CLINIC | Age: 88
End: 2023-08-05
Payer: MEDICARE

## 2023-08-05 DIAGNOSIS — E78.5 HYPERLIPIDEMIA LDL GOAL <70: Primary | ICD-10-CM

## 2023-08-05 DIAGNOSIS — I25.10 CORONARY ARTERY DISEASE INVOLVING NATIVE CORONARY ARTERY WITHOUT ANGINA PECTORIS, UNSPECIFIED WHETHER NATIVE OR TRANSPLANTED HEART: ICD-10-CM

## 2023-08-05 NOTE — TELEPHONE ENCOUNTER
08/05 m and sent letter for pt to sched fasting labs prior to upcoming [place] visit

## 2023-08-05 NOTE — TELEPHONE ENCOUNTER
Please help schedule fasting labs prior to 8/11 [place] visit.

## 2023-08-07 DIAGNOSIS — K08.89 PAIN, DENTAL: ICD-10-CM

## 2023-08-07 NOTE — TELEPHONE ENCOUNTER
08/07 [place] [facility] for patient schedule fasting lab prior to upcoming [place] Visit

## 2023-08-10 ENCOUNTER — LAB (OUTPATIENT)
Dept: LAB | Facility: CLINIC | Age: 88
End: 2023-08-10
Attending: INTERNAL MEDICINE
Payer: MEDICARE

## 2023-08-10 DIAGNOSIS — E78.5 HYPERLIPIDEMIA LDL GOAL <70: ICD-10-CM

## 2023-08-10 DIAGNOSIS — I25.10 CORONARY ARTERY DISEASE INVOLVING NATIVE CORONARY ARTERY WITHOUT ANGINA PECTORIS, UNSPECIFIED WHETHER NATIVE OR TRANSPLANTED HEART: ICD-10-CM

## 2023-08-10 DIAGNOSIS — I10 WHITE COAT SYNDROME WITH DIAGNOSIS OF HYPERTENSION: ICD-10-CM

## 2023-08-10 LAB
ANION GAP SERPL CALCULATED.3IONS-SCNC: 9 MMOL/L (ref 7–15)
BUN SERPL-MCNC: 17 MG/DL (ref 8–23)
CALCIUM SERPL-MCNC: 9.7 MG/DL (ref 8.8–10.2)
CHLORIDE SERPL-SCNC: 104 MMOL/L (ref 98–107)
CHOLEST SERPL-MCNC: 188 MG/DL
CREAT SERPL-MCNC: 0.93 MG/DL (ref 0.51–0.95)
DEPRECATED HCO3 PLAS-SCNC: 28 MMOL/L (ref 22–29)
GFR SERPL CREATININE-BSD FRML MDRD: 60 ML/MIN/1.73M2
GLUCOSE SERPL-MCNC: 102 MG/DL (ref 70–99)
HDLC SERPL-MCNC: 74 MG/DL
LDLC SERPL CALC-MCNC: 96 MG/DL
NONHDLC SERPL-MCNC: 114 MG/DL
POTASSIUM SERPL-SCNC: 3.8 MMOL/L (ref 3.4–5.3)
SODIUM SERPL-SCNC: 141 MMOL/L (ref 136–145)
TRIGL SERPL-MCNC: 92 MG/DL

## 2023-08-10 PROCEDURE — 36415 COLL VENOUS BLD VENIPUNCTURE: CPT | Performed by: PATHOLOGY

## 2023-08-10 PROCEDURE — 80048 BASIC METABOLIC PNL TOTAL CA: CPT | Performed by: PATHOLOGY

## 2023-08-10 PROCEDURE — 84443 ASSAY THYROID STIM HORMONE: CPT | Performed by: PATHOLOGY

## 2023-08-10 PROCEDURE — 80061 LIPID PANEL: CPT | Performed by: PATHOLOGY

## 2023-08-10 NOTE — TELEPHONE ENCOUNTER
amoxicillin (AMOXIL) 500 MG capsule   4 capsule 1 5/16/2022     Last Office Visit : 2-  Future Office visit:  none      Oral Acne/Rosacea Medications Protocol [id number]/07/2023 09:37 AM   Protocol Details Confirmation of diagnosis is required

## 2023-08-11 ENCOUNTER — OFFICE VISIT (OUTPATIENT)
Dept: CARDIOLOGY | Facility: CLINIC | Age: 88
End: 2023-08-11
Attending: INTERNAL MEDICINE
Payer: MEDICARE

## 2023-08-11 VITALS
WEIGHT: 122.9 LBS | HEART RATE: 64 BPM | DIASTOLIC BLOOD PRESSURE: 80 MMHG | BODY MASS INDEX: 21.09 KG/M2 | SYSTOLIC BLOOD PRESSURE: 190 MMHG | OXYGEN SATURATION: 98 %

## 2023-08-11 DIAGNOSIS — E78.5 HYPERLIPIDEMIA LDL GOAL <70: ICD-10-CM

## 2023-08-11 DIAGNOSIS — Z95.2 S/P TAVR (TRANSCATHETER AORTIC VALVE REPLACEMENT): ICD-10-CM

## 2023-08-11 DIAGNOSIS — Z78.9 STATIN INTOLERANCE: ICD-10-CM

## 2023-08-11 DIAGNOSIS — I10 WHITE COAT SYNDROME WITH DIAGNOSIS OF HYPERTENSION: ICD-10-CM

## 2023-08-11 DIAGNOSIS — Z95.2 S/P TAVR (TRANSCATHETER AORTIC VALVE REPLACEMENT): Primary | ICD-10-CM

## 2023-08-11 DIAGNOSIS — I25.10 CORONARY ARTERY DISEASE INVOLVING NATIVE CORONARY ARTERY OF NATIVE HEART WITHOUT ANGINA PECTORIS: ICD-10-CM

## 2023-08-11 LAB
LVEF ECHO: NORMAL
TSH SERPL DL<=0.005 MIU/L-ACNC: 1.95 UIU/ML (ref 0.3–4.2)

## 2023-08-11 PROCEDURE — 93306 TTE W/DOPPLER COMPLETE: CPT | Performed by: INTERNAL MEDICINE

## 2023-08-11 PROCEDURE — 99215 OFFICE O/P EST HI 40 MIN: CPT | Mod: 25 | Performed by: INTERNAL MEDICINE

## 2023-08-11 PROCEDURE — G0463 HOSPITAL OUTPT CLINIC VISIT: HCPCS | Performed by: INTERNAL MEDICINE

## 2023-08-11 ASSESSMENT — PAIN SCALES - GENERAL: PAINLEVEL: NO PAIN (0)

## 2023-08-11 NOTE — PATIENT INSTRUCTIONS
"You were seen today in the Cardiovascular Clinic at the [facility].     Cardiology Providers you saw during your visit: Dr. Checo Logan     Diagnosis:   Encounter Diagnoses   Name Primary?    Hyperlipidemia LDL goal <70     Statin intolerance     Coronary artery disease involving native coronary artery of native heart without angina pectoris     S/P TAVR (transcatheter aortic valve replacement) Yes    White coat syndrome with diagnosis of hypertension         Recommendations:   1. Place a 24 hour ambulatory blood pressure monitoring device today.  2. We will discuss changes to blood pressure treatment after we get your results.  3. Follow up with NP in 6 months.  4. Repeat echo in 1 year.        Please feel free to call me with any questions or concerns.       Saniya Morales RN     Questions: [phone].   First press #1 for the [employer] and then press #4 for \"Medical Questions\" to reach us Cardiology Nurses.     [phone].   First press #1 for the [employer] and then press #1     On Call Cardiologist for after hours or on weekends: [phone]   option #4 and ask to speak to the on-call Cardiologist.          If you need a medication refill please contact your pharmacy.  Please allow 3 business days for your refill to be completed.  ________________________________________________________________________________________________________________________________  "

## 2023-08-11 NOTE — PROGRESS NOTES
PREVENTIVE CARDIOLOGY - follow up   Lydia Dietz is an 88 year old female who receives primary care from Dr. Christal Ferreira. She was referred to Cardiology clinic by THOMAS Murphy who manages Lydia's CAD and TAVR.  She was referred for statin intolerance and plan for lipid-lowering therapy.  She returns today for follow up.     8/20/21  Lydia Dietz is a pleasant woman with a past medical history of HTN, HLD w/statin intolerance, CKD stage III, coronary artery disease status-post PCI of the LAD and RCA 9/9/2020 and severe aortic valvular stenosis status-post transfemoral transcatheter aortic valve replacement (TAVR) with a 23 mm Paredes Arik 3 on 10/7/20 w/mild-moderate PVL.    Lydia has attempted to take multiple statins over many years including simvastatin, atorvastatin, simvastatin, pravastatin.  All have resulted in intolerable side effects including myalgias which rapidly resolved after stopping medication.       8/19/22  Lydia's biggest complaint is low back pain. She denies chest pain or dyspnea. She goes on walks with her  every other day and does stretching and body weight exercises at home. No lightheadedness, orthopnea, LE edema.    She is tolerating repatha well. LDL is 93mg/dL.   Blood pressure in clinic visits has been elevated, but at home her last BP was 132/62.    Interval History 8/11/22  Lydia's primary symptoms today are non-cardiac. She feels dizzy, off balance often when walking; no lightheadedness, tunnel vision or syncope. She has often has tremors and feels cold. She is worried and fairly confused about a water pill (I assume she is talking about furosemide).   No chest pain or dyspnea. Occasional pedal edema.    Blood pressure is again very high in clinic.   She has not checked it at home lately, but I see an office visit from April where her BP was adequately controlled. She is currently taking carvedilol and furosemide for antihypertensive therapy. She has had many  adverse effects related to antihypertensives in the past.     I reviewed her echo which showed normal ventricular function and normal functioning TAVR; official read is pending.     LDL is 96 on repatha.     ASSESSMENT AND PLAN  Lydia Dietz is an 88 year old female  with a past medical history of HTN, HLD w/statin intolerance, CKD stage III, coronary artery disease status-post PCI of the LAD and RCA 9/9/2020 and severe aortic valvular stenosis status-post transfemoral transcatheter aortic valve replacement (TAVR) with a 23 mm Paredes Arik 3 on 10/7/20 w/mild-moderate PVL.      Most of her symptoms today were non-cardiac and she seemed confused about her care.     # CAD: no angina  # HLD: LDL 96  # statin intolerance   - cont repatha - LDL is acceptable for now given medication intolerance    #. Very high blood pressure/white coat hypertension  - will complete 24 hour ABPM and then decide on treatment based on results    # s/p TAVR  - valve is functioning well on my echo read  - echo in 1 year    # dysequilibrium  - recommend discussion with Dr. Moses    # feeling cold  # tremors  - will add on TSH  - follow up with Dr. Moses    Follow up with cardiology NP in 6 months.      Thank you for the opportunity to participate in the care of Ms. Lydia Dietz. Please feel free to contact me with any additional questions or concerns.    Palomo Logan MD    Preventive Cardiology  Pager: [phone]    The total time of this encounter amounted to 40 minutes on the day of service. This time included time spent with the patient, reviewing records or previous testing, ordering tests, care coordination, and performing post visit documentation.     PAST MEDICAL HISTORY:  Patient Active Problem List   Diagnosis    Wedge compression fracture of T11-T12 vertebra, initial encounter for closed fracture (H)    Coronary artery disease involving native coronary artery, angina presence unspecified,  unspecified whether native or transplanted heart    Status post coronary angiogram    Severe aortic stenosis    Adenomatous polyp of colon    Allergic rhinitis    Back pain, thoracic    Benign neoplasm of colon    Calculus of gallbladder    Chronic left-sided low back pain without sciatica    CKD (chronic kidney disease) stage 3, GFR 30-59 ml/min    Counseling on health care directive    Counseling regarding advanced directives and goals of care    Degeneration, intervertebral disc, lumbosacral    Abdominal hernia    Diffuse cystic mastopathy    Endometriosis    Essential hypertension    Hyperlipidemia LDL goal <70    IFG (impaired fasting glucose)    Microscopic hematuria    Osteoarthrosis    Renal cyst, left    Symptoms involving cardiovascular system    Varicella    Aortic stenosis, severe    Chest pain    Statin intolerance     Past Medical History:   Diagnosis Date    Allergies     Anemia     Coronary artery disease     HLD (hyperlipidemia)     HTN (hypertension)     Impaired fasting glucose     Osteoarthritis     Severe aortic stenosis        CURRENT MEDICATIONS:  Current Outpatient Medications   Medication Sig Dispense Refill    acetaminophen (TYLENOL) 325 MG tablet Take 325-650 mg by mouth every 6 hours as needed for mild pain      carvedilol (COREG) 6.25 MG tablet Take 1 tablet (6.25 mg) by mouth 2 times daily (with meals) 180 tablet 3    evolocumab (REPATHA SURECLICK) 140 MG/ML prefilled autoinjector Inject 1 mL (140 mg) Subcutaneous every 14 days 14 mL 11    fluticasone (FLONASE) 50 MCG/ACT nasal spray Spray 2 sprays in nostril every morning       furosemide (LASIX) 20 MG tablet Take 1 tablet (20 mg) by mouth every other day 180 tablet 3    alendronate (FOSAMAX) 70 MG tablet Take 1 tablet (70 mg) by mouth every 7 days Take 60 minutes before am meal with 8 oz. water. Remain upright for 30 minutes. (Patient not taking: Reported on 4/3/2023) 12 tablet 3    amoxicillin (AMOXIL) 500 MG capsule Take 1 capsule  (500 mg) by mouth once as needed (prn) Take 4 tablets(2000 mg) 1 hour before dental work (Patient not taking: Reported on 1/13/2023) 4 capsule 1    amoxicillin (AMOXIL) 500 MG capsule Take 4 capsules (2,000 mg) by mouth See Admin Instructions Take 4 caps (2000mg), by mouth, one hour prior to dental procedure. (Patient not taking: Reported on 11/30/2021) 4 capsule 0    bisacodyl (DULCOLAX) 5 MG EC tablet Take 2 tabs by mouth at 3pm one day prior to procedure.  Take 2 tabs by mouth at 11pm the night before procedure. (Patient not taking: Reported on 4/27/2022) 4 tablet 0    ciprofloxacin (CIPRO) 500 MG tablet Take 1 tablet (500 mg) by mouth 2 times daily (Patient not taking: Reported on 4/3/2023) 10 tablet 0    Lidocaine (LIDOCARE) 4 % Patch Place 1 patch onto the skin every 24 hours To prevent lidocaine toxicity, patient should be patch free for 12 hrs daily. Patient using OTC (Patient not taking: Reported on 8/11/2023) 30 patch 3    Lidocaine (LIDOCARE) 4 % Patch Place 1 patch onto the skin every 24 hours To prevent lidocaine toxicity, patient should be patch free for 12 hrs daily. (Patient not taking: Reported on 8/11/2023) 7 patch 3    polyethylene glycol (GOLYTELY) 236 g suspension Take as directed in patient instructions.  Day before procedure at 6:00pm drink 8oz glass of mixture every 15 min until the jug is half empty.  Store the rest in the refrigerator.   At 11pm drink second half of container.  Drink one 8oz glass every 15 minutes until jug is empty. (Patient not taking: Reported on 4/27/2022) 4000 mL 0    sulfamethoxazole-trimethoprim (BACTRIM DS) 800-160 MG tablet Take 1 tablet by mouth 2 times daily (Patient not taking: Reported on 4/3/2023) 10 tablet 0       PAST SURGICAL HISTORY:  Past Surgical History:   Procedure Laterality Date    AS LAP INCISIONAL HERNIA REPAIR      BREAST BIOPSY, CORE RT/LT      X3    CHOLECYSTECTOMY      COLECTOMY PARTIAL  2019    COLONOSCOPY N/A 2/17/2022    Procedure:  COLONOSCOPY;  Surgeon: Karlos Holley MD;  Location: [facility]    CV LEFT HEART CATH N/A 2020    Procedure: Left Heart Cath;  Surgeon: Leonard Pastor MD;  Location:  HEART CARDIAC CATH LAB    CV PCI ANGIOPLASTY N/A 2020    Procedure: CV PCI ANGIOPLASTY;  Surgeon: Leonard Pastor MD;  Location:  HEART CARDIAC CATH LAB    CV TRANSCATHETER AORTIC VALVE REPLACEMENT N/A 10/7/2020    Procedure: Transfemoral, subclavian (SIFUENTES) or transapical transcatheter aortic valve replacement 23mm sifuentes valve placed. cardiovascular standby.;  Surgeon: Leonard Pastor MD;  Location: [facility]    ESOPHAGOSCOPY, GASTROSCOPY, DUODENOSCOPY (EGD), COMBINED N/A 2022    Procedure: ESOPHAGOGASTRODUODENOSCOPY (EGD);  Surgeon: Karlos Holley MD;  Location: [facility]    HC LAPAROSCOPY, OVIDUCT/OVARY; REMOVAL      HEART CATH FEMORAL CANNULIZATION WITH OPEN STANDBY REPAIR AORTIC VALVE N/A 10/7/2020    Procedure: Cardiac standby. Perfusion standby.;  Surgeon: Richi Olmos MD;  Location: [facility]    HYSTERECTOMY TOTAL ABDOMINAL         ALLERGIES  Rosuvastatin, Seasonal allergies, Amlodipine, Atorvastatin, Fenofibrate, Fluconazole, Hydrochlorothiazide, Losartan, Pravastatin, Simvastatin, and Niacin    FAMILY HX:  No family history on file.    SOCIAL HX:  Social History     Tobacco Use    Smoking status: Former     Types: Cigarettes     Quit date: 1980     Years since quittin.6    Smokeless tobacco: Never    Tobacco comments:     1 pack per week if that maybe for 5 years    Substance Use Topics    Alcohol use: Yes     Comment: occ     Drug use: Never       VITAL SIGNS:  BP (!) 190/80 (BP Location: Right arm, Patient Position: Chair, Cuff Size: Adult Regular)   Pulse 64   Wt 55.7 kg (122 lb 14.4 oz)   SpO2 98%   BMI 21.09 kg/m    Body mass index is 21.09 kg/m .  Wt Readings from Last 2 Encounters:   23 55.7 kg (122 lb 14.4 oz)   23 55.1 kg (121 lb 8 oz)       PHYSICAL EXAM  Gen: seated  comfortably; occasional shivering  CV: nml s1/s2; no murmur; no JVD  Chest: clear lungs  Ext: no LE edema    LABS: personally reviewed  CMP  Recent Labs   Lab Test 08/10/23  1025 01/13/23  1125 10/11/22  1056 04/27/22  1425 02/07/22  1402 01/11/22  1301 11/30/21  0850 11/22/21  0842 05/17/21  0930 03/05/21  0536 03/04/21  0629 03/03/21  0000 11/16/20  0947 10/08/20  1604 10/08/20  0522 10/07/20  1834 09/10/20  0624 09/09/20 2052    141 144  --  142  --  142   < > 142 140 140  --  140   < > 138 137   < > 138   POTASSIUM 3.8 4.5 3.9  --  4.0  --  4.1   < > 3.7 3.3* 3.9 3.8 4.1   < > 3.5 3.5   < > 3.9   CHLORIDE 104 106 110*  --  107  --  107   < > 109 108 109  --  109   < > 105 106   < > 107   CO2 28 29 30  --  28  --  27   < > 27 25 25  --  26   < > 27 25   < > 26   ANIONGAP 9 6* 4  --  7  --  8   < > 6 7 6  --  5   < > 6 5   < > 5   * 108* 107*  --  100*  --  102*   < > 101* 94 106* 155* 98   < > 115* 108*   < > 178*   BUN 17.0 20.0 21  --  22  --  26   < > 26 23 25  --  27   < > 22 25   < > 24   CR 0.93 0.81 0.88 1.12* 1.13*   < > 0.93   < > 0.98 0.89 0.94 1.17* 1.26*   < > 0.86 0.88   < > 0.84   GFRESTIMATED 60* 71 64 48* 48*   < > 57*   < > 53* 60* 56* 43* 39*   < > 63 60*   < > 64   GFRESTBLACK  --   --   --   --   --   --   --   --  61 69 65  --  45*   < > 73 70   < > 74   DREW 9.7 9.5 9.3 9.6 9.4  --  9.4   < > 9.3 9.0 8.5  --  9.4   < > 8.6 8.1*   < > 8.4*   MAG  --   --   --   --   --   --   --   --   --   --  2.1  --   --   --  2.1 2.0  --  2.0   PHOS  --   --   --  3.5  --   --   --   --   --   --  3.3  --   --   --  3.1 3.2  --   --    PROTTOTAL  --  6.5  --   --  7.2  --  7.0  --   --   --   --   --   --   --   --  5.3*  --   --    ALBUMIN  --  4.2  --   --  3.7  --  3.3*  --   --   --   --   --   --   --   --  2.6*  --   --    BILITOTAL  --  0.4  --   --  0.5  --  0.3  --   --   --   --   --   --   --   --  0.4  --   --    ALKPHOS  --  67  --   --  78  --  78  --   --   --   --   --   --    --   --  59  --   --    AST  --  25  --   --  22  --  22  --   --   --   --  29  --   --   --  17  --   --    ALT  --  16  --   --  21  --  22  --   --   --   --  35  --   --   --  15  --   --     < > = values in this interval not displayed.     CBC  Recent Labs   Lab Test 03/22/23  1419 01/13/23  1125 04/27/22  1425 02/07/22  1402   WBC 7.4 7.0 7.4 6.3   RBC 4.71 4.56 4.70 4.92   HGB 13.1 12.4 12.4 12.8   HCT 41.0 40.1 40.7 41.9   MCV 87 88 87 85   MCH 27.8 27.2 26.4* 26.0*   MCHC 32.0 30.9* 30.5* 30.5*   RDW 13.0 12.9 13.0 14.6   * 118* 136* 141*     INR  Recent Labs   Lab Test 10/07/20  0916 09/09/20  1230   INR 0.97 0.98     Recent Labs   Lab Test 08/10/23  1025 10/17/22  1027   CHOL 188 164   HDL 74 69   LDL 96 72   TRIG 92 114     No lab results found.    Most recent ECHO:   11/22/21  S/P TAVR with 23 mm Paredes Arik 3 valve on 10/07/2020.Mean aortic gradient  11mmHg. Trivial AI.  The inferior vena cava is normal.  No significant changes noted.    5/17/21 TTE  Interpretation Summary  S/P TAVR with 23 mm Paredes Arik 3 valve on 10/07/2020. Mean aortic gradient  12mmHg. Mild paravalvular AI.  No significant changes noted.  ______________________________________________________________________________  Left Ventricle  Global and regional left ventricular function is normal with an EF of 60-65%.  Left ventricular wall thickness is normal. Left ventricular size is normal.  Diastolic function not assessed due to significant mitral annular  calcification. No regional wall motion abnormalities are seen.     Right Ventricle  Right ventricular function, chamber size, wall motion, and thickness are  normal.

## 2023-08-11 NOTE — NURSING NOTE
Chief Complaint   Patient presents with    Follow Up     [facility] F/U     Vitals were taken and medications reconciled.    Wilbert Sears, EMT  11:29 AM

## 2023-08-13 RX ORDER — AMOXICILLIN 500 MG/1
CAPSULE ORAL
Qty: 4 CAPSULE | Refills: 3 | Status: SHIPPED | OUTPATIENT
Start: 2023-08-13 | End: 2024-01-24

## 2023-08-16 ENCOUNTER — TELEPHONE (OUTPATIENT)
Dept: CARDIOLOGY | Facility: CLINIC | Age: 88
End: 2023-08-16
Payer: MEDICARE

## 2023-08-16 NOTE — TELEPHONE ENCOUNTER
Called pt.     Sounds like pt was supposed to have an ambulatory BP monitor, but pt/spouse opted to take readings at home themselves over the course of about 24 hrs (they were instructed by hospital staff they could do this instead?).    8/15:  9:16 am: 130/49, HR 63  11:25 am: 141/58, HR 59  1:45 pm: 152/66,  HR 66  10:30 pm: 148/60, HR 67    8/16:  10:10 am: 142/69, HR 69  10:45 am: 142/59, HR 66  1:45 pm: 159/60, HR 66    Will forward readings over to Dr. Checo Logan.

## 2023-08-16 NOTE — TELEPHONE ENCOUNTER
[facility] Call Center    Phone Message    May a detailed message be left on voicemail: yes     Reason for Call: Other: Patient wanted to speak with Dr. Casey Logan regarding her BP reading . Says completed in the last two days and was supposed to call to receive the results. Please call Pt spouse back at [phone]     Action Taken: Other: cardiology    Travel Screening: Not Applicable    .sac

## 2023-08-17 NOTE — TELEPHONE ENCOUNTER
"Called pt and relayed Dr. Checo Logan's message:     \"I'm ok with these blood pressures for now while she is feeling off balance. We do not need to add hypotension to the mix. When her dysequilibrium improves, we can consider added something like a calcium channel blocker.\"     Pt verbalized understanding.Plan to readdress BP + disequilibrium at visit with Marizol on 2/12.  "

## 2023-09-07 DIAGNOSIS — Z78.9 STATIN INTOLERANCE: ICD-10-CM

## 2023-09-07 DIAGNOSIS — E78.5 HYPERLIPIDEMIA LDL GOAL <70: ICD-10-CM

## 2023-09-07 DIAGNOSIS — I25.10 CORONARY ARTERY DISEASE INVOLVING NATIVE CORONARY ARTERY WITHOUT ANGINA PECTORIS, UNSPECIFIED WHETHER NATIVE OR TRANSPLANTED HEART: ICD-10-CM

## 2023-09-12 RX ORDER — EVOLOCUMAB 140 MG/ML
INJECTION, SOLUTION SUBCUTANEOUS
Qty: 2 ML | Refills: 2 | Status: SHIPPED | OUTPATIENT
Start: 2023-09-12 | End: 2023-12-12

## 2023-09-12 NOTE — TELEPHONE ENCOUNTER
evolocumab (REPATHA SURECLICK) 140 MG/ML prefilled autoinjector     14 mL 11 8/11/2022       Last Office Visit : 2-  Future Office visit:  none

## 2023-09-12 NOTE — TELEPHONE ENCOUNTER
Pt was last seen in clinic on 2/13/23. Pt last had evolocumab (REPATHA SURECLICK) 140 MG/ML prefilled autoinjector  refilled on 8/11/22 for a 360 day supply by PCP. Due for refill. Routed ot provider for refill approval.     CASSIE GOMEZ RN on 9/12/2023 at 2:19 PM

## 2023-10-19 ENCOUNTER — TELEPHONE (OUTPATIENT)
Dept: INTERNAL MEDICINE | Facility: CLINIC | Age: 88
End: 2023-10-19
Payer: MEDICARE

## 2023-10-20 ENCOUNTER — TELEPHONE (OUTPATIENT)
Dept: CARDIOLOGY | Facility: CLINIC | Age: 88
End: 2023-10-20
Payer: MEDICARE

## 2023-10-20 NOTE — TELEPHONE ENCOUNTER
PA Initiation    Medication: REPATHA SURECLICK 140 MG/ML SC SOAJ  Insurance Company: [employer] Non-Specialty PA's - Phone [phone] Fax [phone]  Pharmacy Filling the Rx: [facility] [address] [place] [address]  Filling Pharmacy Phone: [phone]  Filling Pharmacy Fax: [phone]  Start Date: 10/20/2023

## 2023-10-25 NOTE — TELEPHONE ENCOUNTER
Prior Authorization Approval    Medication: REPATHA SURECLICK 140 MG/ML SC SOAJ  Authorization Effective Date: 9/20/2023  Authorization Expiration Date: 10/19/2024  Approved Dose/Quantity:   Reference #:     Insurance Company: [employer] Non-Specialty PA's - Phone [phone] Fax [phone]  Expected CoPay: $    CoPay Card Available:      Financial Assistance Needed:   Which Pharmacy is filling the prescription: [facility] [address] [place] [address]  Pharmacy Notified: YES  Patient Notified: **Instructed pharmacy to notify patient when script is ready to /ship.**

## 2023-12-08 DIAGNOSIS — Z78.9 STATIN INTOLERANCE: ICD-10-CM

## 2023-12-08 DIAGNOSIS — I25.10 CORONARY ARTERY DISEASE INVOLVING NATIVE CORONARY ARTERY WITHOUT ANGINA PECTORIS, UNSPECIFIED WHETHER NATIVE OR TRANSPLANTED HEART: ICD-10-CM

## 2023-12-08 DIAGNOSIS — E78.5 HYPERLIPIDEMIA LDL GOAL <70: ICD-10-CM

## 2023-12-11 NOTE — TELEPHONE ENCOUNTER
evolocumab (REPATHA SURECLICK) 140 MG/ML prefilled autoinjector 2 mL 2 9/12/2023     Last Office Visit : 2/13/2023  [facility] [place]     Praveen Moses MD     Future Office visit:  0    Routing refill request to provider for review/approval because:  MEDICATION NOT ON PROTOCOL.

## 2023-12-12 RX ORDER — EVOLOCUMAB 140 MG/ML
140 INJECTION, SOLUTION SUBCUTANEOUS
Qty: 2 ML | Refills: 1 | Status: SHIPPED | OUTPATIENT
Start: 2023-12-12 | End: 2024-02-07

## 2024-01-23 NOTE — PROGRESS NOTES
[facility] Cardiology - [facility] [place]   Cardiology Clinic Note      HPI:   Ms. Lydia Ditez is a pleasant 88 year old female with medical history pertinent for HTN, HLD with statin, intolerance, CKD III, CAD s/p PCI of LAD & RCA (), severe aortic stenosis s/p TAVR (). She presents to cardiology clinic for follow up.    She was last seen in clinic 6 months ago by Dr. Checo Logan. Her blood pressure at that visit was severely elevated (190/90s), she was prescribed a 24h ambulatory BP monitor, opted to check her BP at home instead. Her BP ranges 130-150/60s. At that visit she also noted dysequilibrium.     She prsented to ED in 2023 with dysequilibrium, blurred vision, and abdominal pain. CT head negative for acute pathologies, KUB showed high stool burden. Labs unremarkable. She was referred to PT for postural instability, and for OT for cognitive testing.     Today in clinic, she denies chest pain, palpitations, dizziness, syncope, or lower extremity edema. She has been working with PT with good progress.      PAST MEDICAL HISTORY:  Past Medical History:   Diagnosis Date    Allergies     Anemia     Coronary artery disease     HLD (hyperlipidemia)     HTN (hypertension)     Impaired fasting glucose     Osteoarthritis     Severe aortic stenosis        FAMILY HISTORY:  No family history on file.    SOCIAL HISTORY:  Social History     Socioeconomic History    Marital status:    Tobacco Use    Smoking status: Former     Types: Cigarettes     Quit date: 1980     Years since quittin.0    Smokeless tobacco: Never    Tobacco comments:     1 pack per week if that maybe for 5 years    Substance and Sexual Activity    Alcohol use: Yes     Comment: occ     Drug use: Never       CURRENT MEDICATIONS:  acetaminophen (TYLENOL) 325 MG tablet, Take 325-650 mg by mouth every 6 hours as needed for mild pain  Apoaequorin (PREVAGEN PO),   aspirin 81 MG EC tablet, Take 81 mg by mouth daily  carvedilol (COREG) 6.25 MG tablet,  Take 1 tablet (6.25 mg) by mouth 2 times daily (with meals)  evolocumab (REPATHA SURECLICK) 140 MG/ML prefilled autoinjector, Inject 1 mL (140 mg) Subcutaneous every 14 days  fluticasone (FLONASE) 50 MCG/ACT nasal spray, Spray 2 sprays in nostril every morning   furosemide (LASIX) 20 MG tablet, Take 1 tablet (20 mg) by mouth every other day  alendronate (FOSAMAX) 70 MG tablet, Take 1 tablet (70 mg) by mouth every 7 days Take 60 minutes before am meal with 8 oz. water. Remain upright for 30 minutes. (Patient not taking: Reported on 4/3/2023)    No current facility-administered medications on file prior to visit.      ROS:   Refer to HPI    EXAM:  /61 (BP Location: Right arm, Patient Position: Sitting, Cuff Size: Adult Regular)   Pulse 68   Wt 52.6 kg (116 lb)   SpO2 97%   BMI 19.90 kg/m    GENERAL: Appears comfortable, in no acute distress.   HEENT: Eye symmetrical, no discharge or icterus bilaterally. Mucous membranes moist and without lesions.  CV: RRR, +S1S2, +systolic 2/6 murmur  RESPIRATORY: Respirations regular, even, and unlabored. Lungs CTA throughout.   GI: Soft and non distended with normoactive bowel sounds present in all quadrants. No tenderness, rebound, guarding.   EXTREMITIES: no peripheral edema. 2+ bilateral pedal pulses.   NEUROLOGIC: Alert and oriented x 3. No focal deficits.   MUSCULOSKELETAL: No joint swelling or tenderness.   SKIN: No jaundice. No rashes or lesions.     Labs, reviewed with patient in clinic today:  CBC RESULTS:  Lab Results   Component Value Date    WBC 7.4 03/22/2023    WBC 10.1 03/05/2021    RBC 4.71 03/22/2023    RBC 3.87 03/05/2021    HGB 13.1 03/22/2023    HGB 9.6 (L) 03/05/2021    HCT 41.0 03/22/2023    HCT 32.1 (L) 03/05/2021    MCV 87 03/22/2023    MCV 83 03/05/2021    MCH 27.8 03/22/2023    MCH 24.8 (L) 03/05/2021    MCHC 32.0 03/22/2023    MCHC 29.9 (L) 03/05/2021    RDW 13.0 03/22/2023    RDW 15.8 (H) 03/05/2021     (L) 03/22/2023     (L)  "2021       CMP RESULTS:  Lab Results   Component Value Date     08/10/2023     2021    POTASSIUM 3.8 08/10/2023    POTASSIUM 3.9 10/11/2022    POTASSIUM 3.7 2021    CHLORIDE 104 08/10/2023    CHLORIDE 110 (H) 10/11/2022    CHLORIDE 109 2021    CO2 28 08/10/2023    CO2 30 10/11/2022    CO2 27 2021    ANIONGAP 9 08/10/2023    ANIONGAP 4 10/11/2022    ANIONGAP 6 2021     (H) 08/10/2023     (H) 10/11/2022     (H) 2021    BUN 17.0 08/10/2023    BUN 21 10/11/2022    BUN 26 2021    CR 0.93 08/10/2023    CR 0.98 2021    GFRESTIMATED 60 (L) 08/10/2023    GFRESTIMATED 49 (L) 2022    GFRESTIMATED 53 (L) 2021    GFRESTBLACK 61 2021    DREW 9.7 08/10/2023    DREW 9.3 2021    BILITOTAL 0.4 2023    BILITOTAL 0.4 10/07/2020    ALBUMIN 4.2 2023    ALBUMIN 3.7 2022    ALBUMIN 2.6 (L) 10/07/2020    ALKPHOS 67 2023    ALKPHOS 59 10/07/2020    ALT 16 2023    ALT 35 2021    AST 25 2023    AST 29 2021        INR RESULTS:  Lab Results   Component Value Date    INR 0.97 10/07/2020       Lab Results   Component Value Date    MAG 2.1 2021     No results found for: \"NTBNPI\"  No results found for: \"NTBNP\"    LIPIDS:  Recent Labs   Lab Test 08/10/23  1025 10/17/22  1027   CHOL 188 164   HDL 74 69   LDL 96 72   TRIG 92 114         Echocardiogram:  Recent Results (from the past 4320 hour(s))   Echocardiogram Complete   Result Value    LVEF  60-65%    Narrative    [id number]  [id number]  [id number]  [id number]^KVNG LOBATO^[facility]  Echocardiography Laboratory  [address] [place] [id number]     Name: ZAYNAB RIVAS  MRN: [id number]  : 1935  Study Date: 2023 10:42 AM  Age: 88 yrs  Gender: Female  Patient Location: [facility]  Reason For Study: S/P TAVR (transcatheter aortic valve replacement)  Ordering Physician: KVNG LOBATO, " RANI  Referring Physician: RANI BRICE  Performed By: Jordyn Yip     BSA: 1.6 m2  Height: 64 in  Weight: 121 lb  ______________________________________________________________________________  Procedure  Echocardiogram with two-dimensional, color and spectral Doppler performed.  ______________________________________________________________________________  Interpretation Summary  S/P TAVR with 23 mm Paredes Arik 3 valve on 10/07/2020.Mean aortic gradient  11mmHg. Trace AI.  No significant changes noted.  ______________________________________________________________________________  Left Ventricle  Global and regional left ventricular function is normal with an EF of 60-65%.  Left ventricular wall thickness is normal. Left ventricular size is normal.  Grade I or early diastolic dysfunction. No regional wall motion abnormalities  are seen.     Right Ventricle  Right ventricular function, chamber size, wall motion, and thickness are  normal.     Atria  Both atria appear normal.     Mitral Valve  Mild to moderate mitral annular calcification is present. Trace to mild mitral  insufficiency is present.     Aortic Valve  S/P TAVR with 23 mm Paredes Arik 3 valve on 10/07/2020.Mean aortic gradient  11mmHg. Trace AI.     Tricuspid Valve  The tricuspid valve is normal. Trace tricuspid insufficiency is present.  Pulmonary artery systolic pressure cannot be assessed.     Pulmonic Valve  The valve leaflets are not well visualized. On Doppler interrogation, there is  no significant stenosis or regurgitation.     Vessels  The thoracic aorta is normal. The pulmonary artery and bifurcation cannot be  assessed. The inferior vena cava is normal.     Pericardium  No pericardial effusion is present.     Compared to Previous Study  No significant changes noted.  ______________________________________________________________________________  MMode/2D Measurements & Calculations  IVSd: 0.87 cm  LVIDd: 4.4 cm  LVIDs:  3.1 cm  LVPWd: 0.86 cm  FS: 30.4 %  LV mass(C)d: 122.4 grams  LV mass(C)dI: 77.5 grams/m2  Ao root diam: 2.9 cm  asc Aorta Diam: 3.1 cm  LVOT diam: 2.0 cm  LVOT area: 3.0 cm2  RWT: 0.39  TAPSE: 2.1 cm     Doppler Measurements & Calculations  MV E max karson: 128.0 cm/sec  MV A max karson: 159.5 cm/sec  MV E/A: 0.80  MV dec slope: 324.5 cm/sec2  MV dec time: 0.40 sec  Ao V2 max: 247.3 cm/sec  Ao max P.5 mmHg  Ao V2 mean: 156.1 cm/sec  Ao mean P.4 mmHg  Ao V2 VTI: 57.1 cm  MARLENY(I,D): 1.2 cm2  MARLENY(V,D): 1.1 cm2  LV V1 max PG: 3.3 mmHg  LV V1 max: 90.7 cm/sec  LV V1 VTI: 23.1 cm     SV(LVOT): 69.2 ml  SI(LVOT): 43.8 ml/m2  AV Karson Ratio (DI): 0.37  MARLENY Index (cm2/m2): 0.77  E/E' av.8  Lateral E/e': 31.8  Medial E/e': 21.8  RV S Karson: 12.7 cm/sec     ______________________________________________________________________________  Report approved by: Angelique Hugo 2023 08:31 AM             Coronary Angiogram:    Assessment and Plan:   Ms. Dietz is a 88 year old female with a PMH of HTN, HLD with statin, intolerance, CKD III, CAD s/p PCI of LAD & RCA (), severe aortic stenosis s/p TAVR ()    # Severe aortic stenosis s/p TAVR with 23mm Paredes Arik 3 valve ()  Echo 2023 showed EF with 60-65%, grade I diastolic dysfunction, TAVR valve well-seated, MG 11mmHg.  - continue aspirin 81mg daily  - echo in 6 months     # CAD s/p PCI of LAD & RCA ()  - continue Repatha  - asa 81mg daily    # HTN  Home -130/60s, normotensive in clinic  - carvedilol 6.25mg BID    # HLD  # Statin intolerance   Most recent LDL 96  - continue Repatha q14 days      Follow up:  6 months w/ echo  Chart review time today: 10 minutes  Visit time today: 15 minutes  Total time spent today: 25 minutes        YUE JOE CNP  General Cardiology   24

## 2024-01-24 ENCOUNTER — OFFICE VISIT (OUTPATIENT)
Dept: CARDIOLOGY | Facility: CLINIC | Age: 89
End: 2024-01-24
Attending: INTERNAL MEDICINE
Payer: MEDICARE

## 2024-01-24 VITALS
OXYGEN SATURATION: 97 % | DIASTOLIC BLOOD PRESSURE: 61 MMHG | WEIGHT: 116 LBS | BODY MASS INDEX: 19.9 KG/M2 | SYSTOLIC BLOOD PRESSURE: 127 MMHG | HEART RATE: 68 BPM

## 2024-01-24 DIAGNOSIS — Z95.2 S/P TAVR (TRANSCATHETER AORTIC VALVE REPLACEMENT): ICD-10-CM

## 2024-01-24 DIAGNOSIS — E78.5 HYPERLIPIDEMIA, UNSPECIFIED HYPERLIPIDEMIA TYPE: ICD-10-CM

## 2024-01-24 DIAGNOSIS — I10 PRIMARY HYPERTENSION: Primary | ICD-10-CM

## 2024-01-24 DIAGNOSIS — I25.10 CORONARY ARTERY DISEASE INVOLVING NATIVE CORONARY ARTERY OF NATIVE HEART WITHOUT ANGINA PECTORIS: ICD-10-CM

## 2024-01-24 DIAGNOSIS — Z78.9 STATIN INTOLERANCE: ICD-10-CM

## 2024-01-24 PROCEDURE — G0463 HOSPITAL OUTPT CLINIC VISIT: HCPCS | Performed by: CASE MANAGER/CARE COORDINATOR

## 2024-01-24 PROCEDURE — 99213 OFFICE O/P EST LOW 20 MIN: CPT | Performed by: CASE MANAGER/CARE COORDINATOR

## 2024-01-24 RX ORDER — ASPIRIN 81 MG/1
81 TABLET ORAL DAILY
COMMUNITY

## 2024-01-24 ASSESSMENT — PAIN SCALES - GENERAL: PAINLEVEL: NO PAIN (0)

## 2024-01-24 NOTE — PATIENT INSTRUCTIONS
You were seen today in the Cardiovascular Clinic at the [facility] by:       YUE VAZ CNP    Your visit summary and instructions are as follows:    No medication changes  Keep checking blood pressure   Echocardiogram (heart ultrasound) in 6 months       Return to cardiology clinic in 6 months to see Dr. Checo Logan     Thank you for your visit today!     Please MyChart message me or call my nurse if you have any questions or concerns.      During Business Hours:  [phone], option # 1 (University) then option # 4 (medical questions) and ask to speak with my nurse.     After hours, weekends or holidays:   [phone], Option #4  Ask to speak to the On-Call Cardiologist. Inform them you are a cardiology patient at the [facility].

## 2024-01-24 NOTE — NURSING NOTE
Chief Complaint   Patient presents with    Follow Up     6 month follow up for Dr. Checo Logan       Vitals were taken, medications reviewed.    Pretty Salter, EMT   1:27 PM

## 2024-01-24 NOTE — LETTER
2024      RE: Lydia Dietz  [address] [facility] [address]       Dear Colleague,    Thank you for the opportunity to participate in the care of your patient, Lydia Dietz, at the [facility] HEART [facility]IN[facility] [place] at [facility]. Please see a copy of my visit note below.      [facility] - [facility]   [place] Clinic Note      HPI:   Ms. Lydia Dietz is a pleasant 88 year old female with medical history pertinent for HTN, HLD with statin, intolerance, CKD III, CAD s/p PCI of LAD & RCA (), severe aortic stenosis s/p TAVR (). She presents to cardiology clinic for follow up.    She was last seen in clinic 6 months ago by Dr. Checo Logan. Her blood pressure at that visit was severely elevated (190/90s), she was prescribed a 24h ambulatory BP monitor, opted to check her BP at home instead. Her BP ranges 130-150/60s. At that visit she also noted dysequilibrium.     She prsented to ED in 2023 with dysequilibrium, blurred vision, and abdominal pain. CT head negative for acute pathologies, KUB showed high stool burden. Labs unremarkable. She was referred to PT for postural instability, and for OT for cognitive testing.     Today in clinic, she denies chest pain, palpitations, dizziness, syncope, or lower extremity edema. She has been working with PT with good progress.      PAST MEDICAL HISTORY:  Past Medical History:   Diagnosis Date    Allergies     Anemia     Coronary artery disease     HLD (hyperlipidemia)     HTN (hypertension)     Impaired fasting glucose     Osteoarthritis     Severe aortic stenosis        FAMILY HISTORY:  No family history on file.    SOCIAL HISTORY:  Social History     Socioeconomic History    Marital status:    Tobacco Use    Smoking status: Former     Types: Cigarettes     Quit date:      Years since quittin.0    Smokeless tobacco: Never    Tobacco comments:     1 pack per week if that maybe  for 5 years    Substance and Sexual Activity    Alcohol use: Yes     Comment: occ     Drug use: Never       CURRENT MEDICATIONS:  acetaminophen (TYLENOL) 325 MG tablet, Take 325-650 mg by mouth every 6 hours as needed for mild pain  Apoaequorin (PREVAGEN PO),   aspirin 81 MG EC tablet, Take 81 mg by mouth daily  carvedilol (COREG) 6.25 MG tablet, Take 1 tablet (6.25 mg) by mouth 2 times daily (with meals)  evolocumab (REPATHA SURECLICK) 140 MG/ML prefilled autoinjector, Inject 1 mL (140 mg) Subcutaneous every 14 days  fluticasone (FLONASE) 50 MCG/ACT nasal spray, Spray 2 sprays in nostril every morning   furosemide (LASIX) 20 MG tablet, Take 1 tablet (20 mg) by mouth every other day  alendronate (FOSAMAX) 70 MG tablet, Take 1 tablet (70 mg) by mouth every 7 days Take 60 minutes before am meal with 8 oz. water. Remain upright for 30 minutes. (Patient not taking: Reported on 4/3/2023)    No current facility-administered medications on file prior to visit.      ROS:   Refer to HPI    EXAM:  /61 (BP Location: Right arm, Patient Position: Sitting, Cuff Size: Adult Regular)   Pulse 68   Wt 52.6 kg (116 lb)   SpO2 97%   BMI 19.90 kg/m    GENERAL: Appears comfortable, in no acute distress.   HEENT: Eye symmetrical, no discharge or icterus bilaterally. Mucous membranes moist and without lesions.  CV: RRR, +S1S2, +systolic 2/6 murmur  RESPIRATORY: Respirations regular, even, and unlabored. Lungs CTA throughout.   GI: Soft and non distended with normoactive bowel sounds present in all quadrants. No tenderness, rebound, guarding.   EXTREMITIES: no peripheral edema. 2+ bilateral pedal pulses.   NEUROLOGIC: Alert and oriented x 3. No focal deficits.   MUSCULOSKELETAL: No joint swelling or tenderness.   SKIN: No jaundice. No rashes or lesions.     Labs, reviewed with patient in clinic today:  CBC RESULTS:  Lab Results   Component Value Date    WBC 7.4 03/22/2023    WBC 10.1 03/05/2021    RBC 4.71 03/22/2023    RBC 3.87  "03/05/2021    HGB 13.1 03/22/2023    HGB 9.6 (L) 03/05/2021    HCT 41.0 03/22/2023    HCT 32.1 (L) 03/05/2021    MCV 87 03/22/2023    MCV 83 03/05/2021    MCH 27.8 03/22/2023    MCH 24.8 (L) 03/05/2021    MCHC 32.0 03/22/2023    MCHC 29.9 (L) 03/05/2021    RDW 13.0 03/22/2023    RDW 15.8 (H) 03/05/2021     (L) 03/22/2023     (L) 03/05/2021       CMP RESULTS:  Lab Results   Component Value Date     08/10/2023     05/17/2021    POTASSIUM 3.8 08/10/2023    POTASSIUM 3.9 10/11/2022    POTASSIUM 3.7 05/17/2021    CHLORIDE 104 08/10/2023    CHLORIDE 110 (H) 10/11/2022    CHLORIDE 109 05/17/2021    CO2 28 08/10/2023    CO2 30 10/11/2022    CO2 27 05/17/2021    ANIONGAP 9 08/10/2023    ANIONGAP 4 10/11/2022    ANIONGAP 6 05/17/2021     (H) 08/10/2023     (H) 10/11/2022     (H) 05/17/2021    BUN 17.0 08/10/2023    BUN 21 10/11/2022    BUN 26 05/17/2021    CR 0.93 08/10/2023    CR 0.98 05/17/2021    GFRESTIMATED 60 (L) 08/10/2023    GFRESTIMATED 49 (L) 01/11/2022    GFRESTIMATED 53 (L) 05/17/2021    GFRESTBLACK 61 05/17/2021    DREW 9.7 08/10/2023    DREW 9.3 05/17/2021    BILITOTAL 0.4 01/13/2023    BILITOTAL 0.4 10/07/2020    ALBUMIN 4.2 01/13/2023    ALBUMIN 3.7 02/07/2022    ALBUMIN 2.6 (L) 10/07/2020    ALKPHOS 67 01/13/2023    ALKPHOS 59 10/07/2020    ALT 16 01/13/2023    ALT 35 03/03/2021    AST 25 01/13/2023    AST 29 03/03/2021        INR RESULTS:  Lab Results   Component Value Date    INR 0.97 10/07/2020       Lab Results   Component Value Date    MAG 2.1 03/04/2021     No results found for: \"NTBNPI\"  No results found for: \"NTBNP\"    LIPIDS:  Recent Labs   Lab Test 08/10/23  1025 10/17/22  1027   CHOL 188 164   HDL 74 69   LDL 96 72   TRIG 92 114         Echocardiogram:  Recent Results (from the past 4320 hour(s))   Echocardiogram Complete   Result Value    LVEF  60-65%    [facility]    [id number]  [id number]  [id number]  [id number]^VAN'T HOF^[facility] " [facility]  Echocardiography Laboratory  [address] [place] [id number]     Name: ZAYNAB RIVAS  MRN: [id number]  : 1935  Study Date: 2023 10:42 AM  Age: 88 yrs  Gender: Female  Patient Location: [facility]  Reason For Study: S/P TAVR (transcatheter aortic valve replacement)  Ordering Physician: RANI BRICE  Referring Physician: RANI BRICE  Performed By: Jordyn Yip     BSA: 1.6 m2  Height: 64 in  Weight: 121 lb  ______________________________________________________________________________  Procedure  Echocardiogram with two-dimensional, color and spectral Doppler performed.  ______________________________________________________________________________  Interpretation Summary  S/P TAVR with 23 mm Paredes Arik 3 valve on 10/07/2020.Mean aortic gradient  11mmHg. Trace AI.  No significant changes noted.  ______________________________________________________________________________  Left Ventricle  Global and regional left ventricular function is normal with an EF of 60-65%.  Left ventricular wall thickness is normal. Left ventricular size is normal.  Grade I or early diastolic dysfunction. No regional wall motion abnormalities  are seen.     Right Ventricle  Right ventricular function, chamber size, wall motion, and thickness are  normal.     Atria  Both atria appear normal.     Mitral Valve  Mild to moderate mitral annular calcification is present. Trace to mild mitral  insufficiency is present.     Aortic Valve  S/P TAVR with 23 mm Paredes Arik 3 valve on 10/07/2020.Mean aortic gradient  11mmHg. Trace AI.     Tricuspid Valve  The tricuspid valve is normal. Trace tricuspid insufficiency is present.  Pulmonary artery systolic pressure cannot be assessed.     Pulmonic Valve  The valve leaflets are not well visualized. On Doppler interrogation, there is  no significant stenosis or regurgitation.     Vessels  The thoracic aorta is normal. The pulmonary  artery and bifurcation cannot be  assessed. The inferior vena cava is normal.     Pericardium  No pericardial effusion is present.     Compared to Previous Study  No significant changes noted.  ______________________________________________________________________________  MMode/2D Measurements & Calculations  IVSd: 0.87 cm  LVIDd: 4.4 cm  LVIDs: 3.1 cm  LVPWd: 0.86 cm  FS: 30.4 %  LV mass(C)d: 122.4 grams  LV mass(C)dI: 77.5 grams/m2  Ao root diam: 2.9 cm  asc Aorta Diam: 3.1 cm  LVOT diam: 2.0 cm  LVOT area: 3.0 cm2  RWT: 0.39  TAPSE: 2.1 cm     Doppler Measurements & Calculations  MV E max karson: 128.0 cm/sec  MV A max karson: 159.5 cm/sec  MV E/A: 0.80  MV dec slope: 324.5 cm/sec2  MV dec time: 0.40 sec  Ao V2 max: 247.3 cm/sec  Ao max P.5 mmHg  Ao V2 mean: 156.1 cm/sec  Ao mean P.4 mmHg  Ao V2 VTI: 57.1 cm  MARLENY(I,D): 1.2 cm2  MARLENY(V,D): 1.1 cm2  LV V1 max PG: 3.3 mmHg  LV V1 max: 90.7 cm/sec  LV V1 VTI: 23.1 cm     SV(LVOT): 69.2 ml  SI(LVOT): 43.8 ml/m2  AV Karson Ratio (DI): 0.37  MARLENY Index (cm2/m2): 0.77  E/E' av.8  Lateral E/e': 31.8  Medial E/e': 21.8  RV S Karson: 12.7 cm/sec     ______________________________________________________________________________  Report approved by: Angelique Hugo 2023 08:31 AM             Coronary Angiogram:    Assessment and Plan:   Ms. Dietz is a 88 year old female with a PMH of HTN, HLD with statin, intolerance, CKD III, CAD s/p PCI of LAD & RCA (), severe aortic stenosis s/p TAVR ()    # Severe aortic stenosis s/p TAVR with 23mm Paredes Arik 3 valve ()  Echo 2023 showed EF with 60-65%, grade I diastolic dysfunction, TAVR valve well-seated, MG 11mmHg.  - continue aspirin 81mg daily  - echo in 6 months     # CAD s/p PCI of LAD & RCA ()  - continue Repatha  - asa 81mg daily    # HTN  Home -130/60s, normotensive in clinic  - carvedilol 6.25mg BID    # HLD  # Statin intolerance   Most recent LDL 96  - continue Repatha q14 days      Follow  up:  6 months w/ echo  Chart review time today: 10 minutes  Visit time today: 15 minutes  Total time spent today: 25 minutes        YUE JOE CNP  General Cardiology   01/24/24               Please do not hesitate to contact me if you have any questions/concerns.     Sincerely,     YUE JOE CNP

## 2024-02-02 DIAGNOSIS — Z78.9 STATIN INTOLERANCE: ICD-10-CM

## 2024-02-02 DIAGNOSIS — I25.10 CORONARY ARTERY DISEASE INVOLVING NATIVE CORONARY ARTERY WITHOUT ANGINA PECTORIS, UNSPECIFIED WHETHER NATIVE OR TRANSPLANTED HEART: ICD-10-CM

## 2024-02-02 DIAGNOSIS — E78.5 HYPERLIPIDEMIA LDL GOAL <70: ICD-10-CM

## 2024-02-06 NOTE — TELEPHONE ENCOUNTER
evolocumab (REPATHA SURECLICK) 140 MG/ML prefilled autoinjector 2 mL 1 12/12/2023     Last Office Visit : 2/13/2023  [facility] [place]     Praveen Moses MD     Future Office visit:  3/18/2024     Routing refill request to provider for review/approval because:  Drug not on the G, P or [facility] refill protocol

## 2024-02-07 RX ORDER — EVOLOCUMAB 140 MG/ML
140 INJECTION, SOLUTION SUBCUTANEOUS
Qty: 2 ML | Refills: 1 | Status: SHIPPED | OUTPATIENT
Start: 2024-02-07 | End: 2024-04-03

## 2024-03-11 ENCOUNTER — TELEPHONE (OUTPATIENT)
Dept: INTERNAL MEDICINE | Facility: CLINIC | Age: 89
End: 2024-03-11
Payer: MEDICARE

## 2024-03-11 NOTE — TELEPHONE ENCOUNTER
[facility] Call Center    Phone Message    May a detailed message be left on voicemail: yes     Reason for Call: Medication Question or concern regarding medication   Prescription Clarification  Name of Medication: Evolcumab  Prescribing Provider: Aurelia     What on the order needs clarification? Patient  called in and the pharmacy is having an issue getting the  evolocumab (REPATHA SURECLICK) 140 MG/ML prefilled autoinjector, please call.       Action Taken: Message routed to:  [facility]    Travel Screening: Not Applicable

## 2024-03-11 NOTE — TELEPHONE ENCOUNTER
Called pharmacy- apparently there was an issue with the manufacture coupon they were using but they were able to bill insurance for $9. Patient filled.    Vikram Renee RN on 3/11/2024 at 3:26 PM

## 2024-03-26 DIAGNOSIS — E78.5 HYPERLIPIDEMIA LDL GOAL <70: ICD-10-CM

## 2024-03-26 DIAGNOSIS — Z78.9 STATIN INTOLERANCE: ICD-10-CM

## 2024-03-26 DIAGNOSIS — I25.10 CORONARY ARTERY DISEASE INVOLVING NATIVE CORONARY ARTERY WITHOUT ANGINA PECTORIS, UNSPECIFIED WHETHER NATIVE OR TRANSPLANTED HEART: ICD-10-CM

## 2024-04-02 NOTE — TELEPHONE ENCOUNTER
Patient calling to see when they will be filling her medication for   evolocumab (REPATHA SURECLICK) 140 MG/ML prefilled autoinjector. Please call when the prescription has been sent to the pharmacy. She does want to talk to the nurse about this.

## 2024-04-02 NOTE — TELEPHONE ENCOUNTER
evolocumab (REPATHA SURECLICK) 140 MG/ML prefilled autoinjector   2 mL 1 2/7/2024     Last Office Visit : 2-  Future Office visit:  none

## 2024-04-03 ENCOUNTER — TELEPHONE (OUTPATIENT)
Dept: INTERNAL MEDICINE | Facility: CLINIC | Age: 89
End: 2024-04-03
Payer: MEDICARE

## 2024-04-03 RX ORDER — EVOLOCUMAB 140 MG/ML
INJECTION, SOLUTION SUBCUTANEOUS
Qty: 2 ML | Refills: 0 | Status: SHIPPED | OUTPATIENT
Start: 2024-04-03 | End: 2024-05-01

## 2024-04-03 NOTE — TELEPHONE ENCOUNTER
Patient confirmed scheduled appointment:  Date: 6/18  Time: 10am  Visit type: epp  Provider: pcp  Location: [facility]

## 2024-04-08 DIAGNOSIS — I10 BENIGN ESSENTIAL HYPERTENSION: ICD-10-CM

## 2024-04-16 NOTE — TELEPHONE ENCOUNTER
furosemide (LASIX) 20 MG tablet 180 tablet 3 1/13/2023  ----------------------  Last Office Visit : 2/13/2023  [facility] [place]     Praveen Moses MD     Future Office visit:    6/18/2024 Status: Estephanie    Arrive By: 9:45 AM     Appointment Time: 10:00 AM Length: 30   Visit Type: [facility] PHYSICAL [[id number]] ANDRES:     Provider: Praveen Moses MD Department: [facility] INTERNAL MEDICINE     ----------------------      Routing refill request to provider for review/approval because:  Last GFR abnormal, please review. 90 days pended.    BP Readings from Last 3 Encounters:   01/24/24 127/61   08/11/23 (!) 190/80   03/22/23 (!) 171/71     Last Comprehensive Metabolic Panel:  Lab Results   Component Value Date     08/10/2023    POTASSIUM 3.8 08/10/2023    CHLORIDE 104 08/10/2023    CO2 28 08/10/2023    ANIONGAP 9 08/10/2023     (H) 08/10/2023    BUN 17.0 08/10/2023    CR 0.93 08/10/2023    GFRESTIMATED 60 (L) 08/10/2023    DREW 9.7 08/10/2023           Pass/Fail Protocol Criteria:    Diuretics (Including Combos) Protocol [id number]/16/2024 01:15 PM   Protocol Details Has GFR on file in past 12 months and most recent value is normal    Recent (12 mo) or future (90 days) visit within the authorizing provider's specialty    Blood pressure under 140/90 in past 12 months    Medication is active on med list    Medication indicated for associated diagnosis    Patient is age 18 or older    No active pregancy on record    No positive pregnancy test in past 12 months

## 2024-04-17 RX ORDER — FUROSEMIDE 20 MG
20 TABLET ORAL EVERY OTHER DAY
Qty: 45 TABLET | Refills: 0 | Status: SHIPPED | OUTPATIENT
Start: 2024-04-17 | End: 2024-07-09

## 2024-04-26 ENCOUNTER — TELEPHONE (OUTPATIENT)
Dept: INTERNAL MEDICINE | Facility: CLINIC | Age: 89
End: 2024-04-26
Payer: MEDICARE

## 2024-04-26 DIAGNOSIS — I25.10 CORONARY ARTERY DISEASE INVOLVING NATIVE CORONARY ARTERY WITHOUT ANGINA PECTORIS, UNSPECIFIED WHETHER NATIVE OR TRANSPLANTED HEART: ICD-10-CM

## 2024-04-26 DIAGNOSIS — Z78.9 STATIN INTOLERANCE: ICD-10-CM

## 2024-04-26 DIAGNOSIS — E78.5 HYPERLIPIDEMIA LDL GOAL <70: ICD-10-CM

## 2024-05-01 RX ORDER — EVOLOCUMAB 140 MG/ML
INJECTION, SOLUTION SUBCUTANEOUS
Qty: 2 ML | Refills: 0 | Status: SHIPPED | OUTPATIENT
Start: 2024-05-01 | End: 2024-05-30

## 2024-05-01 NOTE — TELEPHONE ENCOUNTER
Health Call Center    Phone Message    May a detailed message be left on voicemail: yes     Reason for Call: Medication Question or concern regarding medication   Prescription Clarification  Name of Medication: REPATHA SURECLICK 140 MG/ML prefilled autoinjector   Prescribing Provider: Praveen Moses MD       Pharmacy:   [facility] [address] [place] [address]      What on the order needs clarification?     The patient wanted to speak with the care team to understand if she needs to keep taking this medication or not, please review and follow up thank you.      Action Taken: Message routed to:  [facility] pcc    Travel Screening: Not Applicable

## 2024-05-01 NOTE — TELEPHONE ENCOUNTER
Called patient and lvm- should stay on this. Sent refill.    Vikram Renee RN on 5/1/2024 at 2:07 PM

## 2024-05-09 ENCOUNTER — TELEPHONE (OUTPATIENT)
Dept: CARDIOLOGY | Facility: CLINIC | Age: 89
End: 2024-05-09
Payer: MEDICARE

## 2024-05-09 NOTE — TELEPHONE ENCOUNTER
Left Voicemail (1st Attempt) for the patient to call back and schedule the following:    Appointment type: rtn cardio  Provider: vinicio  Return date: 07/24/24  Specialty phone number: [phone] opt 1  Additional appointment(s) needed: n/a   Additonal Notes: n/a

## 2024-05-12 ENCOUNTER — APPOINTMENT (OUTPATIENT)
Dept: GENERAL RADIOLOGY | Facility: CLINIC | Age: 89
End: 2024-05-12
Attending: EMERGENCY MEDICINE
Payer: MEDICARE

## 2024-05-12 ENCOUNTER — APPOINTMENT (OUTPATIENT)
Dept: CT IMAGING | Facility: CLINIC | Age: 89
End: 2024-05-12
Attending: EMERGENCY MEDICINE
Payer: MEDICARE

## 2024-05-12 ENCOUNTER — HOSPITAL ENCOUNTER (EMERGENCY)
Facility: CLINIC | Age: 89
Discharge: HOME | End: 2024-05-12
Attending: EMERGENCY MEDICINE | Admitting: EMERGENCY MEDICINE
Payer: MEDICARE

## 2024-05-12 VITALS
TEMPERATURE: 98.7 F | RESPIRATION RATE: 15 BRPM | HEIGHT: 66 IN | BODY MASS INDEX: 19.61 KG/M2 | DIASTOLIC BLOOD PRESSURE: 58 MMHG | HEART RATE: 76 BPM | SYSTOLIC BLOOD PRESSURE: 151 MMHG | WEIGHT: 122 LBS | OXYGEN SATURATION: 96 %

## 2024-05-12 DIAGNOSIS — S39.012A LOW BACK STRAIN, INITIAL ENCOUNTER: ICD-10-CM

## 2024-05-12 DIAGNOSIS — S61.213A LACERATION OF LEFT MIDDLE FINGER WITHOUT FOREIGN BODY WITHOUT DAMAGE TO NAIL, INITIAL ENCOUNTER: ICD-10-CM

## 2024-05-12 LAB
ANION GAP SERPL CALCULATED.3IONS-SCNC: 16 MMOL/L (ref 7–15)
ATRIAL RATE - MUSE: 70 BPM
BASOPHILS # BLD AUTO: 0 10E3/UL (ref 0–0.2)
BASOPHILS NFR BLD AUTO: 1 %
BUN SERPL-MCNC: 26.8 MG/DL (ref 8–23)
CALCIUM SERPL-MCNC: 9.5 MG/DL (ref 8.8–10.2)
CHLORIDE SERPL-SCNC: 103 MMOL/L (ref 98–107)
CREAT SERPL-MCNC: 1.12 MG/DL (ref 0.51–0.95)
DEPRECATED HCO3 PLAS-SCNC: 26 MMOL/L (ref 22–29)
DIASTOLIC BLOOD PRESSURE - MUSE: NORMAL MMHG
EGFRCR SERPLBLD CKD-EPI 2021: 47 ML/MIN/1.73M2
EOSINOPHIL # BLD AUTO: 0.1 10E3/UL (ref 0–0.7)
EOSINOPHIL NFR BLD AUTO: 1 %
ERYTHROCYTE [DISTWIDTH] IN BLOOD BY AUTOMATED COUNT: 12.4 % (ref 10–15)
GLUCOSE SERPL-MCNC: 86 MG/DL (ref 70–99)
HCT VFR BLD AUTO: 39.1 % (ref 35–47)
HGB BLD-MCNC: 12.7 G/DL (ref 11.7–15.7)
IMM GRANULOCYTES # BLD: 0.1 10E3/UL
IMM GRANULOCYTES NFR BLD: 1 %
INTERPRETATION ECG - MUSE: NORMAL
LYMPHOCYTES # BLD AUTO: 1.5 10E3/UL (ref 0.8–5.3)
LYMPHOCYTES NFR BLD AUTO: 18 %
MCH RBC QN AUTO: 27.8 PG (ref 26.5–33)
MCHC RBC AUTO-ENTMCNC: 32.5 G/DL (ref 31.5–36.5)
MCV RBC AUTO: 86 FL (ref 78–100)
MONOCYTES # BLD AUTO: 0.7 10E3/UL (ref 0–1.3)
MONOCYTES NFR BLD AUTO: 9 %
NEUTROPHILS # BLD AUTO: 6 10E3/UL (ref 1.6–8.3)
NEUTROPHILS NFR BLD AUTO: 70 %
NRBC # BLD AUTO: 0 10E3/UL
NRBC BLD AUTO-RTO: 0 /100
P AXIS - MUSE: 60 DEGREES
PLATELET # BLD AUTO: 131 10E3/UL (ref 150–450)
POTASSIUM SERPL-SCNC: 4.1 MMOL/L (ref 3.4–5.3)
PR INTERVAL - MUSE: 174 MS
QRS DURATION - MUSE: 112 MS
QT - MUSE: 426 MS
QTC - MUSE: 460 MS
R AXIS - MUSE: -63 DEGREES
RBC # BLD AUTO: 4.57 10E6/UL (ref 3.8–5.2)
SODIUM SERPL-SCNC: 145 MMOL/L (ref 135–145)
SYSTOLIC BLOOD PRESSURE - MUSE: NORMAL MMHG
T AXIS - MUSE: 48 DEGREES
VENTRICULAR RATE- MUSE: 70 BPM
WBC # BLD AUTO: 8.4 10E3/UL (ref 4–11)

## 2024-05-12 PROCEDURE — 12001 RPR S/N/AX/GEN/TRNK 2.5CM/<: CPT

## 2024-05-12 PROCEDURE — 80048 BASIC METABOLIC PNL TOTAL CA: CPT | Performed by: EMERGENCY MEDICINE

## 2024-05-12 PROCEDURE — 99285 EMERGENCY DEPT VISIT HI MDM: CPT | Mod: 25

## 2024-05-12 PROCEDURE — 70450 CT HEAD/BRAIN W/O DYE: CPT | Mod: MA

## 2024-05-12 PROCEDURE — 73130 X-RAY EXAM OF HAND: CPT | Mod: RT

## 2024-05-12 PROCEDURE — 93005 ELECTROCARDIOGRAM TRACING: CPT

## 2024-05-12 PROCEDURE — 85025 COMPLETE CBC W/AUTO DIFF WBC: CPT | Performed by: EMERGENCY MEDICINE

## 2024-05-12 PROCEDURE — 250N000013 HC RX MED GY IP 250 OP 250 PS 637: Performed by: EMERGENCY MEDICINE

## 2024-05-12 PROCEDURE — 36415 COLL VENOUS BLD VENIPUNCTURE: CPT | Performed by: EMERGENCY MEDICINE

## 2024-05-12 PROCEDURE — 72100 X-RAY EXAM L-S SPINE 2/3 VWS: CPT

## 2024-05-12 RX ORDER — ACETAMINOPHEN 325 MG/1
975 TABLET ORAL ONCE
Status: COMPLETED | OUTPATIENT
Start: 2024-05-12 | End: 2024-05-12

## 2024-05-12 RX ADMIN — ACETAMINOPHEN 975 MG: 325 TABLET, FILM COATED ORAL at 18:24

## 2024-05-12 ASSESSMENT — ACTIVITIES OF DAILY LIVING (ADL)
ADLS_ACUITY_SCORE: 37

## 2024-05-12 NOTE — ED NOTES
Bed: ED03  Expected date:   Expected time:   Means of arrival:   Comments:  [facility] 89 F fall/dizzy lac on hand unk thinners unk loc

## 2024-05-12 NOTE — ED PROVIDER NOTES
Emergency Department Note      History of Present Illness     Chief Complaint  Fall    HPI  Lydia Dietz is a 89 year old female with history of dementia and aortic stenosis who presents to the ED with her family for evaluation after a fall. Lydia reports feeling shaky and off-balance today while leaving a restaurant with family. She went to the restroom with her daughter, was incontinent of urine, and fell onto her left side. Daughter did not directly witness the fall, but does not believe the patient lost consciousness. Daughter was able to assist the patient up to sit on a toilet seat and daughter noted the patient was confused, distracted, and hyperventilating. She sustained lacerations to her left 3rd and 4th digits, which initially bled but are now controlled. Also notes she has chronic back pain but is unsure whether it worsened after the fall today. Patient is concerned because these episodes of shakiness and off-balance are increasing in frequency. She sustained a fall last week as well. Patient denies any recent medication changes. She denies neck pain, hip pain, chest pain, shortness of breath, hematochezia, or melena.     Independent Historian  Daughter as detailed above.    Review of External Notes  Primary care office visit from May 6 of this year was reviewed for hypertension, she is status post TAVR.  ED note from 5/2 mentions bifascicular block on EKG.    Past Medical History   Medical History and Problem List  Past Medical History:   Diagnosis Date    Allergies     Anemia     Coronary artery disease     HLD (hyperlipidemia)     HTN (hypertension)     Impaired fasting glucose     Osteoarthritis     Severe aortic stenosis        Medications  acetaminophen (TYLENOL) 325 MG tablet  Apoaequorin (PREVAGEN PO)  aspirin 81 MG EC tablet  carvedilol (COREG) 6.25 MG tablet  evolocumab (REPATHA SURECLICK) 140 MG/ML prefilled autoinjector  fluticasone (FLONASE) 50 MCG/ACT nasal spray  furosemide (LASIX)  "20 MG tablet        Surgical History   Past Surgical History:   Procedure Laterality Date    AS LAP INCISIONAL HERNIA REPAIR      BREAST BIOPSY, CORE RT/LT      X3    CHOLECYSTECTOMY      COLECTOMY PARTIAL  2019    COLONOSCOPY N/A 2/17/2022    Procedure: COLONOSCOPY;  Surgeon: Karlos Holley MD;  Location: [facility]    CV LEFT HEART CATH N/A 9/9/2020    Procedure: Left Heart Cath;  Surgeon: Leonard Pastor MD;  Location:  HEART CARDIAC CATH LAB    CV PCI ANGIOPLASTY N/A 9/9/2020    Procedure: CV PCI ANGIOPLASTY;  Surgeon: Leonard Pastor MD;  Location:  HEART CARDIAC CATH LAB    CV TRANSCATHETER AORTIC VALVE REPLACEMENT N/A 10/7/2020    Procedure: Transfemoral, subclavian (SIFUENTES) or transapical transcatheter aortic valve replacement 23mm sifuentes valve placed. cardiovascular standby.;  Surgeon: Leonard Pastor MD;  Location: [facility]    ESOPHAGOSCOPY, GASTROSCOPY, DUODENOSCOPY (EGD), COMBINED N/A 2/17/2022    Procedure: ESOPHAGOGASTRODUODENOSCOPY (EGD);  Surgeon: Karlos Holley MD;  [facility]    HC LAPAROSCOPY, OVIDUCT/OVARY; REMOVAL      HEART CATH FEMORAL CANNULIZATION WITH OPEN STANDBY REPAIR AORTIC VALVE N/A 10/7/2020    Procedure: Cardiac standby. Perfusion standby.;  Surgeon: Richi Olmos MD;  Location: [facility]    HYSTERECTOMY TOTAL ABDOMINAL      IR LUMBAR PUNCTURE  8/12/2021    IR LUMBAR PUNCTURE  11/28/2016    IR LUMBAR PUNCTURE  8/29/2018     Physical Exam   Patient Vitals for the past 24 hrs:   BP Temp Temp src Pulse Resp SpO2 Height Weight   05/12/24 1940 (!) 151/58 -- -- 76 15 96 % -- --   05/12/24 1542 (!) 146/51 98.7  F (37.1  C) Oral 70 -- 99 % 1.676 m (5' 6\") 55.3 kg (122 lb)     Physical Exam  General: Alert, interactive. Tremulous, pleasantly confused  Head:  Scalp is atraumatic  Eyes:  The pupils are equal, round, and reactive to light    EOM's intact    No scleral icterus  ENT:      Nose:  The external nose is normal  Ears:  External ears are " normal  Mouth/Throat: Mucus membranes are moist      Neck:  Normal range of motion.      There is no rigidity.    Trachea is in the midline         CV:  Regular rate and rhythm    No murmur   Resp:  Breath sounds are clear bilaterally    Non-labored, no retractions or accessory muscle use      GI:  Abdomen is soft, no distension, no tenderness.       MS:  Normal strength in all 4 extremities full range of motion of left index finger, tenderness to the lumbar sacral spine  Skin:  2.5 cm laceration to the radial aspect of the left middle finger tendon is visualized and is intact  Neuro:   Moving all 4 extremities  Diagnostics   Lab Results   Labs Ordered and Resulted from Time of ED Arrival to Time of ED Departure   BASIC METABOLIC PANEL - Abnormal       Result Value    Sodium 145      Potassium 4.1      Chloride 103      Carbon Dioxide (CO2) 26      Anion Gap 16 (*)     Urea Nitrogen 26.8 (*)     Creatinine 1.12 (*)     GFR Estimate 47 (*)     Calcium 9.5      Glucose 86     CBC WITH PLATELETS AND DIFFERENTIAL - Abnormal    WBC Count 8.4      RBC Count 4.57      Hemoglobin 12.7      Hematocrit 39.1      MCV 86      MCH 27.8      MCHC 32.5      RDW 12.4      Platelet Count 131 (*)     % Neutrophils 70      % Lymphocytes 18      % Monocytes 9      % Eosinophils 1      % Basophils 1      % Immature Granulocytes 1      NRBCs per 100 WBC 0      Absolute Neutrophils 6.0      Absolute Lymphocytes 1.5      Absolute Monocytes 0.7      Absolute Eosinophils 0.1      Absolute Basophils 0.0      Absolute Immature Granulocytes 0.1      Absolute NRBCs 0.0         Imaging  CT Head w/o Contrast   Final Result   IMPRESSION:   1.  Left frontotemporal scalp hematoma. No fracture.   2.  No acute intracranial injury.         XR Hand Right 3 Views   Final Result   IMPRESSION: Anatomic alignment right hand. No acute displaced right hand fracture is identified. Diffuse bone demineralization in the fingers. Apparent soft tissue swelling  distal aspects of the index greater than long fingers. Severe STT joint space    narrowing with mild joint space narrowing at the thumb CMC joint. Mild distal radial ulnar joint arthrosis. Apparent wrist soft tissue swelling.      XR Lumbar Spine 2/3 Views   Final Result   IMPRESSION: 5 lumbar vertebral bodies. Mild convex left curvature centered at L2-L3 is slightly progressed. Normal lumbar vertebral body heights; no compression deformity or acute fracture. Mild to moderate interspace and endplate degeneration at L1-L2.    More mild degenerative changes elsewhere.          EKG   ECG taken at 1639, ECG read at 1646  Normal sinus rhythm  Right bundle branch block  Left anterior fascicular block  Bifascicular block  Abnormal ECG  Bifascicular block is new as compared to prior, dated 11/22/2021.  Rate 70 bpm. NH interval 174 ms. QRS duration 112 ms. QT/QTc 426/460 ms. P-R-T axes 60 -63 48.    Independent Interpretation  CT Head to my read demonstrates no acute intracranial hemorrhage  Radiograph of the hand reviewed demonstrating no obvious fractures  ED Course    Medications Administered  Medications   acetaminophen (TYLENOL) tablet 975 mg (975 mg Oral $Given 5/12/24 8228)       Procedures  Procedures     Laceration Repair      Procedure: Laceration Repair    Indication: Laceration    Consent: Verbal    Tetanus status reviewed yes, updated in 2016    Location: Left L third (middle) finger    Length: 2.5 cm    Preparation: Irrigation with Sterile Saline.    Anesthesia/Sedation: Bupivacaine - 0.5%      Treatment/Exploration: Wound explored, no foreign bodies found     Closure: The wound was closed with one layer. Skin/superficial layer was closed with 6 x 5-0 Nylon using Interrupted sutures.     Patient Status: The patient tolerated the procedure well: Yes. There were no complications.   Discussion of Management  None    Social Determinants of Health adding to complexity of care  None    ED Course  ED Course as of  05/12/24 1800   Sun May 12, 2024   1611 I obtained history and examined the patient as noted above.      Medical Decision Making / Diagnosis   CMS Diagnoses: None    MIPS     None    MDM  Lydia Dietz is a 89 year old female presenting following a mechanical fall.  EKG is unremarkable and stable when compared to prior.  Laboratory workup is reassuring.  CT imaging of the brain is unremarkable, radiographs of the hand and lumbar spine are also unremarkable.  Laceration was repaired as noted above without incident.  At this point I believe the patient can safely be discharged home, the wounds were copiously irrigated and cleansed and required no debridement I do not believe she needs empiric antibiotics.  Patient will follow-up with her primary care provider and return if new symptoms develop.  Family was in agreement this plan of action.  Suture removal recommended in 10 days time.    Disposition  The patient was discharged.     ICD-10 Codes:    ICD-10-CM    1. Laceration of left middle finger without foreign body without damage to nail, initial encounter  S61.213A       2. Low back strain, initial encounter  S39.012A              Scribe Disclosure:  Klever DE LA CRUZ Hailie, am serving as a scribe at 5:08 PM on 5/12/2024 to document services personally performed by Will Ames MD based on my observations and the provider's statements to me.     [employer]       Will Ames MD  05/12/24 2044

## 2024-05-13 NOTE — DISCHARGE INSTRUCTIONS
Discharge Instructions  Laceration (Cut)    You were seen today for a laceration (cut).  Your provider examined your laceration for any problems such a buried foreign body (like glass, a splinter, or gravel), or injury to blood vessels, tendons, and nerves.  Your provider may have also rinsed and/or scrubbed your laceration to help prevent an infection. It may not be possible to find all problems with your laceration on the first visit; occasionally foreign bodies or a tendon injury can go undetected.    Your laceration may have been closed in one of several ways:  No closure: many wounds will heal just fine without closure.  Stitches: regular stitches that require removal.  Staples: skin staples are often used in the scalp/head.  Wound adhesive (glue): skin glue can be used for certain lacerations and doesn t require removal.  Wound strips (aka Butterfly bandages or steri-strips): these are bandages that help to close a wound.  Absorbable stitches:  dissolving  stitches that go away on their own and usually don t require removal.    A small percentage of wounds will develop an infection regardless of how well the wound is cared for. Antibiotics are generally not indicated to prevent an infection so are only given for a small number of high-risk wounds. Some lacerations are too high risk to close, and are left open to heal because closure can increase the likelihood that an infection will develop.    Remember that all lacerations, no matter how expertly repaired, will cause scarring. We consider many factors, techniques, and materials, in our efforts to provide the best possible cosmetic outcome.    Generally, every Emergency Department visit should have a follow-up clinic visit with either a primary or a specialty clinic/provider. Please follow-up as instructed by your emergency provider today.     Return to the Emergency Department right away if:  You have more redness, swelling, pain, drainage (pus), a bad smell,  or red streaking from your laceration as these symptoms could indicate an infection.  You have a fever of 100.4 F or more.  You have bleeding that you cannot stop at home. If your cut starts to bleed, hold pressure on the bleeding area with a clean cloth or put pressure over the bandage.  If the bleeding does not stop after using constant pressure for 30 minutes, you should return to the Emergency Department for further treatment.  An area past the laceration is cool, pale, or blue compared with the other side, or has a slower return of color when squeezed.  Your dressing seems too tight or starts to get uncomfortable or painful. For children, signs of a problem might be irritability or restlessness.  You have loss of normal function or use of an area, such as being unable to straighten or bend a finger normally.  You have a numb area past the laceration.    Return to the Emergency Department or see your regular provider if:  The laceration starts to come open.   You have something coming out of the cut or a feeling that there is something in the laceration.  Your wound will not heal, or keeps breaking open. There can always be glass, wood, dirt or other things in any wound.  They will not always show up, even on x-rays.  If a wound does not heal, this may be why, and it is important to follow-up with your regular provider.    Home Care:  Take your dressing off in 12-24 hours, or as instructed by your provider, to check your laceration. Remove the dressing sooner if it seems too tight or painful, or if it is getting numb, tingly, or pale past the dressing.  Gently wash your laceration 1-2 times daily with clean water and mild soap. It is okay to shower or run clean water over the laceration, but do not let the laceration soak in water (no swimming).  If your laceration was closed with wound adhesive or strips: pat it dry and leave it open to the air. For all other repairs: after you wash your laceration, or at least  2 times a day, apply antibiotic ointment (such as Neosporin  or Bacitracin ) to the laceration, then cover it with a Band-Aid  or gauze.  Keep the laceration clean. Wear gloves or other protective clothing if you are around dirt.    Follow-up for removal:  If your wound was closed with staples or regular stitches, they need to be removed according to the instructions and timeline specified by your provider today.  If your wound was closed with absorbable ( dissolving ) sutures, they should fall out, dissolve, or not be visible in about one week. If they are still visible, then they should be removed according to the instructions and timeline specified by your provider today.    Scars:  To help minimize scarring:  Wear sunscreen over the healed laceration when out in the sun.  Massage the area regularly once healed.  You may apply Vitamin E to the healed wound.  Wait. Scars improve in appearance over months and years.    If you were given a prescription for medicine here today, be sure to read all of the information (including the package insert) that comes with your prescription.  This will include important information about the medicine, its side effects, and any warnings that you need to know about.  The pharmacist who fills the prescription can provide more information and answer questions you may have about the medicine.  If you have questions or concerns that the pharmacist cannot address, please call or return to the Emergency Department.       Remember that you can always come back to the Emergency Department if you are not able to see your regular provider in the amount of time listed above, if you get any new symptoms, or if there is anything that worries you.   Discharge Instructions  Back Pain  You were seen today for back pain. Back pain can have many causes, but most will get better without surgery or other specific treatment. Sometimes there is a herniated ( slipped ) disc. We do not usually do MRI  scans to look for these right away, since most herniated discs will get better on their own with time.  Today, we did not find any evidence that your back pain was caused by a serious condition. However, sometimes symptoms develop over time and cannot be found during an emergency visit, so it is very important that you follow up with your primary provider.  Generally, every Emergency Department visit should have a follow-up clinic visit with either a primary or a specialty clinic/provider. Please follow-up as instructed by your emergency provider today.    Return to the Emergency Department if:  You develop a fever with your back pain.   You have weakness or change in sensation in one or both legs.  You lose control of your bowels or bladder, or cannot empty your bladder (cannot pee).  Your pain gets much worse.     Follow-up with your provider:  Unless your pain has completely gone away, please make an appointment with your provider within one week. Most of the routine care for back pain is available in a clinic and not the Emergency Department. You may need further management of your back pain, such as more pain medication, imaging such as an X-ray or MRI, or physical therapy.    What can I do to help myself?  Remain Active -- People are often afraid that they will hurt their back further or delay recovery by remaining active, but this is one of the best things you can do for your back. In fact, staying in bed for a long time to rest is not recommended. Studies have shown that people with low back pain recover faster when they remain active. Movement helps to bring blood flow to the muscles and relieve muscle spasms as well as preventing loss of muscle strength.  Heat -- Using a heating pad can help with low back pain during the first few weeks. Do not sleep with a heating pad, as you can be burned.   Pain medications - You may take a pain medication such as Tylenol  (acetaminophen), Advil , Motrin  (ibuprofen) or  Aleve  (naproxen).  If you were given a prescription for medicine here today, be sure to read all of the information (including the package insert) that comes with your prescription.  This will include important information about the medicine, its side effects, and any warnings that you need to know about.  The pharmacist who fills the prescription can provide more information and answer questions you may have about the medicine.  If you have questions or concerns that the pharmacist cannot address, please call or return to the Emergency Department.   Remember that you can always come back to the Emergency Department if you are not able to see your regular provider in the amount of time listed above, if you get any new symptoms, or if there is anything that worries you.

## 2024-05-13 NOTE — TELEPHONE ENCOUNTER
Left Voicemail (2nd Attempt) for the patient to call back and schedule the following:      Appointment type: rtn cardio  Provider: vinicio  Return date: 07/24/24  Specialty phone number: [phone] opt 1  Additional appointment(s) needed: n/a   Additonal Notes: n/a

## 2024-05-23 DIAGNOSIS — I10 BENIGN ESSENTIAL HYPERTENSION: ICD-10-CM

## 2024-05-25 RX ORDER — CARVEDILOL 6.25 MG/1
6.25 TABLET ORAL 2 TIMES DAILY WITH MEALS
Qty: 180 TABLET | Refills: 0 | Status: SHIPPED | OUTPATIENT
Start: 2024-05-25 | End: 2024-08-30

## 2024-05-30 DIAGNOSIS — I25.10 CORONARY ARTERY DISEASE INVOLVING NATIVE CORONARY ARTERY WITHOUT ANGINA PECTORIS, UNSPECIFIED WHETHER NATIVE OR TRANSPLANTED HEART: ICD-10-CM

## 2024-05-30 DIAGNOSIS — E78.5 HYPERLIPIDEMIA LDL GOAL <70: ICD-10-CM

## 2024-05-30 DIAGNOSIS — Z78.9 STATIN INTOLERANCE: ICD-10-CM

## 2024-05-30 RX ORDER — EVOLOCUMAB 140 MG/ML
INJECTION, SOLUTION SUBCUTANEOUS
Qty: 2 ML | Refills: 2 | Status: SHIPPED | OUTPATIENT
Start: 2024-05-30 | End: 2024-08-12

## 2024-06-07 ENCOUNTER — TELEPHONE (OUTPATIENT)
Dept: INTERNAL MEDICINE | Facility: CLINIC | Age: 89
End: 2024-06-07
Payer: MEDICARE

## 2024-06-14 NOTE — PLAN OF CARE
"Transfer  Transferred from: Cath Lab  Via: stretcher  Reason for transfer: Post-op TAVR  Family: , Geovany present  Belongings: Sent with pt  Chart: Sent with pt  Medications: Meds from bin sent with pt  Report called from: Cony RN   Admission & 2 RN skin check complete    /57 (BP Location: Right arm)   Pulse 76   Temp 97.4  F (36.3  C) (Axillary)   Resp 16   Ht 1.651 m (5' 5\")   Wt 56 kg (123 lb 7.3 oz)   SpO2 93%   BMI 20.54 kg/m      " Medication: Invokana 300 MG tablet   Last office visit date: 6/6/24  Medication Refill Protocol Failed.  Failed criteria: needs updated labs. Sent to clinician to review.

## 2024-07-01 DIAGNOSIS — I10 BENIGN ESSENTIAL HYPERTENSION: ICD-10-CM

## 2024-07-09 RX ORDER — FUROSEMIDE 20 MG
20 TABLET ORAL EVERY OTHER DAY
Qty: 45 TABLET | Refills: 0 | Status: SHIPPED | OUTPATIENT
Start: 2024-07-09

## 2024-07-09 NOTE — TELEPHONE ENCOUNTER
Diuretics (Including Combos) Protocol [id number]/01/2024 12:06 PM   Protocol Details Blood pressure under 140/90 in past 12 months    Has GFR on file in past 12 months and most recent value is normal    Recent (12 mo) or future (90 days) visit within the authorizing provider's specialty

## 2024-08-05 ENCOUNTER — TELEPHONE (OUTPATIENT)
Dept: INTERNAL MEDICINE | Facility: CLINIC | Age: 89
End: 2024-08-05
Payer: MEDICARE

## 2024-08-05 NOTE — TELEPHONE ENCOUNTER
[facility] Call [facility]    Phone Message    May a detailed message be left on voicemail: yes     Reason for Call: Other: patient is calling  about a medication and would like a callback to see if she needs to keep taking a certain medication.    Action Taken: Message routed to:  [facility] kenneth    Travel Screening: Not Applicable     Date of Service:

## 2024-08-05 NOTE — TELEPHONE ENCOUNTER
Called patient- she wanted to verify she should still be taking repatha. Confirmed that yes, should keep taking it.    Vikram Renee RN on 8/5/2024 at 3:52 PM

## 2024-08-06 DIAGNOSIS — E78.5 HYPERLIPIDEMIA LDL GOAL <70: ICD-10-CM

## 2024-08-06 DIAGNOSIS — I25.10 CORONARY ARTERY DISEASE INVOLVING NATIVE CORONARY ARTERY WITHOUT ANGINA PECTORIS, UNSPECIFIED WHETHER NATIVE OR TRANSPLANTED HEART: ICD-10-CM

## 2024-08-06 DIAGNOSIS — Z78.9 STATIN INTOLERANCE: ICD-10-CM

## 2024-08-07 ENCOUNTER — TELEPHONE (OUTPATIENT)
Dept: INTERNAL MEDICINE | Facility: CLINIC | Age: 89
End: 2024-08-07
Payer: MEDICARE

## 2024-08-07 NOTE — TELEPHONE ENCOUNTER
Patient confirmed scheduled appointment:  Date: 9/27/24  Time: 130pm  Visit type: [facility] Return  Provider: Dr Moses  Location: [facility]

## 2024-08-10 NOTE — TELEPHONE ENCOUNTER
Medication Requested:  evolocumab (REPATHA SURECLICK) 140 MG/ML prefilled autoinjector 2 mL 2 5/30/2024 -- No   Sig: INJECT 1 ML (1 PEN) SUBCUTANEOUSLY EVERY 14 DAYS.     ----------------------  Last Office Visit : 2/13/2023  [facility] Office visit:     9/27/2024 1:30 PM (30 min)  Estephanie   Arrive by:  1:15 PM   [facility]   Praveen Moses MD     ----------------------      Refill decision: Refill pended and routed to the provider for review/determination due to the following criteria not met:       evolocumab (REPATHA) - NOT ON CARDIOLOGY PROTOCOL

## 2024-08-12 RX ORDER — EVOLOCUMAB 140 MG/ML
INJECTION, SOLUTION SUBCUTANEOUS
Qty: 2 ML | Refills: 1 | Status: SHIPPED | OUTPATIENT
Start: 2024-08-12

## 2024-08-27 DIAGNOSIS — I10 BENIGN ESSENTIAL HYPERTENSION: ICD-10-CM

## 2024-08-30 RX ORDER — CARVEDILOL 6.25 MG/1
6.25 TABLET ORAL 2 TIMES DAILY WITH MEALS
Qty: 60 TABLET | Refills: 0 | Status: SHIPPED | OUTPATIENT
Start: 2024-08-30 | End: 2024-10-07

## 2024-08-30 NOTE — TELEPHONE ENCOUNTER
Medication Requested:   Disp Refills Start End COSME   carvedilol (COREG) 6.25 MG tablet 180 tablet 0 5/25/2024 -- No   Sig - Route: TAKE 1 TABLET TWICE A DAY WITH MEALS - Oral     ----------------------  Last Office Visit :     2/13/2023  [facility] [place]      Future Office visit:     9/27/2024 1:30 PM (30 min)  Estephanie   Arrive by:  1:15 PM   [facility] PHYSICAL   [facility] ([facility]   Praveen Moses MD     ----------------------      Refill decision: Medication refilled per protocol.    Pass/Fail Protocol Criteria:  Beta-Blockers Protocol [id number]/30/2024 04:14 PM   Protocol Details Blood pressure under 140/90 in past 12 months    Recent (12 mo) or future (90 days) visit within the authorizing provider's specialty    Patient is age 6 or older    Medication is active on med list    Medication indicated for associated diagnosis     BP Readings from Last 3 Encounters:   05/12/24 (!) 151/58   01/24/24 127/61   08/11/23 (!) 190/80

## 2024-09-10 ENCOUNTER — TELEPHONE (OUTPATIENT)
Dept: INTERNAL MEDICINE | Facility: CLINIC | Age: 89
End: 2024-09-10
Payer: MEDICARE

## 2024-09-10 NOTE — TELEPHONE ENCOUNTER
Left Voicemail (1st Attempt) for the patient to call back and schedule the following:    Appointment type: RTN  Provider: PCP  Return date: 9/27/24  Specialty phone number: [phone]  Additional appointment(s) needed: -  Additonal Notes: Dr. Moses unavailable at 130pm. Please move to [place] on 9/27/2024

## 2024-09-26 NOTE — PROGRESS NOTES
HPI:    Ms. Dietz comes in for a physical today. She states more than one month of L sided back mass or fullness with some minor pain. Her  has noticed this L sided back fullness as well. No systemic sxs. No urinary complaints. She was seen in the past by Dr. Goss, Orthopedic spine on 2/22/2024 (she had lumbar MRI imaging on 1/11/2022). No leg or radicular sxs. No other HEENT, cardiopulmonary, abdominal, , GYN, neurological, systemic, psychiatric, lymphatic, endocrine, vascular complaints.     Past Medical History:   Diagnosis Date    Allergies     Anemia     Coronary artery disease     HLD (hyperlipidemia)     HTN (hypertension)     Impaired fasting glucose     Osteoarthritis     Severe aortic stenosis      Past Surgical History:   Procedure Laterality Date    AS LAP INCISIONAL HERNIA REPAIR      BREAST BIOPSY, CORE RT/LT      X3    CHOLECYSTECTOMY      COLECTOMY PARTIAL  2019    COLONOSCOPY N/A 2/17/2022    Procedure: COLONOSCOPY;  Surgeon: Karlos Holley MD;  Location: [facility]    CV LEFT HEART CATH N/A 9/9/2020    Procedure: Left Heart Cath;  Surgeon: Leonard Pastor MD;  [facility]    CV PCI ANGIOPLASTY N/A 9/9/2020    Procedure: CV PCI ANGIOPLASTY;  Surgeon: Leonard Pastor MD;  Location:  [facility]    CV TRANSCATHETER AORTIC VALVE REPLACEMENT N/A 10/7/2020    Procedure: Transfemoral, subclavian (SIFUENTES) or transapical transcatheter aortic valve replacement 23mm sifuentes valve placed. cardiovascular standby.;  Surgeon: Leonard Pastor MD;  Location: [facility]    ESOPHAGOSCOPY, GASTROSCOPY, DUODENOSCOPY (EGD), COMBINED N/A 2/17/2022    Procedure: ESOPHAGOGASTRODUODENOSCOPY (EGD);  Surgeon: Karlos Holley MD;  Location: [facility]    HC LAPAROSCOPY, OVIDUCT/OVARY; REMOVAL      HEART CATH FEMORAL CANNULIZATION WITH OPEN STANDBY REPAIR AORTIC VALVE N/A 10/7/2020    Procedure: Cardiac standby. Perfusion standby.;  Surgeon: Richi Olmos MD;   Location: [facility]    HYSTERECTOMY TOTAL ABDOMINAL      IR LUMBAR PUNCTURE  8/12/2021    IR LUMBAR PUNCTURE  11/28/2016    IR LUMBAR PUNCTURE  8/29/2018     PE:    Vitals noted, gen, nad, cooperative, alert, neck supple nl rom, lungs with good air movement, L sided back with fullness and some minor tenderness to palpation. RRR, S1, S2, abdomen, no acute findings. Grossly normal neurological exam.       Results for orders placed or performed in visit on 09/27/24   CT Chest/Abdomen/Pelvis w Contrast     Status: None    Narrative    EXAMINATION: CT CHEST/ABDOMEN/PELVIS W CONTRAST, 9/27/2024 10:38 AM    INDICATION: L sided flank pain; Back pain, unspecified back location,  unspecified back pain laterality, unspecified chronicity    COMPARISON STUDY: CT chest abdomen pelvis 1/11/2024, CT chest  8/22/2024    TECHNIQUE: CT scan of the chest, abdomen and pelvis was performed on  multidetector CT scanner using volumetric acquisition technique and  images were reconstructed in multiple planes with variable thickness  and reviewed on dedicated workstations.     CONTRAST: Isovue 370 71cc.   Without oral contrast.    CT scan radiation dose is optimized to minimum requisite dose using  automated dose modulation techniques.    FINDINGS:    Chest:   Mediastinum: Multiple bilateral hypodense thyroid nodules, measuring  up to 1 cm on the right. Severe coronary artery calcifications.  Moderate to severe calcifications of the aortic arch and branching  vessels. Normal branching pattern of the great vessels. Mitral annular  calcifications. Heart size within normal limits. No pericardial  effusion. Prosthetic aortic valve. Normal caliber ascending aorta and  main pulmonary artery. Small sliding hiatal hernia. Stable  circumferential thickening of the lower third of the esophageal wall.    Pleura: No significant pericardial effusion. No pneumothorax.    Lungs: Central tracheobronchial tree is patent. Mild biapical  scarring. No focal  consolidative opacities. 4 mm right lower lobe  posterior nodule (series 4 image 163), appears grossly stable. Stable  3 mm right lower lobe posterolateral nodule (series 4 image 233).  Other sub-4 mm pulmonary nodules scattered throughout the lungs.    Abdomen and Pelvis:  Liver: No mass. No intrahepatic biliary ductal dilation.  Stable 1 cm  hypodensity in the right hepatic lobe abutting the right portal vein  (series 2 image 56). Other subcentimeter hypodensities are stable.    Biliary System: Cholecystectomy. No extrahepatic biliary ductal  dilation.    Pancreas: No mass or pancreatic ductal dilation. Pancreatic  parenchymal atrophy.    Adrenal glands: No mass or nodules    Spleen: Normal.    Kidneys: No suspicious mass, obstructing calculus or hydronephrosis.   Exophytic renal cysts, for example in the left upper pole measuring  4.3 cm.  Multiple bilateral subcentimeter hypodensities, too small to  characterize, likely representing simple cysts. Left parapelvic cyst.    Gastrointestinal tract: Small hiatal hernia. The appendix is not  visualized. No right lower quadrant inflammatory change.. Normal  caliber small and large bowel.  Severe colonic diverticulosis without  evidence of acute diverticulitis. Stable appearance of duodenal  diverticulum extending off the medial aspect of the second and third  portions of the duodenum measuring up to 3.5 cm. Postoperative changes  of partial right colectomy with reanastomosis.  Redemonstration of  partial bridging at the anastomosis site.     Mesentery/peritoneum/retroperitoneum: No mass. No free fluid or air.   Postoperative changes along the left posterolateral abdominal wall  near the kidney (series 3 image [id number]).    Lymph nodes: No significant lymphadenopathy.    Vasculature:  Advanced atherosclerotic calcification of the abdominal  aorta and iliac vessels.    Pelvis: Urinary bladder is normal.  Hysterectomy.    Osseous structures: No aggressive or acute osseous  lesion.  Degenerative changes. Stable appearance of T11 anterior wedge  compression deformity with irregular/sclerotic superior endplate.      Soft tissues: Calcifications within the soft tissues extending along  the right spinous processes in the lower thoracic spine.  Mild diffuse  anasarca.      Impression    IMPRESSION:   1. No abnormality to correlate with palpable findings and symptoms in  the left posterior flank. No acute abnormality in the chest, abdomen,  or pelvis.  2. Stable sub-6 mm pulmonary nodules.  3. Moderate to severe atherosclerotic changes of the coronary  arteries, aorta, and multiple branching vessels.  4. Circumferential thickening of the lower esophagus, appears similar  to prior, may represent esophagitis.  5. 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  6. Multiple bilateral thyroid nodules, consider outpatient elective  ultrasound for further characterization.    I have personally reviewed the examination and initial interpretation  and I agree with the findings.    CYNDI ESTRADA DO         SYSTEM ID:  [id number]   Creatinine POCT     Status: Abnormal   Result Value Ref Range    Creatinine POCT 1.2 (H) 0.5 - 1.0 mg/dL    GFR, ESTIMATED POCT 44 (L) >60 mL/min/1.73m2   Results for orders placed or performed in visit on 09/27/24   Comprehensive metabolic panel     Status: Abnormal   Result Value Ref Range    Sodium 144 135 - 145 mmol/L    Potassium 4.4 3.4 - 5.3 mmol/L    Carbon Dioxide (CO2) 26 22 - 29 mmol/L    Anion Gap 10 7 - 15 mmol/L    Urea Nitrogen 23.0 8.0 - 23.0 mg/dL    Creatinine 1.02 (H) 0.51 - 0.95 mg/dL    GFR Estimate 53 (L) >60 mL/min/1.73m2    Calcium 9.6 8.8 - 10.4 mg/dL    Chloride 108 (H) 98 - 107 mmol/L    Glucose 104 (H) 70 - 99 mg/dL    Alkaline Phosphatase 64 40 - 150 U/L    AST 22 0 - 45 U/L    ALT 12 0 - 50 U/L    Protein Total 6.6 6.4 - 8.3 g/dL    Albumin 4.0 3.5 - 5.2 g/dL    Bilirubin Total 0.4 <=1.2 mg/dL    CBC with platelets and differential     Status: Abnormal   Result Value Ref Range    WBC Count 7.6 4.0 - 11.0 10e3/uL    RBC Count 4.49 3.80 - 5.20 10e6/uL    Hemoglobin 12.4 11.7 - 15.7 g/dL    Hematocrit 39.2 35.0 - 47.0 %    MCV 87 78 - 100 fL    MCH 27.6 26.5 - 33.0 pg    MCHC 31.6 31.5 - 36.5 g/dL    RDW 12.9 10.0 - 15.0 %    Platelet Count 130 (L) 150 - 450 10e3/uL    % Neutrophils 77 %    % Lymphocytes 13 %    % Monocytes 8 %    % Eosinophils 1 %    % Basophils 0 %    % Immature Granulocytes 1 %    NRBCs per 100 WBC 0 <1 /100    Absolute Neutrophils 5.9 1.6 - 8.3 10e3/uL    Absolute Lymphocytes 1.0 0.8 - 5.3 10e3/uL    Absolute Monocytes 0.6 0.0 - 1.3 10e3/uL    Absolute Eosinophils 0.1 0.0 - 0.7 10e3/uL    Absolute Basophils 0.0 0.0 - 0.2 10e3/uL    Absolute Immature Granulocytes 0.1 <=0.4 10e3/uL    Absolute NRBCs 0.0 10e3/uL   CBC with platelets and differential     Status: Abnormal    Narrative    The following orders were created for panel order CBC with platelets and differential.  Procedure                               Abnormality         Status                     ---------                               -----------         ------                     CBC with platelets and d...[[id number]]  Abnormal            Final result                 Please view results for these tests on the individual orders.       A/P:    1. Endocrinology appt.  4/27/2022 for thyroid nodule and low bone density. DEXA scan 12/17/2021 with lowest T score -2.2. On Alendronate. Vit D normal at 68 on 8/12/2022.   2. Cardiology seen 8/11/2023  with Dr. Casey Logan. She is on Repatha for increased lipids done 8/10/2023; HDL 74, LDL 96 and TG's 92. . Last seen by Ms. Guerra 1/24/2024. She also has a h/o TAVR (2020).  Echo ordered 1/24/2024 (not done yet). Last resting echo 8/11/2023  3. Immunizations; COVID Pfizer vaccine x 5. Moderna x 1. Td/Tdap 4/26/2016, Prevnar 13 done 4/27/2015.   4. HTN; On Carvedilol, spirolactone,  and 20 mg  of Lasix every other day.  Electrolytes done 5/12/2024.   5. Back pain; she is using lidocaine patches. She saw Dr. Goss, ortho spine 2/22/2022 for T11 compression fracture. Sent in Rx. For Lidocaine patches and telephone visit with me in one month.   6. Lung nodules  Chest CT scan done 8/22/2022 and recommend repeat study in one year and placed order on 8/26/2022  7. Anemia; CBC with Hgb 12.7 on 5/12/2024.  She had EGD and colonoscopy 2/7/2022. She had CT chest/abdomen/pelvic imaging 1/11/2022.   8.  Thrombocytopenia; she was seen 4/25/2023 by Dr. Kong, Hematology. Platelets 131 on 5/12/2024  9 . Dizziness and balance issues;;  She had a negative Head CT scan 5/12/2024 for a fall   10. L sided back mass/fullness and minor pain. Labs today and CT imaging.         40 minutes spent on the date of the encounter doing chart review, history and exam, documentation and further activities as noted above

## 2024-09-27 ENCOUNTER — LAB (OUTPATIENT)
Dept: LAB | Facility: CLINIC | Age: 89
End: 2024-09-27
Payer: COMMERCIAL

## 2024-09-27 ENCOUNTER — OFFICE VISIT (OUTPATIENT)
Dept: INTERNAL MEDICINE | Facility: CLINIC | Age: 89
End: 2024-09-27
Payer: COMMERCIAL

## 2024-09-27 ENCOUNTER — ANCILLARY PROCEDURE (OUTPATIENT)
Dept: CT IMAGING | Facility: CLINIC | Age: 89
End: 2024-09-27
Attending: INTERNAL MEDICINE
Payer: COMMERCIAL

## 2024-09-27 VITALS
BODY MASS INDEX: 19.14 KG/M2 | SYSTOLIC BLOOD PRESSURE: 185 MMHG | WEIGHT: 118.6 LBS | DIASTOLIC BLOOD PRESSURE: 68 MMHG | HEART RATE: 61 BPM | OXYGEN SATURATION: 99 %

## 2024-09-27 DIAGNOSIS — M54.9 BACK PAIN, UNSPECIFIED BACK LOCATION, UNSPECIFIED BACK PAIN LATERALITY, UNSPECIFIED CHRONICITY: Primary | ICD-10-CM

## 2024-09-27 DIAGNOSIS — M54.9 BACK PAIN, UNSPECIFIED BACK LOCATION, UNSPECIFIED BACK PAIN LATERALITY, UNSPECIFIED CHRONICITY: ICD-10-CM

## 2024-09-27 LAB
ALBUMIN SERPL BCG-MCNC: 4 G/DL (ref 3.5–5.2)
ALP SERPL-CCNC: 64 U/L (ref 40–150)
ALT SERPL W P-5'-P-CCNC: 12 U/L (ref 0–50)
ANION GAP SERPL CALCULATED.3IONS-SCNC: 10 MMOL/L (ref 7–15)
AST SERPL W P-5'-P-CCNC: 22 U/L (ref 0–45)
BASOPHILS # BLD AUTO: 0 10E3/UL (ref 0–0.2)
BASOPHILS NFR BLD AUTO: 0 %
BILIRUB SERPL-MCNC: 0.4 MG/DL
BUN SERPL-MCNC: 23 MG/DL (ref 8–23)
CALCIUM SERPL-MCNC: 9.6 MG/DL (ref 8.8–10.4)
CHLORIDE SERPL-SCNC: 108 MMOL/L (ref 98–107)
CREAT BLD-MCNC: 1.2 MG/DL (ref 0.5–1)
CREAT SERPL-MCNC: 1.02 MG/DL (ref 0.51–0.95)
EGFRCR SERPLBLD CKD-EPI 2021: 44 ML/MIN/1.73M2
EGFRCR SERPLBLD CKD-EPI 2021: 53 ML/MIN/1.73M2
EOSINOPHIL # BLD AUTO: 0.1 10E3/UL (ref 0–0.7)
EOSINOPHIL NFR BLD AUTO: 1 %
ERYTHROCYTE [DISTWIDTH] IN BLOOD BY AUTOMATED COUNT: 12.9 % (ref 10–15)
GLUCOSE SERPL-MCNC: 104 MG/DL (ref 70–99)
HCO3 SERPL-SCNC: 26 MMOL/L (ref 22–29)
HCT VFR BLD AUTO: 39.2 % (ref 35–47)
HGB BLD-MCNC: 12.4 G/DL (ref 11.7–15.7)
IMM GRANULOCYTES # BLD: 0.1 10E3/UL
IMM GRANULOCYTES NFR BLD: 1 %
LYMPHOCYTES # BLD AUTO: 1 10E3/UL (ref 0.8–5.3)
LYMPHOCYTES NFR BLD AUTO: 13 %
MCH RBC QN AUTO: 27.6 PG (ref 26.5–33)
MCHC RBC AUTO-ENTMCNC: 31.6 G/DL (ref 31.5–36.5)
MCV RBC AUTO: 87 FL (ref 78–100)
MONOCYTES # BLD AUTO: 0.6 10E3/UL (ref 0–1.3)
MONOCYTES NFR BLD AUTO: 8 %
NEUTROPHILS # BLD AUTO: 5.9 10E3/UL (ref 1.6–8.3)
NEUTROPHILS NFR BLD AUTO: 77 %
NRBC # BLD AUTO: 0 10E3/UL
NRBC BLD AUTO-RTO: 0 /100
PLATELET # BLD AUTO: 130 10E3/UL (ref 150–450)
POTASSIUM SERPL-SCNC: 4.4 MMOL/L (ref 3.4–5.3)
PROT SERPL-MCNC: 6.6 G/DL (ref 6.4–8.3)
RBC # BLD AUTO: 4.49 10E6/UL (ref 3.8–5.2)
SODIUM SERPL-SCNC: 144 MMOL/L (ref 135–145)
WBC # BLD AUTO: 7.6 10E3/UL (ref 4–11)

## 2024-09-27 PROCEDURE — 80053 COMPREHEN METABOLIC PANEL: CPT | Performed by: PATHOLOGY

## 2024-09-27 PROCEDURE — 71260 CT THORAX DX C+: CPT | Mod: GC | Performed by: STUDENT IN AN ORGANIZED HEALTH CARE EDUCATION/TRAINING PROGRAM

## 2024-09-27 PROCEDURE — 74177 CT ABD & PELVIS W/CONTRAST: CPT | Mod: GC | Performed by: STUDENT IN AN ORGANIZED HEALTH CARE EDUCATION/TRAINING PROGRAM

## 2024-09-27 PROCEDURE — 85025 COMPLETE CBC W/AUTO DIFF WBC: CPT | Performed by: PATHOLOGY

## 2024-09-27 PROCEDURE — 36415 COLL VENOUS BLD VENIPUNCTURE: CPT | Performed by: PATHOLOGY

## 2024-09-27 PROCEDURE — 82565 ASSAY OF CREATININE: CPT | Performed by: PATHOLOGY

## 2024-09-27 PROCEDURE — 99397 PER PM REEVAL EST PAT 65+ YR: CPT | Performed by: INTERNAL MEDICINE

## 2024-09-27 RX ORDER — SPIRONOLACTONE 25 MG/1
25 TABLET ORAL EVERY MORNING
COMMUNITY
Start: 2024-09-06

## 2024-09-27 RX ORDER — IOPAMIDOL 755 MG/ML
71 INJECTION, SOLUTION INTRAVASCULAR ONCE
Status: COMPLETED | OUTPATIENT
Start: 2024-09-27 | End: 2024-09-27

## 2024-09-27 RX ADMIN — IOPAMIDOL 71 ML: 755 INJECTION, SOLUTION INTRAVASCULAR at 10:22

## 2024-09-27 NOTE — LETTER
September 30, 2024      Lydia Dietz  [address] [facility] [address]        Dear ,    We are writing to inform you of your test results.    No acute findings on your recent CT scan. I have the following comments and recommendations:     (1) I placed a pain clinic referral for your back pain and you can call [phone] to schedule this appointment     (2) I'll send Dr. Melo, Gastrointestinal a note to comment on your duodenal diverticulum     Resulted Orders   CT Chest/Abdomen/Pelvis w Contrast    Narrative    EXAMINATION: CT CHEST/ABDOMEN/PELVIS W CONTRAST, 9/27/2024 10:38 AM    INDICATION: L sided flank pain; Back pain, unspecified back location,  unspecified back pain laterality, unspecified chronicity    COMPARISON STUDY: CT chest abdomen pelvis 1/11/2024, CT chest  8/22/2024    TECHNIQUE: CT scan of the chest, abdomen and pelvis was performed on  multidetector CT scanner using volumetric acquisition technique and  images were reconstructed in multiple planes with variable thickness  and reviewed on dedicated workstations.     CONTRAST: Isovue 370 71cc.   Without oral contrast.    CT scan radiation dose is optimized to minimum requisite dose using  automated dose modulation techniques.    FINDINGS:    Chest:   Mediastinum: Multiple bilateral hypodense thyroid nodules, measuring  up to 1 cm on the right. Severe coronary artery calcifications.  Moderate to severe calcifications of the aortic arch and branching  vessels. Normal branching pattern of the great vessels. Mitral annular  calcifications. Heart size within normal limits. No pericardial  effusion. Prosthetic aortic valve. Normal caliber ascending aorta and  main pulmonary artery. Small sliding hiatal hernia. Stable  circumferential thickening of the lower third of the esophageal wall.    Pleura: No significant pericardial effusion. No pneumothorax.    Lungs: Central tracheobronchial tree is patent. Mild biapical  scarring. No  focal consolidative opacities. 4 mm right lower lobe  posterior nodule (series 4 image 163), appears grossly stable. Stable  3 mm right lower lobe posterolateral nodule (series 4 image 233).  Other sub-4 mm pulmonary nodules scattered throughout the lungs.    Abdomen and Pelvis:  Liver: No mass. No intrahepatic biliary ductal dilation.  Stable 1 cm  hypodensity in the right hepatic lobe abutting the right portal vein  (series 2 image 56). Other subcentimeter hypodensities are stable.    Biliary System: Cholecystectomy. No extrahepatic biliary ductal  dilation.    Pancreas: No mass or pancreatic ductal dilation. Pancreatic  parenchymal atrophy.    Adrenal glands: No mass or nodules    Spleen: Normal.    Kidneys: No suspicious mass, obstructing calculus or hydronephrosis.   Exophytic renal cysts, for example in the left upper pole measuring  4.3 cm.  Multiple bilateral subcentimeter hypodensities, too small to  characterize, likely representing simple cysts. Left parapelvic cyst.    Gastrointestinal tract: Small hiatal hernia. The appendix is not  visualized. No right lower quadrant inflammatory change.. Normal  caliber small and large bowel.  Severe colonic diverticulosis without  evidence of acute diverticulitis. Stable appearance of duodenal  diverticulum extending off the medial aspect of the second and third  portions of the duodenum measuring up to 3.5 cm. Postoperative changes  of partial right colectomy with reanastomosis.  Redemonstration of  partial bridging at the anastomosis site.     Mesentery/peritoneum/retroperitoneum: No mass. No free fluid or air.   Postoperative changes along the left posterolateral abdominal wall  near the kidney (series 3 image [id number]).    Lymph nodes: No significant lymphadenopathy.    Vasculature:  Advanced atherosclerotic calcification of the abdominal  aorta and iliac vessels.    Pelvis: Urinary bladder is normal.  Hysterectomy.    Osseous structures: No aggressive or acute  osseous lesion.  Degenerative changes. Stable appearance of T11 anterior wedge  compression deformity with irregular/sclerotic superior endplate.      Soft tissues: Calcifications within the soft tissues extending along  the right spinous processes in the lower thoracic spine.  Mild diffuse  anasarca.      Impression    IMPRESSION:   1. No abnormality to correlate with palpable findings and symptoms in  the left posterior flank. No acute abnormality in the chest, abdomen,  or pelvis.  2. Stable sub-6 mm pulmonary nodules.  3. Moderate to severe atherosclerotic changes of the coronary  arteries, aorta, and multiple branching vessels.  4. Circumferential thickening of the lower esophagus, appears similar  to prior, may represent esophagitis.  5. 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  6. Multiple bilateral thyroid nodules, consider outpatient elective  ultrasound for further characterization.    I have personally reviewed the examination and initial interpretation  and I agree with the findings.    CYNDI ESTRADA DO         SYSTEM ID:  [id number]       If you have any questions or concerns, please call the clinic at the number listed above.       Sincerely,      Praveen Moses MD

## 2024-09-27 NOTE — DISCHARGE INSTRUCTIONS

## 2024-09-27 NOTE — PROGRESS NOTES
Lydia is a 89 year old, presenting for the following:  Physical (Pt reports low back pain, more on left side)        9/27/2024     8:25 AM   Additional Questions   Roomed by Josefa FARNSWORTH   Accompanied by          9/27/2024   General Health   How would you rate your overall physical health? (!) FAIR   Feel stress (tense, anxious, or unable to sleep) Very much      (!) STRESS CONCERN      9/27/2024   Nutrition   Diet: Regular (no restrictions)          9/27/2024   Exercise   Days per week of moderate/strenous exercise 2 days      (!) EXERCISE CONCERN      9/27/2024   Social Factors   Frequency of gathering with friends or relatives Once a week   Worry food won't last until get money to buy more No   Food not last or not have enough money for food? No   Do you have housing? (Housing is defined as stable permanent housing and does not include staying ouside in a car, in a tent, in an abandoned building, in an overnight shelter, or couch-surfing.) Yes   Are you worried about losing your housing? No   Lack of transportation? No   Unable to get utilities (heat,electricity)? No          9/27/2024   Fall Risk   Fallen 2 or more times in the past year? No    No   Trouble with walking or balance? Yes    Yes       Multiple values from one day are sorted in reverse-chronological order         9/27/2024   Activities of Daily Living- Home Safety   Needs help with the following daily activites None of the above   Safety concerns in the home No grab bars in the bathroom          9/27/2024   Dental   Dentist two times every year? Yes          9/27/2024   Hearing Screening   Hearing concerns? None of the above          9/27/2024   Driving Risk Screening   Patient/family members have concerns about driving No          9/27/2024   General Alertness/Fatigue Screening   Have you been more tired than usual lately? No          9/27/2024   Urinary Incontinence Screening   Bothered by leaking urine in past 6 months No          9/27/2024    TB Screening   Were you born outside of the [place]? No      Today's PHQ-2 Score:       2024     8:49 AM   PHQ-2 (  [employer])   Q1: Little interest or pleasure in doing things 0   Q2: Feeling down, depressed or hopeless 1   PHQ-2 Score 1   Q1: Little interest or pleasure in doing things Not at all   Q2: Feeling down, depressed or hopeless Several days   PHQ-2 Score 1         2024   Substance Use   Alcohol more than 3/day or more than 7/wk No   Do you have a current opioid prescription? No   How severe/bad is pain from 1 to 10? 8/10   Do you use any other substances recreationally? No      Social History     Tobacco Use    Smoking status: Former     Current packs/day: 0.00     Types: Cigarettes     Quit date:      Years since quittin.7    Smokeless tobacco: Never    Tobacco comments:     1 pack per week if that maybe for 5 years    Substance Use Topics    Alcohol use: Yes     Comment: occ     Drug use: Never     Current providers sharing in care for this patient include:  Patient Care Team:  Praveen Moses MD as PCP - General (Internal Medicine)  Praveen Costello MD as MD (Orthopedics)  Tayla Hartman (Cardiology)  Praveen Moses MD as Assigned PCP  Sammie West MD as MD (Endocrinology, Diabetes, and Metabolism)  Cuate Hensley MD as MD (Endocrinology, Diabetes, and Metabolism)  Saniya Morales, RN as Specialty Care Coordinator (Cardiology)  Aldair Lau MD as MD (Dermatology)  Ade Kong MD as Assigned Cancer Care Provider  Aldair Lau MD as Assigned Surgical Provider  Marizol Guerra APRN CNP as Assigned Heart and Vascular Provider

## 2024-09-30 DIAGNOSIS — I10 BENIGN ESSENTIAL HYPERTENSION: ICD-10-CM

## 2024-10-07 RX ORDER — CARVEDILOL 6.25 MG/1
6.25 TABLET ORAL 2 TIMES DAILY WITH MEALS
Qty: 180 TABLET | Refills: 3 | Status: SHIPPED | OUTPATIENT
Start: 2024-10-07

## 2024-10-07 NOTE — TELEPHONE ENCOUNTER
CARVEDILOL (IR) TABS 6.25MG       Last Written Prescription Date:  8-30-24  Last Fill Quantity: 60,   # refills: 0  Last Office Visit : 9-27-24  Future Office visit:  none    BP Readings from Last 3 Encounters:   09/27/24 (!) 185/68   05/12/24 (!) 151/58   01/24/24 127/61      Routing refill request to provider for review/approval because:  Blood pressure out of range   Last rx limited quantity

## 2024-10-09 DIAGNOSIS — E78.5 HYPERLIPIDEMIA LDL GOAL <70: ICD-10-CM

## 2024-10-09 DIAGNOSIS — I25.10 CORONARY ARTERY DISEASE INVOLVING NATIVE CORONARY ARTERY WITHOUT ANGINA PECTORIS, UNSPECIFIED WHETHER NATIVE OR TRANSPLANTED HEART: ICD-10-CM

## 2024-10-09 DIAGNOSIS — Z78.9 STATIN INTOLERANCE: ICD-10-CM

## 2024-10-12 NOTE — TELEPHONE ENCOUNTER
Medication Requested:  evolocumab (REPATHA SURECLICK) 140 MG/ML prefilled autoinjector 2 mL 1 8/12/2024 -- No   Sig: INJECT 1 ML (1 PEN) SUBCUTANEOUSLY EVERY 14 DAYS.     ----------------------  Last Office Visit : Office Visit  9/27/2024  [facility] [place]      Future Office visit:  0  ----------------------        Refill decision: Refill pended and routed to the provider for review/determination due to the following criteria not met:     Medication not on [facility], [facility], [facility] refill protocol

## 2024-10-14 RX ORDER — EVOLOCUMAB 140 MG/ML
INJECTION, SOLUTION SUBCUTANEOUS
Qty: 2 ML | Refills: 3 | Status: SHIPPED | OUTPATIENT
Start: 2024-10-14

## 2024-10-21 ENCOUNTER — TRANSFERRED RECORDS (OUTPATIENT)
Dept: HEALTH INFORMATION MANAGEMENT | Facility: CLINIC | Age: 89
End: 2024-10-21
Payer: MEDICARE

## 2024-11-08 DIAGNOSIS — I10 BENIGN ESSENTIAL HYPERTENSION: Primary | ICD-10-CM

## 2024-11-12 RX ORDER — FUROSEMIDE 20 MG/1
20 TABLET ORAL EVERY OTHER DAY
Qty: 45 TABLET | Refills: 3 | Status: SHIPPED | OUTPATIENT
Start: 2024-11-12

## 2025-02-02 DIAGNOSIS — I25.10 CORONARY ARTERY DISEASE INVOLVING NATIVE CORONARY ARTERY WITHOUT ANGINA PECTORIS, UNSPECIFIED WHETHER NATIVE OR TRANSPLANTED HEART: ICD-10-CM

## 2025-02-02 DIAGNOSIS — Z78.9 STATIN INTOLERANCE: ICD-10-CM

## 2025-02-02 DIAGNOSIS — E78.5 HYPERLIPIDEMIA LDL GOAL <70: ICD-10-CM

## 2025-02-05 RX ORDER — EVOLOCUMAB 140 MG/ML
INJECTION, SOLUTION SUBCUTANEOUS
Qty: 2 ML | Refills: 0 | Status: SHIPPED | OUTPATIENT
Start: 2025-02-05

## 2025-03-01 DIAGNOSIS — E78.5 HYPERLIPIDEMIA LDL GOAL <70: ICD-10-CM

## 2025-03-01 DIAGNOSIS — I25.10 CORONARY ARTERY DISEASE INVOLVING NATIVE CORONARY ARTERY WITHOUT ANGINA PECTORIS, UNSPECIFIED WHETHER NATIVE OR TRANSPLANTED HEART: ICD-10-CM

## 2025-03-01 DIAGNOSIS — Z78.9 STATIN INTOLERANCE: ICD-10-CM

## 2025-03-05 RX ORDER — EVOLOCUMAB 140 MG/ML
INJECTION, SOLUTION SUBCUTANEOUS
Qty: 2 ML | Refills: 0 | Status: SHIPPED | OUTPATIENT
Start: 2025-03-05

## 2025-03-05 NOTE — TELEPHONE ENCOUNTER
Last Written Prescription:     evolocumab (REPATHA SURECLICK) 140 MG/ML prefilled autoinjector 2 mL 0 2/5/2025 -- No   Sig: INJECT 1 FULL SYRINGE SUBCUTANEOUSLY ONCE EVERY 2 WEEKS.     ----------------------  Last Visit Date: [facility] 9/27/24  Future Visit Date: None  ----------------------           Refill decision: Medication unable to be refilled by RN due to: Medication not included in refill protocol policy   evolocumab (REPATHA SURECLICK) 140 MG/ML prefilled autoinjector         Request from pharmacy:  Requested Prescriptions   Pending Prescriptions Disp Refills    evolocumab (REPATHA SURECLICK) 140 MG/ML prefilled autoinjector [Pharmacy Med Name: Repatha SureClick 140 MG/ML Subcutaneous Solution Auto-injector] 2 mL 0     Sig: INJECT 1 SYRINGE SUBCUTANEOUSLY EVERY TWO WEEKS       There is no refill protocol information for this order

## 2025-03-26 DIAGNOSIS — Z78.9 STATIN INTOLERANCE: ICD-10-CM

## 2025-03-26 DIAGNOSIS — E78.5 HYPERLIPIDEMIA LDL GOAL <70: ICD-10-CM

## 2025-03-26 DIAGNOSIS — I25.10 CORONARY ARTERY DISEASE INVOLVING NATIVE CORONARY ARTERY WITHOUT ANGINA PECTORIS, UNSPECIFIED WHETHER NATIVE OR TRANSPLANTED HEART: ICD-10-CM

## 2025-04-01 NOTE — TELEPHONE ENCOUNTER
Repatha SureClick 140 MG/ML Subcutaneous Solution Auto-injector   Last Written Prescription:  3/5/24  2ml, 0 refills  ----------------------  Last Visit Date: 9/27/24  Future Visit Date: None     Medication not included in refill protocol policy     Request from pharmacy:  Requested Prescriptions   Pending Prescriptions Disp Refills    evolocumab (REPATHA SURECLICK) 140 MG/ML prefilled autoinjector [Pharmacy Med Name: Repatha SureClick 140 MG/ML Subcutaneous Solution Auto-injector] 2 mL 0     Sig: INJECT 1 SYRINGE SUBCUTANEOUSLY EVERY 2 WEEKS       There is no refill protocol information for this order

## 2025-04-02 RX ORDER — EVOLOCUMAB 140 MG/ML
INJECTION, SOLUTION SUBCUTANEOUS
Qty: 2 ML | Refills: 4 | Status: SHIPPED | OUTPATIENT
Start: 2025-04-02

## 2025-08-06 ENCOUNTER — APPOINTMENT (OUTPATIENT)
Dept: URBAN - METROPOLITAN AREA CLINIC 256 | Age: 89
Setting detail: DERMATOLOGY
End: 2025-08-06

## 2025-08-06 VITALS — HEIGHT: 67 IN | WEIGHT: 120 LBS

## 2025-08-06 DIAGNOSIS — D49.2 NEOPLASM OF UNSPECIFIED BEHAVIOR OF BONE, SOFT TISSUE, AND SKIN: ICD-10-CM

## 2025-08-06 ASSESSMENT — LOCATION DETAILED DESCRIPTION DERM: LOCATION DETAILED: LEFT ANTECUBITAL SKIN

## 2025-08-06 ASSESSMENT — LOCATION SIMPLE DESCRIPTION DERM: LOCATION SIMPLE: LEFT UPPER ARM

## 2025-08-06 ASSESSMENT — LOCATION ZONE DERM: LOCATION ZONE: ARM

## 2025-08-20 ENCOUNTER — APPOINTMENT (OUTPATIENT)
Dept: URBAN - METROPOLITAN AREA CLINIC 256 | Age: 89
Setting detail: DERMATOLOGY
End: 2025-08-20

## 2025-08-20 VITALS — HEIGHT: 67 IN | WEIGHT: 120 LBS

## 2025-08-20 DIAGNOSIS — T1490XA CONTUSION OF UNSPECIFIED SITE: ICD-10-CM

## 2025-08-20 DIAGNOSIS — L81.8 OTHER SPECIFIED DISORDERS OF PIGMENTATION: ICD-10-CM

## 2025-08-20 PROBLEM — D04.62 CARCINOMA IN SITU OF SKIN OF LEFT UPPER LIMB, INCLUDING SHOULDER: Status: ACTIVE | Noted: 2025-08-20

## 2025-08-20 PROBLEM — S80.12XA CONTUSION OF LEFT LOWER LEG, INITIAL ENCOUNTER: Status: ACTIVE | Noted: 2025-08-20

## 2025-08-20 ASSESSMENT — LOCATION DETAILED DESCRIPTION DERM
LOCATION DETAILED: LEFT PROXIMAL PRETIBIAL REGION
LOCATION DETAILED: RIGHT DISTAL PRETIBIAL REGION

## 2025-08-20 ASSESSMENT — LOCATION SIMPLE DESCRIPTION DERM
LOCATION SIMPLE: RIGHT PRETIBIAL REGION
LOCATION SIMPLE: LEFT PRETIBIAL REGION

## 2025-08-20 ASSESSMENT — LOCATION ZONE DERM: LOCATION ZONE: LEG

## (undated) DEVICE — CATH ANGIO SUPERTORQUE PLUS JR4 6FRX100CM 533621

## (undated) DEVICE — SHTH INTRO 0.021IN ID 6FR DIA

## (undated) DEVICE — GUIDEWIRE TERUMO .035X180 ANG GR3508

## (undated) DEVICE — MANIFOLD KIT ANGIO AUTOMATED 014613

## (undated) DEVICE — GUIDEWIRE ASAHI SION BLUE 0.014"X180CM J-TIP AHW14R004J

## (undated) DEVICE — WIRE GUIDE 0.035"X150CM EMERALD STR 502542

## (undated) DEVICE — CATH GUIDING BLUE YELLOW PTFE JR4 6FRX100CM 67008200

## (undated) DEVICE — CATH ANGIO SUPERTORQUE AL1 6FRX100CM 532-645

## (undated) DEVICE — INTRO SHEATH 7FRX25CM PINNACLE RSS706

## (undated) DEVICE — TUBING PRESSURE 30"

## (undated) DEVICE — PACK HEART LEFT CUSTOM

## (undated) DEVICE — SPONGE RAY-TEC 4X8" 7318

## (undated) DEVICE — WIRE GUIDE LUNDERQUIST 0.035"X260CM DBL CVD

## (undated) DEVICE — Device

## (undated) DEVICE — TUBING SUCTION MEDI-VAC SOFT 3/16"X20' N520A

## (undated) DEVICE — CATH BALLOON NC EMERGE 3.50X8MM H7493926708350

## (undated) DEVICE — INTRO SHEATH 6FRX25CM PINNACLE RSS606

## (undated) DEVICE — KIT HAND CONTROL ANGIOTOUCH ACIST 65CM AT-P65

## (undated) DEVICE — DRAPE STERI FLUOROSCOPE 35X43"1012 LATEX FREE

## (undated) DEVICE — DRSG TEGADERM 8X12" 1629

## (undated) DEVICE — SUCTION MANIFOLD DORNOCH ULTRA CART UL-CL500

## (undated) DEVICE — CATH EP PACEL 5FRX110CM 1MM RIGHT HEART CURVE 401763

## (undated) DEVICE — KIT HAND CONTROL ACIST 014644 AR-P54

## (undated) DEVICE — CATH ANGIO INFINITI PIGTAIL 145 6 SH 6FRX110CM  534-652S

## (undated) DEVICE — KIT ACCESSORY INTRO INFLATION SYS 20/30 PRIORITY 1000186-115

## (undated) DEVICE — SYR 50ML LL W/O NDL 309653

## (undated) DEVICE — WIRE GUIDE 0.035"X150CM EMERALD J TIP 502521

## (undated) DEVICE — WIRE GUIDE 0.035"X260CM SAFE-T-J EXCHANGE G00517

## (undated) DEVICE — DRSG PRIMAPORE 02X3" 7133

## (undated) DEVICE — STENT SYNERGY DRUG ELUTING 3.50X28MM  H7493926028350: Type: IMPLANTABLE DEVICE | Status: NON-FUNCTIONAL

## (undated) DEVICE — CATH GUIDING BLUE YELLOW PTFE XB3.5 6FRX100CM 67005400

## (undated) DEVICE — SET INTRODUCER SHEATH 14FR 914ES

## (undated) DEVICE — GUIDEWIRE VASC SAFARI2 0.035X275CM H74939406XS1

## (undated) DEVICE — CATH BALLOON EMERGE 2.0X15MM H7493918915200

## (undated) DEVICE — RAD CLOSURE ANGIOSEAL 8FR  610131

## (undated) DEVICE — VALVE HEMOSTASIS .096" COPILOT MECH 1003331

## (undated) DEVICE — CLOSURE DEVICE 6FR VASC PROGLIDE MEDICATED SUTURE 12673-03

## (undated) DEVICE — CATH BALLOON EMERGE 3.0X15MM H7493918915300

## (undated) DEVICE — INFLATION DEVICE ATRION QL2530

## (undated) DEVICE — ADH SKIN CLOSURE PREMIERPRO EXOFIN 1.0ML 3470

## (undated) DEVICE — WIRE GUIDE 0.035"X260CM TERUMO GLIDEWIRE STR GR3504

## (undated) DEVICE — 0.035IN X 260CM, EMERALD DIAGNOSTIC GUIDEWIRE, FIXED-CORE PTFE COATED, STANDARD, 3MM EXCHANGE J-TIP (EA/1)

## (undated) DEVICE — PREP CHLORAPREP 26ML TINTED ORANGE  260815

## (undated) DEVICE — GUIDEWIRE ASAHI GRAND SLAM 300CM J-TIP AG141302J

## (undated) DEVICE — SOL NACL 0.9% IRRIG 1000ML BOTTLE 2F7124

## (undated) DEVICE — ESU GROUND PAD ADULT REM W/15' CORD E7507DB

## (undated) DEVICE — DEFIB PADPRO CONMED ADULT/CHILD 2001Z-C

## (undated) RX ORDER — SODIUM CHLORIDE 9 MG/ML
INJECTION, SOLUTION INTRAVENOUS
Status: DISPENSED
Start: 2020-10-07

## (undated) RX ORDER — HEPARIN SODIUM 1000 [USP'U]/ML
INJECTION, SOLUTION INTRAVENOUS; SUBCUTANEOUS
Status: DISPENSED
Start: 2020-09-09

## (undated) RX ORDER — ASPIRIN 81 MG/1
TABLET, CHEWABLE ORAL
Status: DISPENSED
Start: 2020-10-07

## (undated) RX ORDER — FENTANYL CITRATE 50 UG/ML
INJECTION, SOLUTION INTRAMUSCULAR; INTRAVENOUS
Status: DISPENSED
Start: 2022-02-17

## (undated) RX ORDER — BUPIVACAINE HYDROCHLORIDE 5 MG/ML
INJECTION, SOLUTION EPIDURAL; INTRACAUDAL
Status: DISPENSED
Start: 2019-06-12

## (undated) RX ORDER — FENTANYL CITRATE 50 UG/ML
INJECTION, SOLUTION INTRAMUSCULAR; INTRAVENOUS
Status: DISPENSED
Start: 2020-10-07

## (undated) RX ORDER — PROPOFOL 10 MG/ML
INJECTION, EMULSION INTRAVENOUS
Status: DISPENSED
Start: 2020-10-07

## (undated) RX ORDER — LIDOCAINE HYDROCHLORIDE 10 MG/ML
INJECTION, SOLUTION EPIDURAL; INFILTRATION; INTRACAUDAL; PERINEURAL
Status: DISPENSED
Start: 2019-06-12

## (undated) RX ORDER — HEPARIN SODIUM 1000 [USP'U]/ML
INJECTION, SOLUTION INTRAVENOUS; SUBCUTANEOUS
Status: DISPENSED
Start: 2020-10-07

## (undated) RX ORDER — DEXAMETHASONE SODIUM PHOSPHATE 4 MG/ML
INJECTION, SOLUTION INTRA-ARTICULAR; INTRALESIONAL; INTRAMUSCULAR; INTRAVENOUS; SOFT TISSUE
Status: DISPENSED
Start: 2020-10-07

## (undated) RX ORDER — LIDOCAINE HYDROCHLORIDE 20 MG/ML
INJECTION, SOLUTION EPIDURAL; INFILTRATION; INTRACAUDAL; PERINEURAL
Status: DISPENSED
Start: 2020-10-07

## (undated) RX ORDER — FENTANYL CITRATE 50 UG/ML
INJECTION, SOLUTION INTRAMUSCULAR; INTRAVENOUS
Status: DISPENSED
Start: 2020-09-09

## (undated) RX ORDER — CEFAZOLIN SODIUM 1 G/3ML
INJECTION, POWDER, FOR SOLUTION INTRAMUSCULAR; INTRAVENOUS
Status: DISPENSED
Start: 2020-10-07

## (undated) RX ORDER — PROTAMINE SULFATE 10 MG/ML
INJECTION, SOLUTION INTRAVENOUS
Status: DISPENSED
Start: 2020-10-07

## (undated) RX ORDER — FENTANYL CITRATE-0.9 % NACL/PF 10 MCG/ML
PLASTIC BAG, INJECTION (ML) INTRAVENOUS
Status: DISPENSED
Start: 2020-10-07

## (undated) RX ORDER — SIMETHICONE 40MG/0.6ML
SUSPENSION, DROPS(FINAL DOSAGE FORM)(ML) ORAL
Status: DISPENSED
Start: 2022-02-17

## (undated) RX ORDER — METHYLPREDNISOLONE ACETATE 80 MG/ML
INJECTION, SUSPENSION INTRA-ARTICULAR; INTRALESIONAL; INTRAMUSCULAR; SOFT TISSUE
Status: DISPENSED
Start: 2019-06-12

## (undated) RX ORDER — CALCIUM CHLORIDE 100 MG/ML
INJECTION INTRAVENOUS; INTRAVENTRICULAR
Status: DISPENSED
Start: 2020-10-07

## (undated) RX ORDER — SODIUM CHLORIDE 9 MG/ML
INJECTION, SOLUTION INTRAVENOUS
Status: DISPENSED
Start: 2020-09-09

## (undated) RX ORDER — FUROSEMIDE 10 MG/ML
INJECTION INTRAMUSCULAR; INTRAVENOUS
Status: DISPENSED
Start: 2020-10-07

## (undated) RX ORDER — LIDOCAINE HYDROCHLORIDE 10 MG/ML
INJECTION, SOLUTION EPIDURAL; INFILTRATION; INTRACAUDAL; PERINEURAL
Status: DISPENSED
Start: 2020-09-09

## (undated) RX ORDER — CEFAZOLIN SODIUM 2 G/100ML
INJECTION, SOLUTION INTRAVENOUS
Status: DISPENSED
Start: 2020-10-07

## (undated) RX ORDER — POTASSIUM CHLORIDE 750 MG/1
TABLET, EXTENDED RELEASE ORAL
Status: DISPENSED
Start: 2020-09-09

## (undated) RX ORDER — ONDANSETRON 2 MG/ML
INJECTION INTRAMUSCULAR; INTRAVENOUS
Status: DISPENSED
Start: 2020-10-07

## (undated) RX ORDER — LIDOCAINE HYDROCHLORIDE 10 MG/ML
INJECTION, SOLUTION EPIDURAL; INFILTRATION; INTRACAUDAL; PERINEURAL
Status: DISPENSED
Start: 2020-10-07

## (undated) RX ORDER — EPHEDRINE SULFATE 50 MG/ML
INJECTION, SOLUTION INTRAMUSCULAR; INTRAVENOUS; SUBCUTANEOUS
Status: DISPENSED
Start: 2020-10-07

## (undated) RX ORDER — EPINEPHRINE IN SOD CHLOR,ISO 1 MG/10 ML
SYRINGE (ML) INTRAVENOUS
Status: DISPENSED
Start: 2020-10-07